# Patient Record
Sex: MALE | Race: WHITE | NOT HISPANIC OR LATINO | Employment: OTHER | URBAN - METROPOLITAN AREA
[De-identification: names, ages, dates, MRNs, and addresses within clinical notes are randomized per-mention and may not be internally consistent; named-entity substitution may affect disease eponyms.]

---

## 2017-01-01 ENCOUNTER — HOSPITAL ENCOUNTER (OUTPATIENT)
Dept: RADIOLOGY | Facility: MEDICAL CENTER | Age: 77
End: 2017-07-11
Attending: INTERNAL MEDICINE
Payer: MEDICARE

## 2017-01-01 ENCOUNTER — APPOINTMENT (OUTPATIENT)
Dept: RADIOLOGY | Facility: MEDICAL CENTER | Age: 77
End: 2017-01-01
Attending: INTERNAL MEDICINE
Payer: MEDICARE

## 2017-01-01 ENCOUNTER — HOSPITAL ENCOUNTER (OUTPATIENT)
Dept: RADIOLOGY | Facility: MEDICAL CENTER | Age: 77
End: 2017-08-08
Attending: INTERNAL MEDICINE
Payer: MEDICARE

## 2017-01-01 ENCOUNTER — APPOINTMENT (OUTPATIENT)
Dept: RADIOLOGY | Facility: MEDICAL CENTER | Age: 77
End: 2017-01-01
Attending: EMERGENCY MEDICINE
Payer: MEDICARE

## 2017-01-01 ENCOUNTER — HOSPITAL ENCOUNTER (OUTPATIENT)
Dept: RADIOLOGY | Facility: MEDICAL CENTER | Age: 77
End: 2017-10-10
Attending: INTERNAL MEDICINE
Payer: MEDICARE

## 2017-01-01 ENCOUNTER — OFFICE VISIT (OUTPATIENT)
Dept: MEDICAL GROUP | Facility: CLINIC | Age: 77
End: 2017-01-01
Payer: MEDICARE

## 2017-01-01 ENCOUNTER — APPOINTMENT (OUTPATIENT)
Dept: RADIOLOGY | Facility: MEDICAL CENTER | Age: 77
DRG: 193 | End: 2017-01-01
Payer: MEDICARE

## 2017-01-01 ENCOUNTER — HOSPITAL ENCOUNTER (OUTPATIENT)
Dept: RADIOLOGY | Facility: MEDICAL CENTER | Age: 77
End: 2017-09-05
Attending: INTERNAL MEDICINE
Payer: MEDICARE

## 2017-01-01 ENCOUNTER — HOSPITAL ENCOUNTER (OUTPATIENT)
Facility: MEDICAL CENTER | Age: 77
End: 2017-11-27
Attending: SURGERY | Admitting: SURGERY
Payer: MEDICARE

## 2017-01-01 ENCOUNTER — TELEPHONE (OUTPATIENT)
Dept: MEDICAL GROUP | Facility: CLINIC | Age: 77
End: 2017-01-01

## 2017-01-01 ENCOUNTER — HOSPITAL ENCOUNTER (OUTPATIENT)
Dept: RADIOLOGY | Facility: MEDICAL CENTER | Age: 77
End: 2017-05-02
Attending: INTERNAL MEDICINE
Payer: MEDICARE

## 2017-01-01 ENCOUNTER — PATIENT OUTREACH (OUTPATIENT)
Dept: HEALTH INFORMATION MANAGEMENT | Facility: OTHER | Age: 77
End: 2017-01-01

## 2017-01-01 ENCOUNTER — HOSPITAL ENCOUNTER (OUTPATIENT)
Dept: RADIOLOGY | Facility: MEDICAL CENTER | Age: 77
End: 2017-08-22
Attending: INTERNAL MEDICINE
Payer: MEDICARE

## 2017-01-01 ENCOUNTER — HOSPITAL ENCOUNTER (OUTPATIENT)
Dept: RADIOLOGY | Facility: MEDICAL CENTER | Age: 77
End: 2017-10-31
Attending: INTERNAL MEDICINE
Payer: MEDICARE

## 2017-01-01 ENCOUNTER — HOSPITAL ENCOUNTER (EMERGENCY)
Facility: MEDICAL CENTER | Age: 77
DRG: 193 | End: 2017-02-15
Payer: MEDICARE

## 2017-01-01 ENCOUNTER — APPOINTMENT (OUTPATIENT)
Dept: RADIOLOGY | Facility: MEDICAL CENTER | Age: 77
DRG: 193 | End: 2017-01-01
Attending: EMERGENCY MEDICINE
Payer: MEDICARE

## 2017-01-01 ENCOUNTER — HOSPITAL ENCOUNTER (EMERGENCY)
Facility: MEDICAL CENTER | Age: 77
End: 2017-04-16
Attending: EMERGENCY MEDICINE
Payer: MEDICARE

## 2017-01-01 ENCOUNTER — HOSPITAL ENCOUNTER (OUTPATIENT)
Dept: RADIOLOGY | Facility: MEDICAL CENTER | Age: 77
End: 2017-09-26
Attending: INTERNAL MEDICINE
Payer: MEDICARE

## 2017-01-01 ENCOUNTER — HOSPITAL ENCOUNTER (OUTPATIENT)
Dept: RADIOLOGY | Facility: MEDICAL CENTER | Age: 77
End: 2017-10-20
Attending: INTERNAL MEDICINE
Payer: MEDICARE

## 2017-01-01 ENCOUNTER — NON-PROVIDER VISIT (OUTPATIENT)
Dept: CARDIOLOGY | Facility: MEDICAL CENTER | Age: 77
End: 2017-01-01
Payer: MEDICARE

## 2017-01-01 ENCOUNTER — HOSPITAL ENCOUNTER (OUTPATIENT)
Dept: RADIOLOGY | Facility: MEDICAL CENTER | Age: 77
End: 2017-11-21
Attending: INTERNAL MEDICINE
Payer: MEDICARE

## 2017-01-01 ENCOUNTER — HOSPITAL ENCOUNTER (INPATIENT)
Facility: MEDICAL CENTER | Age: 77
LOS: 3 days | DRG: 683 | End: 2017-01-19
Attending: EMERGENCY MEDICINE | Admitting: INTERNAL MEDICINE
Payer: MEDICARE

## 2017-01-01 ENCOUNTER — APPOINTMENT (OUTPATIENT)
Dept: RADIOLOGY | Facility: MEDICAL CENTER | Age: 77
DRG: 193 | End: 2017-01-01
Attending: HOSPITALIST
Payer: MEDICARE

## 2017-01-01 ENCOUNTER — HOSPITAL ENCOUNTER (EMERGENCY)
Facility: MEDICAL CENTER | Age: 77
End: 2017-09-14
Attending: EMERGENCY MEDICINE
Payer: MEDICARE

## 2017-01-01 ENCOUNTER — HOSPITAL ENCOUNTER (OUTPATIENT)
Dept: RADIOLOGY | Facility: MEDICAL CENTER | Age: 77
End: 2017-05-16
Attending: INTERNAL MEDICINE
Payer: MEDICARE

## 2017-01-01 ENCOUNTER — HOSPITAL ENCOUNTER (EMERGENCY)
Facility: MEDICAL CENTER | Age: 77
End: 2017-03-28
Attending: EMERGENCY MEDICINE
Payer: MEDICARE

## 2017-01-01 ENCOUNTER — APPOINTMENT (OUTPATIENT)
Dept: RADIOLOGY | Facility: MEDICAL CENTER | Age: 77
DRG: 871 | End: 2017-01-01
Attending: INTERNAL MEDICINE
Payer: MEDICARE

## 2017-01-01 ENCOUNTER — HOSPITAL ENCOUNTER (OUTPATIENT)
Dept: RADIOLOGY | Facility: MEDICAL CENTER | Age: 77
End: 2017-06-27
Attending: INTERNAL MEDICINE
Payer: MEDICARE

## 2017-01-01 ENCOUNTER — HOSPITAL ENCOUNTER (OUTPATIENT)
Dept: RADIOLOGY | Facility: MEDICAL CENTER | Age: 77
End: 2017-07-25
Attending: INTERNAL MEDICINE
Payer: MEDICARE

## 2017-01-01 ENCOUNTER — OFFICE VISIT (OUTPATIENT)
Dept: CARDIOLOGY | Facility: MEDICAL CENTER | Age: 77
End: 2017-01-01
Payer: MEDICARE

## 2017-01-01 ENCOUNTER — HOSPITAL ENCOUNTER (OUTPATIENT)
Dept: RADIOLOGY | Facility: MEDICAL CENTER | Age: 77
DRG: 871 | End: 2017-12-26
Attending: INTERNAL MEDICINE
Payer: MEDICARE

## 2017-01-01 ENCOUNTER — HOSPITAL ENCOUNTER (INPATIENT)
Facility: MEDICAL CENTER | Age: 77
LOS: 2 days | DRG: 193 | End: 2017-02-18
Attending: EMERGENCY MEDICINE | Admitting: HOSPITALIST
Payer: MEDICARE

## 2017-01-01 ENCOUNTER — HOSPITAL ENCOUNTER (INPATIENT)
Facility: MEDICAL CENTER | Age: 77
LOS: 11 days | DRG: 871 | End: 2018-01-09
Attending: EMERGENCY MEDICINE | Admitting: HOSPITALIST
Payer: MEDICARE

## 2017-01-01 ENCOUNTER — HOSPITAL ENCOUNTER (OUTPATIENT)
Dept: RADIOLOGY | Facility: MEDICAL CENTER | Age: 77
End: 2017-05-30
Attending: INTERNAL MEDICINE
Payer: MEDICARE

## 2017-01-01 ENCOUNTER — HOSPITAL ENCOUNTER (OUTPATIENT)
Dept: RADIOLOGY | Facility: MEDICAL CENTER | Age: 77
End: 2017-12-05
Attending: INTERNAL MEDICINE
Payer: MEDICARE

## 2017-01-01 ENCOUNTER — HOSPITAL ENCOUNTER (OUTPATIENT)
Dept: RADIOLOGY | Facility: MEDICAL CENTER | Age: 77
End: 2017-11-09
Attending: INTERNAL MEDICINE
Payer: MEDICARE

## 2017-01-01 ENCOUNTER — APPOINTMENT (OUTPATIENT)
Dept: RADIOLOGY | Facility: MEDICAL CENTER | Age: 77
DRG: 683 | End: 2017-01-01
Attending: INTERNAL MEDICINE
Payer: MEDICARE

## 2017-01-01 ENCOUNTER — APPOINTMENT (OUTPATIENT)
Dept: RADIOLOGY | Facility: MEDICAL CENTER | Age: 77
DRG: 683 | End: 2017-01-01
Attending: HOSPITALIST
Payer: MEDICARE

## 2017-01-01 ENCOUNTER — RESOLUTE PROFESSIONAL BILLING HOSPITAL PROF FEE (OUTPATIENT)
Dept: HOSPITALIST | Facility: MEDICAL CENTER | Age: 77
End: 2017-01-01
Payer: MEDICARE

## 2017-01-01 ENCOUNTER — APPOINTMENT (OUTPATIENT)
Dept: RADIOLOGY | Facility: MEDICAL CENTER | Age: 77
DRG: 871 | End: 2017-01-01
Payer: MEDICARE

## 2017-01-01 ENCOUNTER — HOSPITAL ENCOUNTER (OUTPATIENT)
Dept: RADIOLOGY | Facility: MEDICAL CENTER | Age: 77
End: 2017-06-13
Attending: INTERNAL MEDICINE
Payer: MEDICARE

## 2017-01-01 ENCOUNTER — HOSPITAL ENCOUNTER (OUTPATIENT)
Dept: RADIOLOGY | Facility: MEDICAL CENTER | Age: 77
End: 2017-12-14
Attending: INTERNAL MEDICINE
Payer: MEDICARE

## 2017-01-01 ENCOUNTER — APPOINTMENT (OUTPATIENT)
Dept: RADIOLOGY | Facility: MEDICAL CENTER | Age: 77
DRG: 871 | End: 2017-01-01
Attending: EMERGENCY MEDICINE
Payer: MEDICARE

## 2017-01-01 ENCOUNTER — HOSPITAL ENCOUNTER (OUTPATIENT)
Dept: RADIOLOGY | Facility: MEDICAL CENTER | Age: 77
End: 2017-05-05
Attending: INTERNAL MEDICINE
Payer: MEDICARE

## 2017-01-01 ENCOUNTER — APPOINTMENT (OUTPATIENT)
Dept: RADIOLOGY | Facility: MEDICAL CENTER | Age: 77
DRG: 683 | End: 2017-01-01
Attending: EMERGENCY MEDICINE
Payer: MEDICARE

## 2017-01-01 VITALS
SYSTOLIC BLOOD PRESSURE: 112 MMHG | WEIGHT: 177.1 LBS | TEMPERATURE: 98.3 F | HEART RATE: 86 BPM | OXYGEN SATURATION: 96 % | DIASTOLIC BLOOD PRESSURE: 52 MMHG | BODY MASS INDEX: 23.99 KG/M2 | RESPIRATION RATE: 16 BRPM | HEIGHT: 72 IN

## 2017-01-01 VITALS
OXYGEN SATURATION: 96 % | HEART RATE: 92 BPM | SYSTOLIC BLOOD PRESSURE: 124 MMHG | DIASTOLIC BLOOD PRESSURE: 56 MMHG | BODY MASS INDEX: 25.86 KG/M2 | HEIGHT: 72 IN | RESPIRATION RATE: 20 BRPM | TEMPERATURE: 98.4 F | WEIGHT: 190.9 LBS

## 2017-01-01 VITALS
RESPIRATION RATE: 17 BRPM | HEIGHT: 72 IN | DIASTOLIC BLOOD PRESSURE: 64 MMHG | OXYGEN SATURATION: 94 % | SYSTOLIC BLOOD PRESSURE: 159 MMHG | HEART RATE: 74 BPM | TEMPERATURE: 98.7 F | BODY MASS INDEX: 25.89 KG/M2 | WEIGHT: 191.14 LBS

## 2017-01-01 VITALS
HEIGHT: 72 IN | DIASTOLIC BLOOD PRESSURE: 70 MMHG | HEART RATE: 84 BPM | BODY MASS INDEX: 25.47 KG/M2 | SYSTOLIC BLOOD PRESSURE: 140 MMHG | WEIGHT: 188 LBS | OXYGEN SATURATION: 98 %

## 2017-01-01 VITALS
DIASTOLIC BLOOD PRESSURE: 63 MMHG | HEIGHT: 72 IN | HEART RATE: 68 BPM | TEMPERATURE: 97.5 F | SYSTOLIC BLOOD PRESSURE: 124 MMHG | RESPIRATION RATE: 16 BRPM | WEIGHT: 178.13 LBS | BODY MASS INDEX: 24.13 KG/M2 | OXYGEN SATURATION: 98 %

## 2017-01-01 VITALS
HEIGHT: 72 IN | TEMPERATURE: 97.7 F | OXYGEN SATURATION: 99 % | DIASTOLIC BLOOD PRESSURE: 70 MMHG | RESPIRATION RATE: 16 BRPM | WEIGHT: 177 LBS | SYSTOLIC BLOOD PRESSURE: 120 MMHG | HEART RATE: 86 BPM | BODY MASS INDEX: 23.98 KG/M2

## 2017-01-01 VITALS
BODY MASS INDEX: 23.86 KG/M2 | HEIGHT: 73 IN | WEIGHT: 180 LBS | HEART RATE: 94 BPM | SYSTOLIC BLOOD PRESSURE: 128 MMHG | OXYGEN SATURATION: 99 % | RESPIRATION RATE: 21 BRPM | DIASTOLIC BLOOD PRESSURE: 71 MMHG | TEMPERATURE: 98.2 F

## 2017-01-01 VITALS
DIASTOLIC BLOOD PRESSURE: 54 MMHG | SYSTOLIC BLOOD PRESSURE: 132 MMHG | TEMPERATURE: 98.4 F | HEIGHT: 72 IN | RESPIRATION RATE: 18 BRPM | HEART RATE: 84 BPM | WEIGHT: 174.6 LBS | BODY MASS INDEX: 23.65 KG/M2 | OXYGEN SATURATION: 97 %

## 2017-01-01 VITALS
RESPIRATION RATE: 18 BRPM | WEIGHT: 171 LBS | SYSTOLIC BLOOD PRESSURE: 122 MMHG | BODY MASS INDEX: 22.66 KG/M2 | HEART RATE: 78 BPM | OXYGEN SATURATION: 98 % | TEMPERATURE: 98 F | DIASTOLIC BLOOD PRESSURE: 60 MMHG | HEIGHT: 73 IN

## 2017-01-01 VITALS
HEART RATE: 78 BPM | SYSTOLIC BLOOD PRESSURE: 167 MMHG | TEMPERATURE: 97.5 F | HEIGHT: 72 IN | WEIGHT: 167.77 LBS | RESPIRATION RATE: 16 BRPM | DIASTOLIC BLOOD PRESSURE: 75 MMHG | OXYGEN SATURATION: 100 % | BODY MASS INDEX: 22.72 KG/M2

## 2017-01-01 VITALS
TEMPERATURE: 98.8 F | OXYGEN SATURATION: 93 % | HEART RATE: 78 BPM | HEIGHT: 72 IN | SYSTOLIC BLOOD PRESSURE: 133 MMHG | RESPIRATION RATE: 18 BRPM | DIASTOLIC BLOOD PRESSURE: 74 MMHG | BODY MASS INDEX: 23.23 KG/M2 | WEIGHT: 171.52 LBS

## 2017-01-01 VITALS
OXYGEN SATURATION: 95 % | BODY MASS INDEX: 23.12 KG/M2 | WEIGHT: 170.7 LBS | HEART RATE: 80 BPM | RESPIRATION RATE: 18 BRPM | TEMPERATURE: 98.5 F | SYSTOLIC BLOOD PRESSURE: 126 MMHG | HEIGHT: 72 IN | DIASTOLIC BLOOD PRESSURE: 58 MMHG

## 2017-01-01 VITALS — SYSTOLIC BLOOD PRESSURE: 127 MMHG | OXYGEN SATURATION: 100 % | DIASTOLIC BLOOD PRESSURE: 61 MMHG | HEART RATE: 74 BPM

## 2017-01-01 VITALS
HEART RATE: 103 BPM | BODY MASS INDEX: 23.26 KG/M2 | WEIGHT: 171.74 LBS | DIASTOLIC BLOOD PRESSURE: 82 MMHG | TEMPERATURE: 98.6 F | OXYGEN SATURATION: 98 % | SYSTOLIC BLOOD PRESSURE: 125 MMHG | HEIGHT: 72 IN | RESPIRATION RATE: 16 BRPM

## 2017-01-01 VITALS
HEIGHT: 72 IN | WEIGHT: 172 LBS | OXYGEN SATURATION: 97 % | DIASTOLIC BLOOD PRESSURE: 82 MMHG | BODY MASS INDEX: 23.3 KG/M2 | HEART RATE: 82 BPM | SYSTOLIC BLOOD PRESSURE: 176 MMHG

## 2017-01-01 VITALS
DIASTOLIC BLOOD PRESSURE: 65 MMHG | WEIGHT: 191.8 LBS | SYSTOLIC BLOOD PRESSURE: 124 MMHG | HEIGHT: 72 IN | BODY MASS INDEX: 25.98 KG/M2 | TEMPERATURE: 98.2 F | OXYGEN SATURATION: 94 % | HEART RATE: 94 BPM | RESPIRATION RATE: 19 BRPM

## 2017-01-01 VITALS
DIASTOLIC BLOOD PRESSURE: 50 MMHG | TEMPERATURE: 98 F | OXYGEN SATURATION: 100 % | WEIGHT: 177 LBS | HEART RATE: 82 BPM | HEIGHT: 73 IN | RESPIRATION RATE: 16 BRPM | BODY MASS INDEX: 23.46 KG/M2 | SYSTOLIC BLOOD PRESSURE: 122 MMHG

## 2017-01-01 VITALS — SYSTOLIC BLOOD PRESSURE: 127 MMHG | DIASTOLIC BLOOD PRESSURE: 62 MMHG | OXYGEN SATURATION: 99 % | HEART RATE: 76 BPM

## 2017-01-01 VITALS — DIASTOLIC BLOOD PRESSURE: 60 MMHG | HEART RATE: 79 BPM | SYSTOLIC BLOOD PRESSURE: 131 MMHG | OXYGEN SATURATION: 98 %

## 2017-01-01 DIAGNOSIS — E88.01 ALPHA-1-ANTITRYPSIN DEFICIENCY (HCC): ICD-10-CM

## 2017-01-01 DIAGNOSIS — N17.9 ACUTE ON CHRONIC RENAL FAILURE (HCC): ICD-10-CM

## 2017-01-01 DIAGNOSIS — E11.22 TYPE 2 DIABETES MELLITUS WITH STAGE 2 CHRONIC KIDNEY DISEASE, WITH LONG-TERM CURRENT USE OF INSULIN (HCC): Chronic | ICD-10-CM

## 2017-01-01 DIAGNOSIS — R18.8 OTHER ASCITES: ICD-10-CM

## 2017-01-01 DIAGNOSIS — E46 PROTEIN CALORIE MALNUTRITION (HCC): ICD-10-CM

## 2017-01-01 DIAGNOSIS — R18.8 ASCITES OF LIVER: ICD-10-CM

## 2017-01-01 DIAGNOSIS — J40 BRONCHITIS: ICD-10-CM

## 2017-01-01 DIAGNOSIS — E78.5 DYSLIPIDEMIA: Chronic | ICD-10-CM

## 2017-01-01 DIAGNOSIS — Z95.0 S/P PLACEMENT OF CARDIAC PACEMAKER: ICD-10-CM

## 2017-01-01 DIAGNOSIS — R06.02 SOB (SHORTNESS OF BREATH): ICD-10-CM

## 2017-01-01 DIAGNOSIS — N28.9 RENAL INSUFFICIENCY: ICD-10-CM

## 2017-01-01 DIAGNOSIS — E11.9 TYPE 2 DIABETES MELLITUS WITHOUT COMPLICATION, WITHOUT LONG-TERM CURRENT USE OF INSULIN (HCC): ICD-10-CM

## 2017-01-01 DIAGNOSIS — N18.9 ACUTE ON CHRONIC RENAL FAILURE (HCC): ICD-10-CM

## 2017-01-01 DIAGNOSIS — N40.1 BENIGN NON-NODULAR PROSTATIC HYPERPLASIA WITH LOWER URINARY TRACT SYMPTOMS: ICD-10-CM

## 2017-01-01 DIAGNOSIS — E11.9 TYPE II DIABETES MELLITUS, WELL CONTROLLED (HCC): ICD-10-CM

## 2017-01-01 DIAGNOSIS — I10 ESSENTIAL HYPERTENSION: Chronic | ICD-10-CM

## 2017-01-01 DIAGNOSIS — Z95.0 CARDIAC PACEMAKER IN SITU: ICD-10-CM

## 2017-01-01 DIAGNOSIS — Z23 NEED FOR INFLUENZA VACCINATION: ICD-10-CM

## 2017-01-01 DIAGNOSIS — Z95.0 S/P PLACEMENT OF CARDIAC PACEMAKER: Chronic | ICD-10-CM

## 2017-01-01 DIAGNOSIS — R18.8 ASCITES CONTROLLED WITH MEDICATION: ICD-10-CM

## 2017-01-01 DIAGNOSIS — D64.9 NORMOCYTIC ANEMIA: ICD-10-CM

## 2017-01-01 DIAGNOSIS — E87.8 HYPERCHLOREMIA: ICD-10-CM

## 2017-01-01 DIAGNOSIS — K70.31 ASCITES DUE TO ALCOHOLIC CIRRHOSIS (HCC): ICD-10-CM

## 2017-01-01 DIAGNOSIS — N19 RENAL FAILURE: ICD-10-CM

## 2017-01-01 DIAGNOSIS — R06.02 SHORTNESS OF BREATH: ICD-10-CM

## 2017-01-01 DIAGNOSIS — Z79.4 TYPE 2 DIABETES MELLITUS WITH STAGE 2 CHRONIC KIDNEY DISEASE, WITH LONG-TERM CURRENT USE OF INSULIN (HCC): ICD-10-CM

## 2017-01-01 DIAGNOSIS — Z79.4 TYPE 2 DIABETES MELLITUS WITH STAGE 2 CHRONIC KIDNEY DISEASE, WITH LONG-TERM CURRENT USE OF INSULIN (HCC): Chronic | ICD-10-CM

## 2017-01-01 DIAGNOSIS — I10 ESSENTIAL HYPERTENSION: ICD-10-CM

## 2017-01-01 DIAGNOSIS — N18.5 ACUTE RENAL FAILURE SUPERIMPOSED ON STAGE 5 CHRONIC KIDNEY DISEASE, NOT ON CHRONIC DIALYSIS, UNSPECIFIED ACUTE RENAL FAILURE TYPE (HCC): ICD-10-CM

## 2017-01-01 DIAGNOSIS — K72.90 HEPATIC INSUFFICIENCY (HCC): ICD-10-CM

## 2017-01-01 DIAGNOSIS — I44.2 AV BLOCK, 3RD DEGREE (HCC): ICD-10-CM

## 2017-01-01 DIAGNOSIS — J90 PLEURAL EFFUSION: ICD-10-CM

## 2017-01-01 DIAGNOSIS — N18.2 TYPE 2 DIABETES MELLITUS WITH STAGE 2 CHRONIC KIDNEY DISEASE, WITH LONG-TERM CURRENT USE OF INSULIN (HCC): Chronic | ICD-10-CM

## 2017-01-01 DIAGNOSIS — R80.9 PROTEINURIA: ICD-10-CM

## 2017-01-01 DIAGNOSIS — N17.9 ACUTE RENAL FAILURE SUPERIMPOSED ON STAGE 5 CHRONIC KIDNEY DISEASE, NOT ON CHRONIC DIALYSIS, UNSPECIFIED ACUTE RENAL FAILURE TYPE (HCC): ICD-10-CM

## 2017-01-01 DIAGNOSIS — J11.1 INFLUENZA: ICD-10-CM

## 2017-01-01 DIAGNOSIS — N18.2 TYPE 2 DIABETES MELLITUS WITH STAGE 2 CHRONIC KIDNEY DISEASE, WITH LONG-TERM CURRENT USE OF INSULIN (HCC): ICD-10-CM

## 2017-01-01 DIAGNOSIS — E11.22 TYPE 2 DIABETES MELLITUS WITH STAGE 2 CHRONIC KIDNEY DISEASE, WITH LONG-TERM CURRENT USE OF INSULIN (HCC): ICD-10-CM

## 2017-01-01 DIAGNOSIS — R05.9 COUGH: ICD-10-CM

## 2017-01-01 DIAGNOSIS — D64.9 ANEMIA, UNSPECIFIED TYPE: ICD-10-CM

## 2017-01-01 DIAGNOSIS — N18.9 CRF (CHRONIC RENAL FAILURE), UNSPECIFIED STAGE: ICD-10-CM

## 2017-01-01 DIAGNOSIS — D64.9 CHRONIC ANEMIA: ICD-10-CM

## 2017-01-01 DIAGNOSIS — J18.9 PNEUMONIA DUE TO INFECTIOUS ORGANISM, UNSPECIFIED LATERALITY, UNSPECIFIED PART OF LUNG: ICD-10-CM

## 2017-01-01 DIAGNOSIS — R89.9 ABNORMAL LABORATORY TEST: ICD-10-CM

## 2017-01-01 DIAGNOSIS — J18.9 PNEUMONIA OF RIGHT UPPER LOBE DUE TO INFECTIOUS ORGANISM: ICD-10-CM

## 2017-01-01 DIAGNOSIS — K74.69 CIRRHOSIS, CRYPTOGENIC (HCC): ICD-10-CM

## 2017-01-01 DIAGNOSIS — E11.22 TYPE 2 DIABETES MELLITUS WITH CHRONIC KIDNEY DISEASE, WITHOUT LONG-TERM CURRENT USE OF INSULIN, UNSPECIFIED CKD STAGE (HCC): ICD-10-CM

## 2017-01-01 DIAGNOSIS — R53.1 WEAKNESS: ICD-10-CM

## 2017-01-01 DIAGNOSIS — E78.5 DYSLIPIDEMIA: ICD-10-CM

## 2017-01-01 DIAGNOSIS — R52 BODY ACHES: ICD-10-CM

## 2017-01-01 LAB
A1AT SERPL-MCNC: 80 MG/DL (ref 90–200)
A1AT SERPL-MCNC: 83 MG/DL (ref 90–200)
ACTION RANGE TRIGGERED IACRT: YES
AFP-TM SERPL-MCNC: 2 NG/ML (ref 0–9)
ALBUMIN FLD-MCNC: <1 G/DL
ALBUMIN SERPL BCP-MCNC: 2 G/DL (ref 3.2–4.9)
ALBUMIN SERPL BCP-MCNC: 2.1 G/DL (ref 3.2–4.9)
ALBUMIN SERPL BCP-MCNC: 2.2 G/DL (ref 3.2–4.9)
ALBUMIN SERPL BCP-MCNC: 2.4 G/DL (ref 3.2–4.9)
ALBUMIN SERPL BCP-MCNC: 2.4 G/DL (ref 3.2–4.9)
ALBUMIN SERPL BCP-MCNC: 2.6 G/DL (ref 3.2–4.9)
ALBUMIN SERPL BCP-MCNC: 2.7 G/DL (ref 3.2–4.9)
ALBUMIN SERPL BCP-MCNC: 2.7 G/DL (ref 3.2–4.9)
ALBUMIN SERPL BCP-MCNC: 3 G/DL (ref 3.2–4.9)
ALBUMIN SERPL BCP-MCNC: 3.1 G/DL (ref 3.2–4.9)
ALBUMIN/GLOB SERPL: 0.8 G/DL
ALBUMIN/GLOB SERPL: 0.9 G/DL
ALP SERPL-CCNC: 103 U/L (ref 30–99)
ALP SERPL-CCNC: 118 U/L (ref 30–99)
ALP SERPL-CCNC: 140 U/L (ref 30–99)
ALP SERPL-CCNC: 145 U/L (ref 30–99)
ALP SERPL-CCNC: 155 U/L (ref 30–99)
ALP SERPL-CCNC: 165 U/L (ref 30–99)
ALP SERPL-CCNC: 198 U/L (ref 30–99)
ALP SERPL-CCNC: 75 U/L (ref 30–99)
ALP SERPL-CCNC: 79 U/L (ref 30–99)
ALP SERPL-CCNC: 98 U/L (ref 30–99)
ALT SERPL-CCNC: 10 U/L (ref 2–50)
ALT SERPL-CCNC: 11 U/L (ref 2–50)
ALT SERPL-CCNC: 12 U/L (ref 2–50)
ALT SERPL-CCNC: 14 U/L (ref 2–50)
ALT SERPL-CCNC: 20 U/L (ref 2–50)
ALT SERPL-CCNC: 9 U/L (ref 2–50)
ALT SERPL-CCNC: 9 U/L (ref 2–50)
AMORPH CRY #/AREA URNS HPF: PRESENT /HPF
ANION GAP SERPL CALC-SCNC: 10 MMOL/L (ref 0–11.9)
ANION GAP SERPL CALC-SCNC: 11 MMOL/L (ref 0–11.9)
ANION GAP SERPL CALC-SCNC: 12 MMOL/L (ref 0–11.9)
ANION GAP SERPL CALC-SCNC: 13 MMOL/L (ref 0–11.9)
ANION GAP SERPL CALC-SCNC: 14 MMOL/L (ref 0–11.9)
ANION GAP SERPL CALC-SCNC: 15 MMOL/L (ref 0–11.9)
ANION GAP SERPL CALC-SCNC: 15 MMOL/L (ref 0–11.9)
ANION GAP SERPL CALC-SCNC: 16 MMOL/L (ref 0–11.9)
ANION GAP SERPL CALC-SCNC: 17 MMOL/L (ref 0–11.9)
ANION GAP SERPL CALC-SCNC: 19 MMOL/L (ref 0–11.9)
ANION GAP SERPL CALC-SCNC: 6 MMOL/L (ref 0–11.9)
ANION GAP SERPL CALC-SCNC: 7 MMOL/L (ref 0–11.9)
ANION GAP SERPL CALC-SCNC: 8 MMOL/L (ref 0–11.9)
ANION GAP SERPL CALC-SCNC: 9 MMOL/L (ref 0–11.9)
ANION GAP SERPL CALC-SCNC: 9 MMOL/L (ref 0–11.9)
ANISOCYTOSIS BLD QL SMEAR: ABNORMAL
APPEARANCE FLD: NORMAL
APPEARANCE UR: ABNORMAL
APPEARANCE UR: CLEAR
APTT PPP: 25.8 SEC (ref 24.7–36)
APTT PPP: 29.4 SEC (ref 24.7–36)
APTT PPP: 30.9 SEC (ref 24.7–36)
APTT PPP: 31.1 SEC (ref 24.7–36)
APTT PPP: 32.9 SEC (ref 24.7–36)
AST SERPL-CCNC: 23 U/L (ref 12–45)
AST SERPL-CCNC: 25 U/L (ref 12–45)
AST SERPL-CCNC: 25 U/L (ref 12–45)
AST SERPL-CCNC: 26 U/L (ref 12–45)
AST SERPL-CCNC: 28 U/L (ref 12–45)
AST SERPL-CCNC: 28 U/L (ref 12–45)
AST SERPL-CCNC: 29 U/L (ref 12–45)
AST SERPL-CCNC: 31 U/L (ref 12–45)
AST SERPL-CCNC: 35 U/L (ref 12–45)
AST SERPL-CCNC: 39 U/L (ref 12–45)
B-OH-BUTYR SERPL-MCNC: 1.49 MMOL/L (ref 0.02–0.27)
BACTERIA #/AREA URNS HPF: ABNORMAL /HPF
BACTERIA #/AREA URNS HPF: NEGATIVE /HPF
BACTERIA BLD CULT: NORMAL
BACTERIA FLD AEROBE CULT: NORMAL
BASE EXCESS BLDA CALC-SCNC: -17 MMOL/L (ref -4–3)
BASOPHILS # BLD AUTO: 0 % (ref 0–1.8)
BASOPHILS # BLD AUTO: 0.2 % (ref 0–1.8)
BASOPHILS # BLD AUTO: 0.2 % (ref 0–1.8)
BASOPHILS # BLD AUTO: 0.4 % (ref 0–1.8)
BASOPHILS # BLD AUTO: 0.4 % (ref 0–1.8)
BASOPHILS # BLD AUTO: 0.6 % (ref 0–1.8)
BASOPHILS # BLD: 0 K/UL (ref 0–0.12)
BASOPHILS # BLD: 0.01 K/UL (ref 0–0.12)
BASOPHILS # BLD: 0.01 K/UL (ref 0–0.12)
BASOPHILS # BLD: 0.02 K/UL (ref 0–0.12)
BASOPHILS # BLD: 0.04 K/UL (ref 0–0.12)
BASOPHILS # BLD: 0.06 K/UL (ref 0–0.12)
BASOPHILS NFR FLD: 1 %
BILIRUB FLD-MCNC: 0.3 MG/DL
BILIRUB SERPL-MCNC: 0.3 MG/DL (ref 0.1–1.5)
BILIRUB SERPL-MCNC: 0.3 MG/DL (ref 0.1–1.5)
BILIRUB SERPL-MCNC: 0.4 MG/DL (ref 0.1–1.5)
BILIRUB SERPL-MCNC: 0.5 MG/DL (ref 0.1–1.5)
BILIRUB SERPL-MCNC: 0.6 MG/DL (ref 0.1–1.5)
BILIRUB SERPL-MCNC: 0.6 MG/DL (ref 0.1–1.5)
BILIRUB SERPL-MCNC: 0.8 MG/DL (ref 0.1–1.5)
BILIRUB SERPL-MCNC: 0.8 MG/DL (ref 0.1–1.5)
BILIRUB UR QL STRIP.AUTO: NEGATIVE
BILIRUB UR QL STRIP.AUTO: NEGATIVE
BNP SERPL-MCNC: 103 PG/ML (ref 0–100)
BNP SERPL-MCNC: 108 PG/ML (ref 0–100)
BNP SERPL-MCNC: 300 PG/ML (ref 0–100)
BNP SERPL-MCNC: 361 PG/ML (ref 0–100)
BODY FLD TYPE: NORMAL
BODY FLD TYPE: NORMAL
BODY TEMPERATURE: ABNORMAL DEGREES
BUN SERPL-MCNC: 29 MG/DL (ref 8–22)
BUN SERPL-MCNC: 30 MG/DL (ref 8–22)
BUN SERPL-MCNC: 31 MG/DL (ref 8–22)
BUN SERPL-MCNC: 32 MG/DL (ref 8–22)
BUN SERPL-MCNC: 33 MG/DL (ref 8–22)
BUN SERPL-MCNC: 33 MG/DL (ref 8–22)
BUN SERPL-MCNC: 39 MG/DL (ref 8–22)
BUN SERPL-MCNC: 42 MG/DL (ref 8–22)
BUN SERPL-MCNC: 43 MG/DL (ref 8–27)
BUN SERPL-MCNC: 45 MG/DL (ref 8–22)
BUN SERPL-MCNC: 89 MG/DL (ref 8–22)
BUN SERPL-MCNC: 90 MG/DL (ref 8–22)
BUN SERPL-MCNC: 90 MG/DL (ref 8–22)
BUN SERPL-MCNC: 91 MG/DL (ref 8–22)
BUN SERPL-MCNC: 92 MG/DL (ref 8–22)
BUN SERPL-MCNC: 92 MG/DL (ref 8–22)
BUN SERPL-MCNC: 95 MG/DL (ref 8–22)
BUN/CREAT SERPL: 9 (ref 10–24)
BURR CELLS BLD QL SMEAR: NORMAL
C DIFF DNA SPEC QL NAA+PROBE: NEGATIVE
C DIFF TOX GENS STL QL NAA+PROBE: NEGATIVE
CALCIUM SERPL-MCNC: 6.8 MG/DL (ref 8.5–10.5)
CALCIUM SERPL-MCNC: 6.8 MG/DL (ref 8.5–10.5)
CALCIUM SERPL-MCNC: 7 MG/DL (ref 8.5–10.5)
CALCIUM SERPL-MCNC: 7 MG/DL (ref 8.5–10.5)
CALCIUM SERPL-MCNC: 7.3 MG/DL (ref 8.5–10.5)
CALCIUM SERPL-MCNC: 7.4 MG/DL (ref 8.5–10.5)
CALCIUM SERPL-MCNC: 7.4 MG/DL (ref 8.5–10.5)
CALCIUM SERPL-MCNC: 7.5 MG/DL (ref 8.5–10.5)
CALCIUM SERPL-MCNC: 7.6 MG/DL (ref 8.5–10.5)
CALCIUM SERPL-MCNC: 7.6 MG/DL (ref 8.5–10.5)
CALCIUM SERPL-MCNC: 7.7 MG/DL (ref 8.5–10.5)
CALCIUM SERPL-MCNC: 7.8 MG/DL (ref 8.5–10.5)
CALCIUM SERPL-MCNC: 7.8 MG/DL (ref 8.5–10.5)
CALCIUM SERPL-MCNC: 8.1 MG/DL (ref 8.5–10.5)
CALCIUM SERPL-MCNC: 8.1 MG/DL (ref 8.5–10.5)
CALCIUM SERPL-MCNC: 8.2 MG/DL (ref 8.5–10.5)
CALCIUM SERPL-MCNC: 8.2 MG/DL (ref 8.6–10.2)
CALCIUM SERPL-MCNC: 8.6 MG/DL (ref 8.5–10.5)
CALCIUM SERPL-MCNC: 8.6 MG/DL (ref 8.5–10.5)
CERULOPLASMIN SERPL-MCNC: 21 MG/DL (ref 17–54)
CHLORIDE SERPL-SCNC: 101 MMOL/L (ref 96–112)
CHLORIDE SERPL-SCNC: 102 MMOL/L (ref 96–112)
CHLORIDE SERPL-SCNC: 103 MMOL/L (ref 96–106)
CHLORIDE SERPL-SCNC: 106 MMOL/L (ref 96–112)
CHLORIDE SERPL-SCNC: 109 MMOL/L (ref 96–112)
CHLORIDE SERPL-SCNC: 110 MMOL/L (ref 96–112)
CHLORIDE SERPL-SCNC: 110 MMOL/L (ref 96–112)
CHLORIDE SERPL-SCNC: 111 MMOL/L (ref 96–112)
CHLORIDE SERPL-SCNC: 112 MMOL/L (ref 96–112)
CHLORIDE SERPL-SCNC: 112 MMOL/L (ref 96–112)
CHLORIDE SERPL-SCNC: 113 MMOL/L (ref 96–112)
CHLORIDE SERPL-SCNC: 114 MMOL/L (ref 96–112)
CHLORIDE SERPL-SCNC: 114 MMOL/L (ref 96–112)
CHLORIDE SERPL-SCNC: 115 MMOL/L (ref 96–112)
CHLORIDE SERPL-SCNC: 115 MMOL/L (ref 96–112)
CHLORIDE SERPL-SCNC: 116 MMOL/L (ref 96–112)
CHLORIDE SERPL-SCNC: 117 MMOL/L (ref 96–112)
CHLORIDE SERPL-SCNC: 117 MMOL/L (ref 96–112)
CHLORIDE UR-SCNC: 98 MMOL/L
CHOLEST SERPL-MCNC: 114 MG/DL (ref 100–199)
CO2 BLDA-SCNC: 8 MMOL/L (ref 20–33)
CO2 SERPL-SCNC: 11 MMOL/L (ref 20–33)
CO2 SERPL-SCNC: 12 MMOL/L (ref 20–33)
CO2 SERPL-SCNC: 13 MMOL/L (ref 20–33)
CO2 SERPL-SCNC: 13 MMOL/L (ref 20–33)
CO2 SERPL-SCNC: 14 MMOL/L (ref 20–33)
CO2 SERPL-SCNC: 15 MMOL/L (ref 20–33)
CO2 SERPL-SCNC: 15 MMOL/L (ref 20–33)
CO2 SERPL-SCNC: 16 MMOL/L (ref 20–33)
CO2 SERPL-SCNC: 17 MMOL/L (ref 20–33)
CO2 SERPL-SCNC: 17 MMOL/L (ref 20–33)
CO2 SERPL-SCNC: 18 MMOL/L (ref 20–33)
CO2 SERPL-SCNC: 18 MMOL/L (ref 20–33)
CO2 SERPL-SCNC: 20 MMOL/L (ref 18–29)
CO2 SERPL-SCNC: 7 MMOL/L (ref 20–33)
CO2 SERPL-SCNC: 8 MMOL/L (ref 20–33)
CO2 SERPL-SCNC: 9 MMOL/L (ref 20–33)
COLOR FLD: YELLOW
COLOR UR: ABNORMAL
COLOR UR: YELLOW
COMMENT 011824: ABNORMAL
CREAT SERPL-MCNC: 2.69 MG/DL (ref 0.5–1.4)
CREAT SERPL-MCNC: 2.82 MG/DL (ref 0.5–1.4)
CREAT SERPL-MCNC: 2.86 MG/DL (ref 0.5–1.4)
CREAT SERPL-MCNC: 2.93 MG/DL (ref 0.5–1.4)
CREAT SERPL-MCNC: 2.94 MG/DL (ref 0.5–1.4)
CREAT SERPL-MCNC: 3.01 MG/DL (ref 0.5–1.4)
CREAT SERPL-MCNC: 3.02 MG/DL (ref 0.5–1.4)
CREAT SERPL-MCNC: 3.04 MG/DL (ref 0.5–1.4)
CREAT SERPL-MCNC: 3.64 MG/DL (ref 0.5–1.4)
CREAT SERPL-MCNC: 4.14 MG/DL (ref 0.5–1.4)
CREAT SERPL-MCNC: 4.75 MG/DL (ref 0.5–1.4)
CREAT SERPL-MCNC: 4.76 MG/DL (ref 0.76–1.27)
CREAT SERPL-MCNC: 4.8 MG/DL (ref 0.5–1.4)
CREAT SERPL-MCNC: 4.98 MG/DL (ref 0.5–1.4)
CREAT SERPL-MCNC: 5.02 MG/DL (ref 0.5–1.4)
CREAT SERPL-MCNC: 5.02 MG/DL (ref 0.5–1.4)
CREAT SERPL-MCNC: 5.16 MG/DL (ref 0.5–1.4)
CREAT SERPL-MCNC: 5.18 MG/DL (ref 0.5–1.4)
CREAT SERPL-MCNC: 5.37 MG/DL (ref 0.5–1.4)
CREAT SERPL-MCNC: 5.42 MG/DL (ref 0.5–1.4)
CREAT SERPL-MCNC: 5.78 MG/DL (ref 0.5–1.4)
CREAT UR-MCNC: 85.2 MG/DL
CSF COMMENTS 1658: NORMAL
CULTURE IF INDICATED INDCX: NO UA CULTURE
CYTOLOGY REG CYTOL: NORMAL
EKG IMPRESSION: NORMAL
EOSINOPHIL # BLD AUTO: 0 K/UL (ref 0–0.51)
EOSINOPHIL # BLD AUTO: 0.03 K/UL (ref 0–0.51)
EOSINOPHIL # BLD AUTO: 0.03 K/UL (ref 0–0.51)
EOSINOPHIL # BLD AUTO: 0.05 K/UL (ref 0–0.51)
EOSINOPHIL # BLD AUTO: 0.08 K/UL (ref 0–0.51)
EOSINOPHIL # BLD AUTO: 0.12 K/UL (ref 0–0.51)
EOSINOPHIL NFR BLD: 0 % (ref 0–6.9)
EOSINOPHIL NFR BLD: 0.5 % (ref 0–6.9)
EOSINOPHIL NFR BLD: 0.7 % (ref 0–6.9)
EOSINOPHIL NFR BLD: 0.8 % (ref 0–6.9)
EOSINOPHIL NFR BLD: 0.9 % (ref 0–6.9)
EOSINOPHIL NFR BLD: 1.4 % (ref 0–6.9)
EPI CELLS #/AREA URNS HPF: ABNORMAL /HPF
EPI CELLS #/AREA URNS HPF: ABNORMAL /HPF
ERYTHROCYTE [DISTWIDTH] IN BLOOD BY AUTOMATED COUNT: 48.6 FL (ref 35.9–50)
ERYTHROCYTE [DISTWIDTH] IN BLOOD BY AUTOMATED COUNT: 48.7 FL (ref 35.9–50)
ERYTHROCYTE [DISTWIDTH] IN BLOOD BY AUTOMATED COUNT: 48.9 FL (ref 35.9–50)
ERYTHROCYTE [DISTWIDTH] IN BLOOD BY AUTOMATED COUNT: 49.2 FL (ref 35.9–50)
ERYTHROCYTE [DISTWIDTH] IN BLOOD BY AUTOMATED COUNT: 50.7 FL (ref 35.9–50)
ERYTHROCYTE [DISTWIDTH] IN BLOOD BY AUTOMATED COUNT: 51 FL (ref 35.9–50)
ERYTHROCYTE [DISTWIDTH] IN BLOOD BY AUTOMATED COUNT: 51.1 FL (ref 35.9–50)
ERYTHROCYTE [DISTWIDTH] IN BLOOD BY AUTOMATED COUNT: 51.9 FL (ref 35.9–50)
ERYTHROCYTE [DISTWIDTH] IN BLOOD BY AUTOMATED COUNT: 53.1 FL (ref 35.9–50)
ERYTHROCYTE [DISTWIDTH] IN BLOOD BY AUTOMATED COUNT: 54 FL (ref 35.9–50)
ERYTHROCYTE [DISTWIDTH] IN BLOOD BY AUTOMATED COUNT: 56.2 FL (ref 35.9–50)
ERYTHROCYTE [DISTWIDTH] IN BLOOD BY AUTOMATED COUNT: 56.9 FL (ref 35.9–50)
ERYTHROCYTE [DISTWIDTH] IN BLOOD BY AUTOMATED COUNT: 58.2 FL (ref 35.9–50)
ERYTHROCYTE [DISTWIDTH] IN BLOOD BY AUTOMATED COUNT: 58.4 FL (ref 35.9–50)
EST. AVERAGE GLUCOSE BLD GHB EST-MCNC: 111 MG/DL
FERRITIN SERPL-MCNC: 226.4 NG/ML (ref 22–322)
FERRITIN SERPL-MCNC: 91.7 NG/ML (ref 22–322)
FLUAV RNA SPEC QL NAA+PROBE: POSITIVE
FLUBV RNA SPEC QL NAA+PROBE: NEGATIVE
GFR SERPL CREATININE-BSD FRML MDRD: 10 ML/MIN/1.73 M 2
GFR SERPL CREATININE-BSD FRML MDRD: 11 ML/MIN/1.73 M 2
GFR SERPL CREATININE-BSD FRML MDRD: 12 ML/MIN/1.73 M 2
GFR SERPL CREATININE-BSD FRML MDRD: 12 ML/MIN/1.73 M 2
GFR SERPL CREATININE-BSD FRML MDRD: 14 ML/MIN/1.73 M 2
GFR SERPL CREATININE-BSD FRML MDRD: 16 ML/MIN/1.73 M 2
GFR SERPL CREATININE-BSD FRML MDRD: 20 ML/MIN/1.73 M 2
GFR SERPL CREATININE-BSD FRML MDRD: 21 ML/MIN/1.73 M 2
GFR SERPL CREATININE-BSD FRML MDRD: 22 ML/MIN/1.73 M 2
GFR SERPL CREATININE-BSD FRML MDRD: 23 ML/MIN/1.73 M 2
GFR SERPLBLD CREATININE-BSD FMLA CKD-EPI: 11 ML/MIN/1.73
GFR SERPLBLD CREATININE-BSD FMLA CKD-EPI: 12 ML/MIN/1.73
GLOBULIN SER CALC-MCNC: 2.3 G/DL (ref 1.9–3.5)
GLOBULIN SER CALC-MCNC: 2.6 G/DL (ref 1.9–3.5)
GLOBULIN SER CALC-MCNC: 2.8 G/DL (ref 1.9–3.5)
GLOBULIN SER CALC-MCNC: 2.9 G/DL (ref 1.9–3.5)
GLOBULIN SER CALC-MCNC: 2.9 G/DL (ref 1.9–3.5)
GLOBULIN SER CALC-MCNC: 3.1 G/DL (ref 1.9–3.5)
GLOBULIN SER CALC-MCNC: 3.1 G/DL (ref 1.9–3.5)
GLOBULIN SER CALC-MCNC: 3.3 G/DL (ref 1.9–3.5)
GLOBULIN SER CALC-MCNC: 3.3 G/DL (ref 1.9–3.5)
GLOBULIN SER CALC-MCNC: 3.8 G/DL (ref 1.9–3.5)
GLUCOSE BLD-MCNC: 101 MG/DL (ref 65–99)
GLUCOSE BLD-MCNC: 104 MG/DL (ref 65–99)
GLUCOSE BLD-MCNC: 109 MG/DL (ref 65–99)
GLUCOSE BLD-MCNC: 109 MG/DL (ref 65–99)
GLUCOSE BLD-MCNC: 110 MG/DL (ref 65–99)
GLUCOSE BLD-MCNC: 111 MG/DL (ref 65–99)
GLUCOSE BLD-MCNC: 120 MG/DL (ref 65–99)
GLUCOSE BLD-MCNC: 124 MG/DL (ref 65–99)
GLUCOSE BLD-MCNC: 131 MG/DL (ref 65–99)
GLUCOSE BLD-MCNC: 132 MG/DL (ref 65–99)
GLUCOSE BLD-MCNC: 137 MG/DL (ref 65–99)
GLUCOSE BLD-MCNC: 140 MG/DL (ref 65–99)
GLUCOSE BLD-MCNC: 141 MG/DL (ref 65–99)
GLUCOSE BLD-MCNC: 146 MG/DL (ref 65–99)
GLUCOSE BLD-MCNC: 171 MG/DL (ref 65–99)
GLUCOSE BLD-MCNC: 174 MG/DL (ref 65–99)
GLUCOSE BLD-MCNC: 175 MG/DL (ref 65–99)
GLUCOSE BLD-MCNC: 176 MG/DL (ref 65–99)
GLUCOSE BLD-MCNC: 84 MG/DL (ref 65–99)
GLUCOSE BLD-MCNC: 88 MG/DL (ref 65–99)
GLUCOSE BLD-MCNC: 89 MG/DL (ref 65–99)
GLUCOSE BLD-MCNC: 90 MG/DL (ref 65–99)
GLUCOSE BLD-MCNC: 95 MG/DL (ref 65–99)
GLUCOSE BLD-MCNC: 95 MG/DL (ref 65–99)
GLUCOSE BLD-MCNC: 97 MG/DL (ref 65–99)
GLUCOSE BLD-MCNC: 98 MG/DL (ref 65–99)
GLUCOSE FLD-MCNC: 136 MG/DL
GLUCOSE SERPL-MCNC: 101 MG/DL (ref 65–99)
GLUCOSE SERPL-MCNC: 111 MG/DL (ref 65–99)
GLUCOSE SERPL-MCNC: 112 MG/DL (ref 65–99)
GLUCOSE SERPL-MCNC: 118 MG/DL (ref 65–99)
GLUCOSE SERPL-MCNC: 124 MG/DL (ref 65–99)
GLUCOSE SERPL-MCNC: 125 MG/DL (ref 65–99)
GLUCOSE SERPL-MCNC: 138 MG/DL (ref 65–99)
GLUCOSE SERPL-MCNC: 140 MG/DL (ref 65–99)
GLUCOSE SERPL-MCNC: 141 MG/DL (ref 65–99)
GLUCOSE SERPL-MCNC: 142 MG/DL (ref 65–99)
GLUCOSE SERPL-MCNC: 144 MG/DL (ref 65–99)
GLUCOSE SERPL-MCNC: 147 MG/DL (ref 65–99)
GLUCOSE SERPL-MCNC: 176 MG/DL (ref 65–99)
GLUCOSE SERPL-MCNC: 180 MG/DL (ref 65–99)
GLUCOSE SERPL-MCNC: 231 MG/DL (ref 65–99)
GLUCOSE SERPL-MCNC: 90 MG/DL (ref 65–99)
GLUCOSE SERPL-MCNC: 94 MG/DL (ref 65–99)
GLUCOSE SERPL-MCNC: 94 MG/DL (ref 65–99)
GLUCOSE SERPL-MCNC: 95 MG/DL (ref 65–99)
GLUCOSE SERPL-MCNC: 98 MG/DL (ref 65–99)
GLUCOSE SERPL-MCNC: 98 MG/DL (ref 65–99)
GLUCOSE UR STRIP.AUTO-MCNC: NEGATIVE MG/DL
GLUCOSE UR STRIP.AUTO-MCNC: NEGATIVE MG/DL
GRAM STN SPEC: NORMAL
GRAM STN SPEC: NORMAL
HAV IGM SERPL QL IA: NEGATIVE
HBA1C MFR BLD: 5.5 % (ref 0–5.6)
HBA1C MFR BLD: NORMAL % (ref ?–5.8)
HBV CORE IGM SER QL: NEGATIVE
HBV SURFACE AG SER QL: NEGATIVE
HCO3 BLDA-SCNC: 7.5 MMOL/L (ref 17–25)
HCT VFR BLD AUTO: 23.6 % (ref 42–52)
HCT VFR BLD AUTO: 23.9 % (ref 42–52)
HCT VFR BLD AUTO: 24.2 % (ref 42–52)
HCT VFR BLD AUTO: 24.3 % (ref 42–52)
HCT VFR BLD AUTO: 24.3 % (ref 42–52)
HCT VFR BLD AUTO: 26.1 % (ref 42–52)
HCT VFR BLD AUTO: 26.6 % (ref 42–52)
HCT VFR BLD AUTO: 27.8 % (ref 42–52)
HCT VFR BLD AUTO: 28.4 % (ref 42–52)
HCT VFR BLD AUTO: 29.1 % (ref 42–52)
HCT VFR BLD AUTO: 30.2 % (ref 42–52)
HCT VFR BLD AUTO: 31.1 % (ref 42–52)
HCT VFR BLD AUTO: 31.2 % (ref 42–52)
HCT VFR BLD AUTO: 35.7 % (ref 42–52)
HCV AB SER QL: NEGATIVE
HDLC SERPL-MCNC: 27 MG/DL
HEMOCCULT STL QL: NEGATIVE
HGB BLD-MCNC: 10.1 G/DL (ref 14–18)
HGB BLD-MCNC: 10.3 G/DL (ref 14–18)
HGB BLD-MCNC: 11.5 G/DL (ref 14–18)
HGB BLD-MCNC: 7.6 G/DL (ref 14–18)
HGB BLD-MCNC: 7.7 G/DL (ref 14–18)
HGB BLD-MCNC: 7.8 G/DL (ref 14–18)
HGB BLD-MCNC: 7.8 G/DL (ref 14–18)
HGB BLD-MCNC: 7.9 G/DL (ref 14–18)
HGB BLD-MCNC: 8.6 G/DL (ref 14–18)
HGB BLD-MCNC: 8.9 G/DL (ref 14–18)
HGB BLD-MCNC: 8.9 G/DL (ref 14–18)
HGB BLD-MCNC: 9.4 G/DL (ref 14–18)
HGB BLD-MCNC: 9.5 G/DL (ref 14–18)
HGB BLD-MCNC: 9.7 G/DL (ref 14–18)
HGB RETIC QN AUTO: 34.8 PG/CELL (ref 29–35)
HIV 1+2 AB+HIV1 P24 AG SERPL QL IA: NON REACTIVE
HYALINE CASTS #/AREA URNS LPF: ABNORMAL /LPF
IGG SERPL-MCNC: 961 MG/DL (ref 768–1632)
IMM GRANULOCYTES # BLD AUTO: 0.01 K/UL (ref 0–0.11)
IMM GRANULOCYTES # BLD AUTO: 0.02 K/UL (ref 0–0.11)
IMM GRANULOCYTES # BLD AUTO: 0.03 K/UL (ref 0–0.11)
IMM GRANULOCYTES # BLD AUTO: 0.05 K/UL (ref 0–0.11)
IMM GRANULOCYTES # BLD AUTO: 0.06 K/UL (ref 0–0.11)
IMM GRANULOCYTES NFR BLD AUTO: 0.3 % (ref 0–0.9)
IMM GRANULOCYTES NFR BLD AUTO: 0.4 % (ref 0–0.9)
IMM GRANULOCYTES NFR BLD AUTO: 0.4 % (ref 0–0.9)
IMM GRANULOCYTES NFR BLD AUTO: 0.5 % (ref 0–0.9)
IMM GRANULOCYTES NFR BLD AUTO: 0.7 % (ref 0–0.9)
IMM RETICS NFR: 17.7 % (ref 9.3–17.4)
INR PPP: 1.18 (ref 0.87–1.13)
INR PPP: 1.21 (ref 0.87–1.13)
INR PPP: 1.26 (ref 0.87–1.13)
INR PPP: 1.27 (ref 0.87–1.13)
INR PPP: 1.28 (ref 0.87–1.13)
INST. QUALIFIED PATIENT IIQPT: YES
INT CON NEG: NEGATIVE
INT CON POS: POSITIVE
IRON SATN MFR SERPL: 11 % (ref 15–55)
IRON SATN MFR SERPL: ABNORMAL % (ref 15–55)
IRON SERPL-MCNC: 23 UG/DL (ref 50–180)
IRON SERPL-MCNC: <10 UG/DL (ref 50–180)
KETONES UR STRIP.AUTO-MCNC: ABNORMAL MG/DL
KETONES UR STRIP.AUTO-MCNC: NEGATIVE MG/DL
LACTATE BLD-SCNC: 2.4 MMOL/L (ref 0.5–2)
LACTATE BLD-SCNC: 2.8 MMOL/L (ref 0.5–2)
LACTATE BLD-SCNC: 2.9 MMOL/L (ref 0.5–2)
LACTATE BLD-SCNC: 2.9 MMOL/L (ref 0.5–2)
LACTATE BLD-SCNC: 3 MMOL/L (ref 0.5–2)
LACTATE BLD-SCNC: 3.6 MMOL/L (ref 0.5–2)
LACTATE BLD-SCNC: 4.5 MMOL/L (ref 0.5–2)
LACTATE BLD-SCNC: 6 MMOL/L (ref 0.5–2)
LDH FLD-CCNC: 49 U/L
LDLC SERPL CALC-MCNC: 57 MG/DL (ref 0–99)
LEUKOCYTE ESTERASE UR QL STRIP.AUTO: NEGATIVE
LEUKOCYTE ESTERASE UR QL STRIP.AUTO: NEGATIVE
LIPASE SERPL-CCNC: 17 U/L (ref 11–82)
LIPASE SERPL-CCNC: 19 U/L (ref 11–82)
LIPASE SERPL-CCNC: 20 U/L (ref 11–82)
LV EJECT FRACT  99904: 75
LYMPHOCYTES # BLD AUTO: 0.72 K/UL (ref 1–4.8)
LYMPHOCYTES # BLD AUTO: 0.73 K/UL (ref 1–4.8)
LYMPHOCYTES # BLD AUTO: 0.75 K/UL (ref 1–4.8)
LYMPHOCYTES # BLD AUTO: 0.92 K/UL (ref 1–4.8)
LYMPHOCYTES # BLD AUTO: 1.1 K/UL (ref 1–4.8)
LYMPHOCYTES # BLD AUTO: 1.25 K/UL (ref 1–4.8)
LYMPHOCYTES NFR BLD: 17.6 % (ref 22–41)
LYMPHOCYTES NFR BLD: 19.7 % (ref 22–41)
LYMPHOCYTES NFR BLD: 22.7 % (ref 22–41)
LYMPHOCYTES NFR BLD: 4.5 % (ref 22–41)
LYMPHOCYTES NFR BLD: 8.6 % (ref 22–41)
LYMPHOCYTES NFR BLD: 8.8 % (ref 22–41)
LYMPHOCYTES NFR FLD: 42 %
MACROCYTES BLD QL SMEAR: ABNORMAL
MAGNESIUM SERPL-MCNC: 1.8 MG/DL (ref 1.5–2.5)
MAGNESIUM SERPL-MCNC: 2.1 MG/DL (ref 1.5–2.5)
MANUAL DIFF BLD: ABNORMAL
MCH RBC QN AUTO: 29.1 PG (ref 27–33)
MCH RBC QN AUTO: 29.1 PG (ref 27–33)
MCH RBC QN AUTO: 29.3 PG (ref 27–33)
MCH RBC QN AUTO: 29.6 PG (ref 27–33)
MCH RBC QN AUTO: 29.7 PG (ref 27–33)
MCH RBC QN AUTO: 29.9 PG (ref 27–33)
MCH RBC QN AUTO: 29.9 PG (ref 27–33)
MCH RBC QN AUTO: 30.1 PG (ref 27–33)
MCH RBC QN AUTO: 30.1 PG (ref 27–33)
MCH RBC QN AUTO: 30.2 PG (ref 27–33)
MCH RBC QN AUTO: 30.5 PG (ref 27–33)
MCH RBC QN AUTO: 31 PG (ref 27–33)
MCH RBC QN AUTO: 31.5 PG (ref 27–33)
MCH RBC QN AUTO: 31.5 PG (ref 27–33)
MCHC RBC AUTO-ENTMCNC: 31.4 G/DL (ref 33.7–35.3)
MCHC RBC AUTO-ENTMCNC: 31.7 G/DL (ref 33.7–35.3)
MCHC RBC AUTO-ENTMCNC: 32 G/DL (ref 33.7–35.3)
MCHC RBC AUTO-ENTMCNC: 32.1 G/DL (ref 33.7–35.3)
MCHC RBC AUTO-ENTMCNC: 32.1 G/DL (ref 33.7–35.3)
MCHC RBC AUTO-ENTMCNC: 32.2 G/DL (ref 33.7–35.3)
MCHC RBC AUTO-ENTMCNC: 32.4 G/DL (ref 33.7–35.3)
MCHC RBC AUTO-ENTMCNC: 32.6 G/DL (ref 33.7–35.3)
MCHC RBC AUTO-ENTMCNC: 32.6 G/DL (ref 33.7–35.3)
MCHC RBC AUTO-ENTMCNC: 33 G/DL (ref 33.7–35.3)
MCHC RBC AUTO-ENTMCNC: 33.1 G/DL (ref 33.7–35.3)
MCHC RBC AUTO-ENTMCNC: 33.1 G/DL (ref 33.7–35.3)
MCHC RBC AUTO-ENTMCNC: 33.5 G/DL (ref 33.7–35.3)
MCHC RBC AUTO-ENTMCNC: 33.5 G/DL (ref 33.7–35.3)
MCV RBC AUTO: 87.9 FL (ref 81.4–97.8)
MCV RBC AUTO: 88.4 FL (ref 81.4–97.8)
MCV RBC AUTO: 90.4 FL (ref 81.4–97.8)
MCV RBC AUTO: 91.3 FL (ref 81.4–97.8)
MCV RBC AUTO: 92.3 FL (ref 81.4–97.8)
MCV RBC AUTO: 92.4 FL (ref 81.4–97.8)
MCV RBC AUTO: 93.2 FL (ref 81.4–97.8)
MCV RBC AUTO: 93.3 FL (ref 81.4–97.8)
MCV RBC AUTO: 94 FL (ref 81.4–97.8)
MCV RBC AUTO: 94.2 FL (ref 81.4–97.8)
MCV RBC AUTO: 94.3 FL (ref 81.4–97.8)
MCV RBC AUTO: 94.5 FL (ref 81.4–97.8)
MCV RBC AUTO: 95.1 FL (ref 81.4–97.8)
MCV RBC AUTO: 95.3 FL (ref 81.4–97.8)
MESOTHL CELL NFR FLD: 2 %
MICRO URNS: ABNORMAL
MICRO URNS: ABNORMAL
MITOCHONDRIA M2 IGG SER-ACNC: 6.4 UNITS (ref 0–20)
MONOCYTES # BLD AUTO: 0.27 K/UL (ref 0–0.85)
MONOCYTES # BLD AUTO: 0.29 K/UL (ref 0–0.85)
MONOCYTES # BLD AUTO: 0.36 K/UL (ref 0–0.85)
MONOCYTES # BLD AUTO: 0.66 K/UL (ref 0–0.85)
MONOCYTES # BLD AUTO: 0.73 K/UL (ref 0–0.85)
MONOCYTES # BLD AUTO: 1.18 K/UL (ref 0–0.85)
MONOCYTES NFR BLD AUTO: 5.1 % (ref 0–13.4)
MONOCYTES NFR BLD AUTO: 6.2 % (ref 0–13.4)
MONOCYTES NFR BLD AUTO: 6.4 % (ref 0–13.4)
MONOCYTES NFR BLD AUTO: 7.1 % (ref 0–13.4)
MONOCYTES NFR BLD AUTO: 7.1 % (ref 0–13.4)
MONOCYTES NFR BLD AUTO: 8.4 % (ref 0–13.4)
MONONUC CELLS NFR FLD: 12 %
MORPHOLOGY BLD-IMP: NORMAL
MUCOUS THREADS #/AREA URNS HPF: ABNORMAL /HPF
NEUTROPHILS # BLD AUTO: 12.05 K/UL (ref 1.82–7.42)
NEUTROPHILS # BLD AUTO: 14.67 K/UL (ref 1.82–7.42)
NEUTROPHILS # BLD AUTO: 2.15 K/UL (ref 1.82–7.42)
NEUTROPHILS # BLD AUTO: 3.01 K/UL (ref 1.82–7.42)
NEUTROPHILS # BLD AUTO: 4.02 K/UL (ref 1.82–7.42)
NEUTROPHILS # BLD AUTO: 8.95 K/UL (ref 1.82–7.42)
NEUTROPHILS NFR BLD: 63.4 % (ref 44–72)
NEUTROPHILS NFR BLD: 67.1 % (ref 44–72)
NEUTROPHILS NFR BLD: 71.9 % (ref 44–72)
NEUTROPHILS NFR BLD: 73.7 % (ref 44–72)
NEUTROPHILS NFR BLD: 83.8 % (ref 44–72)
NEUTROPHILS NFR BLD: 84.5 % (ref 44–72)
NEUTROPHILS NFR FLD: 38 %
NEUTS BAND NFR BLD MANUAL: 25 % (ref 0–10)
NITRITE UR QL STRIP.AUTO: NEGATIVE
NITRITE UR QL STRIP.AUTO: NEGATIVE
NRBC # BLD AUTO: 0 K/UL
NRBC # BLD AUTO: 0.03 K/UL
NRBC BLD AUTO-RTO: 0 /100 WBC
NRBC BLD-RTO: 0.2 /100 WBC
NUCLEAR IGG SER QL IA: NORMAL
O2/TOTAL GAS SETTING VFR VENT: 25 %
PCO2 BLDA: 15.8 MMHG (ref 26–37)
PCO2 TEMP ADJ BLDA: 15.2 MMHG (ref 26–37)
PH BLDA: 7.29 [PH] (ref 7.4–7.5)
PH TEMP ADJ BLDA: 7.3 [PH] (ref 7.4–7.5)
PH UR STRIP.AUTO: 5 [PH]
PH UR STRIP.AUTO: 5.5 [PH]
PHOSPHATE SERPL-MCNC: 3.4 MG/DL (ref 2.5–4.5)
PHOSPHATE SERPL-MCNC: 5.6 MG/DL (ref 2.5–4.5)
PHOSPHATE SERPL-MCNC: 6.2 MG/DL (ref 2.5–4.5)
PLATELET # BLD AUTO: 101 K/UL (ref 164–446)
PLATELET # BLD AUTO: 105 K/UL (ref 164–446)
PLATELET # BLD AUTO: 106 K/UL (ref 164–446)
PLATELET # BLD AUTO: 106 K/UL (ref 164–446)
PLATELET # BLD AUTO: 107 K/UL (ref 164–446)
PLATELET # BLD AUTO: 108 K/UL (ref 164–446)
PLATELET # BLD AUTO: 108 K/UL (ref 164–446)
PLATELET # BLD AUTO: 111 K/UL (ref 164–446)
PLATELET # BLD AUTO: 122 K/UL (ref 164–446)
PLATELET # BLD AUTO: 154 K/UL (ref 164–446)
PLATELET # BLD AUTO: 163 K/UL (ref 164–446)
PLATELET # BLD AUTO: 167 K/UL (ref 164–446)
PLATELET # BLD AUTO: 95 K/UL (ref 164–446)
PLATELET # BLD AUTO: 99 K/UL (ref 164–446)
PMV BLD AUTO: 11.2 FL (ref 9–12.9)
PMV BLD AUTO: 11.3 FL (ref 9–12.9)
PMV BLD AUTO: 11.4 FL (ref 9–12.9)
PMV BLD AUTO: 11.5 FL (ref 9–12.9)
PMV BLD AUTO: 11.6 FL (ref 9–12.9)
PMV BLD AUTO: 11.6 FL (ref 9–12.9)
PMV BLD AUTO: 11.7 FL (ref 9–12.9)
PMV BLD AUTO: 11.8 FL (ref 9–12.9)
PMV BLD AUTO: 11.8 FL (ref 9–12.9)
PMV BLD AUTO: 12.1 FL (ref 9–12.9)
PMV BLD AUTO: 12.3 FL (ref 9–12.9)
PMV BLD AUTO: 12.4 FL (ref 9–12.9)
PO2 BLDA: 57 MMHG (ref 64–87)
PO2 TEMP ADJ BLDA: 54 MMHG (ref 64–87)
POIKILOCYTOSIS BLD QL SMEAR: NORMAL
POTASSIUM BLD-SCNC: 4.3 MMOL/L (ref 3.6–5.5)
POTASSIUM SERPL-SCNC: 3.8 MMOL/L (ref 3.6–5.5)
POTASSIUM SERPL-SCNC: 3.9 MMOL/L (ref 3.6–5.5)
POTASSIUM SERPL-SCNC: 4 MMOL/L (ref 3.6–5.5)
POTASSIUM SERPL-SCNC: 4 MMOL/L (ref 3.6–5.5)
POTASSIUM SERPL-SCNC: 4.1 MMOL/L (ref 3.6–5.5)
POTASSIUM SERPL-SCNC: 4.1 MMOL/L (ref 3.6–5.5)
POTASSIUM SERPL-SCNC: 4.2 MMOL/L (ref 3.5–5.2)
POTASSIUM SERPL-SCNC: 4.2 MMOL/L (ref 3.6–5.5)
POTASSIUM SERPL-SCNC: 4.3 MMOL/L (ref 3.6–5.5)
POTASSIUM SERPL-SCNC: 4.3 MMOL/L (ref 3.6–5.5)
POTASSIUM SERPL-SCNC: 4.4 MMOL/L (ref 3.6–5.5)
POTASSIUM SERPL-SCNC: 4.6 MMOL/L (ref 3.6–5.5)
POTASSIUM SERPL-SCNC: 4.7 MMOL/L (ref 3.6–5.5)
POTASSIUM SERPL-SCNC: 4.8 MMOL/L (ref 3.6–5.5)
POTASSIUM SERPL-SCNC: 4.9 MMOL/L (ref 3.6–5.5)
POTASSIUM SERPL-SCNC: 5.1 MMOL/L (ref 3.6–5.5)
POTASSIUM SERPL-SCNC: 5.2 MMOL/L (ref 3.6–5.5)
POTASSIUM UR-SCNC: 37.3 MMOL/L
PROCALCITONIN SERPL-MCNC: 24.69 NG/ML
PROCALCITONIN SERPL-MCNC: 34.13 NG/ML
PROT SERPL-MCNC: 4.3 G/DL (ref 6–8.2)
PROT SERPL-MCNC: 4.8 G/DL (ref 6–8.2)
PROT SERPL-MCNC: 4.9 G/DL (ref 6–8.2)
PROT SERPL-MCNC: 5.3 G/DL (ref 6–8.2)
PROT SERPL-MCNC: 5.5 G/DL (ref 6–8.2)
PROT SERPL-MCNC: 5.6 G/DL (ref 6–8.2)
PROT SERPL-MCNC: 5.8 G/DL (ref 6–8.2)
PROT SERPL-MCNC: 5.9 G/DL (ref 6–8.2)
PROT SERPL-MCNC: 6.4 G/DL (ref 6–8.2)
PROT SERPL-MCNC: 6.8 G/DL (ref 6–8.2)
PROT UR QL STRIP: 30 MG/DL
PROT UR QL STRIP: 70 MG/DL
PROT UR-MCNC: 198 MG/DL (ref 0–15)
PROT UR-MCNC: 215.5 MG/DL (ref 0–15)
PROTHROMBIN TIME: 15.4 SEC (ref 12–14.6)
PROTHROMBIN TIME: 15.7 SEC (ref 12–14.6)
PROTHROMBIN TIME: 16.2 SEC (ref 12–14.6)
PROTHROMBIN TIME: 16.3 SEC (ref 12–14.6)
PROTHROMBIN TIME: 16.4 SEC (ref 12–14.6)
RBC # BLD AUTO: 2.51 M/UL (ref 4.7–6.1)
RBC # BLD AUTO: 2.56 M/UL (ref 4.7–6.1)
RBC # BLD AUTO: 2.58 M/UL (ref 4.7–6.1)
RBC # BLD AUTO: 2.59 M/UL (ref 4.7–6.1)
RBC # BLD AUTO: 2.63 M/UL (ref 4.7–6.1)
RBC # BLD AUTO: 2.86 M/UL (ref 4.7–6.1)
RBC # BLD AUTO: 2.98 M/UL (ref 4.7–6.1)
RBC # BLD AUTO: 2.98 M/UL (ref 4.7–6.1)
RBC # BLD AUTO: 3.01 M/UL (ref 4.7–6.1)
RBC # BLD AUTO: 3.06 M/UL (ref 4.7–6.1)
RBC # BLD AUTO: 3.24 M/UL (ref 4.7–6.1)
RBC # BLD AUTO: 3.31 M/UL (ref 4.7–6.1)
RBC # BLD AUTO: 3.54 M/UL (ref 4.7–6.1)
RBC # BLD AUTO: 3.95 M/UL (ref 4.7–6.1)
RBC # FLD: <2000 CELLS/UL
RBC # URNS HPF: ABNORMAL /HPF
RBC # URNS HPF: ABNORMAL /HPF
RBC BLD AUTO: PRESENT
RBC UR QL AUTO: NEGATIVE
RBC UR QL AUTO: NEGATIVE
RETICS # AUTO: 0.04 M/UL (ref 0.04–0.06)
RETICS/RBC NFR: 1.5 % (ref 0.8–2.1)
SAO2 % BLDA: 87 % (ref 93–99)
SCHISTOCYTES BLD QL SMEAR: NORMAL
SIGNIFICANT IND 70042: NORMAL
SITE SITE: NORMAL
SODIUM SERPL-SCNC: 135 MMOL/L (ref 135–145)
SODIUM SERPL-SCNC: 137 MMOL/L (ref 135–145)
SODIUM SERPL-SCNC: 138 MMOL/L (ref 135–145)
SODIUM SERPL-SCNC: 139 MMOL/L (ref 135–145)
SODIUM SERPL-SCNC: 140 MMOL/L (ref 135–145)
SODIUM SERPL-SCNC: 140 MMOL/L (ref 135–145)
SODIUM SERPL-SCNC: 141 MMOL/L (ref 134–144)
SODIUM SERPL-SCNC: 141 MMOL/L (ref 135–145)
SODIUM SERPL-SCNC: 142 MMOL/L (ref 135–145)
SODIUM UR-SCNC: 95 MMOL/L
SOURCE SOURCE: NORMAL
SP GR UR STRIP.AUTO: 1.01
SP GR UR STRIP.AUTO: 1.02
SPECIMEN DRAWN FROM PATIENT: ABNORMAL
TIBC SERPL-MCNC: 157 UG/DL (ref 250–450)
TIBC SERPL-MCNC: 204 UG/DL (ref 250–450)
TRIGL SERPL-MCNC: 148 MG/DL (ref 0–149)
TROPONIN I SERPL-MCNC: 0.05 NG/ML (ref 0–0.04)
TROPONIN I SERPL-MCNC: 0.06 NG/ML (ref 0–0.04)
TROPONIN I SERPL-MCNC: 0.08 NG/ML (ref 0–0.04)
TROPONIN I SERPL-MCNC: <0.01 NG/ML (ref 0–0.04)
TROPONIN I SERPL-MCNC: <0.01 NG/ML (ref 0–0.04)
TSH SERPL DL<=0.005 MIU/L-ACNC: 9.72 UIU/ML (ref 0.3–3.7)
UROBILINOGEN UR STRIP.AUTO-MCNC: 0.2 MG/DL
UUN UR-MCNC: 427 MG/DL
VANCOMYCIN SERPL-MCNC: 22.5 UG/ML
VLDLC SERPL CALC-MCNC: 30 MG/DL (ref 5–40)
WBC # BLD AUTO: 10.7 K/UL (ref 4.8–10.8)
WBC # BLD AUTO: 12.5 K/UL (ref 4.8–10.8)
WBC # BLD AUTO: 14.3 K/UL (ref 4.8–10.8)
WBC # BLD AUTO: 14.4 K/UL (ref 4.8–10.8)
WBC # BLD AUTO: 16.6 K/UL (ref 4.8–10.8)
WBC # BLD AUTO: 3.2 K/UL (ref 4.8–10.8)
WBC # BLD AUTO: 3.5 K/UL (ref 4.8–10.8)
WBC # BLD AUTO: 3.6 K/UL (ref 4.8–10.8)
WBC # BLD AUTO: 3.9 K/UL (ref 4.8–10.8)
WBC # BLD AUTO: 4.1 K/UL (ref 4.8–10.8)
WBC # BLD AUTO: 5 K/UL (ref 4.8–10.8)
WBC # BLD AUTO: 5.2 K/UL (ref 4.8–10.8)
WBC # BLD AUTO: 5.6 K/UL (ref 4.8–10.8)
WBC # BLD AUTO: 5.9 K/UL (ref 4.8–10.8)
WBC # FLD: 160 CELLS/UL
WBC #/AREA URNS HPF: ABNORMAL /HPF
WBC #/AREA URNS HPF: ABNORMAL /HPF
WBC OTHER NFR FLD: 5 %

## 2017-01-01 PROCEDURE — 85025 COMPLETE CBC W/AUTO DIFF WBC: CPT

## 2017-01-01 PROCEDURE — 700105 HCHG RX REV CODE 258: Performed by: INTERNAL MEDICINE

## 2017-01-01 PROCEDURE — 36415 COLL VENOUS BLD VENIPUNCTURE: CPT

## 2017-01-01 PROCEDURE — 88305 TISSUE EXAM BY PATHOLOGIST: CPT

## 2017-01-01 PROCEDURE — 700111 HCHG RX REV CODE 636 W/ 250 OVERRIDE (IP): Performed by: INTERNAL MEDICINE

## 2017-01-01 PROCEDURE — 700102 HCHG RX REV CODE 250 W/ 637 OVERRIDE(OP): Performed by: HOSPITALIST

## 2017-01-01 PROCEDURE — 49083 ABD PARACENTESIS W/IMAGING: CPT

## 2017-01-01 PROCEDURE — 700101 HCHG RX REV CODE 250: Performed by: INTERNAL MEDICINE

## 2017-01-01 PROCEDURE — 93005 ELECTROCARDIOGRAM TRACING: CPT | Performed by: INTERNAL MEDICINE

## 2017-01-01 PROCEDURE — 770006 HCHG ROOM/CARE - MED/SURG/GYN SEMI*

## 2017-01-01 PROCEDURE — 99233 SBSQ HOSP IP/OBS HIGH 50: CPT | Performed by: HOSPITALIST

## 2017-01-01 PROCEDURE — 160041 HCHG SURGERY MINUTES - EA ADDL 1 MIN LEVEL 4: Performed by: SURGERY

## 2017-01-01 PROCEDURE — 700111 HCHG RX REV CODE 636 W/ 250 OVERRIDE (IP): Performed by: RADIOLOGY

## 2017-01-01 PROCEDURE — 83690 ASSAY OF LIPASE: CPT

## 2017-01-01 PROCEDURE — 94668 MNPJ CHEST WALL SBSQ: CPT

## 2017-01-01 PROCEDURE — G8432 DEP SCR NOT DOC, RNG: HCPCS | Performed by: INTERNAL MEDICINE

## 2017-01-01 PROCEDURE — 93005 ELECTROCARDIOGRAM TRACING: CPT | Performed by: EMERGENCY MEDICINE

## 2017-01-01 PROCEDURE — 71010 DX-CHEST-PORTABLE (1 VIEW): CPT

## 2017-01-01 PROCEDURE — 700111 HCHG RX REV CODE 636 W/ 250 OVERRIDE (IP): Performed by: HOSPITALIST

## 2017-01-01 PROCEDURE — 88112 CYTOPATH CELL ENHANCE TECH: CPT

## 2017-01-01 PROCEDURE — 80048 BASIC METABOLIC PNL TOTAL CA: CPT

## 2017-01-01 PROCEDURE — 700105 HCHG RX REV CODE 258: Performed by: FAMILY MEDICINE

## 2017-01-01 PROCEDURE — 80053 COMPREHEN METABOLIC PANEL: CPT

## 2017-01-01 PROCEDURE — 700101 HCHG RX REV CODE 250: Performed by: EMERGENCY MEDICINE

## 2017-01-01 PROCEDURE — 99291 CRITICAL CARE FIRST HOUR: CPT

## 2017-01-01 PROCEDURE — 99214 OFFICE O/P EST MOD 30 MIN: CPT | Performed by: INTERNAL MEDICINE

## 2017-01-01 PROCEDURE — 85730 THROMBOPLASTIN TIME PARTIAL: CPT

## 2017-01-01 PROCEDURE — 93010 ELECTROCARDIOGRAM REPORT: CPT | Performed by: INTERNAL MEDICINE

## 2017-01-01 PROCEDURE — 83540 ASSAY OF IRON: CPT

## 2017-01-01 PROCEDURE — 85027 COMPLETE CBC AUTOMATED: CPT

## 2017-01-01 PROCEDURE — 85007 BL SMEAR W/DIFF WBC COUNT: CPT

## 2017-01-01 PROCEDURE — 110454 HCHG SHELL REV 250: Performed by: SURGERY

## 2017-01-01 PROCEDURE — 99291 CRITICAL CARE FIRST HOUR: CPT | Mod: 25 | Performed by: INTERNAL MEDICINE

## 2017-01-01 PROCEDURE — P9047 ALBUMIN (HUMAN), 25%, 50ML: HCPCS | Performed by: RADIOLOGY

## 2017-01-01 PROCEDURE — A9270 NON-COVERED ITEM OR SERVICE: HCPCS | Performed by: INTERNAL MEDICINE

## 2017-01-01 PROCEDURE — 82103 ALPHA-1-ANTITRYPSIN TOTAL: CPT

## 2017-01-01 PROCEDURE — 160048 HCHG OR STATISTICAL LEVEL 1-5: Performed by: SURGERY

## 2017-01-01 PROCEDURE — 83605 ASSAY OF LACTIC ACID: CPT

## 2017-01-01 PROCEDURE — A9270 NON-COVERED ITEM OR SERVICE: HCPCS | Performed by: HOSPITALIST

## 2017-01-01 PROCEDURE — 82962 GLUCOSE BLOOD TEST: CPT | Mod: 91

## 2017-01-01 PROCEDURE — 82728 ASSAY OF FERRITIN: CPT

## 2017-01-01 PROCEDURE — 700111 HCHG RX REV CODE 636 W/ 250 OVERRIDE (IP): Performed by: EMERGENCY MEDICINE

## 2017-01-01 PROCEDURE — 87186 SC STD MICRODIL/AGAR DIL: CPT

## 2017-01-01 PROCEDURE — 86255 FLUORESCENT ANTIBODY SCREEN: CPT

## 2017-01-01 PROCEDURE — 74176 CT ABD & PELVIS W/O CONTRAST: CPT

## 2017-01-01 PROCEDURE — 99223 1ST HOSP IP/OBS HIGH 75: CPT | Mod: AI | Performed by: INTERNAL MEDICINE

## 2017-01-01 PROCEDURE — 85610 PROTHROMBIN TIME: CPT

## 2017-01-01 PROCEDURE — 160036 HCHG PACU - EA ADDL 30 MINS PHASE I: Performed by: SURGERY

## 2017-01-01 PROCEDURE — 93280 PM DEVICE PROGR EVAL DUAL: CPT | Performed by: INTERNAL MEDICINE

## 2017-01-01 PROCEDURE — G8482 FLU IMMUNIZE ORDER/ADMIN: HCPCS | Performed by: INTERNAL MEDICINE

## 2017-01-01 PROCEDURE — G8420 CALC BMI NORM PARAMETERS: HCPCS | Performed by: INTERNAL MEDICINE

## 2017-01-01 PROCEDURE — G0008 ADMIN INFLUENZA VIRUS VAC: HCPCS | Performed by: INTERNAL MEDICINE

## 2017-01-01 PROCEDURE — 94640 AIRWAY INHALATION TREATMENT: CPT

## 2017-01-01 PROCEDURE — 93005 ELECTROCARDIOGRAM TRACING: CPT

## 2017-01-01 PROCEDURE — 1036F TOBACCO NON-USER: CPT | Performed by: INTERNAL MEDICINE

## 2017-01-01 PROCEDURE — 700102 HCHG RX REV CODE 250 W/ 637 OVERRIDE(OP): Performed by: INTERNAL MEDICINE

## 2017-01-01 PROCEDURE — P9047 ALBUMIN (HUMAN), 25%, 50ML: HCPCS | Performed by: HOSPITALIST

## 2017-01-01 PROCEDURE — 82962 GLUCOSE BLOOD TEST: CPT

## 2017-01-01 PROCEDURE — 700105 HCHG RX REV CODE 258: Performed by: EMERGENCY MEDICINE

## 2017-01-01 PROCEDURE — 4040F PNEUMOC VAC/ADMIN/RCVD: CPT | Performed by: INTERNAL MEDICINE

## 2017-01-01 PROCEDURE — 99239 HOSP IP/OBS DSCHRG MGMT >30: CPT | Performed by: HOSPITALIST

## 2017-01-01 PROCEDURE — 160002 HCHG RECOVERY MINUTES (STAT): Performed by: SURGERY

## 2017-01-01 PROCEDURE — 81001 URINALYSIS AUTO W/SCOPE: CPT

## 2017-01-01 PROCEDURE — 700105 HCHG RX REV CODE 258: Performed by: HOSPITALIST

## 2017-01-01 PROCEDURE — 87077 CULTURE AEROBIC IDENTIFY: CPT

## 2017-01-01 PROCEDURE — 94669 MECHANICAL CHEST WALL OSCILL: CPT

## 2017-01-01 PROCEDURE — 89051 BODY FLUID CELL COUNT: CPT

## 2017-01-01 PROCEDURE — 700111 HCHG RX REV CODE 636 W/ 250 OVERRIDE (IP)

## 2017-01-01 PROCEDURE — A9270 NON-COVERED ITEM OR SERVICE: HCPCS | Performed by: EMERGENCY MEDICINE

## 2017-01-01 PROCEDURE — B24BYZZ ULTRASONOGRAPHY OF HEART WITH AORTA USING OTHER CONTRAST: ICD-10-PCS | Performed by: HOSPITALIST

## 2017-01-01 PROCEDURE — 82247 BILIRUBIN TOTAL: CPT

## 2017-01-01 PROCEDURE — 84133 ASSAY OF URINE POTASSIUM: CPT

## 2017-01-01 PROCEDURE — 700101 HCHG RX REV CODE 250: Performed by: HOSPITALIST

## 2017-01-01 PROCEDURE — 99291 CRITICAL CARE FIRST HOUR: CPT | Performed by: INTERNAL MEDICINE

## 2017-01-01 PROCEDURE — 83880 ASSAY OF NATRIURETIC PEPTIDE: CPT

## 2017-01-01 PROCEDURE — 87493 C DIFF AMPLIFIED PROBE: CPT

## 2017-01-01 PROCEDURE — C1768 GRAFT, VASCULAR: HCPCS | Performed by: SURGERY

## 2017-01-01 PROCEDURE — 87389 HIV-1 AG W/HIV-1&-2 AB AG IA: CPT

## 2017-01-01 PROCEDURE — 1111F DSCHRG MED/CURRENT MED MERGE: CPT | Performed by: INTERNAL MEDICINE

## 2017-01-01 PROCEDURE — 1101F PT FALLS ASSESS-DOCD LE1/YR: CPT | Performed by: INTERNAL MEDICINE

## 2017-01-01 PROCEDURE — 84132 ASSAY OF SERUM POTASSIUM: CPT

## 2017-01-01 PROCEDURE — 160035 HCHG PACU - 1ST 60 MINS PHASE I: Performed by: SURGERY

## 2017-01-01 PROCEDURE — 87086 URINE CULTURE/COLONY COUNT: CPT

## 2017-01-01 PROCEDURE — 80202 ASSAY OF VANCOMYCIN: CPT

## 2017-01-01 PROCEDURE — 770022 HCHG ROOM/CARE - ICU (200)

## 2017-01-01 PROCEDURE — 96361 HYDRATE IV INFUSION ADD-ON: CPT

## 2017-01-01 PROCEDURE — 82945 GLUCOSE OTHER FLUID: CPT

## 2017-01-01 PROCEDURE — 700111 HCHG RX REV CODE 636 W/ 250 OVERRIDE (IP): Performed by: FAMILY MEDICINE

## 2017-01-01 PROCEDURE — 501837 HCHG SUTURE CV: Performed by: SURGERY

## 2017-01-01 PROCEDURE — 84100 ASSAY OF PHOSPHORUS: CPT

## 2017-01-01 PROCEDURE — C1729 CATH, DRAINAGE: HCPCS

## 2017-01-01 PROCEDURE — 83036 HEMOGLOBIN GLYCOSYLATED A1C: CPT

## 2017-01-01 PROCEDURE — 87040 BLOOD CULTURE FOR BACTERIA: CPT | Mod: 91

## 2017-01-01 PROCEDURE — 36600 WITHDRAWAL OF ARTERIAL BLOOD: CPT

## 2017-01-01 PROCEDURE — 87150 DNA/RNA AMPLIFIED PROBE: CPT

## 2017-01-01 PROCEDURE — 99223 1ST HOSP IP/OBS HIGH 75: CPT | Mod: AI | Performed by: HOSPITALIST

## 2017-01-01 PROCEDURE — 90662 IIV NO PRSV INCREASED AG IM: CPT | Performed by: INTERNAL MEDICINE

## 2017-01-01 PROCEDURE — 71020 DX-CHEST-2 VIEWS: CPT

## 2017-01-01 PROCEDURE — 500700 HCHG HEMOCLIP, SMALL (RED): Performed by: SURGERY

## 2017-01-01 PROCEDURE — 96365 THER/PROPH/DIAG IV INF INIT: CPT

## 2017-01-01 PROCEDURE — 83615 LACTATE (LD) (LDH) ENZYME: CPT

## 2017-01-01 PROCEDURE — 82803 BLOOD GASES ANY COMBINATION: CPT

## 2017-01-01 PROCEDURE — 82570 ASSAY OF URINE CREATININE: CPT

## 2017-01-01 PROCEDURE — 84484 ASSAY OF TROPONIN QUANT: CPT

## 2017-01-01 PROCEDURE — 86038 ANTINUCLEAR ANTIBODIES: CPT

## 2017-01-01 PROCEDURE — 84156 ASSAY OF PROTEIN URINE: CPT

## 2017-01-01 PROCEDURE — 87502 INFLUENZA DNA AMP PROBE: CPT

## 2017-01-01 PROCEDURE — P9047 ALBUMIN (HUMAN), 25%, 50ML: HCPCS | Performed by: INTERNAL MEDICINE

## 2017-01-01 PROCEDURE — 87040 BLOOD CULTURE FOR BACTERIA: CPT

## 2017-01-01 PROCEDURE — 87015 SPECIMEN INFECT AGNT CONCNTJ: CPT

## 2017-01-01 PROCEDURE — 92250 FUNDUS PHOTOGRAPHY W/I&R: CPT | Mod: TC | Performed by: INTERNAL MEDICINE

## 2017-01-01 PROCEDURE — 160047 HCHG PACU  - EA ADDL 30 MINS PHASE II: Performed by: SURGERY

## 2017-01-01 PROCEDURE — 700102 HCHG RX REV CODE 250 W/ 637 OVERRIDE(OP): Performed by: EMERGENCY MEDICINE

## 2017-01-01 PROCEDURE — 82272 OCCULT BLD FECES 1-3 TESTS: CPT

## 2017-01-01 PROCEDURE — 0W9G3ZX DRAINAGE OF PERITONEAL CAVITY, PERCUTANEOUS APPROACH, DIAGNOSTIC: ICD-10-PCS | Performed by: RADIOLOGY

## 2017-01-01 PROCEDURE — 83735 ASSAY OF MAGNESIUM: CPT

## 2017-01-01 PROCEDURE — 82390 ASSAY OF CERULOPLASMIN: CPT

## 2017-01-01 PROCEDURE — 160046 HCHG PACU - 1ST 60 MINS PHASE II: Performed by: SURGERY

## 2017-01-01 PROCEDURE — 82010 KETONE BODYS QUAN: CPT

## 2017-01-01 PROCEDURE — 93306 TTE W/DOPPLER COMPLETE: CPT | Mod: 26 | Performed by: INTERNAL MEDICINE

## 2017-01-01 PROCEDURE — 87205 SMEAR GRAM STAIN: CPT

## 2017-01-01 PROCEDURE — 84443 ASSAY THYROID STIM HORMONE: CPT

## 2017-01-01 PROCEDURE — 304561 HCHG STAT O2

## 2017-01-01 PROCEDURE — P9047 ALBUMIN (HUMAN), 25%, 50ML: HCPCS | Performed by: EMERGENCY MEDICINE

## 2017-01-01 PROCEDURE — 49082 ABD PARACENTESIS: CPT

## 2017-01-01 PROCEDURE — 84300 ASSAY OF URINE SODIUM: CPT

## 2017-01-01 PROCEDURE — 99213 OFFICE O/P EST LOW 20 MIN: CPT | Performed by: INTERNAL MEDICINE

## 2017-01-01 PROCEDURE — 99284 EMERGENCY DEPT VISIT MOD MDM: CPT

## 2017-01-01 PROCEDURE — 160025 RECOVERY II MINUTES (STATS): Performed by: SURGERY

## 2017-01-01 PROCEDURE — 80048 BASIC METABOLIC PNL TOTAL CA: CPT | Mod: 91

## 2017-01-01 PROCEDURE — 99285 EMERGENCY DEPT VISIT HI MDM: CPT

## 2017-01-01 PROCEDURE — 83550 IRON BINDING TEST: CPT

## 2017-01-01 PROCEDURE — 99232 SBSQ HOSP IP/OBS MODERATE 35: CPT | Performed by: HOSPITALIST

## 2017-01-01 PROCEDURE — 160009 HCHG ANES TIME/MIN: Performed by: SURGERY

## 2017-01-01 PROCEDURE — G8419 CALC BMI OUT NRM PARAM NOF/U: HCPCS | Performed by: INTERNAL MEDICINE

## 2017-01-01 PROCEDURE — 93306 TTE W/DOPPLER COMPLETE: CPT

## 2017-01-01 PROCEDURE — 87070 CULTURE OTHR SPECIMN AEROBIC: CPT

## 2017-01-01 PROCEDURE — 82436 ASSAY OF URINE CHLORIDE: CPT

## 2017-01-01 PROCEDURE — 500698 HCHG HEMOCLIP, MEDIUM: Performed by: SURGERY

## 2017-01-01 PROCEDURE — 501838 HCHG SUTURE GENERAL: Performed by: SURGERY

## 2017-01-01 PROCEDURE — 80074 ACUTE HEPATITIS PANEL: CPT

## 2017-01-01 PROCEDURE — 160029 HCHG SURGERY MINUTES - 1ST 30 MINS LEVEL 4: Performed by: SURGERY

## 2017-01-01 PROCEDURE — 85046 RETICYTE/HGB CONCENTRATE: CPT

## 2017-01-01 PROCEDURE — 96375 TX/PRO/DX INJ NEW DRUG ADDON: CPT

## 2017-01-01 PROCEDURE — 82105 ALPHA-FETOPROTEIN SERUM: CPT

## 2017-01-01 PROCEDURE — 700112 HCHG RX REV CODE 229: Performed by: HOSPITALIST

## 2017-01-01 PROCEDURE — 93005 ELECTROCARDIOGRAM TRACING: CPT | Performed by: SURGERY

## 2017-01-01 PROCEDURE — 83036 HEMOGLOBIN GLYCOSYLATED A1C: CPT | Performed by: INTERNAL MEDICINE

## 2017-01-01 PROCEDURE — 700101 HCHG RX REV CODE 250

## 2017-01-01 PROCEDURE — 82042 OTHER SOURCE ALBUMIN QUAN EA: CPT

## 2017-01-01 PROCEDURE — 51798 US URINE CAPACITY MEASURE: CPT

## 2017-01-01 PROCEDURE — 83605 ASSAY OF LACTIC ACID: CPT | Mod: 91

## 2017-01-01 PROCEDURE — 94667 MNPJ CHEST WALL 1ST: CPT

## 2017-01-01 PROCEDURE — A6402 STERILE GAUZE <= 16 SQ IN: HCPCS | Performed by: SURGERY

## 2017-01-01 PROCEDURE — 96367 TX/PROPH/DG ADDL SEQ IV INF: CPT

## 2017-01-01 PROCEDURE — 82784 ASSAY IGA/IGD/IGG/IGM EACH: CPT

## 2017-01-01 PROCEDURE — 84145 PROCALCITONIN (PCT): CPT | Mod: 91

## 2017-01-01 PROCEDURE — 99214 OFFICE O/P EST MOD 30 MIN: CPT | Mod: 25 | Performed by: INTERNAL MEDICINE

## 2017-01-01 PROCEDURE — 96366 THER/PROPH/DIAG IV INF ADDON: CPT

## 2017-01-01 PROCEDURE — 94760 N-INVAS EAR/PLS OXIMETRY 1: CPT

## 2017-01-01 PROCEDURE — 71010 DX-CHEST-LIMITED (1 VIEW): CPT

## 2017-01-01 PROCEDURE — 76700 US EXAM ABDOM COMPLETE: CPT

## 2017-01-01 PROCEDURE — 84540 ASSAY OF URINE/UREA-N: CPT

## 2017-01-01 DEVICE — GRAFT GOR STRCH 4-7X45CM: Type: IMPLANTABLE DEVICE | Status: FUNCTIONAL

## 2017-01-01 RX ORDER — ALBUMIN (HUMAN) 12.5 G/50ML
50 SOLUTION INTRAVENOUS ONCE
Status: COMPLETED | OUTPATIENT
Start: 2017-01-01 | End: 2017-01-01

## 2017-01-01 RX ORDER — SODIUM CHLORIDE 9 MG/ML
1000 INJECTION, SOLUTION INTRAVENOUS
Status: DISCONTINUED | OUTPATIENT
Start: 2017-01-01 | End: 2018-01-01

## 2017-01-01 RX ORDER — SPIRONOLACTONE 25 MG/1
25 TABLET ORAL DAILY
Status: ON HOLD | COMMUNITY
End: 2017-01-01

## 2017-01-01 RX ORDER — SPIRONOLACTONE 25 MG/1
25 TABLET ORAL
Status: DISCONTINUED | OUTPATIENT
Start: 2017-01-01 | End: 2017-01-01 | Stop reason: HOSPADM

## 2017-01-01 RX ORDER — LOSARTAN POTASSIUM 100 MG/1
100 TABLET ORAL DAILY
Qty: 90 TAB | Refills: 3 | Status: SHIPPED | OUTPATIENT
Start: 2017-01-01

## 2017-01-01 RX ORDER — AMLODIPINE BESYLATE 5 MG/1
10 TABLET ORAL
Status: DISCONTINUED | OUTPATIENT
Start: 2017-01-01 | End: 2017-01-01

## 2017-01-01 RX ORDER — AZITHROMYCIN 500 MG/1
500 INJECTION, POWDER, LYOPHILIZED, FOR SOLUTION INTRAVENOUS ONCE
Status: DISCONTINUED | OUTPATIENT
Start: 2017-01-01 | End: 2017-01-01

## 2017-01-01 RX ORDER — LIDOCAINE AND PRILOCAINE 25; 25 MG/G; MG/G
1 CREAM TOPICAL
Status: DISCONTINUED | OUTPATIENT
Start: 2017-01-01 | End: 2017-01-01 | Stop reason: HOSPADM

## 2017-01-01 RX ORDER — SODIUM CHLORIDE 9 MG/ML
INJECTION, SOLUTION INTRAVENOUS CONTINUOUS
Status: ACTIVE | OUTPATIENT
Start: 2017-01-01 | End: 2017-01-01

## 2017-01-01 RX ORDER — SPIRONOLACTONE 25 MG/1
25 TABLET ORAL DAILY
Qty: 30 TAB | Refills: 0 | Status: SHIPPED | OUTPATIENT
Start: 2017-01-01 | End: 2017-01-01 | Stop reason: SDUPTHER

## 2017-01-01 RX ORDER — BISACODYL 10 MG
10 SUPPOSITORY, RECTAL RECTAL
Status: DISCONTINUED | OUTPATIENT
Start: 2017-01-01 | End: 2017-01-01 | Stop reason: HOSPADM

## 2017-01-01 RX ORDER — AZITHROMYCIN 250 MG/1
250 TABLET, FILM COATED ORAL DAILY
Status: DISCONTINUED | OUTPATIENT
Start: 2017-01-01 | End: 2017-01-01

## 2017-01-01 RX ORDER — ACETAMINOPHEN 325 MG/1
650 TABLET ORAL
COMMUNITY
End: 2017-01-01

## 2017-01-01 RX ORDER — AMLODIPINE BESYLATE 10 MG/1
10 TABLET ORAL DAILY
COMMUNITY

## 2017-01-01 RX ORDER — FUROSEMIDE 20 MG/1
20 TABLET ORAL DAILY
Qty: 60 TAB | Refills: 11 | Status: SHIPPED | OUTPATIENT
Start: 2017-01-01

## 2017-01-01 RX ORDER — SPIRONOLACTONE 25 MG/1
25 TABLET ORAL DAILY
Qty: 30 TAB | Refills: 11 | Status: SHIPPED | OUTPATIENT
Start: 2017-01-01 | End: 2017-01-01

## 2017-01-01 RX ORDER — CALCIUM ACETATE 667 MG/1
1334 TABLET ORAL
Status: DISCONTINUED | OUTPATIENT
Start: 2017-01-01 | End: 2018-01-01

## 2017-01-01 RX ORDER — LOSARTAN POTASSIUM 50 MG/1
100 TABLET ORAL
Status: DISCONTINUED | OUTPATIENT
Start: 2017-01-01 | End: 2017-01-01 | Stop reason: HOSPADM

## 2017-01-01 RX ORDER — DOXAZOSIN 2 MG/1
2 TABLET ORAL DAILY
Status: DISCONTINUED | OUTPATIENT
Start: 2017-01-01 | End: 2017-01-01 | Stop reason: HOSPADM

## 2017-01-01 RX ORDER — HEPARIN SODIUM 5000 [USP'U]/ML
5000 INJECTION, SOLUTION INTRAVENOUS; SUBCUTANEOUS EVERY 8 HOURS
Status: DISCONTINUED | OUTPATIENT
Start: 2017-01-01 | End: 2017-01-01 | Stop reason: HOSPADM

## 2017-01-01 RX ORDER — LABETALOL HYDROCHLORIDE 5 MG/ML
10 INJECTION, SOLUTION INTRAVENOUS EVERY 4 HOURS PRN
Status: DISCONTINUED | OUTPATIENT
Start: 2017-01-01 | End: 2017-01-01 | Stop reason: HOSPADM

## 2017-01-01 RX ORDER — FLUTICASONE PROPIONATE 50 MCG
2 SPRAY, SUSPENSION (ML) NASAL
Status: DISCONTINUED | OUTPATIENT
Start: 2017-01-01 | End: 2017-01-01 | Stop reason: HOSPADM

## 2017-01-01 RX ORDER — ONDANSETRON 2 MG/ML
4 INJECTION INTRAMUSCULAR; INTRAVENOUS EVERY 4 HOURS PRN
Status: DISCONTINUED | OUTPATIENT
Start: 2017-01-01 | End: 2017-01-01 | Stop reason: HOSPADM

## 2017-01-01 RX ORDER — MAGNESIUM SULFATE HEPTAHYDRATE 40 MG/ML
4 INJECTION, SOLUTION INTRAVENOUS EVERY 4 HOURS PRN
Status: DISCONTINUED | OUTPATIENT
Start: 2017-01-01 | End: 2017-01-01

## 2017-01-01 RX ORDER — ACETAMINOPHEN 325 MG/1
650 TABLET ORAL EVERY 6 HOURS PRN
Status: DISCONTINUED | OUTPATIENT
Start: 2017-01-01 | End: 2017-01-01 | Stop reason: HOSPADM

## 2017-01-01 RX ORDER — FUROSEMIDE 20 MG/1
20 TABLET ORAL
Status: DISCONTINUED | OUTPATIENT
Start: 2017-01-01 | End: 2017-01-01 | Stop reason: HOSPADM

## 2017-01-01 RX ORDER — PIOGLITAZONEHYDROCHLORIDE 15 MG/1
15 TABLET ORAL DAILY
Status: DISCONTINUED | OUTPATIENT
Start: 2017-01-01 | End: 2017-01-01 | Stop reason: HOSPADM

## 2017-01-01 RX ORDER — AZITHROMYCIN 250 MG/1
500 TABLET, FILM COATED ORAL ONCE
Status: COMPLETED | OUTPATIENT
Start: 2017-01-01 | End: 2017-01-01

## 2017-01-01 RX ORDER — HYDROCODONE BITARTRATE AND ACETAMINOPHEN 5; 325 MG/1; MG/1
1-2 TABLET ORAL EVERY 4 HOURS PRN
Qty: 30 TAB | Refills: 0 | Status: SHIPPED | OUTPATIENT
Start: 2017-01-01 | End: 2017-01-01

## 2017-01-01 RX ORDER — AZITHROMYCIN 250 MG/1
250 TABLET, FILM COATED ORAL DAILY
Qty: 2 TAB | Refills: 0 | Status: SHIPPED | OUTPATIENT
Start: 2017-01-01 | End: 2017-01-01

## 2017-01-01 RX ORDER — AMLODIPINE BESYLATE 10 MG/1
TABLET ORAL
Qty: 90 TAB | Refills: 3 | Status: ON HOLD | OUTPATIENT
Start: 2017-01-01 | End: 2017-01-01

## 2017-01-01 RX ORDER — SODIUM CHLORIDE 9 MG/ML
INJECTION, SOLUTION INTRAVENOUS CONTINUOUS
Status: DISCONTINUED | OUTPATIENT
Start: 2017-01-01 | End: 2017-01-01

## 2017-01-01 RX ORDER — FUROSEMIDE 10 MG/ML
40 INJECTION INTRAMUSCULAR; INTRAVENOUS ONCE
Status: COMPLETED | OUTPATIENT
Start: 2017-01-01 | End: 2017-01-01

## 2017-01-01 RX ORDER — LIDOCAINE HYDROCHLORIDE 10 MG/ML
0.5 INJECTION, SOLUTION INFILTRATION; PERINEURAL
Status: DISCONTINUED | OUTPATIENT
Start: 2017-01-01 | End: 2017-01-01 | Stop reason: HOSPADM

## 2017-01-01 RX ORDER — AMOXICILLIN AND CLAVULANATE POTASSIUM 875; 125 MG/1; MG/1
1 TABLET, FILM COATED ORAL 2 TIMES DAILY
Qty: 4 TAB | Refills: 0 | Status: SHIPPED | OUTPATIENT
Start: 2017-01-01 | End: 2017-01-01

## 2017-01-01 RX ORDER — LOSARTAN POTASSIUM 50 MG/1
50 TABLET ORAL ONCE
Status: DISCONTINUED | OUTPATIENT
Start: 2017-01-01 | End: 2017-01-01

## 2017-01-01 RX ORDER — DEXTROSE MONOHYDRATE 25 G/50ML
25 INJECTION, SOLUTION INTRAVENOUS
Status: DISCONTINUED | OUTPATIENT
Start: 2017-01-01 | End: 2018-01-01

## 2017-01-01 RX ORDER — DEXTROSE AND SODIUM CHLORIDE 10; .45 G/100ML; G/100ML
INJECTION, SOLUTION INTRAVENOUS CONTINUOUS
Status: DISCONTINUED | OUTPATIENT
Start: 2017-01-01 | End: 2017-01-01

## 2017-01-01 RX ORDER — AZITHROMYCIN 250 MG/1
250 TABLET, FILM COATED ORAL
Status: DISCONTINUED | OUTPATIENT
Start: 2017-01-01 | End: 2017-01-01 | Stop reason: HOSPADM

## 2017-01-01 RX ORDER — CEFTRIAXONE 2 G/1
2 INJECTION, POWDER, FOR SOLUTION INTRAMUSCULAR; INTRAVENOUS ONCE
Status: COMPLETED | OUTPATIENT
Start: 2017-01-01 | End: 2017-01-01

## 2017-01-01 RX ORDER — DOXAZOSIN 2 MG/1
2 TABLET ORAL
Status: DISCONTINUED | OUTPATIENT
Start: 2017-01-01 | End: 2017-01-01 | Stop reason: HOSPADM

## 2017-01-01 RX ORDER — INSULIN GLARGINE 100 [IU]/ML
5 INJECTION, SOLUTION SUBCUTANEOUS NIGHTLY
Status: DISCONTINUED | OUTPATIENT
Start: 2017-01-01 | End: 2017-01-01

## 2017-01-01 RX ORDER — AMOXICILLIN 250 MG
1 CAPSULE ORAL NIGHTLY
Status: DISCONTINUED | OUTPATIENT
Start: 2017-01-01 | End: 2017-01-01 | Stop reason: HOSPADM

## 2017-01-01 RX ORDER — DEXTROSE MONOHYDRATE 25 G/50ML
25 INJECTION, SOLUTION INTRAVENOUS
Status: DISCONTINUED | OUTPATIENT
Start: 2017-01-01 | End: 2017-01-01 | Stop reason: HOSPADM

## 2017-01-01 RX ORDER — INSULIN GLARGINE 100 [IU]/ML
INJECTION, SOLUTION SUBCUTANEOUS
Qty: 10 ML | Refills: 11 | Status: SHIPPED | OUTPATIENT
Start: 2017-01-01 | End: 2017-01-01

## 2017-01-01 RX ORDER — SODIUM CHLORIDE 9 MG/ML
30 INJECTION, SOLUTION INTRAVENOUS
Status: DISCONTINUED | OUTPATIENT
Start: 2017-01-01 | End: 2018-01-01

## 2017-01-01 RX ORDER — FUROSEMIDE 20 MG/1
20 TABLET ORAL DAILY
Qty: 60 TAB | Refills: 0 | Status: SHIPPED | OUTPATIENT
Start: 2017-01-01 | End: 2017-01-01 | Stop reason: SDUPTHER

## 2017-01-01 RX ORDER — MAGNESIUM SULFATE HEPTAHYDRATE 40 MG/ML
2 INJECTION, SOLUTION INTRAVENOUS
Status: DISCONTINUED | OUTPATIENT
Start: 2017-01-01 | End: 2017-01-01

## 2017-01-01 RX ORDER — SODIUM CHLORIDE 9 MG/ML
1000 INJECTION, SOLUTION INTRAVENOUS
Status: COMPLETED | OUTPATIENT
Start: 2017-01-01 | End: 2017-01-01

## 2017-01-01 RX ORDER — PIOGLITAZONEHYDROCHLORIDE 15 MG/1
15 TABLET ORAL
Status: DISCONTINUED | OUTPATIENT
Start: 2017-01-01 | End: 2017-01-01

## 2017-01-01 RX ORDER — LORAZEPAM 0.5 MG/1
0.5 TABLET ORAL EVERY 6 HOURS PRN
Status: DISCONTINUED | OUTPATIENT
Start: 2017-01-01 | End: 2017-01-01 | Stop reason: HOSPADM

## 2017-01-01 RX ORDER — DOCUSATE SODIUM 100 MG/1
100 CAPSULE, LIQUID FILLED ORAL 2 TIMES DAILY
Status: DISCONTINUED | OUTPATIENT
Start: 2017-01-01 | End: 2017-01-01 | Stop reason: HOSPADM

## 2017-01-01 RX ORDER — SODIUM BICARBONATE 650 MG/1
650 TABLET ORAL 2 TIMES DAILY
COMMUNITY

## 2017-01-01 RX ORDER — SODIUM CHLORIDE 9 MG/ML
INJECTION, SOLUTION INTRAVENOUS CONTINUOUS
Status: DISCONTINUED | OUTPATIENT
Start: 2017-01-01 | End: 2017-01-01 | Stop reason: HOSPADM

## 2017-01-01 RX ORDER — ALBUMIN (HUMAN) 12.5 G/50ML
12.5 SOLUTION INTRAVENOUS ONCE
Status: COMPLETED | OUTPATIENT
Start: 2017-01-01 | End: 2017-01-01

## 2017-01-01 RX ORDER — SODIUM BICARBONATE 650 MG/1
650 TABLET ORAL 2 TIMES DAILY
Status: DISCONTINUED | OUTPATIENT
Start: 2017-01-01 | End: 2018-01-01

## 2017-01-01 RX ORDER — SPIRONOLACTONE 25 MG/1
25 TABLET ORAL DAILY
COMMUNITY

## 2017-01-01 RX ORDER — DOXAZOSIN 2 MG/1
2 TABLET ORAL DAILY
Status: ON HOLD | COMMUNITY
End: 2017-01-01

## 2017-01-01 RX ORDER — ROSUVASTATIN CALCIUM 20 MG/1
20 TABLET, COATED ORAL EVERY EVENING
Status: DISCONTINUED | OUTPATIENT
Start: 2017-01-01 | End: 2017-01-01 | Stop reason: HOSPADM

## 2017-01-01 RX ORDER — SYRINGE-NEEDLE,INSULIN,0.5 ML 31 GX5/16"
SYRINGE, EMPTY DISPOSABLE MISCELLANEOUS
Qty: 100 EACH | Refills: 3 | Status: ON HOLD | OUTPATIENT
Start: 2017-01-01 | End: 2017-01-01

## 2017-01-01 RX ORDER — LOSARTAN POTASSIUM 100 MG/1
100 TABLET ORAL DAILY
COMMUNITY
End: 2017-01-01 | Stop reason: SDUPTHER

## 2017-01-01 RX ORDER — DEXTROSE AND SODIUM CHLORIDE 5; .45 G/100ML; G/100ML
INJECTION, SOLUTION INTRAVENOUS CONTINUOUS
Status: DISCONTINUED | OUTPATIENT
Start: 2017-01-01 | End: 2017-01-01

## 2017-01-01 RX ORDER — CARVEDILOL 12.5 MG/1
12.5 TABLET ORAL 2 TIMES DAILY WITH MEALS
Status: DISCONTINUED | OUTPATIENT
Start: 2017-01-01 | End: 2017-01-01

## 2017-01-01 RX ORDER — DOXAZOSIN 2 MG/1
2 TABLET ORAL DAILY
COMMUNITY

## 2017-01-01 RX ORDER — INSULIN GLARGINE 100 [IU]/ML
5 INJECTION, SOLUTION SUBCUTANEOUS NIGHTLY
Status: DISCONTINUED | OUTPATIENT
Start: 2017-01-01 | End: 2017-01-01 | Stop reason: HOSPADM

## 2017-01-01 RX ORDER — INSULIN GLARGINE 100 [IU]/ML
5-7 INJECTION, SOLUTION SUBCUTANEOUS NIGHTLY
COMMUNITY

## 2017-01-01 RX ORDER — PIOGLITAZONEHYDROCHLORIDE 15 MG/1
TABLET ORAL
Qty: 90 TAB | Refills: 3 | Status: SHIPPED | OUTPATIENT
Start: 2017-01-01

## 2017-01-01 RX ORDER — HEPARIN SODIUM 5000 [USP'U]/ML
5000 INJECTION, SOLUTION INTRAVENOUS; SUBCUTANEOUS EVERY 8 HOURS
Status: DISCONTINUED | OUTPATIENT
Start: 2017-01-01 | End: 2018-01-01

## 2017-01-01 RX ORDER — LIDOCAINE HYDROCHLORIDE 20 MG/ML
20 INJECTION, SOLUTION INFILTRATION; PERINEURAL ONCE
Status: COMPLETED | OUTPATIENT
Start: 2017-01-01 | End: 2017-01-01

## 2017-01-01 RX ORDER — SODIUM BICARBONATE 650 MG/1
650 TABLET ORAL 2 TIMES DAILY
Status: DISCONTINUED | OUTPATIENT
Start: 2017-01-01 | End: 2017-01-01

## 2017-01-01 RX ORDER — ROSUVASTATIN CALCIUM 20 MG/1
20 TABLET, COATED ORAL DAILY
Status: DISCONTINUED | OUTPATIENT
Start: 2017-01-01 | End: 2017-01-01 | Stop reason: HOSPADM

## 2017-01-01 RX ORDER — SODIUM CHLORIDE 9 MG/ML
2000 INJECTION, SOLUTION INTRAVENOUS ONCE
Status: COMPLETED | OUTPATIENT
Start: 2017-01-01 | End: 2017-01-01

## 2017-01-01 RX ORDER — OSELTAMIVIR PHOSPHATE 30 MG/1
30 CAPSULE ORAL DAILY
Status: COMPLETED | OUTPATIENT
Start: 2017-01-01 | End: 2018-01-01

## 2017-01-01 RX ORDER — ROSUVASTATIN CALCIUM 10 MG/1
20 TABLET, COATED ORAL EVERY EVENING
Status: DISCONTINUED | OUTPATIENT
Start: 2017-01-01 | End: 2018-01-01

## 2017-01-01 RX ORDER — LOSARTAN POTASSIUM 100 MG/1
100 TABLET ORAL DAILY
Qty: 30 TAB | Refills: 6 | Status: SHIPPED | OUTPATIENT
Start: 2017-01-01 | End: 2017-01-01 | Stop reason: SDUPTHER

## 2017-01-01 RX ORDER — ZOLPIDEM TARTRATE 5 MG/1
5 TABLET ORAL
Status: DISCONTINUED | OUTPATIENT
Start: 2017-01-01 | End: 2017-01-01 | Stop reason: HOSPADM

## 2017-01-01 RX ORDER — LORAZEPAM 2 MG/ML
0.5 INJECTION INTRAMUSCULAR EVERY 6 HOURS PRN
Status: DISCONTINUED | OUTPATIENT
Start: 2017-01-01 | End: 2017-01-01 | Stop reason: HOSPADM

## 2017-01-01 RX ORDER — LOSARTAN POTASSIUM 50 MG/1
100 TABLET ORAL DAILY
Status: DISCONTINUED | OUTPATIENT
Start: 2017-01-01 | End: 2017-01-01

## 2017-01-01 RX ORDER — ALBUMIN (HUMAN) 12.5 G/50ML
62.5 SOLUTION INTRAVENOUS ONCE
Status: COMPLETED | OUTPATIENT
Start: 2017-01-01 | End: 2017-01-01

## 2017-01-01 RX ORDER — ONDANSETRON 4 MG/1
4 TABLET, ORALLY DISINTEGRATING ORAL EVERY 4 HOURS PRN
Status: DISCONTINUED | OUTPATIENT
Start: 2017-01-01 | End: 2017-01-01 | Stop reason: HOSPADM

## 2017-01-01 RX ORDER — SODIUM CHLORIDE 450 MG/100ML
INJECTION, SOLUTION INTRAVENOUS CONTINUOUS
Status: DISCONTINUED | OUTPATIENT
Start: 2017-01-01 | End: 2017-01-01

## 2017-01-01 RX ORDER — LIDOCAINE HYDROCHLORIDE 10 MG/ML
INJECTION, SOLUTION INFILTRATION; PERINEURAL
Status: COMPLETED
Start: 2017-01-01 | End: 2017-01-01

## 2017-01-01 RX ORDER — DOXAZOSIN 2 MG/1
TABLET ORAL
Qty: 90 TAB | Refills: 3 | Status: ON HOLD | OUTPATIENT
Start: 2017-01-01 | End: 2017-01-01

## 2017-01-01 RX ORDER — AMOXICILLIN 250 MG
1 CAPSULE ORAL
Status: DISCONTINUED | OUTPATIENT
Start: 2017-01-01 | End: 2017-01-01 | Stop reason: HOSPADM

## 2017-01-01 RX ORDER — LACTULOSE 20 G/30ML
30 SOLUTION ORAL
Status: DISCONTINUED | OUTPATIENT
Start: 2017-01-01 | End: 2017-01-01 | Stop reason: HOSPADM

## 2017-01-01 RX ORDER — ENEMA 19; 7 G/133ML; G/133ML
1 ENEMA RECTAL
Status: DISCONTINUED | OUTPATIENT
Start: 2017-01-01 | End: 2017-01-01

## 2017-01-01 RX ADMIN — AZITHROMYCIN 500 MG: 250 TABLET, FILM COATED ORAL at 10:52

## 2017-01-01 RX ADMIN — DOXAZOSIN 2 MG: 2 TABLET ORAL at 21:55

## 2017-01-01 RX ADMIN — CEFTAROLINE FOSAMIL 300 MG: 600 POWDER, FOR SOLUTION INTRAVENOUS at 20:03

## 2017-01-01 RX ADMIN — INSULIN LISPRO 2 UNITS: 100 INJECTION, SOLUTION INTRAVENOUS; SUBCUTANEOUS at 11:02

## 2017-01-01 RX ADMIN — HEPARIN SODIUM 5000 UNITS: 5000 INJECTION, SOLUTION INTRAVENOUS; SUBCUTANEOUS at 06:09

## 2017-01-01 RX ADMIN — CARVEDILOL 12.5 MG: 12.5 TABLET, FILM COATED ORAL at 08:09

## 2017-01-01 RX ADMIN — Medication 1334 MG: at 08:29

## 2017-01-01 RX ADMIN — AZITHROMYCIN 250 MG: 250 TABLET, FILM COATED ORAL at 08:54

## 2017-01-01 RX ADMIN — OSELTAMIVIR PHOSPHATE 30 MG: 30 CAPSULE ORAL at 18:01

## 2017-01-01 RX ADMIN — ALBUMIN (HUMAN) 12.5 G: 12.5 SOLUTION INTRAVENOUS at 19:18

## 2017-01-01 RX ADMIN — HEPARIN SODIUM 5000 UNITS: 5000 INJECTION, SOLUTION INTRAVENOUS; SUBCUTANEOUS at 06:22

## 2017-01-01 RX ADMIN — LIDOCAINE HYDROCHLORIDE 0.5 ML: 10 INJECTION, SOLUTION INFILTRATION; PERINEURAL at 10:30

## 2017-01-01 RX ADMIN — AMPICILLIN SODIUM AND SULBACTAM SODIUM 3 G: 2; 1 INJECTION, POWDER, FOR SOLUTION INTRAMUSCULAR; INTRAVENOUS at 22:59

## 2017-01-01 RX ADMIN — AMPICILLIN SODIUM AND SULBACTAM SODIUM 3 G: 2; 1 INJECTION, POWDER, FOR SOLUTION INTRAMUSCULAR; INTRAVENOUS at 20:08

## 2017-01-01 RX ADMIN — OSELTAMIVIR PHOSPHATE 30 MG: 30 CAPSULE ORAL at 09:21

## 2017-01-01 RX ADMIN — FUROSEMIDE 20 MG: 20 TABLET ORAL at 13:33

## 2017-01-01 RX ADMIN — FUROSEMIDE 20 MG: 20 TABLET ORAL at 08:40

## 2017-01-01 RX ADMIN — ALBUMIN (HUMAN) 50 G: 12.5 SOLUTION INTRAVENOUS at 10:50

## 2017-01-01 RX ADMIN — DOCUSATE SODIUM 100 MG: 100 CAPSULE ORAL at 15:45

## 2017-01-01 RX ADMIN — ALBUMIN (HUMAN) 50 G: 12.5 SOLUTION INTRAVENOUS at 10:46

## 2017-01-01 RX ADMIN — CEFTRIAXONE 2 G: 2 INJECTION, POWDER, FOR SOLUTION INTRAMUSCULAR; INTRAVENOUS at 12:31

## 2017-01-01 RX ADMIN — HEPARIN SODIUM 5000 UNITS: 5000 INJECTION, SOLUTION INTRAVENOUS; SUBCUTANEOUS at 22:06

## 2017-01-01 RX ADMIN — VANCOMYCIN HYDROCHLORIDE 1200 MG: 100 INJECTION, POWDER, LYOPHILIZED, FOR SOLUTION INTRAVENOUS at 15:02

## 2017-01-01 RX ADMIN — PIOGLITAZONE 15 MG: 15 TABLET ORAL at 08:34

## 2017-01-01 RX ADMIN — SPIRONOLACTONE 25 MG: 25 TABLET, FILM COATED ORAL at 09:45

## 2017-01-01 RX ADMIN — LOSARTAN POTASSIUM 100 MG: 50 TABLET, FILM COATED ORAL at 08:40

## 2017-01-01 RX ADMIN — HEPARIN SODIUM 5000 UNITS: 5000 INJECTION, SOLUTION INTRAVENOUS; SUBCUTANEOUS at 15:46

## 2017-01-01 RX ADMIN — HEPARIN SODIUM 5000 UNITS: 5000 INJECTION, SOLUTION INTRAVENOUS; SUBCUTANEOUS at 13:46

## 2017-01-01 RX ADMIN — CARVEDILOL 12.5 MG: 12.5 TABLET, FILM COATED ORAL at 17:46

## 2017-01-01 RX ADMIN — HEPARIN SODIUM 5000 UNITS: 5000 INJECTION, SOLUTION INTRAVENOUS; SUBCUTANEOUS at 06:27

## 2017-01-01 RX ADMIN — SPIRONOLACTONE 25 MG: 25 TABLET, FILM COATED ORAL at 13:34

## 2017-01-01 RX ADMIN — SODIUM BICARBONATE: 84 INJECTION, SOLUTION INTRAVENOUS at 13:45

## 2017-01-01 RX ADMIN — LABETALOL HYDROCHLORIDE 10 MG: 5 INJECTION, SOLUTION INTRAVENOUS at 18:47

## 2017-01-01 RX ADMIN — AMPICILLIN SODIUM AND SULBACTAM SODIUM 3 G: 2; 1 INJECTION, POWDER, FOR SOLUTION INTRAMUSCULAR; INTRAVENOUS at 17:32

## 2017-01-01 RX ADMIN — ROSUVASTATIN CALCIUM 20 MG: 20 TABLET ORAL at 22:03

## 2017-01-01 RX ADMIN — INSULIN GLARGINE 5 UNITS: 100 INJECTION, SOLUTION SUBCUTANEOUS at 22:11

## 2017-01-01 RX ADMIN — HEPARIN SODIUM 5000 UNITS: 5000 INJECTION, SOLUTION INTRAVENOUS; SUBCUTANEOUS at 20:09

## 2017-01-01 RX ADMIN — AMPICILLIN SODIUM AND SULBACTAM SODIUM 3 G: 2; 1 INJECTION, POWDER, FOR SOLUTION INTRAMUSCULAR; INTRAVENOUS at 11:10

## 2017-01-01 RX ADMIN — VANCOMYCIN HYDROCHLORIDE 1900 MG: 100 INJECTION, POWDER, LYOPHILIZED, FOR SOLUTION INTRAVENOUS at 07:37

## 2017-01-01 RX ADMIN — SODIUM BICARBONATE 650 MG: 650 TABLET ORAL at 20:27

## 2017-01-01 RX ADMIN — ROSUVASTATIN CALCIUM 20 MG: 10 TABLET, FILM COATED ORAL at 20:03

## 2017-01-01 RX ADMIN — FLUTICASONE PROPIONATE 100 MCG: 50 SPRAY, METERED NASAL at 20:09

## 2017-01-01 RX ADMIN — LOSARTAN POTASSIUM 100 MG: 50 TABLET, FILM COATED ORAL at 08:35

## 2017-01-01 RX ADMIN — SODIUM BICARBONATE 650 MG: 650 TABLET ORAL at 09:21

## 2017-01-01 RX ADMIN — SODIUM BICARBONATE 650 MG: 650 TABLET ORAL at 21:56

## 2017-01-01 RX ADMIN — CEFTRIAXONE 2 G: 2 INJECTION, POWDER, FOR SOLUTION INTRAMUSCULAR; INTRAVENOUS at 12:14

## 2017-01-01 RX ADMIN — SODIUM BICARBONATE: 84 INJECTION, SOLUTION INTRAVENOUS at 19:23

## 2017-01-01 RX ADMIN — Medication 1334 MG: at 11:54

## 2017-01-01 RX ADMIN — SODIUM CHLORIDE 1000 ML: 9 INJECTION, SOLUTION INTRAVENOUS at 15:34

## 2017-01-01 RX ADMIN — CEFTRIAXONE 2 G: 2 INJECTION, POWDER, FOR SOLUTION INTRAMUSCULAR; INTRAVENOUS at 09:22

## 2017-01-01 RX ADMIN — FUROSEMIDE 40 MG: 10 INJECTION, SOLUTION INTRAVENOUS at 19:00

## 2017-01-01 RX ADMIN — SODIUM CHLORIDE: 9 INJECTION, SOLUTION INTRAVENOUS at 10:30

## 2017-01-01 RX ADMIN — HEPARIN SODIUM 5000 UNITS: 5000 INJECTION, SOLUTION INTRAVENOUS; SUBCUTANEOUS at 21:56

## 2017-01-01 RX ADMIN — ROSUVASTATIN CALCIUM 20 MG: 20 TABLET ORAL at 22:17

## 2017-01-01 RX ADMIN — INSULIN HUMAN 2 UNITS: 100 INJECTION, SOLUTION PARENTERAL at 20:27

## 2017-01-01 RX ADMIN — HEPARIN SODIUM 5000 UNITS: 5000 INJECTION, SOLUTION INTRAVENOUS; SUBCUTANEOUS at 20:03

## 2017-01-01 RX ADMIN — AZITHROMYCIN 500 MG: 250 TABLET, FILM COATED ORAL at 15:45

## 2017-01-01 RX ADMIN — ALBUMIN (HUMAN) 50 G: 12.5 SOLUTION INTRAVENOUS at 10:30

## 2017-01-01 RX ADMIN — LOSARTAN POTASSIUM 100 MG: 50 TABLET, FILM COATED ORAL at 08:54

## 2017-01-01 RX ADMIN — SODIUM CHLORIDE 1000 ML: 9 INJECTION, SOLUTION INTRAVENOUS at 10:24

## 2017-01-01 RX ADMIN — DOXAZOSIN 2 MG: 2 TABLET ORAL at 08:55

## 2017-01-01 RX ADMIN — ALBUMIN (HUMAN) 50 G: 12.5 SOLUTION INTRAVENOUS at 10:10

## 2017-01-01 RX ADMIN — ALBUMIN (HUMAN) 50 G: 0.25 INJECTION, SOLUTION INTRAVENOUS at 10:45

## 2017-01-01 RX ADMIN — HEPARIN SODIUM 5000 UNITS: 5000 INJECTION, SOLUTION INTRAVENOUS; SUBCUTANEOUS at 21:30

## 2017-01-01 RX ADMIN — SODIUM CHLORIDE: 9 INJECTION, SOLUTION INTRAVENOUS at 08:40

## 2017-01-01 RX ADMIN — LABETALOL HYDROCHLORIDE 10 MG: 5 INJECTION, SOLUTION INTRAVENOUS at 17:36

## 2017-01-01 RX ADMIN — OSELTAMIVIR PHOSPHATE 30 MG: 30 CAPSULE ORAL at 08:29

## 2017-01-01 RX ADMIN — AZITHROMYCIN 250 MG: 250 TABLET, FILM COATED ORAL at 09:21

## 2017-01-01 RX ADMIN — SODIUM CHLORIDE: 9 INJECTION, SOLUTION INTRAVENOUS at 15:51

## 2017-01-01 RX ADMIN — ALBUMIN (HUMAN) 50 G: 0.25 INJECTION, SOLUTION INTRAVENOUS at 16:20

## 2017-01-01 RX ADMIN — LOSARTAN POTASSIUM 50 MG: 50 TABLET, FILM COATED ORAL at 13:34

## 2017-01-01 RX ADMIN — ALBUMIN (HUMAN) 50 G: 12.5 SOLUTION INTRAVENOUS at 11:24

## 2017-01-01 RX ADMIN — ALBUMIN (HUMAN) 50 G: 12.5 SOLUTION INTRAVENOUS at 09:30

## 2017-01-01 RX ADMIN — Medication 1334 MG: at 17:51

## 2017-01-01 RX ADMIN — ROSUVASTATIN CALCIUM 20 MG: 20 TABLET ORAL at 21:30

## 2017-01-01 RX ADMIN — FLUTICASONE PROPIONATE 100 MCG: 50 SPRAY, METERED NASAL at 20:13

## 2017-01-01 RX ADMIN — ROSUVASTATIN CALCIUM 20 MG: 10 TABLET, FILM COATED ORAL at 21:55

## 2017-01-01 RX ADMIN — AZITHROMYCIN 250 MG: 250 TABLET, FILM COATED ORAL at 08:35

## 2017-01-01 RX ADMIN — SPIRONOLACTONE 25 MG: 25 TABLET, FILM COATED ORAL at 08:55

## 2017-01-01 RX ADMIN — AZITHROMYCIN 250 MG: 250 TABLET, FILM COATED ORAL at 08:29

## 2017-01-01 RX ADMIN — DOXAZOSIN 2 MG: 2 TABLET ORAL at 21:29

## 2017-01-01 RX ADMIN — AMPICILLIN SODIUM AND SULBACTAM SODIUM 3 G: 2; 1 INJECTION, POWDER, FOR SOLUTION INTRAMUSCULAR; INTRAVENOUS at 05:06

## 2017-01-01 RX ADMIN — SODIUM BICARBONATE 650 MG: 650 TABLET ORAL at 08:29

## 2017-01-01 RX ADMIN — AMPICILLIN SODIUM AND SULBACTAM SODIUM 3 G: 2; 1 INJECTION, POWDER, FOR SOLUTION INTRAMUSCULAR; INTRAVENOUS at 08:58

## 2017-01-01 RX ADMIN — HEPARIN SODIUM 5000 UNITS: 5000 INJECTION, SOLUTION INTRAVENOUS; SUBCUTANEOUS at 15:02

## 2017-01-01 RX ADMIN — DOXYCYCLINE 100 MG: 100 INJECTION, POWDER, LYOPHILIZED, FOR SOLUTION INTRAVENOUS at 13:38

## 2017-01-01 RX ADMIN — DOXAZOSIN 2 MG: 2 TABLET ORAL at 08:35

## 2017-01-01 RX ADMIN — SODIUM CHLORIDE 2000 ML: 9 INJECTION, SOLUTION INTRAVENOUS at 13:00

## 2017-01-01 RX ADMIN — SODIUM BICARBONATE: 84 INJECTION, SOLUTION INTRAVENOUS at 08:59

## 2017-01-01 RX ADMIN — PIOGLITAZONE 15 MG: 15 TABLET ORAL at 08:55

## 2017-01-01 RX ADMIN — ROSUVASTATIN CALCIUM 20 MG: 20 TABLET ORAL at 20:09

## 2017-01-01 RX ADMIN — ALBUMIN (HUMAN) 62.5 G: 12.5 SOLUTION INTRAVENOUS at 11:00

## 2017-01-01 RX ADMIN — ALBUMIN (HUMAN) 50 G: 12.5 SOLUTION INTRAVENOUS at 16:45

## 2017-01-01 RX ADMIN — ALBUMIN (HUMAN) 50 G: 12.5 SOLUTION INTRAVENOUS at 12:41

## 2017-01-01 RX ADMIN — SODIUM BICARBONATE: 84 INJECTION, SOLUTION INTRAVENOUS at 21:55

## 2017-01-01 RX ADMIN — ROSUVASTATIN CALCIUM 20 MG: 10 TABLET, FILM COATED ORAL at 22:06

## 2017-01-01 RX ADMIN — SPIRONOLACTONE 25 MG: 25 TABLET, FILM COATED ORAL at 08:40

## 2017-01-01 RX ADMIN — LIDOCAINE HYDROCHLORIDE 20 ML: 20 INJECTION, SOLUTION INFILTRATION; PERINEURAL at 11:45

## 2017-01-01 RX ADMIN — CEFTRIAXONE 2 G: 2 INJECTION, POWDER, FOR SOLUTION INTRAMUSCULAR; INTRAVENOUS at 09:00

## 2017-01-01 RX ADMIN — LABETALOL HYDROCHLORIDE 10 MG: 5 INJECTION, SOLUTION INTRAVENOUS at 05:06

## 2017-01-01 RX ADMIN — SODIUM BICARBONATE: 84 INJECTION, SOLUTION INTRAVENOUS at 04:13

## 2017-01-01 RX ADMIN — LOSARTAN POTASSIUM 50 MG: 50 TABLET, FILM COATED ORAL at 12:37

## 2017-01-01 RX ADMIN — CARVEDILOL 12.5 MG: 12.5 TABLET, FILM COATED ORAL at 08:14

## 2017-01-01 RX ADMIN — HEPARIN SODIUM 5000 UNITS: 5000 INJECTION, SOLUTION INTRAVENOUS; SUBCUTANEOUS at 20:13

## 2017-01-01 RX ADMIN — HEPARIN SODIUM 5000 UNITS: 5000 INJECTION, SOLUTION INTRAVENOUS; SUBCUTANEOUS at 14:30

## 2017-01-01 ASSESSMENT — ENCOUNTER SYMPTOMS
FEVER: 0
MEMORY LOSS: 0
ORTHOPNEA: 0
CHILLS: 0
COUGH: 1
PND: 0
ORTHOPNEA: 0
SORE THROAT: 0
HEMOPTYSIS: 0
HEMOPTYSIS: 0
BRUISES/BLEEDS EASILY: 0
DEPRESSION: 0
GASTROINTESTINAL NEGATIVE: 1
DIZZINESS: 0
CARDIOVASCULAR NEGATIVE: 1
SORE THROAT: 0
COUGH: 0
RESPIRATORY NEGATIVE: 1
WHEEZING: 0
CHILLS: 0
GASTROINTESTINAL NEGATIVE: 1
SORE THROAT: 0
SPUTUM PRODUCTION: 1
COUGH: 0
CLAUDICATION: 0
MUSCULOSKELETAL NEGATIVE: 1
ABDOMINAL PAIN: 0
BLURRED VISION: 0
WHEEZING: 0
COUGH: 0
TINGLING: 0
SPEECH CHANGE: 0
DIARRHEA: 0
NAUSEA: 0
CONSTIPATION: 0
SHORTNESS OF BREATH: 0
CARDIOVASCULAR NEGATIVE: 1
CARDIOVASCULAR NEGATIVE: 1
RESPIRATORY NEGATIVE: 1
ORTHOPNEA: 0
VOMITING: 0
CHILLS: 0
TINGLING: 0
WEAKNESS: 1
PALPITATIONS: 0
BLOOD IN STOOL: 0
SPUTUM PRODUCTION: 0
PALPITATIONS: 0
COUGH: 1
GASTROINTESTINAL NEGATIVE: 1
EYES NEGATIVE: 1
FEVER: 0
STRIDOR: 0
SORE THROAT: 0
HEARTBURN: 0
COUGH: 1
CLAUDICATION: 0
GASTROINTESTINAL NEGATIVE: 1
FEVER: 0
CHILLS: 0
HEADACHES: 0
FOCAL WEAKNESS: 0
CARDIOVASCULAR NEGATIVE: 1
SPUTUM PRODUCTION: 1
SPUTUM PRODUCTION: 0
SPUTUM PRODUCTION: 0
PALPITATIONS: 0
DIZZINESS: 0
EYES NEGATIVE: 1
DEPRESSION: 0
DOUBLE VISION: 0
VOMITING: 0
DIARRHEA: 0
ORTHOPNEA: 0
SORE THROAT: 0
BRUISES/BLEEDS EASILY: 0
BLOOD IN STOOL: 0
BLOOD IN STOOL: 0
COUGH: 0
SPUTUM PRODUCTION: 0
CONSTITUTIONAL NEGATIVE: 1
PALPITATIONS: 0
COUGH: 0
PHOTOPHOBIA: 0
SHORTNESS OF BREATH: 1
HEMOPTYSIS: 0
PND: 0
PHOTOPHOBIA: 0
ABDOMINAL PAIN: 0
NECK PAIN: 0
SHORTNESS OF BREATH: 0
CHILLS: 0
DEPRESSION: 0
DIARRHEA: 0
FEVER: 0
COUGH: 1
FEVER: 0
NAUSEA: 0
WHEEZING: 0
HEADACHES: 0
SHORTNESS OF BREATH: 0
FOCAL WEAKNESS: 0
BRUISES/BLEEDS EASILY: 0
NAUSEA: 0
TREMORS: 0
TINGLING: 0
DIARRHEA: 0
WEAKNESS: 0
FEVER: 0
LOSS OF CONSCIOUSNESS: 0
WEAKNESS: 0
NAUSEA: 0
PALPITATIONS: 0
DIZZINESS: 0
WHEEZING: 0
CHILLS: 0
MUSCULOSKELETAL NEGATIVE: 1
PND: 0
FOCAL WEAKNESS: 0
FEVER: 0
VOMITING: 0
EYES NEGATIVE: 1
MUSCULOSKELETAL NEGATIVE: 1
DIZZINESS: 0
SORE THROAT: 0
ABDOMINAL PAIN: 0
PALPITATIONS: 0
SENSORY CHANGE: 0
DIZZINESS: 0
MUSCULOSKELETAL NEGATIVE: 1
EYES NEGATIVE: 1
DIZZINESS: 0
HEADACHES: 0
EYES NEGATIVE: 1
CHILLS: 0
ORTHOPNEA: 0
CLAUDICATION: 0
NEUROLOGICAL NEGATIVE: 1
MYALGIAS: 0
ABDOMINAL PAIN: 0
GASTROINTESTINAL NEGATIVE: 1
STRIDOR: 0
WHEEZING: 0
NEUROLOGICAL NEGATIVE: 1
DIZZINESS: 0
LOSS OF CONSCIOUSNESS: 0
COUGH: 1
HEMOPTYSIS: 1
SHORTNESS OF BREATH: 0
SORE THROAT: 0
SHORTNESS OF BREATH: 1
ORTHOPNEA: 0
VOMITING: 0
CONSTITUTIONAL NEGATIVE: 1
DIARRHEA: 0
FEVER: 0
MYALGIAS: 0
EYE PAIN: 0
NAUSEA: 0
PHOTOPHOBIA: 0
HEADACHES: 0
NERVOUS/ANXIOUS: 1
MUSCULOSKELETAL NEGATIVE: 1
SHORTNESS OF BREATH: 1
SHORTNESS OF BREATH: 0
STRIDOR: 0
SHORTNESS OF BREATH: 0
BACK PAIN: 0
CARDIOVASCULAR NEGATIVE: 1
NEUROLOGICAL NEGATIVE: 1
CONSTIPATION: 0
MYALGIAS: 0
WEAKNESS: 0
TINGLING: 0
FOCAL WEAKNESS: 0
MYALGIAS: 0

## 2017-01-01 ASSESSMENT — LIFESTYLE VARIABLES
ALCOHOL_USE: NO
DO YOU DRINK ALCOHOL: NO
EVER_SMOKED: NEVER
DO YOU DRINK ALCOHOL: NO
DO YOU DRINK ALCOHOL: NO
EVER_SMOKED: NEVER
EVER_SMOKED: YES
DO YOU DRINK ALCOHOL: NO
DO YOU DRINK ALCOHOL: NO
ALCOHOL_USE: NO
EVER_SMOKED: YES
DO YOU DRINK ALCOHOL: NO

## 2017-01-01 ASSESSMENT — PAIN SCALES - GENERAL
PAINLEVEL_OUTOF10: 0
PAINLEVEL_OUTOF10: 1
PAINLEVEL_OUTOF10: 0
PAINLEVEL_OUTOF10: 4
PAINLEVEL_OUTOF10: 0
PAINLEVEL_OUTOF10: 0

## 2017-01-01 ASSESSMENT — PATIENT HEALTH QUESTIONNAIRE - PHQ9
2. FEELING DOWN, DEPRESSED, IRRITABLE, OR HOPELESS: NOT AT ALL
CLINICAL INTERPRETATION OF PHQ2 SCORE: 0
SUM OF ALL RESPONSES TO PHQ9 QUESTIONS 1 AND 2: 0
1. LITTLE INTEREST OR PLEASURE IN DOING THINGS: NOT AT ALL
SUM OF ALL RESPONSES TO PHQ QUESTIONS 1-9: 0

## 2017-01-01 ASSESSMENT — COPD QUESTIONNAIRES
HAVE YOU SMOKED AT LEAST 100 CIGARETTES IN YOUR ENTIRE LIFE: YES
DO YOU EVER COUGH UP ANY MUCUS OR PHLEGM?: NO/ONLY WITH OCCASIONAL COLDS OR INFECTIONS
HAVE YOU SMOKED AT LEAST 100 CIGARETTES IN YOUR ENTIRE LIFE: YES
COPD SCREENING SCORE: 4
DURING THE PAST 4 WEEKS HOW MUCH DID YOU FEEL SHORT OF BREATH: NONE/LITTLE OF THE TIME
COPD SCREENING SCORE: 4
HAVE YOU SMOKED AT LEAST 100 CIGARETTES IN YOUR ENTIRE LIFE: YES
DURING THE PAST 4 WEEKS HOW MUCH DID YOU FEEL SHORT OF BREATH: NONE/LITTLE OF THE TIME
DURING THE PAST 4 WEEKS HOW MUCH DID YOU FEEL SHORT OF BREATH: SOME OF THE TIME
DO YOU EVER COUGH UP ANY MUCUS OR PHLEGM?: NO/ONLY WITH OCCASIONAL COLDS OR INFECTIONS

## 2017-01-04 ENCOUNTER — TELEPHONE (OUTPATIENT)
Dept: MEDICAL GROUP | Facility: CLINIC | Age: 77
End: 2017-01-04

## 2017-01-04 NOTE — TELEPHONE ENCOUNTER
Please have patient Fu with Dr. MADRIGAL regarding this thyroid lab.  Looks like he would benefit from medication

## 2017-01-16 PROBLEM — D64.9 NORMOCYTIC ANEMIA: Status: ACTIVE | Noted: 2017-01-01

## 2017-01-16 PROBLEM — R80.9 PROTEINURIA: Status: ACTIVE | Noted: 2017-01-01

## 2017-01-16 PROBLEM — E87.8 HYPERCHLOREMIA: Status: ACTIVE | Noted: 2017-01-01

## 2017-01-16 NOTE — IP AVS SNAPSHOT
" After Visit Summary                                                                                                                  Name:Tico Solorzano  Medical Record Number:[id number]  CSN: [id number]    YOB: 1940   Age: 76 y.o.  Sex: male  HT:1.829 m (6' 0.01\") WT: 86.7 kg (191 lb 2.2 oz)          Admit Date: 1/16/2017     Discharge Date:   Today's Date: 1/19/2017  Attending Doctor:  Idalia Randhawa M.D.                  Allergies:  Valsartan            Discharge Instructions       Discharge Instructions    Discharged to home by taxi with self. Discharged via wheelchair, hospital escort: Yes.  Special equipment needed: Not Applicable    Be sure to schedule a follow-up appointment with your primary care doctor or any specialists as instructed.     Discharge Plan:   Diet Plan: Discussed  Activity Level: Discussed  Confirmed Follow up Appointment: Patient to Call and Schedule Appointment  Confirmed Symptoms Management: Discussed  Medication Reconciliation Updated: Yes  Influenza Vaccine Indication: Not indicated: Previously immunized this influenza season and > 8 years of age    I understand that a diet low in cholesterol, fat, and sodium is recommended for good health. Unless I have been given specific instructions below for another diet, I accept this instruction as my diet prescription.   Other diet: Diabetic Diet    Special Instructions: None    · Is patient discharged on Warfarin / Coumadin?   No     · Is patient Post Blood Transfusion?  No    Depression / Suicide Risk    As you are discharged [facility], it is important to learn how to keep safe from harming yourself.    Recognize the warning signs:  · Abrupt changes in personality, positive or negative- including increase in energy   · Giving away possessions  · Change in eating patterns- significant weight changes-  positive or negative  · Change in sleeping patterns- unable to sleep or sleeping all the " time   · Unwillingness or inability to communicate  · Depression  · Unusual sadness, discouragement and loneliness  · Talk of wanting to die  · Neglect of personal appearance   · Rebelliousness- reckless behavior  · Withdrawal from people/activities they love  · Confusion- inability to concentrate     If you or a loved one observes any of these behaviors or has concerns about self-harm, here's what you can do:  · Talk about it- your feelings and reasons for harming yourself  · Remove any means that you might use to hurt yourself (examples: pills, rope, extension cords, firearm)  · Get professional help from the community (Mental Health, Substance Abuse, psychological counseling)  · Do not be alone:Call your Safe Contact- someone whom you trust who will be there for you.  · Call your local CRISIS HOTLINE 021-8555 or 787-838-5124  · Call your local Children's Mobile Crisis Response Team [place] (891) 722-4187 or www.Starbates  · Call the toll free National Suicide Prevention Hotlines   · National Suicide Prevention Lifeline 708-961-GYWU (9375)  · [employer] Line Network 800-SUICIDE (640-7607)  OK to DC home once seen by nephro.  Patient to call [facility] at [phone] to arrange appointment to followup on new liver disease and to review labs.        Your appointments     Jan 24, 2017  1:30 PM   PACER CHECK ONLY with PACER PHONE CHECK   [facility] (--)    [address], [address]  [place] [address] [phone]            Jan 24, 2017  1:45 PM   FOLLOW UP with Navneet Brunner M.D.   [facility] (--)    [address], [address]  [place] [address] [phone]            Mar 08, 2017 11:00 AM   Established Patient with Casey Wick D.O.   [facility]    [address] [facility] [address] [facility] [address]  [place] [address] [phone]           You will be receiving a confirmation call a few days before your  appointment from our automated call confirmation system.                 Discharge Medication Instructions:    Below are the medications your physician expects you to take upon discharge:    Review all your home medications and newly ordered medications with your doctor and/or pharmacist. Follow medication instructions as directed by your doctor and/or pharmacist.    Please keep your medication list with you and share with your physician.               Medication List      START taking these medications        Instructions    furosemide 20 MG Tabs   Last time this was given:  20 mg on 1/19/2017  8:40 AM   Commonly known as:  LASIX    Take 1 Tab by mouth every day.   Dose:  20 mg       spironolactone 25 MG Tabs   Last time this was given:  25 mg on 1/19/2017  8:40 AM   Commonly known as:  ALDACTONE    Take 1 Tab by mouth every day.   Dose:  25 mg         CONTINUE taking these medications        Instructions    doxazosin 2 MG Tabs   Last time this was given:  2 mg on 1/18/2017  9:29 PM   Commonly known as:  CARDURA    Take 2 mg by mouth every day.   Dose:  2 mg       fluticasone 50 MCG/ACT nasal spray   Commonly known as:  FLONASE    INSTILL 2 SPRAYS IN EACH NOSTRIL ONCE DAILY       insulin glargine 100 UNIT/ML Soln   Commonly known as:  LANTUS    Inject 5-7 Units as instructed every evening.   Dose:  5-7 Units       losartan 100 MG Tabs   Last time this was given:  100 mg on 1/19/2017  8:40 AM   Commonly known as:  COZAAR    TAKE 1 TABLET BY MOUTH ONCE DAILY       OCUVITE Tabs    Take 1 Tab by mouth every day.   Dose:  1 Tab       pioglitazone 15 MG Tabs   Commonly known as:  ACTOS    TAKE 1 TABLET BY MOUTH EVERY DAY       rosuvastatin 20 MG Tabs   Last time this was given:  20 mg on 1/18/2017  9:30 PM   Commonly known as:  CRESTOR    Take 1 Tab by mouth every evening.   Dose:  20 mg         STOP taking these medications     amlodipine 10 MG Tabs   Commonly known as:  NORVASC               Instructions           Diet  / Nutrition:    Follow any diet instructions given to you by your doctor or the dietician, including how much salt (sodium) you are allowed each day.    If you are overweight, talk to your doctor about a weight reduction plan.    Activity:    Remain physically active following your doctor's instructions about exercise and activity.    Rest often.     Any time you become even a little tired or short of breath, SIT DOWN and rest.    Worsening Symptoms:    Report any of the following signs and symptoms to the doctor's office immediately:    *Pain of jaw, arm, or neck  *Chest pain not relieved by medication                               *Dizziness or loss of consciousness  *Difficulty breathing even when at rest   *More tired than usual                                       *Bleeding drainage or swelling of surgical site  *Swelling of feet, ankles, legs or stomach                 *Fever (>100ºF)  *Pink or blood tinged sputum  *Weight gain (3lbs/day or 5lbs /week)           *Shock from internal defibrillator (if applicable)  *Palpitations or irregular heartbeats                *Cool and/or numb extremities    Stroke Awareness    Common Risk Factors for Stroke include:    Age  Atrial Fibrillation  Carotid Artery Stenosis  Diabetes Mellitus  Excessive alcohol consumption  High blood pressure  Overweight   Physical inactivity  Smoking    Warning signs and symptoms of a stroke include:    *Sudden numbness or weakness of the face, arm or leg (especially on one side of the body).  *Sudden confusion, trouble speaking or understanding.  *Sudden trouble seeing in one or both eyes.  *Sudden trouble walking, dizziness, loss of balance or coordination.Sudden severe headache with no known cause.    It is very important to get treatment quickly when a stroke occurs. If you experience any of the above warning signs, call 911 immediately.                   Disclaimer         Quit Smoking / Tobacco Use:    I understand the use of any  tobacco products increases my chance of suffering from future heart disease or stroke and could cause other illnesses which may shorten my life. Quitting the use of tobacco products is the single most important thing I can do to improve my health. For further information on smoking / tobacco cessation call a Toll Free Quit Line at 1-954.523.5563 (*National [employer]) or 1-653.330.4201 (American Lung Association) or you can access the web based program at www.lungusa.org.    [place] Tobacco Users Help Line:  (811) 981-3390       Toll Free: 1-425.772.1176  Quit Tobacco Program [facility] (927)158-9761    Crisis Hotline:    [place] Crisis Hotline:  7-689-ZUABQDH or 1-571.221.7370    [place] Crisis Hotline:    1-267.393.5725 or 662-685-1679    Discharge Survey:   Thank you for choosing [facility]. We hope we did everything we could to make your hospital stay a pleasant one. You may be receiving a phone survey and we would appreciate your time and participation in answering the questions. Your input is very valuable to us in our efforts to improve our service to our patients and their families.        My signature on this form indicates that:    1. I have reviewed and understand the above information.  2. My questions regarding this information have been answered to my satisfaction.  3. I have formulated a plan with my discharge nurse to obtain my prescribed medications for home.                  Disclaimer         __________________________________                     __________       ________                       Patient Signature                                                 Date                    Time

## 2017-01-16 NOTE — IP AVS SNAPSHOT
POPAPP Access Code: [email or web address]  Expires: 2/18/2017 10:49 AM    Your email address is not on file at [facility].  Email Addresses are required for you to sign up for POPAPP, please contact [phone] to verify your personal information and to provide your email address prior to attempting to register for POPAPP.    Tico Whalen Solorzano  [address] [place] [address]    POPAPP  A secure, online tool to manage your health information     [facility]’s POPAPP® is a secure, online tool that connects you to your personalized health information from the privacy of your home -- day or night - making it very easy for you to manage your healthcare. Once the activation process is completed, you can even access your medical information using the POPAPP chapincito, which is available for free in the Apple Chapincito store or Google Play store.     To learn more about POPAPP, visit [email or web address]    There are two levels of access available (as shown below):   My Chart Features  [facility] Primary Care Doctor [facility]  Specialists [facility]  Urgent  Care Non-[facility] Primary Care Doctor   Email your healthcare team securely and privately 24/7 X X X    Manage appointments: schedule your next appointment; view details of past/upcoming appointments X      Request prescription refills. X      View recent personal medical records, including lab and immunizations X X X X   View health record, including health history, allergies, medications X X X X   Read reports about your outpatient visits, procedures, consult and ER notes X X X X   See your discharge summary, which is a recap of your hospital and/or ER visit that includes your diagnosis, lab results, and care plan X X  X     How to register for POPAPP:  Once your e-mail address has been verified, follow the following steps to sign up for POPAPP.     1. Go to  [email or web address]  2. Click on the Sign Up Now box, which takes you to the New Member Sign Up page. You will need  to provide the following information:  a. Enter your [employer] Access Code exactly as it appears at the top of this page. (You will not need to use this code after you’ve completed the sign-up process. If you do not sign up before the expiration date, you must request a new code.)   b. Enter your date of birth.   c. Enter your home email address.   d. Click Submit, and follow the next screen’s instructions.  3. Create a [employer] ID. This will be your [employer] login ID and cannot be changed, so think of one that is secure and easy to remember.  4. Create a [employer] password. You can change your password at any time.  5. Enter your Password Reset Question and Answer. This can be used at a later time if you forget your password.   6. Enter your e-mail address. This allows you to receive e-mail notifications when new information is available in [employer].  7. Click Sign Up. You can now view your health information.    For assistance activating your [employer] account, call [phone]

## 2017-01-16 NOTE — IP AVS SNAPSHOT
1/19/2017          Tico Solorzano  [address] [address]    Dear Tico:    [facility] wants to ensure your discharge home is safe and you or your loved ones have had all your questions answered regarding your care after you leave the hospital.    You may receive a telephone call within two days of your discharge.  This call is to make certain you understand your discharge instructions as well as ensure we provided you with the best care possible during your stay with us.     The call will only last approximately 3-5 minutes and will be done by a nurse.    Once again, we want to ensure your discharge home is safe and that you have a clear understanding of any next steps in your care.  If you have any questions or concerns, please do not hesitate to contact us, we are here for you.  Thank you for choosing [facility] for your healthcare needs.    Sincerely,    Baltazar Jacome    [facility] [place]

## 2017-01-16 NOTE — IP AVS SNAPSHOT
" <p align=\"LEFT\"><IMG SRC=\"//[email or web address]/blob$/[email or web address]\" alt=\"Image\" WIDTH=\"50%\" HEIGHT=\"200\" BORDER=\"\"></p>                   Name:Tico Solorzano  Medical Record Number:[id number]  CSN: [id number]    YOB: 1940   Age: 76 y.o.  Sex: male  HT:1.829 m (6' 0.01\") WT: 86.7 kg (191 lb 2.2 oz)          Admit Date: 1/16/2017     Discharge Date:   Today's Date: 1/19/2017  Attending Doctor:  Idalia Randhawa M.D.                  Allergies:  Valsartan          Your appointments     Jan 24, 2017  1:30 PM   PACER CHECK ONLY with PACER PHONE CHECK   [facility] (--)    [address], [address]  [place] [address]   [phone]            Jan 24, 2017  1:45 PM   FOLLOW UP with Navneet Brunner M.D.   [facility] (--)    [address], [address]  [place] [address] [phone]            Mar 08, 2017 11:00 AM   Established Patient with Casey Wick D.O.   [facility] ([address]  [place] [address]   [phone]           You will be receiving a confirmation call a few days before your appointment from our automated call confirmation system.                 Medication List      Take these Medications        Instructions    doxazosin 2 MG Tabs   Commonly known as:  CARDURA    Take 2 mg by mouth every day.   Dose:  2 mg       fluticasone 50 MCG/ACT nasal spray   Commonly known as:  FLONASE    INSTILL 2 SPRAYS IN EACH NOSTRIL ONCE DAILY       furosemide 20 MG Tabs   Commonly known as:  LASIX    Take 1 Tab by mouth every day.   Dose:  20 mg       insulin glargine 100 UNIT/ML Soln   Commonly known as:  LANTUS    Inject 5-7 Units as instructed every evening.   Dose:  5-7 Units       losartan 100 MG Tabs   Commonly known as:  COZAAR    TAKE 1 TABLET BY MOUTH ONCE DAILY       OCUVITE Tabs    Take 1 Tab by mouth every day.   Dose:  1 Tab       pioglitazone 15 MG Tabs   Commonly known as:  ACTOS   " TAKE 1 TABLET BY MOUTH EVERY DAY       rosuvastatin 20 MG Tabs   Commonly known as:  CRESTOR    Take 1 Tab by mouth every evening.   Dose:  20 mg       spironolactone 25 MG Tabs   Commonly known as:  ALDACTONE    Take 1 Tab by mouth every day.   Dose:  25 mg

## 2017-01-17 NOTE — ED PROVIDER NOTES
ED Provider Note    CHIEF COMPLAINT  Chief Complaint   Patient presents with   • Shortness of Breath   • Abdominal Swelling       [facility]  Lousicarmen Solorzano is a 76 y.o. male who presents for evaluation of one week's worth of progressive shortness of breath orthopnea leg swelling and abdominal swelling. The patient has extensive past medical history as listed below including chronic kidney disease. He has no known history of congestive heart failure or coronary artery disease. He is followed by cardiology. He had an echocardiogram about one year ago which was unremarkable. He denies any high fevers or productive cough. No numbness weakness tingling. He has reported abdominal swelling but has had diarrhea no bloody stools or vomiting blood. No high fevers or chills. He does have a pacemaker and it has been apparently working according to his cardiology visit last August.    REVIEW OF SYSTEMS  See HPI for further details. No numbness tingling weakness high fevers or chills All other systems are negative.     PAST MEDICAL HISTORY  Past Medical History   Diagnosis Date   • Diabetes    • Hypertension    • Renal disorder      50% function   • Arthritis      fingers   • Pneumonia 1961   • Cancer 2003     prostate cancer   • CATARACT      repaired in right eye   • Vitamin d deficiency 1/16/2012   • Protein in urine 7/2/2012       FAMILY HISTORY  No history of bleeding disorder    SOCIAL HISTORY  Social History     Social History   • Marital Status:      Spouse Name: N/A   • Number of Children: N/A   • Years of Education: N/A     Social History Main Topics   • Smoking status: Former Smoker -- 2.00 packs/day for 40 years     Quit date: 07/13/1991   • Smokeless tobacco: Never Used      Comment: quit 20 yrs ago   • Alcohol Use: No   • Drug Use: No   • Sexual Activity:     Partners: Female     Other Topics Concern   • Not on file     Social History Narrative     Former smoker no alcohol abuse  SURGICAL HISTORY  Past Surgical  "History   Procedure Laterality Date   • Tonsillectomy     • Cholecystectomy     • Other  2003     radical prostatectomy   • Open reduction       rods in left leg, spontaneous bone deficit   • Incision and drainage orthopedic  12/17/2011     Performed by MADISON MENDEZ at SUR[facility]       CURRENT MEDICATIONS  No current facility-administered medications for this encounter.    Current outpatient prescriptions:   •  doxazosin (CARDURA) 2 MG Tab, Take 2 mg by mouth every day., Disp: , Rfl:   •  insulin glargine (LANTUS) 100 UNIT/ML Solution, Inject 5-7 Units as instructed every evening., Disp: , Rfl:   •  losartan (COZAAR) 100 MG Tab, TAKE 1 TABLET BY MOUTH ONCE DAILY, Disp: 90 Tab, Rfl: 3  •  amlodipine (NORVASC) 10 MG Tab, TAKE 1 TABLET BY MOUTH EVERY DAY, Disp: 90 Tab, Rfl: 3  •  fluticasone (FLONASE) 50 MCG/ACT nasal spray, INSTILL 2 SPRAYS IN EACH NOSTRIL ONCE DAILY, Disp: 16 g, Rfl: 6  •  pioglitazone (ACTOS) 15 MG Tab, TAKE 1 TABLET BY MOUTH EVERY DAY, Disp: 90 Tab, Rfl: 3  •  Multiple Vitamins-Minerals (OCUVITE) Tab, Take 1 Tab by mouth every day., Disp: , Rfl:   •  rosuvastatin (CRESTOR) 20 MG Tab, Take 1 Tab by mouth every evening., Disp: 30 Tab, Rfl: 11      ALLERGIES  Allergies   Allergen Reactions   • Valsartan      12/16/15 pt states he doesn't remember if he's allergic to this medication.       PHYSICAL EXAM  VITAL SIGNS: /69 mmHg  Pulse 80  Temp(Src) 37 °C (98.6 °F)  Resp 18  Ht 1.829 m (6' 0.01\")  Wt 82.555 kg (182 lb)  BMI 24.68 kg/m2 Room air O2: 95    Constitutional: Patient appears chronically ill  HENT: Normocephalic, Atraumatic, Bilateral external ears normal, Oropharynx moist, No oral exudates, Nose normal.   Eyes: PERRLA, EOMI, Conjunctiva normal, No discharge.   Neck: Normal range of motion, No tenderness, Supple, No stridor.   Cardiovascular: Normal heart rate, Normal rhythm, No murmurs, No rubs, No gallops.   Thorax & Lungs: Bibasilar rales orthopnea is noted JVD is " noted  Abdomen: Bowel sounds hyperactive, tense abdomen without any tenderness positive fluid wave  Skin: Warm, Dry, No erythema, No rash.   Back: No tenderness, No CVA tenderness.   Extremities: Intact distal pulses, apparently new for plus pitting edema to the anterior lower extremities, No cyanosis, No clubbing.   Neurologic: Alert & oriented x 3, Normal motor function, Normal sensory function, No focal deficits noted.   Psychiatric: Affect normal, Judgment normal, Mood normal.     EKG  Interpretation by me rate 102 sinus tachycardia borderline intraventricular conduction delay. Question of anterior infarct and old inferior infarct QT interval was prolonged. Patient does have a pacemaker does not appear to be paced    RADIOLOGY/PROCEDURES  DX-CHEST-PORTABLE (1 VIEW)   Final Result      Mild interstitial prominence could be related to hypoventilatory change or mild edema.            COURSE & MEDICAL DECISION MAKING  Pertinent Labs & Imaging studies reviewed. (See chart for details)  Results for orders placed or performed during the hospital encounter of 01/16/17   LACTIC ACID   Result Value Ref Range    Lactic Acid 2.8 (H) 0.5 - 2.0 mmol/L   CBC WITH DIFFERENTIAL   Result Value Ref Range    WBC 10.7 4.8 - 10.8 K/uL    RBC 2.98 (L) 4.70 - 6.10 M/uL    Hemoglobin 9.4 (L) 14.0 - 18.0 g/dL    Hematocrit 28.4 (L) 42.0 - 52.0 %    MCV 95.3 81.4 - 97.8 fL    MCH 31.5 27.0 - 33.0 pg    MCHC 33.1 (L) 33.7 - 35.3 g/dL    RDW 53.1 (H) 35.9 - 50.0 fL    Platelet Count 154 (L) 164 - 446 K/uL    MPV 11.5 9.0 - 12.9 fL    Neutrophils-Polys 83.80 (H) 44.00 - 72.00 %    Lymphocytes 8.60 (L) 22.00 - 41.00 %    Monocytes 6.20 0.00 - 13.40 %    Eosinophils 0.50 0.00 - 6.90 %    Basophils 0.40 0.00 - 1.80 %    Immature Granulocytes 0.50 0.00 - 0.90 %    Nucleated RBC 0.00 /100 WBC    Neutrophils (Absolute) 8.95 (H) 1.82 - 7.42 K/uL    Lymphs (Absolute) 0.92 (L) 1.00 - 4.80 K/uL    Monos (Absolute) 0.66 0.00 - 0.85 K/uL    Eos  (Absolute) 0.05 0.00 - 0.51 K/uL    Baso (Absolute) 0.04 0.00 - 0.12 K/uL    Immature Granulocytes (abs) 0.05 0.00 - 0.11 K/uL    NRBC (Absolute) 0.00 K/uL   COMP METABOLIC PANEL   Result Value Ref Range    Sodium 138 135 - 145 mmol/L    Potassium 4.8 3.6 - 5.5 mmol/L    Chloride 113 (H) 96 - 112 mmol/L    Co2 13 (L) 20 - 33 mmol/L    Anion Gap 12.0 (H) 0.0 - 11.9    Glucose 147 (H) 65 - 99 mg/dL    Bun 29 (H) 8 - 22 mg/dL    Creatinine 2.82 (H) 0.50 - 1.40 mg/dL    Calcium 8.1 (L) 8.5 - 10.5 mg/dL    AST(SGOT) 28 12 - 45 U/L    ALT(SGPT) 11 2 - 50 U/L    Alkaline Phosphatase 145 (H) 30 - 99 U/L    Total Bilirubin 0.8 0.1 - 1.5 mg/dL    Albumin 2.7 (L) 3.2 - 4.9 g/dL    Total Protein 5.8 (L) 6.0 - 8.2 g/dL    Globulin 3.1 1.9 - 3.5 g/dL    A-G Ratio 0.9 g/dL   TROPONIN   Result Value Ref Range    Troponin I <0.01 0.00 - 0.04 ng/mL   PROTHROMBIN TIME   Result Value Ref Range    PT 15.4 (H) 12.0 - 14.6 sec    INR 1.18 (H) 0.87 - 1.13   APTT   Result Value Ref Range    APTT 25.8 24.7 - 36.0 sec   BTYPE NATRIURETIC PEPTIDE   Result Value Ref Range    B Natriuretic Peptide 103 (H) 0 - 100 pg/mL   ESTIMATED GFR   Result Value Ref Range    GFR If  26 (A) >60 mL/min/1.73 m 2    GFR If Non African American 22 (A) >60 mL/min/1.73 m 2   EKG (ER)   Result Value Ref Range    Report       [facility] Emergency Dept.    Test Date:  2017  Pt Name:    JOSE MANUEL AARON                 Department: ER  MRN:        [id number]                      Room:        07  Gender:     M                            Technician: [id number]  :        [id number]                   Requested By:ER TRIAGE PROTOCOL  Order #:    [id number]                    Reading MD:    Measurements  Intervals                                Axis  Rate:       102                          P:          0  CT:         133                          QRS:        -69  QRSD:       108                          T:          93  QT:         372  QTc:         485    Interpretive Statements  SINUS TACHYCARDIA  BORDERLINE IVCD WITH LAD  INFERIOR INFARCT, AGE INDETERMINATE  EXTENSIVE ANTERIOR INFARCT, POSSIBLY ACUTE  BORDERLINE PROLONGED QT INTERVAL  Compared to ECG 02/03/2016 09:46:30  Myocardial infarct finding now present  Ventricular-paced complex(es) or rhythm no longer present  Atrial-sensed ventricular-pace d complex(es) or rhythm no longer present        Patient presents here with shortness of breath leg swelling and abdominal swelling and orthopnea. Here his workup strongly suggest anasarca. I performed a quick look bedside ultrasound and appears to moderate to significant ascites has no known history of liver disease I suspect that this is all related to his acute nephrosis. His albumin is quite low and he appears to be 3rd spacing. The patient has worsening kidney function in the starting to acutely accumulate fluid in his legs and his lungs. He is no history of heart failure and has had a good echo 2 years ago. I will consult nephrology as he already sees Dr. Blanco. I spoke to Dr. Finch. Who recommended in addition to IV albumin some IV Lasix. They will consult on the patient. He likely has acute nephrotic syndrome    FINAL IMPRESSION  1. Anasarca  2. Worsening chronic kidney disease  3. Question of nephrotic syndrome    Admission         Electronically signed by: Yair Vaca, 1/16/2017 4:45 PM

## 2017-01-17 NOTE — PROGRESS NOTES
"Admitted from the ER, alert x4 and able to let his needs known. Admit paperwork completed. Refused to take some of his medications and refused insulin , \"it'll be better tomorrow\"- NPO at midnight for abd US; aware of orders. Holcomb to room, call light within reach and visual checks through the night  Built in bed alarm placed    Skin check completed with Joseph RN. No open areas, skin intact  "

## 2017-01-17 NOTE — CARE PLAN
Problem: Safety  Goal: Will remain free from injury  Bed alarm placed for safety and fall history    Problem: Knowledge Deficit  Goal: Knowledge of disease process/condition, treatment plan, diagnostic tests, and medications will improve  Educate about the plan of care    Problem: Discharge Barriers/Planning  Goal: Patient’s continuum of care needs will be met  Lives at home independantly with his wife

## 2017-01-17 NOTE — DISCHARGE PLANNING
Care Transition Team Assessment  Pt stated that he resides with his wife and has a son who lives in [place].  Pt denied using any DME and stated that he does not have difficulty with his ADLs unless he has shortness of breath.  Pt does not have any outside services providing care to him and his wife.  Pt receives SSI and his wife does as well, he has Medicare and [employer] as insurance coverage.  SW will assist in dc needs.         Information Source  Who is responsible for making decisions for patient? : Patient  Source of Information: Patient  Primary Caregiver for Others?: No  Why do you believe you were admitted?: SOB    Elopement Risk  Legal Hold: No    Interdisciplinary Discharge Planning  Does Admitting Nurse Feel This Could be a Complex Discharge?: No  Primary Care Physician: Dr Wick  Lives with - Patient's Self Care Capacity: Spouse  Support Systems: Children, Family Member(s)  Housing / Facility: [address]  Do You Take your Prescribed Medications Regularly: Yes  Able to Return to Previous ADL's: Yes  Mobility Issues: No  Prior Services: None  Patient Expects to be Discharged to:: home  Assistance Needed: Unknown at this Time  Durable Medical Equipment: Not Applicable    Discharge Preparedness  [facility] resources needed?: None  Do you have concerns about your hospital stay or care after discharge?: No  Difficulity with ADLs: None  Difficulity with IADLs: None  Pharmacy: [facility]  Prescription Coverage: Yes    Functional Assesment  Prior Functional Level: Ambulatory    Finances  Source of Income: Social Security  Medical Insurance Coverage: Medicare    Vision / Hearing Impairment  Vision Impairment : Yes  Right Eye Vision: Wears Glasses  Left Eye Vision: Wears Glasses  Hearing Impairment : Yes  Hearing Impairment: Patient Declines to Wear Hearing Device(s)    Values / Beliefs / Concerns  Values / Beliefs Concerns : No    Advance Directive  Advance Directive?: None    Domestic Abuse  Have you ever  been the victim of abuse or violence?: No    Psychological Assessment  Suspect Abuse: No  History of Psychiatric Problems: No  Non-compliant with Treatment: No  Newly Diagnosed Catastrophic Illness: No  Needs Assistance Dealing with Catastrophic Illness: No  Cancer Diagnosis per Medical Record: No  Facing Drastic Life Change: No  No Needs at this Time: No Needs at This Time    Discharge Risks or Barriers  Discharge risks or barriers?: No    Anticipated Discharge Information  Anticipated discharge disposition: Home

## 2017-01-17 NOTE — RESPIRATORY CARE
COPD EDUCATION by COPD CLINICAL EDUCATOR  1/17/2017 at 8:26 AM by Lucia Posada     Patient reviewed by COPD education team. Patient does not qualify for COPD program.

## 2017-01-17 NOTE — PROGRESS NOTES
"Assumed care of patient at 0700. Received report from RN. Patient is AOX4 . Assessment complete. Labs reviewed.Patient and RN discussed plan of care. Patient questions answered. Patient needs are met at this time. Bed in lowest and locked position. Upper bed rails up.  Call light is within reach. Hourly rounding in place.  /55 mmHg  Pulse 75  Temp(Src) 36.6 °C (97.9 °F)  Resp 16  Ht 1.829 m (6' 0.01\")  Wt 82.555 kg (182 lb)  BMI 24.68 kg/m2  SpO2 94%    "

## 2017-01-17 NOTE — ED NOTES
77 y/o male bib ambulance from home with c/o exertional sob x 1 week. Pt also reporting abd swelling over the past week, no history.

## 2017-01-17 NOTE — ED NOTES
The Medication Reconciliation has been completed per patient, his list and a call to [facility]  Allergies have been reviewed  Antibiotic use in 30 days - NONE  Pharmacy:  [facility] Sw[facility] Sotelo

## 2017-01-17 NOTE — H&P
ATTENDING:  [facility] hospitalist.    CONSULTS:  Nephrology, Dr. Finch.    PRIMARY CARE PHYSICIAN:  Casey Wick DO    CODE STATUS:  Full code.    CHIEF COMPLAINT:  Dyspnea and edema.    HISTORY OF PRESENT ILLNESS:  This is a 76-year-old male who presented to the   emergency department due to dyspnea and leg and abdominal swelling.  Patient   states it started approximately 1 week ago; however, it has continued to   worsen.  Patient does have a history of renal failure, not requiring   hemodialysis, does follow with nephrology.  Upon arrival, patient did obtain a   urinalysis which shows significant proteinuria.  Patient does have   significant lower extremity edema as well as abdominal distention.  Patient   does state his urine output has been normal and actually he felt that it was   getting better because he was not needing to get up at night, whereas before   he had to get up 1-2 times per night.  Patient does complain of some chronic   diarrhea.    PAST MEDICAL HISTORY:  1.  Prostate cancer.   2.  Chronic kidney disease.   3.  Hypertension.   4.  Diabetes.   5.  Gout for which he has been off allopurinol.     PAST SURGICAL HISTORY:  1.  Pacemaker.   2.  Cholecystectomy.   3.  Prostatectomy.   4.  Left leg surgery.     MEDICATIONS:  1.  Amlodipine 10 mg daily.   2.  Cardura 2 mg daily.   3.  Fluticasone 2 sprays in each nostril daily.   4.  Lantus 5-7 units q. evening.   5.  Cozaar 100 mg daily.   6.  Multivitamin daily.   7.  Actos 50 mg daily.   8.  Crestor 20 mg daily.     ALLERGIES:  VALSARTAN.      SOCIAL HISTORY:  Quit smoking 37 years ago.  Denies any tobacco or illicit   drug use.    FAMILY HISTORY:  Significant for cancer.    REVIEW OF SYSTEMS:  Complete review of systems obtained with positives noted   in the HPI above, all others negative.    PHYSICAL EXAMINATION:  VITAL SIGNS:  Temperature 37, pulse 80, respirations 18, blood pressure 133/69   and satting 93% on room air.  GENERAL:  Chronically  ill appearing.  HEENT:  Normocephalic, atraumatic, moist mucous membranes.  NECK:  No JVD, bruit, thyromegaly, cervical or supraclavicular adenopathy   noted.  CARDIOVASCULAR:  Regular rate and rhythm.  No murmurs, rubs or gallops.  LUNGS:  Bilateral crackles, no rhonchi or wheezes.  ABDOMEN:  Bowel sounds x4, soft, nontender, distended.  No hepatosplenomegaly.  EXTREMITIES:  Bilateral lower extremity edema, no clubbing or cyanosis.  SKIN:  No rash or erythema.  NEUROLOGIC:  Cranial nerves II-XII intact.  Extraocular muscles intact.    LABORATORY DATA:  White count 7.7, hemoglobin is 9.4, hematocrit 28.4 and   platelets 154.  Sodium 138, potassium 4.8, chloride 113, CO2 is 13, BUN 29,   creatinine 2.82 and glucose 147.    IMAGING:  Chest x-ray showed mild interstitial prominence could be related to   hypoventilatory change or mild edema.    ASSESSMENT AND PLAN:  1.  Proteinuria -- nephrology has been consulted, requesting albumin as well   as Lasix.  Await additional recommendations.  Patient will require at least 2   days in the hospital.  2.  Acute on chronic renal failure -- for now, we will have albumin as well as   Lasix, we will repeat a BMP in the morning.   3.  Normocytic anemia -- no sign of gross bleeding, repeat CBC in the morning.     4.  Hyperchloremia -- this as well as renal failure are contributing to his   acidosis, we will repeat a BMP in the morning.   5.  Hypertension -- patient is on amlodipine at home, this could worsen his   swelling, we will hold this for now and just start Coreg, adjust Coreg as   able.  Patient at home is on Cozaar; however, needing to hold this due to his   worsening kidney function.  We will place p.r.n. and adjust as necessary.   6.  Diabetes -- we will give insulin sliding scale and adjust as necessary.       ____________________________________     DO SUSY CUMMINGSP / DIONICIO    DD:  01/16/2017 23:58:40  DT:  01/17/2017 02:15:43    D#:  [id number]  Job#:  [id number]

## 2017-01-17 NOTE — PROGRESS NOTES
Hospital Medicine Progress Note, Adult, Complex               Author: Idalia Randhawa Date & Time created: 1/17/2017  8:04 AM     CC: 76M with history of CKD4 who presented with SOB and abdominal swelling and admitted for acute on chronic renal failure    Interval History:  Patient feeling unchanged, minimal improvement in swelling     Review of Systems:  Review of Systems   Constitutional: Negative for fever and chills.   HENT: Negative for congestion and sore throat.    Eyes: Negative for photophobia.   Respiratory: Negative for cough, shortness of breath and wheezing.    Cardiovascular: Negative for chest pain and palpitations.   Gastrointestinal: Negative for nausea, vomiting, abdominal pain and diarrhea.   Genitourinary: Negative for dysuria.   Musculoskeletal: Negative for myalgias.   Skin: Negative.    Neurological: Negative for dizziness, tingling, focal weakness and headaches.   Psychiatric/Behavioral: Negative for depression and suicidal ideas.       Physical Exam:  Physical Exam   Constitutional: He is oriented to person, place, and time. No distress.   HENT:   Head: Normocephalic and atraumatic.   Right Ear: External ear normal.   Left Ear: External ear normal.   Eyes: EOM are normal. Right eye exhibits no discharge. Left eye exhibits no discharge.   Neck: Neck supple. No JVD present.   Cardiovascular: Normal rate, regular rhythm and normal heart sounds.    Pulmonary/Chest: Effort normal and breath sounds normal. No respiratory distress. He exhibits no tenderness.   Abdominal: Soft. Bowel sounds are normal. He exhibits distension. There is no tenderness.   Musculoskeletal: He exhibits no edema.   Neurological: He is alert and oriented to person, place, and time. No cranial nerve deficit.   Skin: Skin is dry. He is not diaphoretic. No erythema.   Psychiatric: He has a normal mood and affect. His behavior is normal.   Nursing note and vitals reviewed.      Labs:        Invalid input(s):  [id number]  Recent Labs      17   1644   TROPONINI  <0.01   BNPBTYPENAT  103*     Recent Labs      17   1644  17   0124   SODIUM  138  141   POTASSIUM  4.8  4.3   CHLORIDE  113*  116*   CO2  13*  16*   BUN  29*  30*   CREATININE  2.82*  3.04*   CALCIUM  8.1*  7.7*     Recent Labs      17   1644  17   0124   ALTSGPT  11   --    ASTSGOT  28   --    ALKPHOSPHAT  145*   --    TBILIRUBIN  0.8   --    GLUCOSE  147*  118*     Recent Labs      17   1644  17   0124   RBC  2.98*  2.56*   HEMOGLOBIN  9.4*  7.6*   HEMATOCRIT  28.4*  24.2*   PLATELETCT  154*  107*   PROTHROMBTM  15.4*   --    APTT  25.8   --    INR  1.18*   --      Recent Labs      17   0124   WBC  10.7  5.9   NEUTSPOLYS  83.80*   --    LYMPHOCYTES  8.60*   --    MONOCYTES  6.20   --    EOSINOPHILS  0.50   --    BASOPHILS  0.40   --    ASTSGOT  28   --    ALTSGPT  11   --    ALKPHOSPHAT  145*   --    TBILIRUBIN  0.8   --            Hemodynamics:  Temp (24hrs), Av.9 °C (98.5 °F), Min:36.6 °C (97.9 °F), Max:37.1 °C (98.8 °F)  Temperature: 36.6 °C (97.9 °F)  Pulse  Av.3  Min: 75  Max: 94Heart Rate (Monitored): 93  Blood Pressure : 140/55 mmHg, NIBP: 136/58 mmHg     Respiratory:    Respiration: 16, Pulse Oximetry: 94 %           Fluids:    Intake/Output Summary (Last 24 hours) at 17 0804  Last data filed at 17 0400   Gross per 24 hour   Intake    120 ml   Output    720 ml   Net   -600 ml     Weight: 82.555 kg (182 lb)  GI/Nutrition:  Orders Placed This Encounter   Procedures   • Diet Order     Standing Status: Standing      Number of Occurrences: 1      Standing Expiration Date:      Order Specific Question:  Diet:     Answer:  Diabetic [3]     Medical Decision Making, by Problem:  Active Hospital Problems    Diagnosis   • Acute on chronic renal failure (CMS-HCC) [N17.9, N18.9] with Proteinuria [R80.9]  - No improvement overnight with lasix and albumin  - Nephrology  following  - Will continue to trend function  - US with no hydronephrosis  - Possible progression of chronic disease  - Still making urine  - Avoid nephrotoxins     • Ascites with cirrhosis on US  - Etiology unclear at this time  - No significant EtOH history  - Hepatitis panel pending  - Paracentesis ordered for cytology     • Type 2 diabetes mellitus with diabetic chronic kidney disease (CMS-HCC) [E11.22]  - ISS and accuchecks for now     • HTN (hypertension) [I10]  - Norvasc held in light of edema, transitioned to Coreg for now  - Holding ARB in light of worsening renal function     • Normocytic anemia [D64.9]  - Stable, no active bleed, monitor     Labs reviewed and Medications reviewed

## 2017-01-17 NOTE — CONSULTS
[facility] NEPHROLOGY CONSULTATION      DATE OF SERVICE:  01/17/2017     REASON FOR CONSULTATION:  Anasarca in setting of CKD     HISTORY OF PRESENT ILLNESS:    Mr. Solorzano is a 75 y/o male with PMHx of T2DM, HTN, and CKD who presented with 2 week h/o progressive dyspnea, especially on exertion, associated with some abdominal distension.  He reports chronic pedal edema for over a year, which has remained unchanged recently, as well as chronic diarrhea with bowel incontinence for the past 2 years, but other than this denies any complaints.  He has not had fevers, chills, URI symptoms, decreased appetite, weakness, dizziness, orthopnea, PND, abdominal pain, nausea, or vomiting.  On probing, he endorses noting slight decrease in urinary output, in that he is not having to wake up to go to the bathroom at night as often as he usually does.  He states his urinary output has increased significantly since being given lasix overnight, and already feels some improvement in his dyspnea.     PAST MEDICAL HISTORY:   T2DM, HTN, CKD-4, h/o prostate cancer s/p prostatectomy at age 56, gout, Mobitz type II heart block s/p dual chamber PPM placement in 12/2015     ALLERGIES: Valsartan      PRE-ADMIT MEDICATIONS:    1. Lantus 5-7 U QPM  2. Pioglitazone 15 mg QD  3. Rosuvastatin 20 mg QD  4. Losartan 100 mg QD  5. Amlodipine 10 mg QD  6. Doxazosin 2 mg QD  7. Fluticasone 2 sprays in each nostril QD  8. MV 1 tab QD     REVIEW OF SYSTEMS:    Constitutional: no fevers, chills, weight change  Eyes: no blurred vision, discharge, eye pain  ENT: no rhinorrhea, sore throat, neck masses  Cardiovascular: no angina, palpitations, PND, orthopnea, + chronic pedal edema  Respiratory: no cough or sputum, + dyspnea  GI: no nausea, vomiting, abdominal pain, constipation, melena, or hematochezia, + chronic diarrhea and bowel incontinence, + abdominal distension  : no dysuria, hematuria, frequency   Musculo-skeletal: no joint or muscle  pain  Skin: no rashes or open wounds  Neurological: no headaches, dizziness, motor/sensory loss  Psychological: no anxiety or depression  All others negative     SOCIAL HISTORY:  Former smoker with 50+ pack year smoking history, quit >40 years ago.  Denies alcohol or drug use.    FAMILY HISTORY: No family h/o CVA, CAD, CHF, cancer, liver disease, CKD.  Patient has a brother who was told he had an abdominal cancer and  in his 20s.     CURRENT PHYSICAL FINDINGS:  GENERAL: Patient is resting comfortably in bed in no acute distress.  VITAL SIGNS: Temp is 97.9 F, HR is 75 bpm, BP is 140/55 mmHg, SpO2 is 94% on RA.  HEAD, EYES, EARS, NOSE, AND THROAT: Examination is atraumatic and normocephalic. Pupils are equal, round, and reactive to light. Mucous membranes are moist.  NECK: Supple. There is no lymphadenopathy.  CHEST: Clear to auscultation and percussion. No rales/ronchi.  HEART: normal S1 and S2, without murmurs, gallops, or rubs.  ABDOMEN: Soft, nontender, distended, + shifting dullness. Bowel sounds are normal.  No abdominal wall edema.  No signs of hepatic encephalopathy.  NEURO: Patient is A&Ox4. No focal neurological deficits.  EXTREMITIES: Distal pulses palpable bilaterally.  2+ b/l pitting pedal edema.    CURRENT LABORATORY REVIEW:    BUN/Creat 30/3.04 (baseline 20-25/2.1-2.4)  K 4.3, CO2 16, Ca 7.7, Alb 2.7  INR 1.18, platelet count 154 (at baseline)  Hb 9.4>7.6 (10-11 at baseline)    UA - protein 70 mg/dL, no blood/ketone/LE/nitrite/glucose  CXR - mild vascular congestion  US Abd - cirrhosis with hepatosplenomegaly, + moderate ascites, b/l renal cortical thinning, 7mm nonobstructing calculus, 2.7 cm simple left renal cyst     ASSESSMENT & PLAN:    # acute on chronic kidney disease, stage IV   - likely from diabetic and hypertensive nephropathy    - patient does not need dialysis at this time   - lab review does not fit the picture of nephrotic syndrome, as urinary protein is not in nephrotic range,  despite serum albumin <3   - low Albumin + acites and scarred liver + large spleen - concern he has significant undiagnosed liver dz contributing to hypoalbuminemia   - will order spot urinary protein to further evaluate   - advise resuming home Losartan 100 mg QD    # electrolytes    - K wnl; Ca borderline low; will check Mg/P    - will continue to monitor    # hypervolemia   - seems to be more likely of hepatic etiology (US shows e/o cirrhosis and HSmegaly, labs show thrombocytopenia, hypoalbuminemia, and elevated INR; risk factors - T2DM leading to fatty liver)   - unlikely cardiac etiology, as BNP is only 103 and echo showed normal EF in 12/2015   - advise diagnostic paracentesis, hepatitis panel, and HIV for further workup    # acute on chronic anemia   - chronic anemia likely secondary to renal disease, but acute drop is not explained by patient's symptoms   - advise further workup with FOBT and iron studies    # hypertension    - normotensive currently   - advise continuing home Losartan     # T2DM    - well controlled, last A1c 6.7 in 7/2016

## 2017-01-17 NOTE — DIETARY
"Nutrition Services:  Poor Oral Intake  75 yo male with admit dx: proteinuria  PMH: diabetes, HTN, renal disorder, arthritis, pneumonia, cancer, cataract, vitamin D deficiency  The patient is currently on a diabetic diet. Per nutrition admit screen, patient with unplanned weight loss prior to admission. However, the patient denies any weight loss when questioned. He does confirm that he has not been eating well for the last few days and did poorly with breakfast this morning; ADLs show patient consumed 25-50% of breakfast. Offered to add appropriate diabetic snacks between meals as patient seems to be having early satiety when eating; smaller more frequent meals would likely benefit patient. The patient stated \"it better not be any of that diabetic crap.\" Discussed that the patient was on a diabetic diet and all items would have to fit into diabetic diet restrictions. The patient did not like any of the items offered to him. Encouraged patient to consume protein items. Per chart review, patient with significant ascites, swelling, chronic renal failure, proteinuria.     Pertinent Labs: Glucose 118, BUN 30, Creatinine 3.04, GFR 20, Ca 7.7  Pertinent Meds: SSI. Prn: zofran.  Fluids: none listed  GI: no BM documented  WT: 82.555 kg stated weight (need to obtain scale weight)  HT: 6'  BMI: 24.68 kg/m^2  SKIN: no skin breakdown noted    PLAN - Nutrition Rep to see patient daily for meal choices.   RECOMMEND - Continue Diabetic diet; encourage good PO. Please obtain scale weight and continued weights to monitor fluid and nutrition status. Please document all intake as percentage of meal consumed. RD monitoring.    "

## 2017-01-18 NOTE — PROGRESS NOTES
[facility] Nephrology Progress Note               Author: Yue Preston/Nacho Ram MD Date & Time created: 1/18/2017  9:46 AM     Interval History:  Mr. Solorzano is a 75 y/o male with PMHx of T2DM, HTN, and CKD who presented with 2 week h/o progressive dyspnea, especially on exertion, associated with some abdominal distension.  Nephrology was consulted for suspicion of nephrotic syndrome in the setting of patient's CKD, however etiology appears to be more consistent with liver disease after further workup.    01/17/2017 - initial consultation  01/18/2017 - kidney function improving.  Still no nephrotic range proteinuria.  +120 mL fluid balance.  Vitals stable.  OK to resume losartan.  Paracentesis pending.  Consider starting lasix/aldactone for fluid overload.      Review of Systems   Constitutional: Negative for fever and chills.   Respiratory: Positive for shortness of breath. Negative for cough.    Cardiovascular: Positive for leg swelling. Negative for chest pain and orthopnea.   Gastrointestinal: Negative for nausea, abdominal pain, diarrhea and blood in stool.        + abdominal distension   Genitourinary: Negative for dysuria and hematuria.   Neurological: Negative for dizziness and focal weakness.       Physical Exam   Constitutional: He is oriented to person, place, and time. He appears well-developed and well-nourished. No distress.   HENT:   Head: Normocephalic and atraumatic.   Eyes: EOM are normal. Pupils are equal, round, and reactive to light.   Neck: Neck supple. No JVD present.   Cardiovascular: Normal rate, regular rhythm, normal heart sounds and intact distal pulses.  Exam reveals no gallop and no friction rub.    No murmur heard.  Pulmonary/Chest: Effort normal and breath sounds normal. No respiratory distress. He has no wheezes. He has no rales.   Abdominal: Soft. Bowel sounds are normal. He exhibits distension. There is no tenderness.   Musculoskeletal: He exhibits edema.    Neurological: He is alert and oriented to person, place, and time.   Skin: Skin is warm and dry.   Psychiatric: He has a normal mood and affect.   Nursing note and vitals reviewed.       Labs:        Invalid input(s): [id number]  Recent Labs      17   TROPONINI  <0.01   BNPBTYPENAT  103*     Recent Labs      17   001   SODIUM  138  141  140   POTASSIUM  4.8  4.3  4.3   CHLORIDE  113*  116*  115*   CO2  13*  16*  16*   BUN  29*  30*  31*   CREATININE  2.82*  3.04*  2.86*   MAGNESIUM   --    --   2.1   PHOSPHORUS   --    --   3.4   CALCIUM  8.1*  7.7*  7.5*     Recent Labs      17   ALTSGPT  11   --    --    ASTSGOT  28   --    --    ALKPHOSPHAT  145*   --    --    TBILIRUBIN  0.8   --    --    GLUCOSE  147*  118*  111*     Recent Labs      17   0014  17   0015  17   0711   RBC  2.98*  2.56*  2.58*   --    --    HEMOGLOBIN  9.4*  7.6*  7.8*   --    --    HEMATOCRIT  28.4*  24.2*  24.3*   --    --    PLATELETCT  154*  107*  108*   --    --    PROTHROMBTM  15.4*   --    --    --   16.3*   APTT  25.8   --    --    --   31.1   INR  1.18*   --    --    --   1.27*   IRON   --    --   23*   --    --    FERRITIN   --    --    --   91.7   --    TOTIRONBC   --    --   204*   --    --      Recent Labs      17   WBC  10.7  5.9  5.2   NEUTSPOLYS  83.80*   --    --    LYMPHOCYTES  8.60*   --    --    MONOCYTES  6.20   --    --    EOSINOPHILS  0.50   --    --    BASOPHILS  0.40   --    --    ASTSGOT  28   --    --    ALTSGPT  11   --    --    ALKPHOSPHAT  145*   --    --    TBILIRUBIN  0.8   --    --            Hemodynamics:  Temp (24hrs), Av.7 °C (98.1 °F), Min:36.2 °C (97.2 °F), Max:37.1 °C (98.8 °F)  Temperature: 36.2 °C (97.2 °F)  Pulse  Av.9  Min: 65  Max: 94   Blood Pressure : 148/66 mmHg     Respiratory:     Respiration: 18, Pulse Oximetry: 91 %           Fluids:    Intake/Output Summary (Last 24 hours) at 01/18/17 0946  Last data filed at 01/18/17 0731   Gross per 24 hour   Intake    440 ml   Output    675 ml   Net   -235 ml     Weight: 86.7 kg (191 lb 2.2 oz)  GI/Nutrition:  Orders Placed This Encounter   Procedures   • Diet Order     Standing Status: Standing      Number of Occurrences: 1      Standing Expiration Date:      Order Specific Question:  Diet:     Answer:  Diabetic [3]     Medical Decision Making, by Problem:  Active Hospital Problems    Diagnosis   • Proteinuria [R80.9]   • Hyperchloremia [E87.8]   • Acute on chronic renal failure (CMS-HCC) [N17.9, N18.9]   • Gout [M10.9]   • Normocytic anemia [D64.9]   • Type 2 diabetes mellitus with diabetic chronic kidney disease (CMS-HCC) [E11.22]   • HTN (hypertension) [I10]     ASSESSMENT & PLAN:    # acute on chronic kidney disease, stage IV              - likely from diabetic and hypertensive nephropathy                - patient does not need dialysis at this time; kidney function improving              - lab review does fit the picture of nephrotic syndrome, urinary protein is in nephrotic range              - low albumin + acites and scarred liver + large spleen - concern he has significant undiagnosed liver disease contributing to hypoalbuminemia              - advise resuming home Losartan 100 mg QD   - will check FeUrea (cannot check FeNa as patient has been started on Lasix)    # electrolytes               - K/Mg/P/Ca wnl   - CO2 16               - will continue to monitor    # hypervolemia              - concern there is significant hepatic etiology (US shows e/o cirrhosis and HSmegaly, labs show thrombocytopenia, hypoalbuminemia, and elevated INR; risk factors - T2DM leading to fatty liver)              - unlikely cardiac etiology, as BNP is only 103 and echo showed normal EF in 12/2015   - hepatitis panel and HIV negative              -  diagnostic/therapeutic paracentesis pending   - consider starting lasix/aldactone for treatment of cirrhotic ascites as patient currently continues to have + fluid balance despite fluid restriction    # acute on chronic anemia              - chronic anemia likely secondary to renal disease, but acute drop is not explained by patient's symptoms              - FOBT negative, iron studies show picture of anemia of chronic disease   - H/H stable currently   - continue to monitor    # hypertension               - normotensive currently              - advise continuing home Losartan     # T2DM               - well controlled, last A1c 6.7 in 7/2016    Core Measures

## 2017-01-18 NOTE — CARE PLAN
Problem: Safety  Goal: Will remain free from injury  Outcome: PROGRESSING AS EXPECTED  Bed in lowest position, call light within reach. Bed alarm on. Patient educated regarding safety precautions. Room free of clutter.

## 2017-01-18 NOTE — OR SURGEON
Immediate Post- Operative Note        PostOp Diagnosis: ascites      Procedure(s): US goided paracentesis      Estimated Blood Loss: Less than 5 ml        Complications: None            1/18/2017     11:41 AM     Aj Cruz

## 2017-01-18 NOTE — PROGRESS NOTES
Hospital Medicine Progress Note, Adult, Complex               Author: Idalia Randhawa Date & Time created: 1/18/2017  8:15 AM     CC: 76M with history of CKD4 who presented with SOB and abdominal swelling and admitted for acute on chronic renal failure    Interval History:  1/17 - Patient feeling unchanged, minimal improvement in swelling   1/18 - Abdominal swelling unchanged but still waiting for paracentesis, discussed abnormalities noted on US of the abdomen and possible underlying liver disease.  Patient reiterates no history of EtOH, IVDU and hepatitis panel is negative    Review of Systems:  Review of Systems   Constitutional: Negative for fever and chills.   HENT: Negative for congestion and sore throat.    Eyes: Negative for photophobia.   Respiratory: Negative for cough, shortness of breath and wheezing.    Cardiovascular: Negative for chest pain and palpitations.   Gastrointestinal: Negative for nausea, vomiting, abdominal pain and diarrhea.   Genitourinary: Negative for dysuria.   Musculoskeletal: Negative for myalgias.   Skin: Negative.    Neurological: Negative for dizziness, tingling, focal weakness and headaches.   Psychiatric/Behavioral: Negative for depression and suicidal ideas.       Physical Exam:  Physical Exam   Constitutional: He is oriented to person, place, and time. No distress.   HENT:   Head: Normocephalic and atraumatic.   Right Ear: External ear normal.   Left Ear: External ear normal.   Eyes: EOM are normal. Right eye exhibits no discharge. Left eye exhibits no discharge.   Neck: Neck supple. No JVD present.   Cardiovascular: Normal rate, regular rhythm and normal heart sounds.    Pulmonary/Chest: Effort normal and breath sounds normal. No respiratory distress. He exhibits no tenderness.   Abdominal: Soft. Bowel sounds are normal. He exhibits distension. There is no tenderness.   Musculoskeletal: He exhibits no edema.   Neurological: He is alert and oriented to person, place, and time.  No cranial nerve deficit.   Skin: Skin is dry. He is not diaphoretic. No erythema.   Psychiatric: He has a normal mood and affect. His behavior is normal.   Nursing note and vitals reviewed.      Labs:        Invalid input(s): [id number]  Recent Labs      17   TROPONINI  <0.01   BNPBTYPENAT  103*     Recent Labs      17   0014   SODIUM  138  141  140   POTASSIUM  4.8  4.3  4.3   CHLORIDE  113*  116*  115*   CO2  13*  16*  16*   BUN  29*  30*  31*   CREATININE  2.82*  3.04*  2.86*   MAGNESIUM   --    --   2.1   PHOSPHORUS   --    --   3.4   CALCIUM  8.1*  7.7*  7.5*     Recent Labs      17   ALTSGPT  11   --    --    ASTSGOT  28   --    --    ALKPHOSPHAT  145*   --    --    TBILIRUBIN  0.8   --    --    GLUCOSE  147*  118*  111*     Recent Labs      17   0014  17   0015  17   0711   RBC  2.98*  2.56*  2.58*   --    --    HEMOGLOBIN  9.4*  7.6*  7.8*   --    --    HEMATOCRIT  28.4*  24.2*  24.3*   --    --    PLATELETCT  154*  107*  108*   --    --    PROTHROMBTM  15.4*   --    --    --   16.3*   APTT  25.8   --    --    --   31.1   INR  1.18*   --    --    --   1.27*   IRON   --    --   23*   --    --    FERRITIN   --    --    --   91.7   --    TOTIRONBC   --    --   204*   --    --      Recent Labs      17   001   WBC  10.7  5.9  5.2   NEUTSPOLYS  83.80*   --    --    LYMPHOCYTES  8.60*   --    --    MONOCYTES  6.20   --    --    EOSINOPHILS  0.50   --    --    BASOPHILS  0.40   --    --    ASTSGOT  28   --    --    ALTSGPT  11   --    --    ALKPHOSPHAT  145*   --    --    TBILIRUBIN  0.8   --    --            Hemodynamics:  Temp (24hrs), Av.7 °C (98.1 °F), Min:36.2 °C (97.2 °F), Max:37.1 °C (98.8 °F)  Temperature: 36.2 °C (97.2 °F)  Pulse  Av.9  Min: 65  Max: 94   Blood Pressure : 148/66 mmHg     Respiratory:     Respiration: 18, Pulse Oximetry: 91 %           Fluids:    Intake/Output Summary (Last 24 hours) at 01/18/17 0815  Last data filed at 01/18/17 0731   Gross per 24 hour   Intake    440 ml   Output    675 ml   Net   -235 ml     Weight: 86.7 kg (191 lb 2.2 oz)  GI/Nutrition:  Orders Placed This Encounter   Procedures   • Diet Order     Standing Status: Standing      Number of Occurrences: 1      Standing Expiration Date:      Order Specific Question:  Diet:     Answer:  Diabetic [3]     Medical Decision Making, by Problem:  Active Hospital Problems    Diagnosis   • Acute on chronic renal failure (CMS-HCC) [N17.9, N18.9] with Proteinuria [R80.9]  - No improvement overnight with lasix and albumin  - Nephrology following  - Will continue to trend function  - US with no hydronephrosis  - Possible progression of chronic disease  - Still making urine  - Avoid nephrotoxins     • Ascites with cirrhosis on US  - Etiology unclear at this time  - No significant EtOH or IVDU history  - Hepatitis panel negative  - Paracentesis today, monitor cytology and other fluid studies  - Start spironolactone and lasix  - Outpt FU with GI is appropriate at this time     • Type 2 diabetes mellitus with diabetic chronic kidney disease (CMS-HCC) [E11.22]  - ISS and accuchecks for now     • HTN (hypertension) [I10]  - Norvasc held in light of edema, transitioned to Coreg for now, may DC if BP is too low with new lasix and aldactone  - Holding ARB in light of worsening renal function     • Normocytic anemia [D64.9]  - Stable, no active bleed, monitor     Labs reviewed and Medications reviewed

## 2017-01-18 NOTE — PROGRESS NOTES
Assumed care of pt, received report from day shift RN, pt assessed.  Pt has no complaints of pain.  Pt is A&O x4.  Pt fall risk, wearing treaded socks, bed locked and in lowest position.  Pt refusing bed alarm, pt educated on need for bed alarm but still refusing.  Pt instructed to call for assistance prior to getting OOB, pt verbalized understanding.  Call light, phone, and personal belongings within reach.

## 2017-01-18 NOTE — CARE PLAN
Problem: Communication  Goal: The ability to communicate needs accurately and effectively will improve  Outcome: PROGRESSING AS EXPECTED  Pt capable of verbalizing all needs and utilizes call light system approprietly.    Problem: Infection  Goal: Will remain free from infection  Outcome: PROGRESSING AS EXPECTED  Pt has no signs of new or worsening infection

## 2017-01-18 NOTE — PROGRESS NOTES
PROCEDURE: US guided PARACENTESIS  SONOGRAPHER: Lisseth STANTON  RADIOLOGIST: Dr. Cruz    PT laying in bed for RLQ paracentesis. PT's vitals were monitored and charted in epic. PT tolerated procedure well. 5300 mL was removed from RLQ, and 1000 mL was sent to lab. Floor RN was notified of amount removed and albumin protocol was started. PT was discharged in stable condition.

## 2017-01-19 NOTE — CARE PLAN
Problem: Communication  Goal: The ability to communicate needs accurately and effectively will improve  Outcome: PROGRESSING AS EXPECTED  Pt does not utilize call light and has difficulty expressing needs, frequent rounding in place to address all of pt needs.    Problem: Safety  Goal: Will remain free from injury  Outcome: PROGRESSING AS EXPECTED  Pt fall risk, pt wearing treaded socks, bed locked and in lowest position, bed alarm is active, in BL SWR regarding pt pulling at lines and tubes.  Pt call light and phone within reach.

## 2017-01-19 NOTE — PROGRESS NOTES
"Assumed care of patient at  0700. Received report from RN. Patient is AOX4 . Assessment complete. Labs reviewed.Patient and RN discussed plan of care. Patient questions answered. Patient needs are met at this time. Bed in lowest and locked position. Upper bed rails up.  Call light is within reach. Hourly rounding in place.  /64 mmHg  Pulse 74  Temp(Src) 37.1 °C (98.7 °F)  Resp 17  Ht 1.829 m (6' 0.01\")  Wt 86.7 kg (191 lb 2.2 oz)  BMI 25.92 kg/m2  SpO2 94%    "

## 2017-01-19 NOTE — CARE PLAN
Problem: Respiratory:  Goal: Respiratory status will improve  Outcome: PROGRESSING AS EXPECTED  Pt SPO2 92% on RA and has no complaints of shortness of breath or difficulty breathing.    Problem: Venous Thromboembolism (VTW)/Deep Vein Thrombosis (DVT) Prevention:  Goal: Patient will participate in Venous Thrombosis (VTE)/Deep Vein Thrombosis (DVT)Prevention Measures  Outcome: PROGRESSING AS EXPECTED  Pt on heparin for pharmacologic prophylaxis.

## 2017-01-19 NOTE — CARE PLAN
Problem: Safety  Goal: Will remain free from injury  Outcome: PROGRESSING AS EXPECTED  Bed in lowest position, call light within reach. Bed alarm on. Patient educated regarding safety precautions. Room free of clutter.     Problem: Knowledge Deficit  Goal: Knowledge of disease process/condition, treatment plan, diagnostic tests, and medications will improve  Outcome: PROGRESSING AS EXPECTED  Explained paracentesis procedure to patient. Patient received handout information.

## 2017-01-19 NOTE — PROGRESS NOTES
[facility] Nephrology Progress Note               Author: Yue Preston/Nacho Ram MD Date & Time created: 1/19/2017  10:27 AM     Interval History:  Mr. Solorzano is a 75 y/o male with PMHx of T2DM, HTN, and CKD who presented with 2 week h/o progressive dyspnea, especially on exertion, associated with some abdominal distension.  Nephrology was consulted for suspicion of nephrotic syndrome in the setting of patient's CKD, however etiology appears to be more consistent with liver disease after further workup.    01/17/2017 - initial consultation  01/18/2017 - kidney function improving.  Still no nephrotic range proteinuria. Vitals stable.  OK to resume losartan.  Paracentesis pending.  Consider starting lasix/aldactone for fluid overload.  01/19/2017 - creatinine improving, good urine output with diuresis.  5.3L drained by paracentesis yesterday; SAAG >1.1.  FeUrea pending.  Vitals stable.  Patient asymptomatic and ok for discharge from renal standpoint.        Review of Systems   Constitutional: Negative for fever and chills.   Respiratory: Negative for cough and shortness of breath.    Cardiovascular: Positive for leg swelling. Negative for chest pain and orthopnea.   Gastrointestinal: Negative for nausea, abdominal pain, diarrhea and blood in stool.   Genitourinary: Negative for dysuria and hematuria.   Neurological: Negative for dizziness and focal weakness.       Physical Exam   Constitutional: He is oriented to person, place, and time. He appears well-developed and well-nourished. No distress.   HENT:   Head: Normocephalic and atraumatic.   Eyes: EOM are normal. Pupils are equal, round, and reactive to light.   Neck: Neck supple. No JVD present.   Cardiovascular: Normal rate, regular rhythm, normal heart sounds and intact distal pulses.  Exam reveals no gallop and no friction rub.    No murmur heard.  Pulmonary/Chest: Effort normal and breath sounds normal. No respiratory distress. He has no wheezes. He  has no rales.   Abdominal: Soft. Bowel sounds are normal. He exhibits no distension. There is no tenderness.   Musculoskeletal: He exhibits edema.   Neurological: He is alert and oriented to person, place, and time.   Skin: Skin is warm and dry.   Psychiatric: He has a normal mood and affect.   Nursing note and vitals reviewed.       Labs:        Invalid input(s): [id number]  Recent Labs      01/16/17 1644   TROPONINI  <0.01   BNPBTYPENAT  103*     Recent Labs      01/17/17 0124  01/18/17   0014  01/19/17 0124   SODIUM  141  140  139   POTASSIUM  4.3  4.3  4.0   CHLORIDE  116*  115*  117*   CO2  16*  16*  16*   BUN  30*  31*  33*   CREATININE  3.04*  2.86*  2.69*   MAGNESIUM   --   2.1   --    PHOSPHORUS   --   3.4   --    CALCIUM  7.7*  7.5*  7.5*     Recent Labs      01/16/17 1644 01/17/17 0124  01/18/17   0014  01/19/17 0124   ALTSGPT  11   --    --    --    ASTSGOT  28   --    --    --    ALKPHOSPHAT  145*   --    --    --    TBILIRUBIN  0.8   --    --    --    GLUCOSE  147*  118*  111*  112*     Recent Labs      01/16/17 1644 01/17/17 0124  01/18/17   0014  01/18/17   0015  01/18/17   0711  01/19/17 0124   RBC  2.98*  2.56*  2.58*   --    --   2.51*   HEMOGLOBIN  9.4*  7.6*  7.8*   --    --   7.9*   HEMATOCRIT  28.4*  24.2*  24.3*   --    --   23.6*   PLATELETCT  154*  107*  108*   --    --   111*   PROTHROMBTM  15.4*   --    --    --   16.3*   --    APTT  25.8   --    --    --   31.1   --    INR  1.18*   --    --    --   1.27*   --    IRON   --    --   23*   --    --    --    FERRITIN   --    --    --   91.7   --    --    TOTIRONBC   --    --   204*   --    --    --      Recent Labs      01/16/17   1644  01/17/17   0124  01/18/17   0014  01/19/17   0124   WBC  10.7  5.9  5.2  3.6*   NEUTSPOLYS  83.80*   --    --    --    LYMPHOCYTES  8.60*   --    --    --    MONOCYTES  6.20   --    --    --    EOSINOPHILS  0.50   --    --    --    BASOPHILS  0.40   --    --    --    SVETLANA Hobson   --     --    --    ALTSGPT  11   --    --    --    ALKPHOSPHAT  145*   --    --    --    TBILIRUBIN  0.8   --    --    --            Hemodynamics:  Temp (24hrs), Av.9 °C (98.5 °F), Min:36.6 °C (97.8 °F), Max:37.3 °C (99.1 °F)  Temperature: 37.1 °C (98.7 °F)  Pulse  Av.8  Min: 60  Max: 94   Blood Pressure : 159/64 mmHg     Respiratory:    Respiration: 17, Pulse Oximetry: 94 %           Fluids:    Intake/Output Summary (Last 24 hours) at 17 1027  Last data filed at 17 0800   Gross per 24 hour   Intake    650 ml   Output      0 ml   Net    650 ml        GI/Nutrition:  Orders Placed This Encounter   Procedures   • Diet Order     Standing Status: Standing      Number of Occurrences: 1      Standing Expiration Date:      Order Specific Question:  Diet:     Answer:  Diabetic [3]     Medical Decision Making, by Problem:  Active Hospital Problems    Diagnosis   • Proteinuria [R80.9]   • Hyperchloremia [E87.8]   • Acute on chronic renal failure (CMS-HCC) [N17.9, N18.9]   • Gout [M10.9]   • Normocytic anemia [D64.9]   • Type 2 diabetes mellitus with diabetic chronic kidney disease (CMS-[facility]) [E11.22]   • HTN (hypertension) [I10]     ASSESSMENT & PLAN:    # acute on chronic kidney disease, stage IV              - likely from diabetic and hypertensive nephropathy                - patient does not need dialysis at this time; kidney function improving              - patient may have a small underlying component of nephrotic syndrome (FeNa 2.2%, Feurea pending)              - low albumin + acites and scarred liver + large spleen - concern he has significant undiagnosed liver disease contributing to hypoalbuminemia; SAAG >1.1              - continue home Losartan 100 mg QD   - patient has o/p f/u with nephrology on Tuesday    # electrolytes               - K/Mg/P/Ca wnl   - CO2 stable at 16                # hypervolemia              - seems to be more likely of hepatic etiology (US shows e/o cirrhosis and HSmegaly, labs  show thrombocytopenia, hypoalbuminemia, and elevated INR; risk factors - T2DM leading to fatty liver)              - hepatitis panel and HIV negative              - paracentesis 1/18 drained 5.3L and showed SAAG >1.1   - continue lasix/aldactone; patient has GI f/u scheduled on o/p basis    # acute on chronic anemia              - chronic anemia likely secondary to renal disease, but acute drop is not explained by patient's symptoms              - FOBT negative, iron studies show picture of anemia of chronic disease   - H/H stable currently     # hypertension               - normotensive currently              - continue home Losartan     # T2DM               - well controlled, last A1c 6.7 in 7/2016    Core Measures

## 2017-01-19 NOTE — PROGRESS NOTES
Patient verbalized understanding of discharge instructions. Patient d/c to home via taxi. Patient had d/c paperwork and prescriptions in hand.

## 2017-01-19 NOTE — DISCHARGE INSTRUCTIONS
Discharge Instructions    Discharged to home by taxi with self. Discharged via wheelchair, hospital escort: Yes.  Special equipment needed: Not Applicable    Be sure to schedule a follow-up appointment with your primary care doctor or any specialists as instructed.     Discharge Plan:   Diet Plan: Discussed  Activity Level: Discussed  Confirmed Follow up Appointment: Patient to Call and Schedule Appointment  Confirmed Symptoms Management: Discussed  Medication Reconciliation Updated: Yes  Influenza Vaccine Indication: Not indicated: Previously immunized this influenza season and > 8 years of age    I understand that a diet low in cholesterol, fat, and sodium is recommended for good health. Unless I have been given specific instructions below for another diet, I accept this instruction as my diet prescription.   Other diet: Diabetic Diet    Special Instructions: None    · Is patient discharged on Warfarin / Coumadin?   No     · Is patient Post Blood Transfusion?  No    Depression / Suicide Risk    As you are discharged from this [facility] facility, it is important to learn how to keep safe from harming yourself.    Recognize the warning signs:  · Abrupt changes in personality, positive or negative- including increase in energy   · Giving away possessions  · Change in eating patterns- significant weight changes-  positive or negative  · Change in sleeping patterns- unable to sleep or sleeping all the time   · Unwillingness or inability to communicate  · Depression  · Unusual sadness, discouragement and loneliness  · Talk of wanting to die  · Neglect of personal appearance   · Rebelliousness- reckless behavior  · Withdrawal from people/activities they love  · Confusion- inability to concentrate     If you or a loved one observes any of these behaviors or has concerns about self-harm, here's what you can do:  · Talk about it- your feelings and reasons for harming yourself  · Remove any means that you might use to  hurt yourself (examples: pills, rope, extension cords, firearm)  · Get professional help from the community (Mental Health, Substance Abuse, psychological counseling)  · Do not be alone:Call your Safe Contact- someone whom you trust who will be there for you.  · Call your local CRISIS HOTLINE 221-2596 or 805-983-3074  · Call your local Children's Mobile Crisis Response Team [place] (462) 473-0161 or www.VISEO  · Call the toll free National Suicide Prevention Hotlines   · National Suicide Prevention Lifeline 418-153-AYYX (2476)  · [employer] Network 800-SUICIDE (527-8288)  OK to DC home once seen by nephro.  Patient to call [facility] at ([phone]) [phone] to arrange appointment to followup on new liver disease and to review labs.

## 2017-01-20 NOTE — DISCHARGE SUMMARY
CHIEF COMPLAINT:  Shortness of breath and swollen belly.    DISCHARGE DIAGNOSES:  1.  Acute on chronic renal failure, improved, back to baseline.  2.  Proteinuria.  3.  Ascites with cirrhosis noted on ultrasound, this is new.  4.  History of type 2 diabetes mellitus.  5.  History of hypertension.  6.  History of anemia of chronic disease.    CONSULTANTS:  Dr. Nacho Ram of nephrology.    STUDIES:  Abdominal ultrasound on 01/17/2017:  Findings of cirrhosis,   hepatosplenomegaly, moderate ascites, no biliary ductal dilatation, 7 mm   echogenic foci in the left kidney, likely represents a nonobstructing   calculus, 2.7 cm left renal cyst    PROCEDURES:  Paracentesis on 01/18/2017.    HOSPITAL COURSE:  For complete history and physical, please see notes dictated   by Dr. Cabrera Luna.  In short, this is a 76-year-old gentleman with a known   history of chronic kidney disease approximately stage III to IV, who was   followed on a regular basis by Dr. Nacho Ram of nephrology.  He came in with   complaints of abdominal pain and swelling with shortness of breath.  Upon   assessment in the ER, the patient was noted to have worsening of his renal   function from his usual baseline and he was admitted to the hospital for   further evaluation and specialist consultation with nephrology.  He was also   noted to have proteinuria.  The patient was diuresed and by the date of his   discharge, his renal function had returned to his baseline.  However, an   ultrasound of the abdomen did show a new diagnosis of cirrhosis of the liver   with ascites.  He underwent paracentesis for both a therapeutic and diagnostic   tap.  Following this, he felt much improved and was ready for discharge home.    The results of the paracentesis were discussed with Dr. Thai Tran of   gastroenterology.  As the patient was back in his clinical baseline, he is   appropriate for outpatient followup with Dr. Tran to discuss his new   diagnosis of liver  disease and for further treatment as needed.  Additionally,   several tests were ordered for him including ascitic fluid cytology, AFP,   IgG, alpha-1 antitrypsin, SOFIYA, and antimitochondrial antibody, which are   send outs and he will review these results with Dr. Tran or one of Dr. Tran's colleagues on an outpatient basis.  Of note, the patient's hepatitis   panel was negative.  He is currently stable for discharge home.    DISPOSITION:  Home.    DIET:  Diabetic.    ACTIVITIES:  As tolerated.    MEDICATIONS:  1.  Lasix 20 mg p.o. daily.  2.  Spironolactone 25 mg p.o. daily.  3.  Cardura 2 mg p.o. daily.  4.  Fluticasone 2 sprays in nose daily.  5.  Lantus 5-7 units subcutaneously at bedtime.  6.  Losartan 100 mg p.o. daily.  7.  Ocuvite tabs.  8.  Actos 15 mg p.o. daily.  9.  Crestor 20 mg p.o. daily.    FOLLOWUP:  The patient has been provided with a phone number for GI consultant   and he is instructed to call within the next week for a followup appointment.    Otherwise, he has followup appointments with his primary care provider and   cardiology.  He has been instructed to keep these appointments.    These discharge instructions were discussed with the patient at bedside, he   has expressed understanding and agreed to comply with all discharge   instructions.    Thirty-six minutes were spent in the discharge planning and management of this   patient, including more than 50% of the time spent face-to-face in   counseling.       ____________________________________     MD PATRIA HINOJOSA / NTS    DD:  01/19/2017 18:32:52  DT:  01/19/2017 20:59:28    D#:  [id number]  Job#:  [id number]

## 2017-01-24 NOTE — MR AVS SNAPSHOT
"        Tico Solorzano   2017 1:45 PM   Office Visit   MRN: [id number]    Department:  [facility]   Dept Phone:  [phone]    Description:  Male : 1940   Provider:  Navneet Brunner M.D.           Reason for Visit     Follow-Up           Allergies as of 2017     Allergen Noted Reactions    Valsartan 2015       12/16/15 pt states he doesn't remember if he's allergic to this medication.      You were diagnosed with     Essential hypertension   [4369513]       Normocytic anemia   [608783]       Proteinuria   [791.0.ICD-9-CM]       S/P placement of cardiac pacemaker   [175770]       Acute on chronic renal failure (CMS-HCC)   [209658]       Type 2 diabetes mellitus with stage 2 chronic kidney disease, with long-term current use of insulin (CMS-HCC)   [3796114]       Renal insufficiency   [529391]       Dyslipidemia   [130688]         Vital Signs     Blood Pressure Pulse Height Weight Body Mass Index Oxygen Saturation    140/70 mmHg 84 1.829 m (6' 0.01\") 85.276 kg (188 lb) 25.49 kg/m2 98%    Smoking Status                   Former Smoker           Basic Information     Date Of Birth Sex Race Ethnicity Preferred Language    1940 Male White Non- English      Your appointments     Mar 08, 2017 11:00 AM   Established Patient with Casey Wick D.O.   [address] [place] [id number] [phone]           You will be receiving a confirmation call a few days before your appointment from our automated call confirmation system.              Problem List              ICD-10-CM Priority Class Noted - Resolved    Renal insufficiency N28.9   2009 - Present    Dyslipidemia E78.5   2009 - Present    Gout M10.9 Low  6/3/2009 - Present    HTN (hypertension) I10   2010 - Present    Vitamin d deficiency    2012 - Present    Protein in urine R80.9   2012 - Present    Type 2 diabetes mellitus with diabetic " chronic kidney disease (CMS-HCC) E11.22   8/10/2015 - Present    History of prostate cancer Z85.46   11/4/2015 - Present    Acute on chronic renal failure (CMS-HCC) N17.9, N18.9 High  12/16/2015 - Present    BPH (benign prostatic hyperplasia) N40.0   12/17/2015 - Present    S/P placement of cardiac pacemaker Z95.0   12/17/2015 - Present    Cough R05   1/21/2016 - Present    Lactic acidosis E87.2   1/21/2016 - Present    Type 2 diabetes mellitus (CMS-HCC) E11.9   2/3/2016 - Present    Proteinuria R80.9 High  1/16/2017 - Present    Normocytic anemia D64.9   1/16/2017 - Present    Hyperchloremia E87.8 High  1/16/2017 - Present      Health Maintenance        Date Due Completion Dates    IMM ZOSTER VACCINE 10/11/1998 ---    RETINAL SCREENING 1/30/2016 1/30/2015 (Done), 6/10/2013, 4/13/2011 (N/S)    Override on 1/30/2015: Done (Dr. Wilkinson. )    Override on 4/13/2011: (N/S) (Dr Wilkinson macular degeneration)    IMM PNEUMOCOCCAL 65+ (ADULT) LOW/MEDIUM RISK SERIES (2 of 2 - PPSV23) 12/16/2016 12/16/2015    A1C SCREENING 1/19/2017 7/19/2016, 3/22/2016, 11/18/2015, 7/15/2015, 2/17/2015, 11/13/2014, 8/12/2014, 5/12/2014, 2/4/2014, 10/10/2013, 7/9/2013, 2/26/2013, 12/27/2012, 9/5/2012, 6/21/2012, 1/26/2012, 11/10/2011, 4/12/2011, 7/13/2010, 4/6/2010, 1/12/2010, 11/10/2009, 8/12/2009    URINE ACR / MICROALBUMIN 3/22/2017 3/22/2016, 5/12/2014, 10/10/2013, 9/5/2012, 6/21/2012, 1/26/2012, 4/12/2011, 4/6/2010    FASTING LIPID PROFILE 7/19/2017 7/19/2016, 7/15/2015, 2/4/2014, 1/26/2012, 9/14/2011 (N/S)    Override on 9/14/2011: (N/S) (See labs from [facility] Consult)    DIABETES MONOFILAMENT / LE EXAM 9/8/2017 9/8/2016 (Done), 3/10/2015 (Done), 2/21/2014 (Done), 1/4/2013 (Done), 7/2/2012 (N/S), 6/13/2011 (N/S)    Override on 9/8/2016: Done    Override on 3/10/2015: Done (Dr. Wick)    Override on 2/21/2014: Done    Override on 1/4/2013: Done    Override on 7/2/2012: (N/S)    Override on 6/13/2011: (N/S) (  Shamika)    SERUM CREATININE 1/19/2018 1/19/2017, 1/18/2017, 1/17/2017, 1/16/2017, 7/19/2016, 1/22/2016, 1/21/2016, 12/18/2015, 12/17/2015, 12/16/2015, 11/18/2015, 7/15/2015, 2/17/2015, 11/13/2014, 5/12/2014, 7/9/2013, 1/26/2012, 12/18/2011, 12/16/2011, 11/10/2009    IMM DTaP/Tdap/Td Vaccine (2 - Td) 12/12/2021 12/12/2011    COLONOSCOPY 7/17/2023 7/17/2013 (Declined)    Override on 7/17/2013: Patient Declined            Current Immunizations     13-VALENT PCV PREVNAR 12/16/2015  6:44 PM    INFLUENZA VACCINE H1N1 1/14/2010    Influenza TIV (IM) 10/16/2016, 10/13/2014, 10/21/2013, 11/1/2011    Influenza Vaccine Adult HD 10/28/2015    Influenza Vaccine Pediatric 12/14/2009    Pneumococcal Vaccine (UF)Historical Data 10/1/2003    Tdap Vaccine 12/12/2011      Below and/or attached are the medications your provider expects you to take. Review all of your home medications and newly ordered medications with your provider and/or pharmacist. Follow medication instructions as directed by your provider and/or pharmacist. Please keep your medication list with you and share with your provider. Update the information when medications are discontinued, doses are changed, or new medications (including over-the-counter products) are added; and carry medication information at all times in the event of emergency situations     Allergies:  VALSARTAN - (reactions not documented)               Medications  Valid as of: January 24, 2017 -  2:12 PM    Generic Name Brand Name Tablet Size Instructions for use    Doxazosin Mesylate (Tab) CARDURA 2 MG Take 2 mg by mouth every day.        Fluticasone Propionate (Suspension) FLONASE 50 MCG/ACT INSTILL 2 SPRAYS IN EACH NOSTRIL ONCE DAILY        Furosemide (Tab) LASIX 20 MG Take 1 Tab by mouth every day.        Insulin Glargine (Solution) LANTUS 100 UNIT/ML Inject 5-7 Units as instructed every evening.        Losartan Potassium (Tab) COZAAR 100 MG TAKE 1 TABLET BY MOUTH ONCE DAILY        Multiple  Vitamins-Minerals (Tab) OCUVITE  Take 1 Tab by mouth every day.        Pioglitazone HCl (Tab) ACTOS 15 MG TAKE 1 TABLET BY MOUTH EVERY DAY        Rosuvastatin Calcium (Tab) CRESTOR 20 MG Take 1 Tab by mouth every evening.        Spironolactone (Tab) ALDACTONE 25 MG Take 1 Tab by mouth every day.        .                 Medicines prescribed today were sent to:     [id number] - [place], [place] - [address] [place] [address]    Phone: [phone] Fax: [phone]    Open 24 Hours?: No      Medication refill instructions:       If your prescription bottle indicates you have medication refills left, it is not necessary to call your provider’s office. Please contact your pharmacy and they will refill your medication.    If your prescription bottle indicates you do not have any refills left, you may request refills at any time through one of the following ways: The online [employer] system (except Urgent Care), by calling your provider’s office, or by asking your pharmacy to contact your provider’s office with a refill request. Medication refills are processed only during regular business hours and may not be available until the next business day. Your provider may request additional information or to have a follow-up visit with you prior to refilling your medication.   *Please Note: Medication refills are assigned a new Rx number when refilled electronically. Your pharmacy may indicate that no refills were authorized even though a new prescription for the same medication is available at the pharmacy. Please request the medicine by name with the pharmacy before contacting your provider for a refill.           [employer] Access Code: [email or web address]  Expires: 2/18/2017 10:49 AM    [employer]  A secure, online tool to manage your health information     [employer]’s [employer]® is a secure, online tool that connects you to your personalized health information from the  privacy of your home -- day or night - making it very easy for you to manage your healthcare. Once the activation process is completed, you can even access your medical information using the Cycle chapincito, which is available for free in the Apple Chapincito store or Google Play store.     Cycle provides the following levels of access (as shown below):   My Chart Features   [facility] Primary [facility] Doctor [facility]  Specialists [facility]  Urgent  Care Non-[facility]  Primary Care  Doctor   Email your healthcare team securely and privately 24/7 X X X    Manage appointments: schedule your next appointment; view details of past/upcoming appointments X      Request prescription refills. X      View recent personal medical records, including lab and immunizations X X X X   View health record, including health history, allergies, medications X X X X   Read reports about your outpatient visits, procedures, consult and ER notes X X X X   See your discharge summary, which is a recap of your hospital and/or ER visit that includes your diagnosis, lab results, and care plan. X X       How to register for Cycle:  1. Go to  [email or web address].  2. Click on the Sign Up Now box, which takes you to the New Member Sign Up page. You will need to provide the following information:  a. Enter your Cycle Access Code exactly as it appears at the top of this page. (You will not need to use this code after you’ve completed the sign-up process. If you do not sign up before the expiration date, you must request a new code.)   b. Enter your date of birth.   c. Enter your home email address.   d. Click Submit, and follow the next screen’s instructions.  3. Create a Cycle ID. This will be your Cycle login ID and cannot be changed, so think of one that is secure and easy to remember.  4. Create a Cycle password. You can change your password at any time.  5. Enter your Password Reset Question and Answer. This can be used at a later time if you forget  your password.   6. Enter your e-mail address. This allows you to receive e-mail notifications when new information is available in [employer].  7. Click Sign Up. You can now view your health information.    For assistance activating your [employer] account, call [phone]

## 2017-01-24 NOTE — Clinical Note
[facility]   [address], [facility] [address]  [place] [address]  Phone: [phone]  Fax: [phone]              Tico Solorzano  1940    Encounter Date: 1/24/2017    Navneet Brunner M.D.          PROGRESS NOTE:  Subjective:   Tico Solorzano is a 76 y.o. male who presents today as a follow-up for his pacemaker and chronic kidney disease. He was recently admitted to the hospital diagnosed with cirrhosis of the liver underwent a diagnostic and therapeutic paracentesis. He was also started on Lasix and spironolactone. Since then his breathing is better and is feeling significantly improved. His most recent pacemaker check showed his battery life is estimated 8 years and is functioning properly.    Past Medical History   Diagnosis Date   • Diabetes    • Hypertension    • Renal disorder      50% function   • Arthritis      fingers   • Pneumonia 1961   • Cancer (CMS-HCC) 2003     prostate cancer   • CATARACT      repaired in right eye   • Vitamin d deficiency 1/16/2012   • Protein in urine 7/2/2012     Past Surgical History   Procedure Laterality Date   • Tonsillectomy     • Cholecystectomy     • Other  2003     radical prostatectomy   • Open reduction       rods in left leg, spontaneous bone deficit   • Incision and drainage orthopedic  12/17/2011     Performed by MADISON MENDEZ at SURGERY [facility]     Family History   Problem Relation Age of Onset   • Other Mother    • Other Father    • Cancer Brother      History   Smoking status   • Former Smoker -- 2.00 packs/day for 40 years   • Quit date: 07/13/1991   Smokeless tobacco   • Never Used     Comment: quit 20 yrs ago     Allergies   Allergen Reactions   • Valsartan      12/16/15 pt states he doesn't remember if he's allergic to this medication.     Outpatient Encounter Prescriptions as of 1/24/2017   Medication Sig Dispense Refill   • furosemide (LASIX) 20 MG Tab Take 1 Tab by mouth every day. 60 Tab 0    "  • spironolactone (ALDACTONE) 25 MG Tab Take 1 Tab by mouth every day. 30 Tab 0   • doxazosin (CARDURA) 2 MG Tab Take 2 mg by mouth every day.     • insulin glargine (LANTUS) 100 UNIT/ML Solution Inject 5-7 Units as instructed every evening.     • losartan (COZAAR) 100 MG Tab TAKE 1 TABLET BY MOUTH ONCE DAILY 90 Tab 3   • fluticasone (FLONASE) 50 MCG/ACT nasal spray INSTILL 2 SPRAYS IN EACH NOSTRIL ONCE DAILY 16 g 6   • pioglitazone (ACTOS) 15 MG Tab TAKE 1 TABLET BY MOUTH EVERY DAY 90 Tab 3   • Multiple Vitamins-Minerals (OCUVITE) Tab Take 1 Tab by mouth every day.     • rosuvastatin (CRESTOR) 20 MG Tab Take 1 Tab by mouth every evening. 30 Tab 11     No facility-administered encounter medications on file as of 1/24/2017.     Review of Systems   Constitutional: Negative.  Negative for fever, chills and malaise/fatigue.   HENT: Negative.  Negative for sore throat.    Eyes: Negative.    Respiratory: Negative.  Negative for cough, hemoptysis, sputum production, shortness of breath, wheezing and stridor.    Cardiovascular: Negative.  Negative for chest pain, palpitations, orthopnea, claudication, leg swelling and PND.   Gastrointestinal: Negative.    Genitourinary: Negative.    Musculoskeletal: Negative.    Skin: Negative.    Neurological: Negative.  Negative for dizziness, loss of consciousness and weakness.   Endo/Heme/Allergies: Negative.  Does not bruise/bleed easily.   All other systems reviewed and are negative.       Objective:   /70 mmHg  Pulse 84  Ht 1.829 m (6' 0.01\")  Wt 85.276 kg (188 lb)  BMI 25.49 kg/m2  SpO2 98%    Physical Exam   Constitutional: He is oriented to person, place, and time. He appears well-developed and well-nourished. No distress.   HENT:   Head: Normocephalic.   Mouth/Throat: Oropharynx is clear and moist.   Eyes: EOM are normal. Pupils are equal, round, and reactive to light. Right eye exhibits no discharge. Left eye exhibits no discharge. No scleral icterus.   Neck: Normal " range of motion. Neck supple. No JVD present. No tracheal deviation present.   Cardiovascular: Normal rate, regular rhythm, S1 normal, S2 normal, normal heart sounds, intact distal pulses and normal pulses.  Exam reveals no gallop, no S3, no S4 and no friction rub.    No murmur heard.   No systolic murmur is present    No diastolic murmur is present   Pulses:       Carotid pulses are 2+ on the right side, and 2+ on the left side.       Radial pulses are 2+ on the right side, and 2+ on the left side.        Dorsalis pedis pulses are 2+ on the right side, and 2+ on the left side.        Posterior tibial pulses are 2+ on the right side, and 2+ on the left side.   Pulmonary/Chest: Effort normal and breath sounds normal. No respiratory distress. He has no wheezes. He has no rales.   Abdominal: Soft. Bowel sounds are normal. He exhibits no distension and no mass. There is no tenderness. There is no rebound and no guarding.   Musculoskeletal: He exhibits no edema.   Neurological: He is alert and oriented to person, place, and time. No cranial nerve deficit.   Skin: Skin is warm and dry. He is not diaphoretic. No pallor.   Psychiatric: He has a normal mood and affect. His behavior is normal. Judgment and thought content normal.   Nursing note and vitals reviewed.      Assessment:     1. Essential hypertension     2. Normocytic anemia     3. Proteinuria     4. S/P placement of cardiac pacemaker     5. Acute on chronic renal failure (CMS-HCC)     6. Type 2 diabetes mellitus with stage 2 chronic kidney disease, with long-term current use of insulin (CMS-[facility])     7. Renal insufficiency     8. Dyslipidemia         Medical Decision Making:  Today's Assessment / Status / Plan:     75-year-old gentleman with complete heart block status post pacemaker, hypertension hyperlipidemia. It is unfortunately was recently diagnosed with cirrhosis. He's got a follow-up appointment scheduled with GI on Monday. Otherwise from a cardiovascular  standpoint I think that he is doing well.    1. 3rd degree s/p PPM    - no issues    2. HTN    - consider stopping doxazonsin    - cont losartan 100    3. HLD    - cont rosuva 20    4. Cirrhosis    - cont kit 25    - cont lasix 20    Thank for you allowing me to take part in your patient's care, please call should you have any questions or would like to discuss this patient.      DEQUAN Guzman.O.  [address] [facility]  [address]  [facility] [address]  VIA In Basket

## 2017-01-24 NOTE — PROGRESS NOTES
Subjective:   Tico Solorzano is a 76 y.o. male who presents today as a follow-up for his pacemaker and chronic kidney disease. He was recently admitted to the hospital diagnosed with cirrhosis of the liver underwent a diagnostic and therapeutic paracentesis. He was also started on Lasix and spironolactone. Since then his breathing is better and is feeling significantly improved. His most recent pacemaker check showed his battery life is estimated 8 years and is functioning properly.    Past Medical History   Diagnosis Date   • Diabetes    • Hypertension    • Renal disorder      50% function   • Arthritis      fingers   • Pneumonia 1961   • Cancer (CMS-HCC) 2003     prostate cancer   • CATARACT      repaired in right eye   • Vitamin d deficiency 1/16/2012   • Protein in urine 7/2/2012     Past Surgical History   Procedure Laterality Date   • Tonsillectomy     • Cholecystectomy     • Other  2003     radical prostatectomy   • Open reduction       rods in left leg, spontaneous bone deficit   • Incision and drainage orthopedic  12/17/2011     Performed by MADISON MENDEZ at SURGERY [facility] ORS     Family History   Problem Relation Age of Onset   • Other Mother    • Other Father    • Cancer Brother      History   Smoking status   • Former Smoker -- 2.00 packs/day for 40 years   • Quit date: 07/13/1991   Smokeless tobacco   • Never Used     Comment: quit 20 yrs ago     Allergies   Allergen Reactions   • Valsartan      12/16/15 pt states he doesn't remember if he's allergic to this medication.     Outpatient Encounter Prescriptions as of 1/24/2017   Medication Sig Dispense Refill   • furosemide (LASIX) 20 MG Tab Take 1 Tab by mouth every day. 60 Tab 0   • spironolactone (ALDACTONE) 25 MG Tab Take 1 Tab by mouth every day. 30 Tab 0   • doxazosin (CARDURA) 2 MG Tab Take 2 mg by mouth every day.     • insulin glargine (LANTUS) 100 UNIT/ML Solution Inject 5-7 Units as instructed every evening.     • losartan (COZAAR) 100  "MG Tab TAKE 1 TABLET BY MOUTH ONCE DAILY 90 Tab 3   • fluticasone (FLONASE) 50 MCG/ACT nasal spray INSTILL 2 SPRAYS IN EACH NOSTRIL ONCE DAILY 16 g 6   • pioglitazone (ACTOS) 15 MG Tab TAKE 1 TABLET BY MOUTH EVERY DAY 90 Tab 3   • Multiple Vitamins-Minerals (OCUVITE) Tab Take 1 Tab by mouth every day.     • rosuvastatin (CRESTOR) 20 MG Tab Take 1 Tab by mouth every evening. 30 Tab 11     No facility-administered encounter medications on file as of 1/24/2017.     Review of Systems   Constitutional: Negative.  Negative for fever, chills and malaise/fatigue.   HENT: Negative.  Negative for sore throat.    Eyes: Negative.    Respiratory: Negative.  Negative for cough, hemoptysis, sputum production, shortness of breath, wheezing and stridor.    Cardiovascular: Negative.  Negative for chest pain, palpitations, orthopnea, claudication, leg swelling and PND.   Gastrointestinal: Negative.    Genitourinary: Negative.    Musculoskeletal: Negative.    Skin: Negative.    Neurological: Negative.  Negative for dizziness, loss of consciousness and weakness.   Endo/Heme/Allergies: Negative.  Does not bruise/bleed easily.   All other systems reviewed and are negative.       Objective:   /70 mmHg  Pulse 84  Ht 1.829 m (6' 0.01\")  Wt 85.276 kg (188 lb)  BMI 25.49 kg/m2  SpO2 98%    Physical Exam   Constitutional: He is oriented to person, place, and time. He appears well-developed and well-nourished. No distress.   HENT:   Head: Normocephalic.   Mouth/Throat: Oropharynx is clear and moist.   Eyes: EOM are normal. Pupils are equal, round, and reactive to light. Right eye exhibits no discharge. Left eye exhibits no discharge. No scleral icterus.   Neck: Normal range of motion. Neck supple. No JVD present. No tracheal deviation present.   Cardiovascular: Normal rate, regular rhythm, S1 normal, S2 normal, normal heart sounds, intact distal pulses and normal pulses.  Exam reveals no gallop, no S3, no S4 and no friction rub.    No " murmur heard.   No systolic murmur is present    No diastolic murmur is present   Pulses:       Carotid pulses are 2+ on the right side, and 2+ on the left side.       Radial pulses are 2+ on the right side, and 2+ on the left side.        Dorsalis pedis pulses are 2+ on the right side, and 2+ on the left side.        Posterior tibial pulses are 2+ on the right side, and 2+ on the left side.   Pulmonary/Chest: Effort normal and breath sounds normal. No respiratory distress. He has no wheezes. He has no rales.   Abdominal: Soft. Bowel sounds are normal. He exhibits no distension and no mass. There is no tenderness. There is no rebound and no guarding.   Musculoskeletal: He exhibits no edema.   Neurological: He is alert and oriented to person, place, and time. No cranial nerve deficit.   Skin: Skin is warm and dry. He is not diaphoretic. No pallor.   Psychiatric: He has a normal mood and affect. His behavior is normal. Judgment and thought content normal.   Nursing note and vitals reviewed.      Assessment:     1. Essential hypertension     2. Normocytic anemia     3. Proteinuria     4. S/P placement of cardiac pacemaker     5. Acute on chronic renal failure (CMS-HCC)     6. Type 2 diabetes mellitus with stage 2 chronic kidney disease, with long-term current use of insulin (CMS-[facility])     7. Renal insufficiency     8. Dyslipidemia         Medical Decision Making:  Today's Assessment / Status / Plan:     75-year-old gentleman with complete heart block status post pacemaker, hypertension hyperlipidemia. It is unfortunately was recently diagnosed with cirrhosis. He's got a follow-up appointment scheduled with GI on Monday. Otherwise from a cardiovascular standpoint I think that he is doing well.    1. 3rd degree s/p PPM    - no issues    2. HTN    - consider stopping doxazonsin    - cont losartan 100    3. HLD    - cont rosuva 20    4. Cirrhosis    - cont kit 25    - cont lasix 20    Thank for you allowing me to take  part in your patient's care, please call should you have any questions or would like to discuss this patient.

## 2017-02-02 NOTE — MR AVS SNAPSHOT
"        Tico Whalen Rashad   2017 10:00 AM   Office Visit   MRN: [id number]    Department:  [facility]   Dept Phone:  [phone]    Description:  Male : 1940   Provider:  Casey Wick D.O.           Reason for Visit     Follow-Up HF/medications/ 17       Allergies as of 2017     Allergen Noted Reactions    Valsartan 2015       12/16/15 pt states he doesn't remember if he's allergic to this medication.      You were diagnosed with     Type II diabetes mellitus, well controlled (CMS-[facility])   [995373]       Acute on chronic renal failure (CMS-[facility])   [301784]       Proteinuria   [791.0.ICD-9-CM]         Vital Signs     Blood Pressure Pulse Temperature Respirations Height Weight    120/70 mmHg 86 36.5 °C (97.7 °F) 16 1.829 m (6' 0.01\") 80.287 kg (177 lb)    Body Mass Index Oxygen Saturation Smoking Status             24.00 kg/m2 99% Former Smoker         Basic Information     Date Of Birth Sex Race Ethnicity Preferred Language    1940 Male White Non- English      Your appointments     Mar 08, 2017 11:00 AM   Established Patient with Casey Wick D.O.   [address]  [place] [address] [phone]           You will be receiving a confirmation call a few days before your appointment from our automated call confirmation system.            2017  2:30 PM   PACER CHECK ONLY with PACER PHONE CHECK   [facility] (--)    [address], [address]  [place] [id number] [phone]            2017  1:30 PM   PACER CHECK ONLY with PACER PHONE CHECK   [facility] (--)    [address], [address]  [place] [address] [phone]            2017  2:00 PM   FOLLOW UP with Navneet Brunner M.D.   [facility] (--)    [address]  [place] [id number] [phone]           "      Problem List              ICD-10-CM Priority Class Noted - Resolved    Renal insufficiency N28.9   5/22/2009 - Present    Dyslipidemia E78.5   5/22/2009 - Present    Gout M10.9 Low  6/3/2009 - Present    HTN (hypertension) I10   9/16/2010 - Present    Vitamin d deficiency    1/16/2012 - Present    Protein in urine R80.9   7/2/2012 - Present    Type 2 diabetes mellitus with diabetic chronic kidney disease (CMS-HCC) E11.22   8/10/2015 - Present    History of prostate cancer Z85.46   11/4/2015 - Present    Acute on chronic renal failure (CMS-HCC) N17.9, N18.9 High  12/16/2015 - Present    BPH (benign prostatic hyperplasia) N40.0   12/17/2015 - Present    S/P placement of cardiac pacemaker Z95.0   12/17/2015 - Present    Cough R05   1/21/2016 - Present    Lactic acidosis E87.2   1/21/2016 - Present    Type 2 diabetes mellitus (CMS-HCC) E11.9   2/3/2016 - Present    Proteinuria R80.9 High  1/16/2017 - Present    Normocytic anemia D64.9   1/16/2017 - Present    Hyperchloremia E87.8 High  1/16/2017 - Present      Health Maintenance        Date Due Completion Dates    IMM ZOSTER VACCINE 10/11/1998 ---    RETINAL SCREENING 1/30/2016 1/30/2015 (Done), 6/10/2013, 4/13/2011 (N/S)    Override on 1/30/2015: Done (Dr. Wilkinson. )    Override on 4/13/2011: (N/S) (Dr Wilkinson macular degeneration)    IMM PNEUMOCOCCAL 65+ (ADULT) LOW/MEDIUM RISK SERIES (2 of 2 - PPSV23) 12/16/2016 12/16/2015    A1C SCREENING 1/19/2017 7/19/2016, 3/22/2016, 11/18/2015, 7/15/2015, 2/17/2015, 11/13/2014, 8/12/2014, 5/12/2014, 2/4/2014, 10/10/2013, 7/9/2013, 2/26/2013, 12/27/2012, 9/5/2012, 6/21/2012, 1/26/2012, 11/10/2011, 4/12/2011, 7/13/2010, 4/6/2010, 1/12/2010, 11/10/2009, 8/12/2009    URINE ACR / MICROALBUMIN 3/22/2017 3/22/2016, 5/12/2014, 10/10/2013, 9/5/2012, 6/21/2012, 1/26/2012, 4/12/2011, 4/6/2010    FASTING LIPID PROFILE 7/19/2017 7/19/2016, 7/15/2015, 2/4/2014, 1/26/2012, 9/14/2011 (N/S)    Override on 9/14/2011: (N/S) (See labs from  [facility] Consult)    DIABETES MONOFILAMENT / LE EXAM 9/8/2017 9/8/2016 (Done), 3/10/2015 (Done), 2/21/2014 (Done), 1/4/2013 (Done), 7/2/2012 (N/S), 6/13/2011 (N/S)    Override on 9/8/2016: Done    Override on 3/10/2015: Done (Dr. Wick)    Override on 2/21/2014: Done    Override on 1/4/2013: Done    Override on 7/2/2012: (N/S)    Override on 6/13/2011: (N/S) (Dr. Wick)    SERUM CREATININE 1/19/2018 1/19/2017, 1/18/2017, 1/17/2017, 1/16/2017, 7/19/2016, 1/22/2016, 1/21/2016, 12/18/2015, 12/17/2015, 12/16/2015, 11/18/2015, 7/15/2015, 2/17/2015, 11/13/2014, 5/12/2014, 7/9/2013, 1/26/2012, 12/18/2011, 12/16/2011, 11/10/2009    IMM DTaP/Tdap/Td Vaccine (2 - Td) 12/12/2021 12/12/2011    COLONOSCOPY 7/17/2023 7/17/2013 (Declined)    Override on 7/17/2013: Patient Declined            Current Immunizations     13-VALENT PCV PREVNAR 12/16/2015  6:44 PM    INFLUENZA VACCINE H1N1 1/14/2010    Influenza TIV (IM) 10/16/2016, 10/13/2014, 10/21/2013, 11/1/2011    Influenza Vaccine Adult HD 10/28/2015    Influenza Vaccine Pediatric 12/14/2009    Pneumococcal Vaccine (UF)Historical Data 10/1/2003    Tdap Vaccine 12/12/2011      Below and/or attached are the medications your provider expects you to take. Review all of your home medications and newly ordered medications with your provider and/or pharmacist. Follow medication instructions as directed by your provider and/or pharmacist. Please keep your medication list with you and share with your provider. Update the information when medications are discontinued, doses are changed, or new medications (including over-the-counter products) are added; and carry medication information at all times in the event of emergency situations     Allergies:  VALSARTAN - (reactions not documented)               Medications  Valid as of: February 02, 2017 - 12:38 PM    Generic Name Brand Name Tablet Size Instructions for use    Doxazosin Mesylate (Tab) CARDURA 2 MG Take 2 mg  by mouth every day.        Fluticasone Propionate (Suspension) FLONASE 50 MCG/ACT INSTILL 2 SPRAYS IN EACH NOSTRIL ONCE DAILY        Furosemide (Tab) LASIX 20 MG Take 1 Tab by mouth every day.        Insulin Glargine (Solution) LANTUS 100 UNIT/ML Inject 5-7 Units as instructed every evening.        Losartan Potassium (Tab) COZAAR 100 MG TAKE 1 TABLET BY MOUTH ONCE DAILY        Multiple Vitamins-Minerals (Tab) OCUVITE  Take 1 Tab by mouth every day.        Pioglitazone HCl (Tab) ACTOS 15 MG TAKE 1 TABLET BY MOUTH EVERY DAY        Rosuvastatin Calcium (Tab) CRESTOR 20 MG Take 1 Tab by mouth every evening.        Spironolactone (Tab) ALDACTONE 25 MG Take 1 Tab by mouth every day.        .                 Medicines prescribed today were sent to:     [facility] #[id number] - [place], [place] - [address] [place] [address] AT [address] [address]    Phone: [phone] Fax: [phone]    Open 24 Hours?: No      Medication refill instructions:       If your prescription bottle indicates you have medication refills left, it is not necessary to call your provider’s office. Please contact your pharmacy and they will refill your medication.    If your prescription bottle indicates you do not have any refills left, you may request refills at any time through one of the following ways: The online [employer] system (except Urgent Care), by calling your provider’s office, or by asking your pharmacy to contact your provider’s office with a refill request. Medication refills are processed only during regular business hours and may not be available until the next business day. Your provider may request additional information or to have a follow-up visit with you prior to refilling your medication.   *Please Note: Medication refills are assigned a new Rx number when refilled electronically. Your pharmacy may indicate that no refills were authorized even though a new prescription for the same medication is  available at the pharmacy. Please request the medicine by name with the pharmacy before contacting your provider for a refill.        Your To Do List     Future Labs/Procedures Complete By Expires    CBC WITH DIFFERENTIAL  As directed 2/3/2018    COMP METABOLIC PANEL  As directed 2/3/2018    URINALYSIS,CULTURE IF INDICATED  As directed 2/3/2018         Gradwell Access Code: [email or web address]  Expires: 2/18/2017 10:49 AM    Gradwell  A secure, online tool to manage your health information     [email or web address]’s Gradwell® is a secure, online tool that connects you to your personalized health information from the privacy of your home -- day or night - making it very easy for you to manage your healthcare. Once the activation process is completed, you can even access your medical information using the Gradwell chapincito, which is available for free in the Apple Chapincito store or Google Play store.     Gradwell provides the following levels of access (as shown below):   My Chart Features   [facility] Doctor [facility]  Specialists [facility]  Urgent  Care Non-[facility]  Primary Care  Doctor   Email your healthcare team securely and privately 24/7 X X X    Manage appointments: schedule your next appointment; view details of past/upcoming appointments X      Request prescription refills. X      View recent personal medical records, including lab and immunizations X X X X   View health record, including health history, allergies, medications X X X X   Read reports about your outpatient visits, procedures, consult and ER notes X X X X   See your discharge summary, which is a recap of your hospital and/or ER visit that includes your diagnosis, lab results, and care plan. X X       How to register for Gradwell:  1. Go to  [email or web address].  2. Click on the Sign Up Now box, which takes you to the New Member Sign Up page. You will need to provide the following information:  a. Enter your Gradwell Access Code exactly as it appears at the top  of this page. (You will not need to use this code after you’ve completed the sign-up process. If you do not sign up before the expiration date, you must request a new code.)   b. Enter your date of birth.   c. Enter your home email address.   d. Click Submit, and follow the next screen’s instructions.  3. Create a Wistia ID. This will be your Wistia login ID and cannot be changed, so think of one that is secure and easy to remember.  4. Create a Wistia password. You can change your password at any time.  5. Enter your Password Reset Question and Answer. This can be used at a later time if you forget your password.   6. Enter your e-mail address. This allows you to receive e-mail notifications when new information is available in Wistia.  7. Click Sign Up. You can now view your health information.    For assistance activating your Wistia account, call [phone]

## 2017-02-03 NOTE — PROGRESS NOTES
CC: Tico Solorzano is a 76 y.o. male is suffering from   Chief Complaint   Patient presents with   • Follow-Up     HF/medications/ 1/16/17          SUBJECTIVE:  1. Other ascites  Patient's here for follow-up, was hospitalized at [facility], reviewed his medical records. Patient was treated with paracentesis which significantly improved his discomfort. Patient was started on Lasix also Aldactone to go ahead and help treat his ascites.     2. Type II diabetes mellitus, well controlled (CMS-HCC)  Patient has a history of type 2 diabetes with reasonable control.     3. Acute on chronic renal failure (CMS-[facility])  Patient was noted to have problems with chronic renal failure which was exacerbated with acute renal failure likely leading to hepatorenal syndrome.     4. Proteinuria  Patient with history of proteinuria consistent with renal failure        Past social, family, history:   Social History   Substance Use Topics   • Smoking status: Former Smoker -- 2.00 packs/day for 40 years     Quit date: 07/13/1991   • Smokeless tobacco: Never Used      Comment: quit 20 yrs ago   • Alcohol Use: No         MEDICATIONS:    Current outpatient prescriptions:   •  furosemide (LASIX) 20 MG Tab, Take 1 Tab by mouth every day., Disp: 60 Tab, Rfl: 11  •  spironolactone (ALDACTONE) 25 MG Tab, Take 1 Tab by mouth every day., Disp: 30 Tab, Rfl: 11  •  doxazosin (CARDURA) 2 MG Tab, Take 2 mg by mouth every day., Disp: , Rfl:   •  insulin glargine (LANTUS) 100 UNIT/ML Solution, Inject 5-7 Units as instructed every evening., Disp: , Rfl:   •  losartan (COZAAR) 100 MG Tab, TAKE 1 TABLET BY MOUTH ONCE DAILY, Disp: 90 Tab, Rfl: 3  •  fluticasone (FLONASE) 50 MCG/ACT nasal spray, INSTILL 2 SPRAYS IN EACH NOSTRIL ONCE DAILY, Disp: 16 g, Rfl: 6  •  pioglitazone (ACTOS) 15 MG Tab, TAKE 1 TABLET BY MOUTH EVERY DAY, Disp: 90 Tab, Rfl: 3  •  Multiple Vitamins-Minerals (OCUVITE) Tab, Take 1 Tab by mouth every day., Disp: , Rfl:   •   "rosuvastatin (CRESTOR) 20 MG Tab, Take 1 Tab by mouth every evening., Disp: 30 Tab, Rfl: 11    PROBLEMS:  Patient Active Problem List    Diagnosis Date Noted   • Proteinuria 01/16/2017     Priority: High   • Hyperchloremia 01/16/2017     Priority: High   • Acute on chronic renal failure (CMS-HCC) 12/16/2015     Priority: High   • Gout 06/03/2009     Priority: Low   • Normocytic anemia 01/16/2017   • Type 2 diabetes mellitus (CMS-HCC) 02/03/2016   • Cough 01/21/2016   • Lactic acidosis 01/21/2016   • BPH (benign prostatic hyperplasia) 12/17/2015   • S/P placement of cardiac pacemaker 12/17/2015   • History of prostate cancer 11/04/2015   • 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  Heart: No chest pain.  Lungs:  No shortness of Breath.  Psychological: Alvin unusual Anxiety depression     PHYSICAL EXAM   Constitutional: Alert, cooperative, not in acute distress.  Cardiovascular:  Rate Rhythm is regular without murmurs rubs clicks.     Thorax & Lungs: Clear to auscultation, no wheezing, rhonchi, or rales  HENT: Normocephalic, Atraumatic.  Eyes: PERRLA, EOMI, Conjunctiva normal.   Neck: Trachia is midline no swelling of the thyroid.   Lymphatic: No lymphadenopathy noted.   Abdomin: Soft non-tender, no rebound, no guarding.   Neurologic: Alert & oriented x 3, cranial nerves II through XII are intact, Normal motor function, Normal sensory function, No focal deficits noted.   Psychiatric: Affect normal, Judgment normal, Mood normal.     VITAL SIGNS:/70 mmHg  Pulse 86  Temp(Src) 36.5 °C (97.7 °F)  Resp 16  Ht 1.829 m (6' 0.01\")  Wt 80.287 kg (177 lb)  BMI 24.00 kg/m2  SpO2 99%    Labs: Reviewed    Assessment:                                  "                    Plan:    1. Other ascites  Patient with no recurrent ascites, clinically stable to continue on Lasix and spironolactone.    2. Type II diabetes mellitus, well controlled (CMS-HCC)  Stable type 2 diabetes retinal eye exam completed by mouth recheck A1c  - POCT Retinal Eye Exam  - POCT A1C (For A1C Every 6 Months Topic)    3. Acute on chronic renal failure (CMS-HCC)  Recheck labs in one month  - COMP METABOLIC PANEL; Future  - CBC WITH DIFFERENTIAL; Future    4. Proteinuria  No change in medical therapy  - URINALYSIS,CULTURE IF INDICATED; Future

## 2017-02-16 PROBLEM — J18.9 RIGHT UPPER LOBE PNEUMONIA: Status: ACTIVE | Noted: 2017-01-01

## 2017-02-16 NOTE — PROGRESS NOTES
Pt transported to [facility] via [facility], [id number].

## 2017-02-16 NOTE — ED NOTES
Med Rec completed per patient and patient med list  Allergies reviewed  No ORAL antibiotics in last 30 days

## 2017-02-16 NOTE — IP AVS SNAPSHOT
2/18/2017          Tico Solorzano  [address] [address]    Dear Tico:    [facility] wants to ensure your discharge home is safe and you or your loved ones have had all your questions answered regarding your care after you leave the hospital.    You may receive a telephone call within two days of your discharge.  This call is to make certain you understand your discharge instructions as well as ensure we provided you with the best care possible during your stay with us.     The call will only last approximately 3-5 minutes and will be done by a nurse.    Once again, we want to ensure your discharge home is safe and that you have a clear understanding of any next steps in your care.  If you have any questions or concerns, please do not hesitate to contact us, we are here for you.  Thank you for choosing [facility] for your healthcare needs.    Sincerely,    Baltazar Jacome    [facility]

## 2017-02-16 NOTE — ED NOTES
PT ambulated to triage c/o SOB and a cough.    Chief Complaint   Patient presents with   • Shortness of Breath   • Cough     Blood pressure 128/60, pulse 109, temperature 36.7 °C (98 °F), temperature source Temporal, resp. rate 16, height 1.829 m (6'), weight 77.8 kg (171 lb 8.3 oz), SpO2 98 %.

## 2017-02-16 NOTE — ED PROVIDER NOTES
ED Provider Note    Scribed for Yair Owusu M.D. by Cosmo Patel. 2/16/2017, 10:03 AM.    Primary care provider: Casey Wick D.O.  Means of arrival: Walk in  History obtained from: Patient  History limited by: None    CHIEF COMPLAINT  Chief Complaint   Patient presents with   • Shortness of Breath   • Cough       HPI  Tico Solorzano is a 76 y.o. male who presents to the Emergency Department complaining of shortness of breath and cough. The patient states he was here for the same complaint 1 month ago and says he had a paracentesis. He says since then his cough has persisted and has progressively worsened with intermittent shortness of breath. He decided to come back to the ED. He says he was also here for a cough last night. His shortness of breath is exacerbated with laying flat. He denies any sputum with his cough. He denies any fever, sore throat, chest pain, abdominal pain, vomiting, or diarrhea. Patient was a former smoker for 40 years. He denies a history of CHF. He has a pacemaker. Patient has a past medical history of hypertension and diabetes.     REVIEW OF SYSTEMS  Review of Systems   Constitutional: Negative for fever.   HENT: Negative for sore throat.    Respiratory: Positive for cough and shortness of breath.    Cardiovascular: Negative for chest pain.   Gastrointestinal: Negative for vomiting, abdominal pain and diarrhea.   All other systems reviewed and are negative.  C    PAST MEDICAL HISTORY   has a past medical history of Diabetes; Hypertension; Renal disorder; Arthritis; Pneumonia (1961); Cancer (CMS-HCC) (2003); CATARACT; Vitamin d deficiency (1/16/2012); and Protein in urine (7/2/2012).    SURGICAL HISTORY   has past surgical history that includes tonsillectomy; cholecystectomy; other (2003); open reduction; and incision and drainage orthopedic (12/17/2011).    SOCIAL HISTORY  Social History   Substance Use Topics   • Smoking status: Former Smoker -- 2.00 packs/day for 40 years      Quit date: 07/13/1991   • Smokeless tobacco: Never Used      Comment: quit 20 yrs ago   • Alcohol Use: No      History   Drug Use No       FAMILY HISTORY  Family History   Problem Relation Age of Onset   • Other Mother    • Other Father    • Cancer Brother        CURRENT MEDICATIONS  No current facility-administered medications for this encounter.    Current outpatient prescriptions:   •  furosemide (LASIX) 20 MG Tab, Take 1 Tab by mouth every day., Disp: 60 Tab, Rfl: 11  •  spironolactone (ALDACTONE) 25 MG Tab, Take 1 Tab by mouth every day., Disp: 30 Tab, Rfl: 11  •  doxazosin (CARDURA) 2 MG Tab, Take 2 mg by mouth every day., Disp: , Rfl:   •  insulin glargine (LANTUS) 100 UNIT/ML Solution, Inject 5-7 Units as instructed every evening., Disp: , Rfl:   •  losartan (COZAAR) 100 MG Tab, TAKE 1 TABLET BY MOUTH ONCE DAILY, Disp: 90 Tab, Rfl: 3  •  fluticasone (FLONASE) 50 MCG/ACT nasal spray, INSTILL 2 SPRAYS IN EACH NOSTRIL ONCE DAILY, Disp: 16 g, Rfl: 6  •  pioglitazone (ACTOS) 15 MG Tab, TAKE 1 TABLET BY MOUTH EVERY DAY, Disp: 90 Tab, Rfl: 3  •  Multiple Vitamins-Minerals (OCUVITE) Tab, Take 1 Tab by mouth every day., Disp: , Rfl:   •  rosuvastatin (CRESTOR) 20 MG Tab, Take 1 Tab by mouth every evening., Disp: 30 Tab, Rfl: 11    ALLERGIES  Allergies   Allergen Reactions   • Valsartan      12/16/15 pt states he doesn't remember if he's allergic to this medication.       PHYSICAL EXAM  VITAL SIGNS: /60 mmHg  Pulse 109  Temp(Src) 36.7 °C (98 °F) (Temporal)  Resp 16  Ht 1.829 m (6')  Wt 77.8 kg (171 lb 8.3 oz)  BMI 23.26 kg/m2  SpO2 98%  Vitals reviewed.  Constitutional: Awake alert chronically ill-appearing, no acute distress  HENT: Normocephalic, Atraumatic, Bilateral external ears normal, Oropharynx moist, No oral exudates, Nose normal.   Eyes: PERRL, EOMI, Conjunctiva normal, No discharge.   Neck: Normal range of motion, No tenderness, Supple, No stridor.   Cardiovascular: Normal heart rate, Normal  rhythm, No murmurs, No rubs, No gallops.   Thorax & Lungs: Normal breath sounds, No respiratory distress, No wheezing  Abdomen: Bowel sounds normal, distended abdomen, fluid wave. No peritonitis  Skin: Warm, Dry, No erythema, No rash.   Back: No tenderness, No CVA tenderness.   Musculoskeletal: Good range of motion in all major joints.   Neurologic: Alert, No focal deficits noted.   Psychiatric: Affect normal    LABS  Results for orders placed or performed during the hospital encounter of 02/16/17   CBC WITH DIFFERENTIAL   Result Value Ref Range    WBC 4.1 (L) 4.8 - 10.8 K/uL    RBC 2.98 (L) 4.70 - 6.10 M/uL    Hemoglobin 8.9 (L) 14.0 - 18.0 g/dL    Hematocrit 27.8 (L) 42.0 - 52.0 %    MCV 93.3 81.4 - 97.8 fL    MCH 29.9 27.0 - 33.0 pg    MCHC 32.0 (L) 33.7 - 35.3 g/dL    RDW 48.9 35.9 - 50.0 fL    Platelet Count 99 (L) 164 - 446 K/uL    MPV 11.8 9.0 - 12.9 fL    Neutrophils-Polys 73.70 (H) 44.00 - 72.00 %    Lymphocytes 17.60 (L) 22.00 - 41.00 %    Monocytes 7.10 0.00 - 13.40 %    Eosinophils 0.70 0.00 - 6.90 %    Basophils 0.20 0.00 - 1.80 %    Immature Granulocytes 0.70 0.00 - 0.90 %    Nucleated RBC 0.00 /100 WBC    Neutrophils (Absolute) 3.01 1.82 - 7.42 K/uL    Lymphs (Absolute) 0.72 (L) 1.00 - 4.80 K/uL    Monos (Absolute) 0.29 0.00 - 0.85 K/uL    Eos (Absolute) 0.03 0.00 - 0.51 K/uL    Baso (Absolute) 0.01 0.00 - 0.12 K/uL    Immature Granulocytes (abs) 0.03 0.00 - 0.11 K/uL    NRBC (Absolute) 0.00 K/uL   BTYPE NATRIURETIC PEPTIDE   Result Value Ref Range    B Natriuretic Peptide 300 (H) 0 - 100 pg/mL   APTT   Result Value Ref Range    APTT 32.9 24.7 - 36.0 sec   PROTHROMBIN TIME (INR)   Result Value Ref Range    PT 16.2 (H) 12.0 - 14.6 sec    INR 1.26 (H) 0.87 - 1.13   LACTIC ACID   Result Value Ref Range    Lactic Acid 3.0 (H) 0.5 - 2.0 mmol/L   COMP METABOLIC PANEL   Result Value Ref Range    Sodium 138 135 - 145 mmol/L    Potassium 4.4 3.6 - 5.5 mmol/L    Chloride 114 (H) 96 - 112 mmol/L    Co2 14 (L) 20  - 33 mmol/L    Anion Gap 10.0 0.0 - 11.9    Glucose 176 (H) 65 - 99 mg/dL    Bun 32 (H) 8 - 22 mg/dL    Creatinine 3.02 (H) 0.50 - 1.40 mg/dL    Calcium 7.8 (L) 8.5 - 10.5 mg/dL    AST(SGOT) 25 12 - 45 U/L    ALT(SGPT) 12 2 - 50 U/L    Alkaline Phosphatase 155 (H) 30 - 99 U/L    Total Bilirubin 0.5 0.1 - 1.5 mg/dL    Albumin 2.4 (L) 3.2 - 4.9 g/dL    Total Protein 5.3 (L) 6.0 - 8.2 g/dL    Globulin 2.9 1.9 - 3.5 g/dL    A-G Ratio 0.8 g/dL   LIPASE   Result Value Ref Range    Lipase 19 11 - 82 U/L   TROPONIN   Result Value Ref Range    Troponin I <0.01 0.00 - 0.04 ng/mL   ESTIMATED GFR   Result Value Ref Range    GFR If  24 (A) >60 mL/min/1.73 m 2    GFR If Non African American 20 (A) >60 mL/min/1.73 m 2   EKG (NOW)   Result Value Ref Range    Report       [facility] Emergency Dept.    Test Date:  2017  Pt Name:    JOSE MANUEL AARON                 Department: ER  MRN:        [id number]                      Room:       [facility]  Gender:     M                            Technician: [id number]  :        [id number]                   Requested By:DOC RETANA  Order #:    [id number]                    Reading MD:    Measurements  Intervals                                Axis  Rate:       74                           P:          9  OH:         160                          QRS:        -37  QRSD:       90                           T:          43  QT:         436  QTc:        484    Interpretive Statements  SINUS RHYTHM  LEFT AXIS DEVIATION  BORDERLINE PROLONGED QT INTERVAL  Compared to ECG 2017 16:41:53  Left-axis deviation now present  Sinus tachycardia no longer present  Myocardial infarct finding no longer present         All labs reviewed by me.    EKG Interpretation  Interpreted by me    Rhythm:  Normal sinus rhythm   Rate:74  Axis: normal  Ectopy: none  Conduction: normal  ST Segments: no acute change  T Waves: no acute change  Q Waves: none  Clinical Impression: Normal EKG without  acute changes      RADIOLOGY  DX-CHEST-2 VIEWS   Final Result      Right suprahilar opacity may represent atelectasis or pneumonia. Follow-up is recommended.      Atherosclerotic plaque.        The radiologist's interpretation of all radiological studies have been reviewed by me.    COURSE & MEDICAL DECISION MAKING  Pertinent Labs & Imaging studies reviewed. (See chart for details)    Obtained and reviewed past medical records from previous admission    10:07 AM Patient seen and examined at bedside by medical student. The patient presents with shortness of breath and cough, and the differential diagnosis includes but is not limited to pneumonia, CHF, atelectasis is result ascites, medication-induced cough.. Ordered for DX-chest, CBC with differential, CMP, Lipase, Troponin, BNP, APTT, Prothrombin time, blood culture, lactic acid to evaluate. Patient will be treated with antibiotics for hospital associated pneumonia for his symptoms.      11:54 AM - Reviewed patient's labs and chest x-ray. Patient will be treated with ROCEPHIN 2g IV, VIBRAMYCIN 100mg IV.     1:21 PM - Spoke with Dr. Ingram, hospitalist, concerning patient case. Agreed to admit patient for further treatment and evaluation.   Chest x-ray shows pneumonia.    Old chart was reviewed her previous workup and treatment.    Patient has what appears to be a hospital associated infection. TSH is elevated. Mild elevation of BNP elevation in creatinine. The liver And    Disposition  Patient will be admitted to Dr. Ingram in guarded condition.     FINAL IMPRESSION  1. Pneumonia of right upper lobe due to infectious organism    2. Cough    3. Essential hypertension     4. Hospital associated pneumonia  5. Renal failure     Cosmo ORTIZ (Hu), am scribing for, and in the presence of, Yair Owusu M.D..    Electronically signed by: Cosmo Patel (Hu), 2/16/2017    Yair ORTIZ M.D. personally performed the services described in this  documentation, as scribed by Cosmo Patel in my presence, and it is both accurate and complete.    The note accurately reflects work and decisions made by me.  Yair Owusu  2/16/2017  4:51 PM

## 2017-02-16 NOTE — IP AVS SNAPSHOT
" Home Care Instructions                                                                                                                  Name:Tico Solorzano  Medical Record Number:[id number]  CSN: [id number]    YOB: 1940   Age: 76 y.o.  Sex: male  HT:1.829 m (6' 0.01\") WT: 77.8 kg (171 lb 8.3 oz)          Admit Date: 2/16/2017     Discharge Date:   Today's Date: 2/18/2017  Attending Doctor:  Chaz Poe M.D.                  Allergies:  Valsartan            Discharge Instructions       Discharge Instructions    Discharged to home by taxi with self. Discharged via wheelchair, hospital escort: Yes.  Special equipment needed: Not Applicable    Be sure to schedule a follow-up appointment with your primary care doctor or any specialists as instructed.     Discharge Plan:   Diet Plan: Discussed (diabetic)  Activity Level: Discussed (as tolerated)  Confirmed Follow up Appointment: Patient to Call and Schedule Appointment  Confirmed Symptoms Management: Discussed  Medication Reconciliation Updated: Yes  Influenza Vaccine Indication: Not indicated: Previously immunized this influenza season and > 8 years of age    I understand that a diet low in cholesterol, fat, and sodium is recommended for good health. Unless I have been given specific instructions below for another diet, I accept this instruction as my diet prescription.   Other diet: diabetic    Special Instructions: None    · Is patient discharged on Warfarin / Coumadin?   No     · Is patient Post Blood Transfusion?  No    Depression / Suicide Risk    As you are discharged from this [facility] facility, it is important to learn how to keep safe from harming yourself.    Recognize the warning signs:  · Abrupt changes in personality, positive or negative- including increase in energy   · Giving away possessions  · Change in eating patterns- significant weight changes-  positive or negative  · Change in sleeping patterns- unable to sleep or sleeping all " the time   · Unwillingness or inability to communicate  · Depression  · Unusual sadness, discouragement and loneliness  · Talk of wanting to die  · Neglect of personal appearance   · Rebelliousness- reckless behavior  · Withdrawal from people/activities they love  · Confusion- inability to concentrate     If you or a loved one observes any of these behaviors or has concerns about self-harm, here's what you can do:  · Talk about it- your feelings and reasons for harming yourself  · Remove any means that you might use to hurt yourself (examples: pills, rope, extension cords, firearm)  · Get professional help from the community (Mental Health, Substance Abuse, psychological counseling)  · Do not be alone:Call your Safe Contact- someone whom you trust who will be there for you.  · Call your local CRISIS HOTLINE 997-1300 or 409-491-3192  · Call your local Children's Mobile Crisis Response Team [place] (315) 798-3122 or www.[employer]  · Call the toll free National Suicide Prevention Hotlines   · National Suicide Prevention Lifeline 914-522-PLRS (1913)  · [employer] Line Network 800-SUICIDE (670-8910)    Cough, Adult   A cough is a reflex that helps clear your throat and airways. It can help heal the body or may be a reaction to an irritated airway. A cough may only last 2 or 3 weeks (acute) or may last more than 8 weeks (chronic).   CAUSES  Acute cough:  · Viral or bacterial infections.  Chronic cough:  · Infections.  · Allergies.  · Asthma.  · Post-nasal drip.  · Smoking.  · Heartburn or acid reflux.  · Some medicines.  · Chronic lung problems (COPD).  · Cancer.  SYMPTOMS   · Cough.  · Fever.  · Chest pain.  · Increased breathing rate.  · High-pitched whistling sound when breathing (wheezing).  · Colored mucus that you cough up (sputum).  TREATMENT   · A bacterial cough may be treated with antibiotic medicine.  · A viral cough must run its course and will not respond to antibiotics.  · Your caregiver may  recommend other treatments if you have a chronic cough.  HOME CARE INSTRUCTIONS   · Only take over-the-counter or prescription medicines for pain, discomfort, or fever as directed by your caregiver. Use cough suppressants only as directed by your caregiver.  · Use a cold steam vaporizer or humidifier in your bedroom or home to help loosen secretions.  · Sleep in a semi-upright position if your cough is worse at night.  · Rest as needed.  · Stop smoking if you smoke.  SEEK IMMEDIATE MEDICAL CARE IF:   · You have pus in your sputum.  · Your cough starts to worsen.  · You cannot control your cough with suppressants and are losing sleep.  · You begin coughing up blood.  · You have difficulty breathing.  · You develop pain which is getting worse or is uncontrolled with medicine.  · You have a fever.  MAKE SURE YOU:   · Understand these instructions.  · Will watch your condition.  · Will get help right away if you are not doing well or get worse.     This information is not intended to replace advice given to you by your health care provider. Make sure you discuss any questions you have with your health care provider.     Document Released: 06/15/2012 Document Revised: 03/11/2013 Document Reviewed: 02/24/2016  BTR Interactive Patient Education ©2016 [employer]      Shortness of Breath  Shortness of breath means you have trouble breathing. It could also mean that you have a medical problem. You should get immediate medical care for shortness of breath.  CAUSES   · Not enough oxygen in the air such as with high altitudes or a smoke-filled room.  · Certain lung diseases, infections, or problems.  · Heart disease or conditions, such as angina or heart failure.  · Low red blood cells (anemia).  · Poor physical fitness, which can cause shortness of breath when you exercise.  · Chest or back injuries or stiffness.  · Being overweight.  · Smoking.  · Anxiety, which can make you feel like you are not getting enough  air.  DIAGNOSIS   Serious medical problems can often be found during your physical exam. Tests may also be done to determine why you are having shortness of breath. Tests may include:  · Chest X-rays.  · Lung function tests.  · Blood tests.  · An electrocardiogram (ECG).  · An ambulatory electrocardiogram. An ambulatory ECG records your heartbeat patterns over a 24-hour period.  · Exercise testing.  · A transthoracic echocardiogram (TTE). During echocardiography, sound waves are used to evaluate how blood flows through your heart.  · A transesophageal echocardiogram (DORETHA).  · Imaging scans.  Your health care provider may not be able to find a cause for your shortness of breath after your exam. In this case, it is important to have a follow-up exam with your health care provider as directed.   TREATMENT   Treatment for shortness of breath depends on the cause of your symptoms and can vary greatly.  HOME CARE INSTRUCTIONS   · Do not smoke. Smoking is a common cause of shortness of breath. If you smoke, ask for help to quit.  · Avoid being around chemicals or things that may bother your breathing, such as paint fumes and dust.  · Rest as needed. Slowly resume your usual activities.  · If medicines were prescribed, take them as directed for the full length of time directed. This includes oxygen and any inhaled medicines.  · Keep all follow-up appointments as directed by your health care provider.  SEEK MEDICAL CARE IF:   · Your condition does not improve in the time expected.  · You have a hard time doing your normal activities even with rest.  · You have any new symptoms.  SEEK IMMEDIATE MEDICAL CARE IF:   · Your shortness of breath gets worse.  · You feel light-headed, faint, or develop a cough not controlled with medicines.  · You start coughing up blood.  · You have pain with breathing.  · You have chest pain or pain in your arms, shoulders, or abdomen.  · You have a fever.  · You are unable to walk up stairs or  exercise the way you normally do.  MAKE SURE YOU:  · Understand these instructions.  · Will watch your condition.  · Will get help right away if you are not doing well or get worse.     This information is not intended to replace advice given to you by your health care provider. Make sure you discuss any questions you have with your health care provider.     Document Released: 09/12/2002 Document Revised: 12/23/2014 Document Reviewed: 03/04/2013  [employer] Interactive Patient Education ©2016 [employer] Inc.        Your appointments     Mar 08, 2017 11:00 AM   Established Patient with Casey Wick D.O.   [facility] ([facility])    [address]  [place] [address] [phone]           You will be receiving a confirmation call a few days before your appointment from our automated call confirmation system.            Apr 24, 2017  2:30 PM   PACER CHECK ONLY with PACER PHONE CHECK   [facility] (--)    [address]  [place] [address]   [phone]            Jul 24, 2017  1:30 PM   PACER CHECK ONLY with PACER PHONE CHECK   [facility] (--)    [address], [address]  [place] [address]   [phone]            Jul 24, 2017  2:00 PM   FOLLOW UP with Navneet Brunner M.D.   [facility] (--)    [address]  [place] [address]   [phone]              Follow-up Information     1. Schedule an appointment as soon as possible for a visit with Casey Wick D.O..    Specialty:  Internal Medicine    Contact information    [address] [place] [address] [address]  [address]  [place] [address] [phone]          2. Schedule an appointment as soon as possible for a visit with Nacho Ram M.D..    Specialty:  Nephrology    Contact information    [address]  [place] [address]  [phone]          3. Follow up with Casey Wick D.O.. Schedule an  appointment as soon as possible for a visit in 1 week.    Specialty:  Internal Medicine    Why:  follow up    Contact information    [address]  [address]  [place] [address] [phone]           Discharge Medication Instructions:    Below are the medications your physician expects you to take upon discharge:    Review all your home medications and newly ordered medications with your doctor and/or pharmacist. Follow medication instructions as directed by your doctor and/or pharmacist.    Please keep your medication list with you and share with your physician.               Medication List      START taking these medications        Instructions    amoxicillin-clavulanate 875-125 MG Tabs   Commonly known as:  AUGMENTIN    Take 1 Tab by mouth 2 times a day for 2 days.   Dose:  1 Tab       azithromycin 250 MG Tabs   Last time this was given:  250 mg on 2/18/2017  8:54 AM   Commonly known as:  ZITHROMAX    Take 1 Tab by mouth every day for 2 days.   Dose:  250 mg         CONTINUE taking these medications        Instructions    doxazosin 2 MG Tabs   Last time this was given:  2 mg on 2/18/2017  8:55 AM   Commonly known as:  CARDURA    Take 2 mg by mouth every day.   Dose:  2 mg       fluticasone 50 MCG/ACT nasal spray   Last time this was given:  100 mcg on 2/17/2017  8:09 PM   Commonly known as:  FLONASE    INSTILL 2 SPRAYS IN EACH NOSTRIL ONCE DAILY       furosemide 20 MG Tabs   Commonly known as:  LASIX    Take 1 Tab by mouth every day.   Dose:  20 mg       insulin glargine 100 UNIT/ML Soln   Commonly known as:  LANTUS    Inject 5-7 Units as instructed every evening.   Dose:  5-7 Units       losartan 100 MG Tabs   Last time this was given:  100 mg on 2/18/2017  8:54 AM   Commonly known as:  COZAAR    TAKE 1 TABLET BY MOUTH ONCE DAILY       OCUVITE Tabs    Take 1 Tab by mouth every day.   Dose:  1 Tab       pioglitazone 15 MG Tabs   Last time this was given:  15 mg on 2/18/2017  8:55 AM   Commonly known as:  ACTOS     TAKE 1 TABLET BY MOUTH EVERY DAY       rosuvastatin 20 MG Tabs   Last time this was given:  20 mg on 2/17/2017  8:09 PM   Commonly known as:  CRESTOR    Take 1 Tab by mouth every evening.   Dose:  20 mg       spironolactone 25 MG Tabs   Last time this was given:  25 mg on 2/18/2017  8:55 AM   Commonly known as:  ALDACTONE    Take 1 Tab by mouth every day.   Dose:  25 mg               Instructions           Diet / Nutrition:    Follow any diet instructions given to you by your doctor or the dietician, including how much salt (sodium) you are allowed each day.    If you are overweight, talk to your doctor about a weight reduction plan.    Activity:    Remain physically active following your doctor's instructions about exercise and activity.    Rest often.     Any time you become even a little tired or short of breath, SIT DOWN and rest.    Worsening Symptoms:    Report any of the following signs and symptoms to the doctor's office immediately:    *Pain of jaw, arm, or neck  *Chest pain not relieved by medication                               *Dizziness or loss of consciousness  *Difficulty breathing even when at rest   *More tired than usual                                       *Bleeding drainage or swelling of surgical site  *Swelling of feet, ankles, legs or stomach                 *Fever (>100ºF)  *Pink or blood tinged sputum  *Weight gain (3lbs/day or 5lbs /week)           *Shock from internal defibrillator (if applicable)  *Palpitations or irregular heartbeats                *Cool and/or numb extremities    Stroke Awareness    Common Risk Factors for Stroke include:    Age  Atrial Fibrillation  Carotid Artery Stenosis  Diabetes Mellitus  Excessive alcohol consumption  High blood pressure  Overweight   Physical inactivity  Smoking    Warning signs and symptoms of a stroke include:    *Sudden numbness or weakness of the face, arm or leg (especially on one side of the body).  *Sudden confusion, trouble speaking  or understanding.  *Sudden trouble seeing in one or both eyes.  *Sudden trouble walking, dizziness, loss of balance or coordination.Sudden severe headache with no known cause.    It is very important to get treatment quickly when a stroke occurs. If you experience any of the above warning signs, call 911 immediately.                   Disclaimer         Quit Smoking / Tobacco Use:    I understand the use of any tobacco products increases my chance of suffering from future heart disease or stroke and could cause other illnesses which may shorten my life. Quitting the use of tobacco products is the single most important thing I can do to improve my health. For further information on smoking / tobacco cessation call a Toll Free Quit Line at 1-143.976.1236 (*National Cancer [place]) or 1-167.470.8742 (American Lung Association) or you can access the web based program at www.lungusa.org.    [place] Tobacco Users Help Line:  (910) 426-7377       Toll Free: 1-556.708.7319  Quit Tobacco Program [facility] (174)310-3915    Crisis Hotline:    [place] Crisis Hotline:  1-523-ABZZACR or 1-302.775.6533    [place] Crisis Hotline:    1-776.677.4608 or 886-676-6442    Discharge Survey:   Thank you for choosing [facility]. We hope we did everything we could to make your hospital stay a pleasant one. You may be receiving a phone survey and we would appreciate your time and participation in answering the questions. Your input is very valuable to us in our efforts to improve our service to our patients and their families.        My signature on this form indicates that:    1. I have reviewed and understand the above information.  2. My questions regarding this information have been answered to my satisfaction.  3. I have formulated a plan with my discharge nurse to obtain my prescribed medications for home.                  Disclaimer         __________________________________                     __________        ________                       Patient Signature                                                 Date                    Time

## 2017-02-16 NOTE — IP AVS SNAPSHOT
" <p align=\"LEFT\"><IMG SRC=\"//EMRWB/blob$/[email or web address]\" alt=\"Image\" WIDTH=\"50%\" HEIGHT=\"200\" BORDER=\"\"></p>                   Name:Tico Solorzano  Medical Record Number:[id number]  CSN: [id number]    YOB: 1940   Age: 76 y.o.  Sex: male  HT:1.829 m (6' 0.01\") WT: 77.8 kg (171 lb 8.3 oz)          Admit Date: 2/16/2017     Discharge Date:   Today's Date: 2/18/2017  Attending Doctor:  Chaz Poe M.D.                  Allergies:  Valsartan          Your appointments     Mar 08, 2017 11:00 AM   Established Patient with Casey Wick D.O.   [facility])    [address] [place] [address] [address]  [place] [address] [phone]           You will be receiving a confirmation call a few days before your appointment from our automated call confirmation system.            Apr 24, 2017  2:30 PM   PACER CHECK ONLY with PACER PHONE CHECK   [facility] (--)    [address]  [place] [address] [phone]            Jul 24, 2017  1:30 PM   PACER CHECK ONLY with PACER PHONE CHECK   [facility] (--)    [address]  [place] [address] [phone]            Jul 24, 2017  2:00 PM   FOLLOW UP with Navneet Brunner M.D.   [facility] (--)    [address]  [place] [address] [phone]              Follow-up Information     1. Schedule an appointment as soon as possible for a visit with Casey Wick D.O..    Specialty:  Internal Medicine    Contact information    [address] [place] [address] [address]  [address]  [place] [address] [phone]          2. Schedule an appointment as soon as possible for a visit with Nacho Ram M.D..    Specialty:  Nephrology    Contact information    [address] [place] [address]  [phone]          3. Follow up with Casey Wick D.O.. Schedule an appointment as soon as possible for a visit in 1 week.   " Specialty:  Internal Medicine    Why:  follow up    Contact information    [address]  [address]  [place] [address] [phone]           Medication List      Take these Medications        Instructions    amoxicillin-clavulanate 875-125 MG Tabs   Commonly known as:  AUGMENTIN    Take 1 Tab by mouth 2 times a day for 2 days.   Dose:  1 Tab       azithromycin 250 MG Tabs   Commonly known as:  ZITHROMAX    Take 1 Tab by mouth every day for 2 days.   Dose:  250 mg       doxazosin 2 MG Tabs   Commonly known as:  CARDURA    Take 2 mg by mouth every day.   Dose:  2 mg       fluticasone 50 MCG/ACT nasal spray   Commonly known as:  FLONASE    INSTILL 2 SPRAYS IN EACH NOSTRIL ONCE DAILY       furosemide 20 MG Tabs   Commonly known as:  LASIX    Take 1 Tab by mouth every day.   Dose:  20 mg       insulin glargine 100 UNIT/ML Soln   Commonly known as:  LANTUS    Inject 5-7 Units as instructed every evening.   Dose:  5-7 Units       losartan 100 MG Tabs   Commonly known as:  COZAAR    TAKE 1 TABLET BY MOUTH ONCE DAILY       OCUVITE Tabs    Take 1 Tab by mouth every day.   Dose:  1 Tab       pioglitazone 15 MG Tabs   Commonly known as:  ACTOS    TAKE 1 TABLET BY MOUTH EVERY DAY       rosuvastatin 20 MG Tabs   Commonly known as:  CRESTOR    Take 1 Tab by mouth every evening.   Dose:  20 mg       spironolactone 25 MG Tabs   Commonly known as:  ALDACTONE    Take 1 Tab by mouth every day.   Dose:  25 mg

## 2017-02-16 NOTE — IP AVS SNAPSHOT
[employer] Access Code: [email or web address]  Expires: 3/20/2017  9:59 AM    Your email address is not on file at [facility].  Email Addresses are required for you to sign up for [employer], please contact [phone] to verify your personal information and to provide your email address prior to attempting to register for [employer].    Tico Whalen Solorzano  [address] [place] [address]    [employer]  A secure, online tool to manage your health information     [facility]’s [employer]® is a secure, online tool that connects you to your personalized health information from the privacy of your home -- day or night - making it very easy for you to manage your healthcare. Once the activation process is completed, you can even access your medical information using the [employer] chapincito, which is available for free in the Apple Chapincito store or Google Play store.     To learn more about [employer], visit [email or web address]    There are two levels of access available (as shown below):   My Chart Features  [facility] Primary Care Doctor [facility]  Specialists [facility]  Urgent  Care Non-[facility] Primary Care Doctor   Email your healthcare team securely and privately 24/7 X X X    Manage appointments: schedule your next appointment; view details of past/upcoming appointments X      Request prescription refills. X      View recent personal medical records, including lab and immunizations X X X X   View health record, including health history, allergies, medications X X X X   Read reports about your outpatient visits, procedures, consult and ER notes X X X X   See your discharge summary, which is a recap of your hospital and/or ER visit that includes your diagnosis, lab results, and care plan X X  X     How to register for [employer]:  Once your e-mail address has been verified, follow the following steps to sign up for [employer].     1. Go to  [email or web address]  2. Click on the Sign Up Now box, which takes you to the New Member Sign Up page. You will need  to provide the following information:  a. Enter your [employer]Watch Access Code exactly as it appears at the top of this page. (You will not need to use this code after you’ve completed the sign-up process. If you do not sign up before the expiration date, you must request a new code.)   b. Enter your date of birth.   c. Enter your home email address.   d. Click Submit, and follow the next screen’s instructions.  3. Create a BabyWatch ID. This will be your BabyWatch login ID and cannot be changed, so think of one that is secure and easy to remember.  4. Create a [employer]Watch password. You can change your password at any time.  5. Enter your Password Reset Question and Answer. This can be used at a later time if you forget your password.   6. Enter your e-mail address. This allows you to receive e-mail notifications when new information is available in BabyWatch.  7. Click Sign Up. You can now view your health information.    For assistance activating your [employer] account, call [phone]

## 2017-02-16 NOTE — ED NOTES
Pt c/o cough x 1 mo getting worse, worse at night, wt loss, hx of liver problems, denies fevers, states is always cold. Had paracentesis last month, saw liver md last week who told pt though he still had fluid. Pt c/o abd feeling tight and full.

## 2017-02-17 PROBLEM — D64.9 NORMOCYTIC ANEMIA: Chronic | Status: ACTIVE | Noted: 2017-01-01

## 2017-02-17 PROBLEM — E46 PROTEIN CALORIE MALNUTRITION (HCC): Status: ACTIVE | Noted: 2017-01-01

## 2017-02-17 NOTE — DIETARY
Nutrition Services: Consult for Poor PO  76 year old male with admit dx of right upper lobe pneumonia  Past Pertinent Med Hx: HTN, DM II, chronic renal insufficiency, degenerative joint disease, prostate cancer, vitamin D deficiency, proteinuria    Pt states his appetite is all right. Pt reports he doesn't eat a lot at home and he typically only has 2 meals a day at home. Pt reports a 11 lb (5 kg) wt loss due to having fluids removed from his stomach. Pt states his UBW is 180 lbs (81.8 kg). Wt loss percent is 4.9%, which is significant. Pt reports eating most of breakfast and for lunch it was very appetizing so pt didn't eat much. Pt declined snacks Per chart pt PO % 1 meal recorded    Ht: 182.9 cm  Wt 2/16: 77.8 kg via stand up scale  BMI: 23.26  Diet: Diabetic, 2200 kcal  Pertinent Labs: BUN 32, Creatinine 2.93, POC glucose 174, 95, 171  Pertinent Meds: unasyn, zithromax, colace, cardura, lantus, humalog, cozaar, actos, crestor, pericolace, aldactone  Fluids: No IV fluids noted in MAR  Skin: No skin breakdown noted in chart  GI: Last BM 2/17    PLAN/RECOMMEND -  1) Nutrition rep to see patient daily for meal and snack preferences.  2) Encourage PO  3) Weekly weights to monitor fluid and nutrition status  4) Obtain supplement order per RD as needed.  5) Please document PO intake for each meal as percentage of meals consumed    RD available prn

## 2017-02-17 NOTE — CARE PLAN
Problem: Safety  Goal: Will remain free from falls  Outcome: PROGRESSING AS EXPECTED  Pt. A&OX4, with history of fall long time ago. Bed alarm ON, bed kept low and locked, call light within reach, assisted as necessary.    Problem: Infection  Goal: Will remain free from infection  Outcome: PROGRESSING AS EXPECTED  Pt. Afebrile, hemodynamically stable. IV ATB in use, blood cultures drawn.

## 2017-02-17 NOTE — PROGRESS NOTES
Pt is AOx4, no complains of pain, tolerated all oral medications, skin and sacral assessment done, left upper chest pacemaker observed, on RA, IS at bedside, able to perform correctly and effectively, 2000mL, call light in use, bed alarm on, treaded socks on, bed locked in low position, plan of care discussed, all needs met at this time. Pt pending sputum sample. Pt aware. Called RT for possible treatment to help loosen up secretions for pending sputum sample.

## 2017-02-17 NOTE — PROGRESS NOTES
AAOx4, irritable. C/o 0/10 pain, declining intervention at this time. -N/V. -N/T. Denies new onset of chest pain, +SOB. +BS in all 4 quadrants, last BM today, pt states he's been having diarrhea recently. Crackles heard upon auscultation of the RLL. Pacemaker present. POC discussed, denies further needs at this time. Bed alarm on, call light within reach & hourly rounding in place.

## 2017-02-17 NOTE — CARE PLAN
Problem: Infection  Goal: Will remain free from infection  Outcome: PROGRESSING AS EXPECTED  Pt admitted for PNA, IV/ PO abx on board,WBC at 3.5,pending BC result, pending sputum sample    Problem: Respiratory:  Goal: Respiratory status will improve  Outcome: PROGRESSING AS EXPECTED  On RA, IS at bedside, able to perform correctly and effectively, RT on board, asked RT Ray to have treatment with pt this AM to help mobilize secretions for sputum sample

## 2017-02-17 NOTE — CARE PLAN
Problem: Nutritional:  Goal: Achieve adequate nutritional intake  Patient will consume >50% of meals  Outcome: MET Date Met:  02/17/17  Per chart pt PO % 1 meal recorded

## 2017-02-17 NOTE — PROGRESS NOTES
Hospital Medicine Progress Note, Adult, Complex               Author: Shazia Valenzuela Date & Time created: 2/17/2017  9:58 AM     Interval History:  75 y/o M with h/o HTN, DM2, CRF that presented with SOB, cough, congestion and loss of appetite complaints. He is admitted for RUL CAP and had leukopenia, tachycardia, and elevated LA. He was given IV fluids, is on azithro PO and IV unasyn, placed on RT and O2 prn.    2/17- still has cough with no sputum today, feels generally improved, LA down to 2.4 from 3.0, Hgb 7.7- no active bleeding suspected, not requiring O2.    Review of Systems:  Review of Systems   Constitutional: Positive for malaise/fatigue. Negative for fever and chills.   HENT: Negative.  Negative for congestion and sore throat.    Eyes: Negative.    Respiratory: Positive for cough. Negative for hemoptysis, sputum production, shortness of breath and wheezing.    Cardiovascular: Negative.  Negative for chest pain, palpitations, orthopnea and leg swelling.   Gastrointestinal: Negative.  Negative for nausea, diarrhea and constipation.   Genitourinary: Negative.  Negative for dysuria, urgency and frequency.   Musculoskeletal: Negative.  Negative for myalgias and joint pain.   Skin: Negative.  Negative for itching and rash.   Neurological: Negative.  Negative for dizziness, tingling, focal weakness, weakness and headaches.   Endo/Heme/Allergies: Negative.  Does not bruise/bleed easily.       Physical Exam:  Physical Exam   Constitutional: He is oriented to person, place, and time. He appears well-developed and well-nourished.   HENT:   Head: Normocephalic and atraumatic.   Mouth/Throat: Oropharynx is clear and moist. No oropharyngeal exudate.   Eyes: Conjunctivae are normal. Right eye exhibits no discharge. Left eye exhibits no discharge. No scleral icterus.   Neck: Normal range of motion. Neck supple. No tracheal deviation present.   Cardiovascular: Normal rate, regular rhythm, normal heart sounds and intact  distal pulses.    Pulmonary/Chest: Effort normal. No respiratory distress. He has wheezes (exp to amna upper, o/w diminished). He has no rales. He exhibits no tenderness.   Pacer left chest   Abdominal: Soft. Bowel sounds are normal. He exhibits distension. He exhibits no mass. There is no tenderness. There is no rebound and no guarding.   Musculoskeletal: Normal range of motion. He exhibits no edema or tenderness.   Neurological: He is alert and oriented to person, place, and time.   Skin: Skin is warm and dry. No rash noted. He is not diaphoretic. No erythema.   Psychiatric: He has a normal mood and affect. His behavior is normal. Judgment and thought content normal.       Labs:        Invalid input(s): [id number]  Recent Labs      02/15/17   1614  02/16/17   1215   TROPONINI   --   <0.01   BNPBTYPENAT  361*  300*     Recent Labs      02/15/17   1614  02/16/17   1215  02/17/17   0225   SODIUM  138  138  137   POTASSIUM  4.9  4.4  4.1   CHLORIDE  112  114*  114*   CO2  15*  14*  16*   BUN  32*  32*  32*   CREATININE  2.94*  3.02*  2.93*   MAGNESIUM   --   1.8   --    CALCIUM  8.1*  7.8*  7.4*     Recent Labs      02/15/17   1614  02/16/17   1215  02/17/17   0225   ALTSGPT  14  12   --    ASTSGOT  29  25   --    ALKPHOSPHAT  165*  155*   --    TBILIRUBIN  0.5  0.5   --    LIPASE   --   19   --    GLUCOSE  231*  176*  98     Recent Labs      02/15/17   1614  02/16/17   1215  02/17/17   0225   RBC  3.31*  2.98*  2.63*   HEMOGLOBIN  10.1*  8.9*  7.7*   HEMATOCRIT  31.2*  27.8*  24.3*   PLATELETCT  101*  99*  95*   PROTHROMBTM   --   16.2*   --    APTT   --   32.9   --    INR   --   1.26*   --      Recent Labs      02/15/17   1614  02/16/17   1215  02/17/17   0225   WBC  5.6  4.1*  3.5*   NEUTSPOLYS  71.90  73.70*   --    LYMPHOCYTES  19.70*  17.60*   --    MONOCYTES  6.40  7.10   --    EOSINOPHILS  1.40  0.70   --    BASOPHILS  0.20  0.20   --    ASTSGOT  29  25   --    ALTSGPT  14  12   --    ALKPHOSPHAT  165*   155*   --    TBILIRUBIN  0.5  0.5   --            Hemodynamics:  Temp (24hrs), Av.8 °C (98.3 °F), Min:36.7 °C (98.1 °F), Max:36.9 °C (98.4 °F)  Temperature: 36.7 °C (98.1 °F)  Pulse  Av.2  Min: 67  Max: 109Heart Rate (Monitored): 71  Blood Pressure : 158/83 mmHg, NIBP: (!) 170/71 mmHg     Respiratory:    Respiration: 16, Pulse Oximetry: 93 %, O2 Daily Delivery Respiratory : Room Air with O2 Available        RUL Breath Sounds: Diminished, RML Breath Sounds: Diminished, RLL Breath Sounds: Diminished, EMERSON Breath Sounds: Diminished, LLL Breath Sounds: Diminished  Fluids:    Intake/Output Summary (Last 24 hours) at 17 0958  Last data filed at 17 1700   Gross per 24 hour   Intake    300 ml   Output      0 ml   Net    300 ml        GI/Nutrition:  Orders Placed This Encounter   Procedures   • Diet Order     Standing Status: Standing      Number of Occurrences: 1      Standing Expiration Date:      Order Specific Question:  Diet:     Answer:  Regular [1]     Order Specific Question:  Diet:     Answer:  Diabetic [3]     Order Specific Question:  Calorie modifications:     Answer:  2200 kcals [6]     Medical Decision Making, by Problem:  Active Hospital Problems    Diagnosis   • *Right upper lobe pneumonia [J18.9]  -cont IV Unasyn, PO azithro  -O2 prn- no need currently  -cont RT protocol, IS  - monitor for signs of sepsis- seems to be improving   • Lactic acidosis [E87.2]  -2.4, improved after IV fluids   • Acute on chronic renal failure (CMS-HCC) [N17.9, N18.9]  -Cr 2.94, slight improvement  - cont to monitor with BMP in AM   • Protein calorie malnutrition (CMS-HCC) [E46]  Decreased appetite over 1 week  -RD consult   • Normocytic anemia [D64.9]  -no obv bleeding, likely from chronic disease state  -cont to trend with CBC in AM  -transfuse if <7/21   • S/P placement of cardiac pacemaker [Z95.0]   • Type 2 diabetes mellitus with diabetic chronic kidney disease (CMS-HCC) [E11.22]  -accuchecks  -DM diet, RD  consult  -cont Lantus, Actos, ISS   • HTN (hypertension) [I10]  -cont losartan, aldactone  -prn labetalol   • Dyslipidemia [E78.5]  -cont crestor       Radiology images reviewed, Labs reviewed and Medications reviewed  Ca catheter: No Ca      DVT Prophylaxis: Heparin      Antibiotics: Treating active infection/contamination beyond 24 hours perioperative coverage        Dispo: cont to monitor closely, recheck labs in AM, cont RT prn, Cont IV/PO abx, possible discharge tomorrow if cont to improve and stable

## 2017-02-17 NOTE — RESPIRATORY CARE
COPD EDUCATION by COPD CLINICAL EDUCATOR  2/17/2017 at 8:22 AM by Jeni Hays     Patient reviewed by COPD education team. Patient does not qualify for COPD program.

## 2017-02-17 NOTE — H&P
CHIEF COMPLAINT:  Shortness of breath.    PRIMARY CARE PHYSICIAN:  Casey Wick DO    ADMITTED TO:  [facility] hospitalist, Dr. Ingram.    DIAGNOSIS:  Right upper lobe pneumonia.    HISTORY OF CURRENT ILLNESS:  The patient is a 76-year-old gentleman who has   been having shortness of breath with cough and clear sputum with a stuffy   nose, generalized weakness, and loss of appetite for the past 7 days or so.    Patient comes into the emergency room, he is tachycardic, he has leukopenia   and the patient at this point will be admitted to the medical floor for   treatment of his community-acquired pneumonia.    PAST MEDICAL HISTORY:  Includes hypertension, diabetes mellitus type 2,   chronic renal insufficiency, degenerative joint disease, prostate cancer,   cataracts, vitamin D deficiency, and proteinuria.    PAST SURGICAL HISTORY:  Includes tonsillectomy, cholecystectomy, open   reduction internal fixation of the left leg and then he has also had a   debridement and radical prostatectomy.    ALLERGIES: HE IS ALLERGIC TO VALSARTAN, UNKNOWN REACTION.    MEDICATIONS:  At the time of admission include Cardura 2 mg p.o. daily,   Flonase 50 mcg 2 sprays in the nose daily, Lasix 20 mg daily, Lantus insulin   5-7 units at bedtime, losartan 100 mg daily, multivitamin 1 tablet daily,   Actos 15 mg daily, Crestor 20 mg at bedtime, and Aldactone 25 mg daily.    SOCIAL HISTORY:  He is a former smoker, quit in 1991.  Smoked for 40 years, 2   packs a day.  No alcohol, no drugs.  He has an advanced directive.  He wishes   to be full code status.    FAMILY HISTORY:  Both mother and father passed away at age 85.  He does not   remember any medical problems that were pertinent.    REVIEW OF SYSTEMS:  He complains of shortness of breath, cough, stuffy nose,   weakness in the muscles, loss of appetite.  Denies at this point any fevers,   chills, headache, nausea, vomiting, any chest pain, or abdominal pain.  All   other review of  systems were reviewed and are negative.    PHYSICAL EXAMINATION:  VITAL SIGNS:  Temperature 36.7 degrees Celsius, pulse 72, respirations 16,   blood pressure 151/70, he is 77.8 kilograms in weight, and 182.9 cm tall.  GENERAL:  He is currently alert, awake, oriented x3, pleasant, cooperative   gentleman, appears his stated age.  HEENT:  Head is atraumatic.  Eyes follow in normal range of gaze.  Pupils are   equal, round, reactive bilaterally.  Nose midline without discharge.  Ears   bilaterally intact without discharge.  Oral cavity, moist mucous membranes.    No apparent focus of infection in the oral cavity.  NECK:  Soft and supple.  No JVD, carotid bruit, thyromegaly, or   lymphadenopathy appreciated.  CHEST:  Chest wall moves equal with inspiration and expiration.  No   paradoxical motion.  No reproducible chest wall tenderness.  HEART:  S1, S2 tachycardic.  No murmurs or gallops detected.  LUNGS:  Diminished breath sounds in the entire right side.  There are also   diminished breath sounds in the left lower lobe.  There are rhonchi in the   right lower lobe and right middle lobe as well as the right upper lobe.  ABDOMEN:  Soft.  Bowel sounds present in all 4 quadrants.  No hepatomegaly, no   splenomegaly, no rebound, no tenderness elicited.  NEUROLOGIC:  Cranial nerves II-XII grossly within normal limits without any   focal deficits appreciated.  SKIN:  Normal turgor.  No rashes or abrasions identified.  EXTREMITIES:  Upper and lower extremities positive pulses, no edema, good   muscle strength, good muscle tone, positive deep tendon reflexes.  GENITAL:  Deferred.  RECTAL:  Deferred.    LABORATORY EVALUATION:  WBC count is 4.1 with a hemoglobin 8.9, hematocrit   27.8, platelets are 99.  Sodium 138, potassium 4.4, chloride of 114, CO2 is   14, BUN 32, creatinine 3, calcium is 7.8 with a glucose of 176.  Liver   function tests show alkaline phosphatase is 155, albumin 2.4 with total   protein 5.3.  INR is 1.26.   Troponin less than 0.01.  Magnesium is 1.8.    IMAGING:  Chest x-ray from today, which I have reviewed myself shows at this   point right upper lobe infiltrate representing a community-acquired pneumonia.    IMPRESSION AND PLAN:  At this point:  1.  Community-acquired pneumonia.  Patient will be placed on Rocephin and   azithromycin.  Patient will have sputum cultures, blood cultures.  We will   follow those cultures carefully.  He will be placed on RT protocol, oxygen   protocol.  The patient at this point will have his white blood cell count   followed.  2.  Normochromic normocytic anemia.  Continue to monitor, currently not in   transfusion range.  The patient will be transfused if he drops below 7 and 21  3.  Metabolic acidosis.  Fluid resuscitation will be given and we will follow   his acidosis.  4.  Acute renal failure on chronic renal insufficiency, follow at this point   renal functions and give fluid resuscitation to correct and hold nephrotoxic   medications.  5.  Severe protein-calorie malnutrition with hypoalbuminemia.  Continue at   this point nutritional support.  He has lost his appetite and he has lost   considerable amount of weight over this past several weeks.  6.  Hyperglycemia.  Continue at this point with blood sugar management   including Lantus insulin as well as sliding scale insulin.  7.  Dyslipidemia.  Continue with Crestor.  8.  Hypertension.  Continue with Cozaar.  9.  Nasal allergies, continue with Flonase.  Patient at this point will be   full admission to the medical floor.       ____________________________________     MD SHIRA DENTON / DIONICIO    DD:  02/16/2017 17:28:22  DT:  02/16/2017 20:05:29    D#:  [id number]  Job#:  [id number]    cc: JASPAL ALMANZAR DO

## 2017-02-17 NOTE — PROGRESS NOTES
"Received report from day shift RN, assumed care. Pt. Is awake, on bed. A&Ox4, stand by assist. Pt. denies pain. Due medications given, no s/s of aspiration noted.  On continuous IV infusion at 83 cc/hr , tolerating well. Plan of care was safety, comfort and rest. Discussed plan of care. Bed alarm in use, call light and personal belongings within reach, bed kept low, treaded socks on. Assisted as necessary. Kept rested and comfortable at all times.    Pt. Needs sputum specimen, unable to produce at this moment. Will continue monitoring.    Pt. Refuses scheduled lantus and SSI dose. Pt. Verbalized \" I usually take insulin when my blood sugar is more than 180, I don't think need i need it this time\". RN educated pt. About insulins peak time and duration. Pt. Verbalized \" I know how my insulin works. I just dont think I need my insulin\".     "

## 2017-02-18 NOTE — PROGRESS NOTES
Received report from day shift RN, assumed care. Pt. Is awake, on bed. A&Ox4,  Stand by assist. denies pain, n/v. Due medications given, tolerated well, no s/s of aspiration noted. Pt. On RA, tolerating well, with O2 sat of 93-94%, diminished lung sounds. Plan of care was safety, comfort and rest. Discussed plan of care. Bed alarm in use, call light and personal belongings within reach, bed kept low, treaded socks on. Assisted as necessary. Kept rested and comfortable at all times.    Pt. Refuses scheduled lantus, again RN medicated pt. About insulins peak and duration time, pt. Continues to refuse.

## 2017-02-18 NOTE — PROGRESS NOTES
Late Entry: Pt had episodes of elevated BP this AM and PM, diuretic given per MAR and scheduled BP medications. PRN BP meds also given this PM, will CTM BP readings. Pt remains to be asymptomatic despite elevation in BP.

## 2017-02-18 NOTE — CARE PLAN
Problem: Safety  Goal: Will remain free from falls  Outcome: PROGRESSING AS EXPECTED  Pt. With remote history of fall. Bed alarm ON, bed kept low and locked, call light within reach, assisted as necessary.    Problem: Knowledge Deficit  Goal: Knowledge of disease process/condition, treatment plan, diagnostic tests, and medications will improve  Outcome: PROGRESSING AS EXPECTED  Reiterated importance of medication/treatment adherence. Pt. Continues to refuses scheduled Lantus.

## 2017-02-18 NOTE — DISCHARGE INSTRUCTIONS
Discharge Instructions    Discharged to home by taxi with self. Discharged via wheelchair, hospital escort: Yes.  Special equipment needed: Not Applicable    Be sure to schedule a follow-up appointment with your primary care doctor or any specialists as instructed.     Discharge Plan:   Diet Plan: Discussed (diabetic)  Activity Level: Discussed (as tolerated)  Confirmed Follow up Appointment: Patient to Call and Schedule Appointment  Confirmed Symptoms Management: Discussed  Medication Reconciliation Updated: Yes  Influenza Vaccine Indication: Not indicated: Previously immunized this influenza season and > 8 years of age    I understand that a diet low in cholesterol, fat, and sodium is recommended for good health. Unless I have been given specific instructions below for another diet, I accept this instruction as my diet prescription.   Other diet: diabetic    Special Instructions: None    · Is patient discharged on Warfarin / Coumadin?   No     · Is patient Post Blood Transfusion?  No    Depression / Suicide Risk    As you are discharged from this [facility] facility, it is important to learn how to keep safe from harming yourself.    Recognize the warning signs:  · Abrupt changes in personality, positive or negative- including increase in energy   · Giving away possessions  · Change in eating patterns- significant weight changes-  positive or negative  · Change in sleeping patterns- unable to sleep or sleeping all the time   · Unwillingness or inability to communicate  · Depression  · Unusual sadness, discouragement and loneliness  · Talk of wanting to die  · Neglect of personal appearance   · Rebelliousness- reckless behavior  · Withdrawal from people/activities they love  · Confusion- inability to concentrate     If you or a loved one observes any of these behaviors or has concerns about self-harm, here's what you can do:  · Talk about it- your feelings and reasons for harming yourself  · Remove any means  that you might use to hurt yourself (examples: pills, rope, extension cords, firearm)  · Get professional help from the community (Mental Health, Substance Abuse, psychological counseling)  · Do not be alone:Call your Safe Contact- someone whom you trust who will be there for you.  · Call your local CRISIS HOTLINE 450-3273 or 244-346-3854  · Call your local Children's Mobile Crisis Response Team [place] (360) 118-7939 or wwwBonanza  · Call the toll free National Suicide Prevention Hotlines   · National Suicide Prevention Lifeline 408-661-MXCM (1265)  · National Hope Line Network 800-SUICIDE (617-5853)    Cough, Adult   A cough is a reflex that helps clear your throat and airways. It can help heal the body or may be a reaction to an irritated airway. A cough may only last 2 or 3 weeks (acute) or may last more than 8 weeks (chronic).   CAUSES  Acute cough:  · Viral or bacterial infections.  Chronic cough:  · Infections.  · Allergies.  · Asthma.  · Post-nasal drip.  · Smoking.  · Heartburn or acid reflux.  · Some medicines.  · Chronic lung problems (COPD).  · Cancer.  SYMPTOMS   · Cough.  · Fever.  · Chest pain.  · Increased breathing rate.  · High-pitched whistling sound when breathing (wheezing).  · Colored mucus that you cough up (sputum).  TREATMENT   · A bacterial cough may be treated with antibiotic medicine.  · A viral cough must run its course and will not respond to antibiotics.  · Your caregiver may recommend other treatments if you have a chronic cough.  HOME CARE INSTRUCTIONS   · Only take over-the-counter or prescription medicines for pain, discomfort, or fever as directed by your caregiver. Use cough suppressants only as directed by your caregiver.  · Use a cold steam vaporizer or humidifier in your bedroom or home to help loosen secretions.  · Sleep in a semi-upright position if your cough is worse at night.  · Rest as needed.  · Stop smoking if you smoke.  SEEK IMMEDIATE MEDICAL CARE IF:    · You have pus in your sputum.  · Your cough starts to worsen.  · You cannot control your cough with suppressants and are losing sleep.  · You begin coughing up blood.  · You have difficulty breathing.  · You develop pain which is getting worse or is uncontrolled with medicine.  · You have a fever.  MAKE SURE YOU:   · Understand these instructions.  · Will watch your condition.  · Will get help right away if you are not doing well or get worse.     This information is not intended to replace advice given to you by your health care provider. Make sure you discuss any questions you have with your health care provider.     Document Released: 06/15/2012 Document Revised: 03/11/2013 Document Reviewed: 02/24/2016  [employer] Interactive Patient Education ©2016 [employer]      Shortness of Breath  Shortness of breath means you have trouble breathing. It could also mean that you have a medical problem. You should get immediate medical care for shortness of breath.  CAUSES   · Not enough oxygen in the air such as with high altitudes or a smoke-filled room.  · Certain lung diseases, infections, or problems.  · Heart disease or conditions, such as angina or heart failure.  · Low red blood cells (anemia).  · Poor physical fitness, which can cause shortness of breath when you exercise.  · Chest or back injuries or stiffness.  · Being overweight.  · Smoking.  · Anxiety, which can make you feel like you are not getting enough air.  DIAGNOSIS   Serious medical problems can often be found during your physical exam. Tests may also be done to determine why you are having shortness of breath. Tests may include:  · Chest X-rays.  · Lung function tests.  · Blood tests.  · An electrocardiogram (ECG).  · An ambulatory electrocardiogram. An ambulatory ECG records your heartbeat patterns over a 24-hour period.  · Exercise testing.  · A transthoracic echocardiogram (TTE). During echocardiography, sound waves are used to evaluate how blood  flows through your heart.  · A transesophageal echocardiogram (DORETHA).  · Imaging scans.  Your health care provider may not be able to find a cause for your shortness of breath after your exam. In this case, it is important to have a follow-up exam with your health care provider as directed.   TREATMENT   Treatment for shortness of breath depends on the cause of your symptoms and can vary greatly.  HOME CARE INSTRUCTIONS   · Do not smoke. Smoking is a common cause of shortness of breath. If you smoke, ask for help to quit.  · Avoid being around chemicals or things that may bother your breathing, such as paint fumes and dust.  · Rest as needed. Slowly resume your usual activities.  · If medicines were prescribed, take them as directed for the full length of time directed. This includes oxygen and any inhaled medicines.  · Keep all follow-up appointments as directed by your health care provider.  SEEK MEDICAL CARE IF:   · Your condition does not improve in the time expected.  · You have a hard time doing your normal activities even with rest.  · You have any new symptoms.  SEEK IMMEDIATE MEDICAL CARE IF:   · Your shortness of breath gets worse.  · You feel light-headed, faint, or develop a cough not controlled with medicines.  · You start coughing up blood.  · You have pain with breathing.  · You have chest pain or pain in your arms, shoulders, or abdomen.  · You have a fever.  · You are unable to walk up stairs or exercise the way you normally do.  MAKE SURE YOU:  · Understand these instructions.  · Will watch your condition.  · Will get help right away if you are not doing well or get worse.     This information is not intended to replace advice given to you by your health care provider. Make sure you discuss any questions you have with your health care provider.     Document Released: 09/12/2002 Document Revised: 12/23/2014 Document Reviewed: 03/04/2013  [employer] Interactive Patient Education ©2016 [employer]

## 2017-02-18 NOTE — PROGRESS NOTES
Discharging patient home per physician order.  Discharged with self.  Demonstrated understanding of discharge instructions, follow up appointments, home medications, prescriptions.  Ambulating with minimal assistance, voiding without difficulty, pain well controlled, tolerating oral medications, oxygen saturation greater than 90% , tolerating diet.   Educational handouts about cough and SOB given and discussed.  Verbalized understanding of discharge instructions and educational handouts.  All questions answered.  Belongings with patient at time of discharge.

## 2017-02-19 NOTE — DISCHARGE SUMMARY
PRIMARY CARE PHYSICIAN:  Casey Wick DO.    PRIMARY NEPHROLOGIST:  Nacho Ram M.D.    CONSULTS:  None.    CHIEF COMPLAINT:  Shortness of breath and cough.    DISCHARGE DIAGNOSES:  Right upper lobe pneumonia, acute on chronic renal   failure, lactic acidosis, and protein calorie malnutrition.    CHRONIC MEDICAL PROBLEMS:  Dyslipidemia, hypertension, type 2 diabetes with   diabetic chronic kidney disease, BPH, and normocytic anemia status post   placement of cardiac pacemaker.    PROCEDURES PERFORMED:  None.    RESULTS REVIEW:    Interval history/exam for day of discharge:    Filed Vitals:    02/17/17 2125 02/18/17 0400 02/18/17 0743 02/18/17 0800   BP: 148/62 137/66  133/74   Pulse:  74 78 78   Temp:  37 °C (98.6 °F) 37.1 °C (98.8 °F)    TempSrc:       Resp:  16 18    Height:       Weight:       SpO2:  90% 93%      Weight/BMI: Body mass index is 23.26 kg/(m^2).         Most Recent Labs:    Lab Results   Component Value Date/Time    WBC 3.9* 02/18/2017 01:47 AM    RBC 2.59* 02/18/2017 01:47 AM    HEMOGLOBIN 7.8* 02/18/2017 01:47 AM    HEMATOCRIT 23.9* 02/18/2017 01:47 AM    MCV 92.3 02/18/2017 01:47 AM    MCH 30.1 02/18/2017 01:47 AM    MCHC 32.6* 02/18/2017 01:47 AM    MPV 11.6 02/18/2017 01:47 AM    NEUTROPHILS-POLYS 73.70* 02/16/2017 12:15 PM    LYMPHOCYTES 17.60* 02/16/2017 12:15 PM    MONOCYTES 7.10 02/16/2017 12:15 PM    EOSINOPHILS 0.70 02/16/2017 12:15 PM    BASOPHILS 0.20 02/16/2017 12:15 PM    ANISOCYTOSIS 1+ 01/21/2016 03:50 PM      Lab Results   Component Value Date/Time    SODIUM 139 02/18/2017 01:47 AM    POTASSIUM 4.4 02/18/2017 01:47 AM    CHLORIDE 115* 02/18/2017 01:47 AM    CO2 16* 02/18/2017 01:47 AM    GLUCOSE 124* 02/18/2017 01:47 AM    BUN 33* 02/18/2017 01:47 AM    CREATININE 3.01* 02/18/2017 01:47 AM    BUN-CREATININE RATIO 11 08/25/2016 07:22 AM    GLOM FILT RATE, EST 41* 11/10/2009 12:00 AM      Lab Results   Component Value Date/Time    ALT(SGPT) 11 02/18/2017 01:47 AM     AST(SGOT) 23 02/18/2017 01:47 AM    ALKALINE PHOSPHATASE 140* 02/18/2017 01:47 AM    TOTAL BILIRUBIN 0.4 02/18/2017 01:47 AM    LIPASE 19 02/16/2017 12:15 PM    ALBUMIN 2.1* 02/18/2017 01:47 AM    GLOBULIN 2.8 02/18/2017 01:47 AM    INR 1.26* 02/16/2017 12:15 PM    MACROCYTOSIS 1+ 01/21/2016 03:50 PM     Lab Results   Component Value Date/Time    PT 16.2* 02/16/2017 12:15 PM    INR 1.26* 02/16/2017 12:15 PM        Imaging/ Testing:      DX-CHEST-PORTABLE (1 VIEW)   Final Result      Hypoinflation without other evidence for acute cardiopulmonary disease.      DX-CHEST-2 VIEWS   Final Result      Right suprahilar opacity may represent atelectasis or pneumonia. Follow-up is recommended.      Atherosclerotic plaque.                DISCHARGE MEDICATIONS:       Medication List      START taking these medications       Instructions    amoxicillin-clavulanate 875-125 MG Tabs   Commonly known as:  AUGMENTIN    Take 1 Tab by mouth 2 times a day for 2 days.   Dose:  1 Tab       azithromycin 250 MG Tabs   Last time this was given:  250 mg on 2/18/2017  8:54 AM   Commonly known as:  ZITHROMAX    Take 1 Tab by mouth every day for 2 days.   Dose:  250 mg         CONTINUE taking these medications       Instructions    doxazosin 2 MG Tabs   Last time this was given:  2 mg on 2/18/2017  8:55 AM   Commonly known as:  CARDURA    Take 2 mg by mouth every day.   Dose:  2 mg       fluticasone 50 MCG/ACT nasal spray   Last time this was given:  100 mcg on 2/17/2017  8:09 PM   Commonly known as:  FLONASE    INSTILL 2 SPRAYS IN EACH NOSTRIL ONCE DAILY       furosemide 20 MG Tabs   Commonly known as:  LASIX    Take 1 Tab by mouth every day.   Dose:  20 mg       insulin glargine 100 UNIT/ML Soln   Commonly known as:  LANTUS    Inject 5-7 Units as instructed every evening.   Dose:  5-7 Units       losartan 100 MG Tabs   Last time this was given:  100 mg on 2/18/2017  8:54 AM   Commonly known as:  COZAAR    TAKE 1 TABLET BY MOUTH ONCE DAILY        OCUVITE Tabs    Take 1 Tab by mouth every day.   Dose:  1 Tab       pioglitazone 15 MG Tabs   Last time this was given:  15 mg on 2/18/2017  8:55 AM   Commonly known as:  ACTOS    TAKE 1 TABLET BY MOUTH EVERY DAY       rosuvastatin 20 MG Tabs   Last time this was given:  20 mg on 2/17/2017  8:09 PM   Commonly known as:  CRESTOR    Take 1 Tab by mouth every evening.   Dose:  20 mg       spironolactone 25 MG Tabs   Last time this was given:  25 mg on 2/18/2017  8:55 AM   Commonly known as:  ALDACTONE    Take 1 Tab by mouth every day.   Dose:  25 mg               HOSPITAL COURSE AND DECISION MAKING:  Please see the H and P dictated by Dr. Alaina Ingram on 02/16/2017.  In brief, this is a 76-year-old gentleman who   was having shortness of breath, cough, generalized weakness, and loss of   appetite for 7 days that was found to have community-acquired pneumonia and   lactic acidosis.  He was able to remain on room air.  His shortness of breath   and cough have improved and his lactic acid was trending down.  His hemoglobin   remained stable in the 7 and did not require transfusion.  He was treated   with IV Unasyn and p.o. azithromycin.  He will be sent home on Augmentin and   azithromycin.  He again had no oxygen needs.  At the time of discharge, his   acute on chronic kidney failure remained stable throughout his stay with the   creatinine of 3.01.  He does have an upcoming appointment with Dr. Ram.  I   advised to be sure to get in to see him as soon as possible for continued   chronic renal failure follow up.  We did some nursing and dietary instructions   to assist with his appetite and protein calorie malnutrition and I have   recommended that he take a blood pressure log to his primary care and   specialist appointments as his blood pressures have been elevated and he may   need to have adjustments made to his medications, but given his chronic renal   failure, I recommend to discuss with his specialist and  primary care for   making changes.  He is discharged to home in stable condition.  We will resume   a diabetic diet.  Activity is as.  He has verbalized understanding with   discharge instructions.  He had no further questions or concerns at this time.    Instructions:      The patient was instructed to return to the ER in the event of worsening symptoms. I have counseled the patient on the importance of compliance and the patient has agreed to proceed with all medical recommendations and follow up plan indicated above.   The patient understands that all medications come with benefits and risks. Risks may include permanent injury or death and these risks can be minimized with close reassessment and monitoring.        Opioid prescription history checked: no    Time spent in coordination of discharge was 35 minutes. This included face to face with the patient, medication reconciliation, care co ordination with RN involved in patient care and discussion and co ordination with case management.            ____________________________________     JOSE Erickson    DD:  02/18/2017 14:21:51  DT:  02/18/2017 22:28:22    D#:  [id number]  Job#:  [id number]

## 2017-03-28 NOTE — DISCHARGE PLANNING
CM met with pt at bedside to discuss getting Paracentesis as out-pt in the future. He has an appt with Dr Steel on Thursday and will discuss with him.

## 2017-03-28 NOTE — ED PROVIDER NOTES
ED Provider Note    CHIEF COMPLAINT  Chief Complaint   Patient presents with   • Abdominal Swelling       HPI  Tico Solorzano is a 76 y.o. male who presents for evaluation of abdominal swelling and shortness of breath. In January of this year. The patient was admitted to the hospital and required paracentesis where 5.3 L of ascites was drained. He states the swelling has now come back and he has shortness of breath with any ambulation or even at rest. States his abdomen is tense. He's had no fevers. He denies cough or sputum production. He has both primary care provider as well as gastroenterologist here in town that he sees.     REVIEW OF SYSTEMS  See HPI for further details. All other systems are negative.     PAST MEDICAL HISTORY  Past Medical History   Diagnosis Date   • Diabetes    • Hypertension    • Renal disorder      50% function   • Arthritis      fingers   • Pneumonia 1961   • Cancer (CMS-[facility]) 2003     prostate cancer   • CATARACT      repaired in right eye   • Vitamin d deficiency 1/16/2012   • Protein in urine 7/2/2012       FAMILY HISTORY  Family History   Problem Relation Age of Onset   • Other Mother    • Other Father    • Cancer Brother        SOCIAL HISTORY  Social History     Social History   • Marital Status:      Spouse Name: N/A   • Number of Children: N/A   • Years of Education: N/A     Social History Main Topics   • Smoking status: Former Smoker -- 2.00 packs/day for 40 years     Quit date: 07/13/1991   • Smokeless tobacco: Never Used      Comment: quit 20 yrs ago   • Alcohol Use: No   • Drug Use: No   • Sexual Activity:     Partners: Female     Other Topics Concern   • Not on file     Social History Narrative       SURGICAL HISTORY  Past Surgical History   Procedure Laterality Date   • Tonsillectomy     • Cholecystectomy     • Other  2003     radical prostatectomy   • Open reduction       rods in left leg, spontaneous bone deficit   • Incision and drainage orthopedic  12/17/2011      "Performed by MADISON MENDEZ at [facility] ORS       CURRENT MEDICATIONS  Home Medications     **Home medications have not yet been reviewed for this encounter**          ALLERGIES  Allergies   Allergen Reactions   • Valsartan      12/16/15 pt states he doesn't remember if he's allergic to this medication.       PHYSICAL EXAM  VITAL SIGNS: /80 mmHg  Pulse 93  Temp(Src) 36.8 °C (98.2 °F)  Resp 18  Ht 1.829 m (6' 0.01\")  Wt 87 kg (191 lb 12.8 oz)  BMI 26.01 kg/m2  SpO2 94%    Constitutional: Well developed, Well nourished,  Non-toxic appearance.   HENT: Normocephalic, Atraumatic.   Eyes:  EOMI, Conjunctiva normal, No discharge.   Cardiovascular: Normal heart rate, Normal rhythm, No murmurs, No rubs, No gallops.   Thorax & Lungs: Lungs clear to auscultation bilaterally without wheezes, rales or rhonchi. No respiratory distress.    Abdomen: Tensely distended abdomen. Nontender.  Skin: Warm, Dry.   Extremities: 2+ lower extremity edema bilaterally.  Musculoskeletal: Good range of motion in all major joints.  Neurologic: Awake alert.    RADIOLOGY/PROCEDURES  US-PARACENTESIS, ABD WITH IMAGING   Final Result      1. Ultrasound-guided therapeutic paracentesis of the left lower quadrant of the abdominal wall.      2. 7100 mL of fluid withdrawn.            COURSE & MEDICAL DECISION MAKING  Pertinent Labs & Imaging studies reviewed. (See chart for details)  This is a 76-year-old here for evaluation of abdominal distention and shortness of breath. The patient has a history of ascites which appears to have reaccumulated. An IV established and laboratories are obtained. These included chemistries with a glucose of 180 and a known diabetic. Renal function shows a BUN of 39 and a creatinine of 3.64 in a patient with known chronic renal failure. CBC shows a white count of 14 and a hemoglobin of 9.5 and a patient with chronic anemia thought secondary to his kidney disease from his diabetes. His platelet count is " normal. His INR is slightly elevated at 1.21. A therapeutic paracentesis is performed by the radiologist. 7100 mL of fluid is withdrawn. Upon repeat evaluation the patient states he feels great. He states his abdomen is flat and he has no shortness of breath. I discussed the case with my  and she has sent an email to Dr. Wick's office requesting that future paracentesis be scheduled as an outpatient as opposed to the patient coming through the emergency department. The patient has an appointment with him in 2 days. Patient is discharged home in improved condition. He is given a discharge instruction sheet on ascites.    FINAL IMPRESSION  1. Ascites with 7100 mL of peritoneal fluid drained  2. Chronic renal failure  3. Diabetes mellitus  4. Chronic anemia  5. Shortness of breath secondary to ascites         Electronically signed by: Lance Ray, 3/28/2017 12:10 PM

## 2017-03-28 NOTE — ED AVS SNAPSHOT
3/28/2017          Tico Solorzano  [address] [address]    Dear Tico:    Critical access hospital wants to ensure your discharge home is safe and you or your loved ones have had all your questions answered regarding your care after you leave the hospital.    You may receive a telephone call within two days of your discharge.  This call is to make certain you understand your discharge instructions as well as ensure we provided you with the best care possible during your stay with us.     The call will only last approximately 3-5 minutes and will be done by a nurse.    Once again, we want to ensure your discharge home is safe and that you have a clear understanding of any next steps in your care.  If you have any questions or concerns, please do not hesitate to contact us, we are here for you.  Thank you for choosing [facility] for your healthcare needs.    Sincerely,    Baltazar Jacome    [facility]

## 2017-03-28 NOTE — ED NOTES
"Med rec updated and complete  Allergies reviewed  Pt had medications list at bedside, went over list and returned list back to pt.  Pt states \"No antibiotics in the last 30 days\"    "

## 2017-03-28 NOTE — DISCHARGE INSTRUCTIONS
Ascites  Ascites is a collection of excess fluid in the abdomen. Ascites can range from mild to severe. It can get worse without treatment.  CAUSES  Possible causes include:  · Cirrhosis. This is the most common cause of ascites.  · Infection or inflammation in the abdomen.  · Cancer in the abdomen.  · Heart failure.  · Kidney disease.  · Inflammation of the pancreas.  · Clots in the veins of the liver.  SIGNS AND SYMPTOMS  Signs and symptoms may include:  · A feeling of fullness in your abdomen. This is common.  · An increase in the size of your abdomen or your waist.  · Swelling in your legs.  · Swelling of the scrotum in men.  · Difficulty breathing.  · Abdominal pain.  · Sudden weight gain.  If the condition is mild, you may not have symptoms.  DIAGNOSIS  To make a diagnosis, your health care provider will:  · Ask about your medical history.  · Perform a physical exam.  · Order imaging tests, such as an ultrasound or CT scan of your abdomen.  TREATMENT  Treatment depends on the cause of the ascites. It may include:  · Taking a pill to make you urinate. This is called a water pill (diuretic pill).  · Strictly reducing your salt (sodium) intake. Salt can cause extra fluid to be kept in the body, and this makes ascites worse.  · Having a procedure to remove fluid from your abdomen (paracentesis).  · Having a procedure to transfer fluid from your abdomen into a vein.  · Having a procedure that connects two of the major veins within your liver and relieves pressure on your liver (TIPS procedure).  Ascites may go away or improve with treatment of the condition that caused it.   HOME CARE INSTRUCTIONS  · Keep track of your weight. To do this, weigh yourself at the same time every day and record your weight.  · Keep track of how much you drink and any changes in the amount you urinate.  · Follow any instructions that your health care provider gives you about how much to drink.  · Try not to eat salty (high-sodium)  foods.  · Take medicines only as directed by your health care provider.  · Keep all follow-up visits as directed by your health care provider. This is important.  · Report any changes in your health to your health care provider, especially if you develop new symptoms or your symptoms get worse.  SEEK MEDICAL CARE IF:  · Your gain more than 3 pounds in 3 days.  · Your abdominal size or your waist size increases.  · You have new swelling in your legs.  · The swelling in your legs gets worse.  SEEK IMMEDIATE MEDICAL CARE IF:  · You develop a fever.  · You develop confusion.  · You develop new or worsening difficulty breathing.  · You develop new or worsening abdominal pain.  · You develop new or worsening swelling in the scrotum (in men).     This information is not intended to replace advice given to you by your health care provider. Make sure you discuss any questions you have with your health care provider.     Document Released: 12/18/2006 Document Revised: 01/08/2016 Document Reviewed: 07/17/2015  [employer] Interactive Patient Education ©2016 Elsevier Inc.

## 2017-03-28 NOTE — ED AVS SNAPSHOT
[employer] Access Code: [email or web address]  Expires: 4/18/2017 10:51 AM    Your email address is not on file at [facility].  Email Addresses are required for you to sign up for [employer], please contact [phone] to verify your personal information and to provide your email address prior to attempting to register for [employer].    Tico Whalen Solorzano  [address] [place] [address]    [employer]  A secure, online tool to manage your health information     [facility]’s [employer]® is a secure, online tool that connects you to your personalized health information from the privacy of your home -- day or night - making it very easy for you to manage your healthcare. Once the activation process is completed, you can even access your medical information using the [employer] chapincito, which is available for free in the Apple Chapincito store or Google Play store.     To learn more about [employer], visit [email or web address] Systems    There are two levels of access available (as shown below):   My Chart Features  [facility] Primary Care Doctor [facility]  Specialists [facility]  Urgent  Care Non-[facility] Primary Care Doctor   Email your healthcare team securely and privately 24/7 X X X    Manage appointments: schedule your next appointment; view details of past/upcoming appointments X      Request prescription refills. X      View recent personal medical records, including lab and immunizations X X X X   View health record, including health history, allergies, medications X X X X   Read reports about your outpatient visits, procedures, consult and ER notes X X X X   See your discharge summary, which is a recap of your hospital and/or ER visit that includes your diagnosis, lab results, and care plan X X  X     How to register for [employer]:  Once your e-mail address has been verified, follow the following steps to sign up for [employer].     1. Go to  [email or web address]  2. Click on the Sign Up Now box, which takes you to the New Member Sign Up page. You will need  to provide the following information:  a. Enter your [employer] Access Code exactly as it appears at the top of this page. (You will not need to use this code after you’ve completed the sign-up process. If you do not sign up before the expiration date, you must request a new code.)   b. Enter your date of birth.   c. Enter your home email address.   d. Click Submit, and follow the next screen’s instructions.  3. Create a [employer] ID. This will be your [employer] login ID and cannot be changed, so think of one that is secure and easy to remember.  4. Create a [employer] password. You can change your password at any time.  5. Enter your Password Reset Question and Answer. This can be used at a later time if you forget your password.   6. Enter your e-mail address. This allows you to receive e-mail notifications when new information is available in [employer].  7. Click Sign Up. You can now view your health information.    For assistance activating your [employer] account, call [phone]

## 2017-03-28 NOTE — OR SURGEON
Immediate Post- Operative Note        PostOp Diagnosis: ascites      Procedure(s): paracentesis      Estimated Blood Loss: Less than 5 ml        Complications: None            3/28/2017     2:44 PM     Santa Bills

## 2017-03-28 NOTE — PROGRESS NOTES
ABDOMINAL DISTENTION, ASCITES. THERAPEUTIC PARACENTESIS REQUESTED    RADIOLOGIST:   SONOGRAPHER: SANDRA    US FINDINGS:  LARGE VOLUME ASCITES SEEN. SAFE SITE MARKED USING ULTRASOUND GUIDANCE, LLQ. - 7100 CC YELLOW COLORED ASCITES, ALL FLUID DISCARDED. PT TOLERATED PROCEDURE WELL, WITH MINIMAL PAIN AND DISCOMFORT. VITALS MONITORED AND RECORDED WITHIN EPIC. SMALL BAND-AID PLACED OVER THE PROCEDURE SITE, LLQ. PT TAKEN BACK TO EMERGENCY DEPT. IN STABLE CONDITION.

## 2017-03-28 NOTE — ED AVS SNAPSHOT
Home Care Instructions                                                                                                                Tico Solorzano   MRN: [id number]    Department:  [facility], Emergency Dept   Date of Visit:  3/28/2017            [facility], Emergency Dept    [address] [address]    Phone:  [phone]      You were seen by     Lance Ray M.D.      Your Diagnosis Was     SOB (shortness of breath)     R06.02       Follow-up Information     1. Follow up with Casey Wick D.O..    Specialty:  Internal Medicine    Why:  on Thursday as scheduled    Contact information    [address]  [place] [place] [address] [phone]        Medication Information     Review all of your home medications and newly ordered medications with your primary doctor and/or pharmacist as soon as possible. Follow medication instructions as directed by your doctor and/or pharmacist.     Please keep your complete medication list with you and share with your physician. Update the information when medications are discontinued, doses are changed, or new medications (including over-the-counter products) are added; and carry medication information at all times in the event of emergency situations.               Medication List      ASK your doctor about these medications        Instructions    Morning Afternoon Evening Bedtime    doxazosin 2 MG Tabs   Commonly known as:  CARDURA        Take 2 mg by mouth every day.   Dose:  2 mg                        fluticasone 50 MCG/ACT nasal spray   Commonly known as:  FLONASE        INSTILL 2 SPRAYS IN EACH NOSTRIL ONCE DAILY                        furosemide 20 MG Tabs   Commonly known as:  LASIX        Take 1 Tab by mouth every day.   Dose:  20 mg                        insulin glargine 100 UNIT/ML Soln   Commonly known as:  LANTUS        Inject 5-7 Units as instructed every evening.   Dose:  5-7 Units                       losartan 100 MG Tabs   Commonly known as:  COZAAR        TAKE 1 TABLET BY MOUTH ONCE DAILY                        OCUVITE Tabs        Take 1 Tab by mouth every day.   Dose:  1 Tab                        pioglitazone 15 MG Tabs   Commonly known as:  ACTOS        TAKE 1 TABLET BY MOUTH EVERY DAY                        rosuvastatin 20 MG Tabs   Commonly known as:  CRESTOR        Take 1 Tab by mouth every evening.   Dose:  20 mg                        spironolactone 25 MG Tabs   Commonly known as:  ALDACTONE        Take 1 Tab by mouth every day.   Dose:  25 mg                                Procedures and tests performed during your visit     APTT    CBC WITH DIFFERENTIAL    COMP METABOLIC PANEL    CONSENT FOR RADIOLOGY PROCEDURE    EKG (ER)    ESTIMATED GFR    IV Saline Lock    LIPASE    PROTHROMBIN TIME (INR)    US-PARACENTESIS, ABD WITH IMAGING        Discharge Instructions       Ascites  Ascites is a collection of excess fluid in the abdomen. Ascites can range from mild to severe. It can get worse without treatment.  CAUSES  Possible causes include:  · Cirrhosis. This is the most common cause of ascites.  · Infection or inflammation in the abdomen.  · Cancer in the abdomen.  · Heart failure.  · Kidney disease.  · Inflammation of the pancreas.  · Clots in the veins of the liver.  SIGNS AND SYMPTOMS  Signs and symptoms may include:  · A feeling of fullness in your abdomen. This is common.  · An increase in the size of your abdomen or your waist.  · Swelling in your legs.  · Swelling of the scrotum in men.  · Difficulty breathing.  · Abdominal pain.  · Sudden weight gain.  If the condition is mild, you may not have symptoms.  DIAGNOSIS  To make a diagnosis, your health care provider will:  · Ask about your medical history.  · Perform a physical exam.  · Order imaging tests, such as an ultrasound or CT scan of your abdomen.  TREATMENT  Treatment depends on the cause of the ascites. It may include:  · Taking a  pill to make you urinate. This is called a water pill (diuretic pill).  · Strictly reducing your salt (sodium) intake. Salt can cause extra fluid to be kept in the body, and this makes ascites worse.  · Having a procedure to remove fluid from your abdomen (paracentesis).  · Having a procedure to transfer fluid from your abdomen into a vein.  · Having a procedure that connects two of the major veins within your liver and relieves pressure on your liver (TIPS procedure).  Ascites may go away or improve with treatment of the condition that caused it.   HOME CARE INSTRUCTIONS  · Keep track of your weight. To do this, weigh yourself at the same time every day and record your weight.  · Keep track of how much you drink and any changes in the amount you urinate.  · Follow any instructions that your health care provider gives you about how much to drink.  · Try not to eat salty (high-sodium) foods.  · Take medicines only as directed by your health care provider.  · Keep all follow-up visits as directed by your health care provider. This is important.  · Report any changes in your health to your health care provider, especially if you develop new symptoms or your symptoms get worse.  SEEK MEDICAL CARE IF:  · Your gain more than 3 pounds in 3 days.  · Your abdominal size or your waist size increases.  · You have new swelling in your legs.  · The swelling in your legs gets worse.  SEEK IMMEDIATE MEDICAL CARE IF:  · You develop a fever.  · You develop confusion.  · You develop new or worsening difficulty breathing.  · You develop new or worsening abdominal pain.  · You develop new or worsening swelling in the scrotum (in men).     This information is not intended to replace advice given to you by your health care provider. Make sure you discuss any questions you have with your health care provider.     Document Released: 12/18/2006 Document Revised: 01/08/2016 Document Reviewed: 07/17/2015  [employer] Patient  Education ©2016 Elsevier Inc.            Patient Information     Patient Information    Following emergency treatment: all patient requiring follow-up care must return either to a private physician or a clinic if your condition worsens before you are able to obtain further medical attention, please return to the emergency room.     Billing Information    At [facility], we work to make the billing process streamlined for our patients.  Our Representatives are here to answer any questions you may have regarding your hospital bill.  If you have insurance coverage and have supplied your insurance information to us, we will submit a claim to your insurer on your behalf.  Should you have any questions regarding your bill, we can be reached online or by phone as follows:  Online: You are able pay your bills online or live chat with our representatives about any billing questions you may have. We are here to help Monday - Friday from 8:00am to 7:30pm and 9:00am - 12:00pm on Saturdays.  Please visit [email or web address]  for more information.   Phone:  [phone] or [phone]    Please note that your emergency physician, surgeon, pathologist, radiologist, anesthesiologist, and other specialists are not employed by [facility] and will therefore bill separately for their services.  Please contact them directly for any questions concerning their bills at the numbers below:     Emergency Physician Services:  [phone]  [facility]:  [phone]  [facility]:  [phone]  [facility]:  [phone]    1. Your final bill may vary from the amount quoted upon discharge if all procedures are not complete at that time, or if your doctor has additional procedures of which we are not aware. You will receive an additional bill if you return to the Emergency Department at [facility] for suture removal regardless of the facility of which the  sutures were placed.     2. Please arrange for settlement of this account at the emergency registration.    3. All self-pay accounts are due in full at the time of treatment.  If you are unable to meet this obligation then payment is expected within 4-5 days.     4. If you have had radiology studies (CT, X-ray, Ultrasound, MRI), you have received a preliminary result during your emergency department visit. Please contact the radiology department [phone] to receive a copy of your final result. Please discuss the Final result with your primary physician or with the follow up physician provided.     Crisis Hotline:  [place] Crisis Hotline:  0-080-EYRJOOA or 1-788.823.9674  [place] Crisis Hotline:    1-927.371.5990 or 577-958-0001         ED Discharge Follow Up Questions    1. In order to provide you with very good care, we would like to follow up with a phone call in the next few days.  May we have your permission to contact you?     YES /  NO    2. What is the best phone number to call you? (       )_____-__________    3. What is the best time to call you?      Morning  /  Afternoon  /  Evening                   Patient Signature:  ____________________________________________________________    Date:  ____________________________________________________________      Your appointments     Mar 30, 2017  2:00 PM   Established Patient with Casey Wick D.O.   [facility]    [address] [place] [address]  [place] [address] [phone]           You will be receiving a confirmation call a few days before your appointment from our automated call confirmation system.            Apr 24, 2017  2:30 PM   PACER CHECK ONLY with PACER CHECK-[facility] B 2   [facility] (--)    [address]  [place] [address]   [phone]            Jul 24, 2017  1:30 PM   PACER CHECK ONLY with PACER CHECK-CAM B 2   [facility]  (--)    [address]  [place] [address]   [phone]            Jul 24, 2017  2:00 PM   FOLLOW UP with Navneet Brunner M.D.   [facility] (--)    [address]  [place] [address]   [phone]

## 2017-03-28 NOTE — ED NOTES
Pt BIB EMS from home  Chief Complaint   Patient presents with   • Abdominal Swelling   hx of paracentesis in January   per EMS  Pt asked to wait in lobby, pt updated on triage process and pt asked to inform RN of any changes.

## 2017-03-28 NOTE — ED NOTES
Pt given discharge instructions, IV removed, catheter intact. The pt walked from the ER and will take a cab home.

## 2017-03-30 PROBLEM — R18.8 ASCITES CONTROLLED WITH MEDICATION: Status: ACTIVE | Noted: 2017-01-01

## 2017-03-30 NOTE — Clinical Note
[facility]  Casey Wick D.O.  [address]  [place] [address]  Fax: [phone]   Authorization for Release/Disclosure of   Protected Health Information   Name: JOSE MANUEL AARON : 1940 SSN: [id number]   Address: [address] [place] [id number] Phone:    [phone] (home)    I authorize the entity listed below to release/disclose the PHI below to:   [facility]/Casey Wick D.O. and Casey Wick D.O.   Provider or Entity Name:  Dr. Nacho Ram.    Address   City, State, [facility]   Phone:      Fax:     Reason for request: continuity of care   Information to be released:    [  ] LAST COLONOSCOPY,  including any PATH REPORT and follow-up  [  ] LAST FIT/COLOGUARD RESULT [  ] LAST DEXA  [  ] LAST MAMMOGRAM  [  ] LAST PAP  [  ] LAST LABS [  ] RETINA EXAM REPORT  [  ] IMMUNIZATION RECORDS  [  ] Release all info  (X) Last office visit.       [  ] Check here and initial the line next to each item to release ALL health information INCLUDING  _____ Care and treatment for drug and / or alcohol abuse  _____ HIV testing, infection status, or AIDS  _____ Genetic Testing    DATES OF SERVICE OR TIME PERIOD TO BE DISCLOSED: _____________  I understand and acknowledge that:  * This Authorization may be revoked at any time by you in writing, except if your health information has already been used or disclosed.  * Your health information that will be used or disclosed as a result of you signing this authorization could be re-disclosed by the recipient. If this occurs, your re-disclosed health information may no longer be protected by State or Federal laws.  * You may refuse to sign this Authorization. Your refusal will not affect your ability to obtain treatment.  * This Authorization becomes effective upon signing and will  on (date) __________.      If no date is indicated, this Authorization will  one (1) year from the signature date.    Name: Jose Manuel Aaron    Signature:   Date:     3/30/2017       PLEASE FAX REQUESTED RECORDS BACK TO: [phone]

## 2017-03-30 NOTE — PROGRESS NOTES
CC: Tico Solorzano is a 76 y.o. male is suffering from   Chief Complaint   Patient presents with   • Follow-Up         SUBJECTIVE:  1. Abnormal laboratory test  Ed here for follow-up, has a history of multiple abnormal labs associated with this Hospital visit status post paracentesis. I have insisted the patient have an appointment with diagnostic radiology for June, I suspect this to be a recurrent issue. Case was discussed via email with emergency room department.     2. Renal insufficiency  Patient with a history of renal insufficiency most recent notes are not available.     3. Ascites controlled with medication  Patient with ascites, orders written for paracentesis in June        Past social, family, history: , Harika  Social History   Substance Use Topics   • Smoking status: Former Smoker -- 2.00 packs/day for 40 years     Quit date: 07/13/1991   • Smokeless tobacco: Never Used      Comment: quit 20 yrs ago   • Alcohol Use: No         MEDICATIONS:    Current outpatient prescriptions:   •  losartan (COZAAR) 100 MG Tab, Take 100 mg by mouth every day., Disp: , Rfl:   •  sodium bicarbonate (SODIUM BICARBONATE) 650 MG Tab, Take 650 mg by mouth 2 times a day., Disp: , Rfl:   •  furosemide (LASIX) 20 MG Tab, Take 1 Tab by mouth every day., Disp: 60 Tab, Rfl: 11  •  doxazosin (CARDURA) 2 MG Tab, Take 2 mg by mouth every day., Disp: , Rfl:   •  insulin glargine (LANTUS) 100 UNIT/ML Solution, Inject 5-7 Units as instructed every evening., Disp: , Rfl:   •  pioglitazone (ACTOS) 15 MG Tab, TAKE 1 TABLET BY MOUTH EVERY DAY, Disp: 90 Tab, Rfl: 3  •  Multiple Vitamins-Minerals (OCUVITE) Tab, Take 1 Tab by mouth every day., Disp: , Rfl:   •  rosuvastatin (CRESTOR) 20 MG Tab, Take 1 Tab by mouth every evening., Disp: 30 Tab, Rfl: 11    PROBLEMS:  Patient Active Problem List    Diagnosis Date Noted   • Right upper lobe pneumonia 02/16/2017     Priority: High   • Proteinuria 01/16/2017     Priority: High   •  "Hyperchloremia 01/16/2017     Priority: High   • Lactic acidosis 01/21/2016     Priority: High   • Acute on chronic renal failure (CMS-HCC) 12/16/2015     Priority: High   • Protein calorie malnutrition (CMS-HCC) 02/17/2017     Priority: Low   • Gout 06/03/2009     Priority: Low   • Ascites controlled with medication 03/30/2017   • Normocytic anemia 01/16/2017   • Type 2 diabetes mellitus (CMS-HCC) 02/03/2016   • Cough 01/21/2016   • BPH (benign prostatic hyperplasia) 12/17/2015   • S/P placement of cardiac pacemaker 12/17/2015   • History of prostate cancer 11/04/2015   • 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  Heart: No chest pain.  Lungs:  No shortness of Breath.  Psychological: Alvin unusual Anxiety depression     PHYSICAL EXAM   Constitutional: Alert, cooperative, not in acute distress.  Cardiovascular:  Rate Rhythm is regular without murmurs rubs clicks.     Thorax & Lungs: Clear to auscultation, no wheezing, rhonchi, or rales  HENT: Normocephalic, Atraumatic.  Eyes: PERRLA, EOMI, Conjunctiva normal.   Neck: Trachia is midline no swelling of the thyroid.   Lymphatic: No lymphadenopathy noted.   Abdomin: Soft non-tender, no rebound, no guarding.   Neurologic: Alert & oriented x 3, cranial nerves II through XII are intact, Normal motor function, Normal sensory function, No focal deficits noted.   Psychiatric: Affect normal, Judgment normal, Mood normal.     VITAL SIGNS:/50 mmHg  Pulse 82  Temp(Src) 36.7 °C (98 °F)  Resp 16  Ht 1.854 m (6' 0.99\")  Wt 80.287 kg (177 lb)  BMI 23.36 kg/m2  SpO2 100%    Labs: Reviewed    Assessment:                                                     Plan:    1. Abnormal laboratory test  Recheck " labs 2 weeks  - COMP METABOLIC PANEL; Future  - CBC WITH DIFFERENTIAL; Future    2. Renal insufficiency  Requested records from nephrology  - COMP METABOLIC PANEL; Future  - CBC WITH DIFFERENTIAL; Future  - REFERRAL TO INTERVENTIONAL RADIOLOGY    3. Ascites controlled with medication  Referral written to interventional radiology  - COMP METABOLIC PANEL; Future  - CBC WITH DIFFERENTIAL; Future  - REFERRAL TO INTERVENTIONAL RADIOLOGY

## 2017-03-30 NOTE — MR AVS SNAPSHOT
"        Tico Whalen Solorzano   3/30/2017 2:00 PM   Office Visit   MRN: [id number]    Department:  [facility]   Dept Phone:  [phone]    Description:  Male : 1940   Provider:  Casey Wick D.O.           Reason for Visit     Follow-Up           Allergies as of 3/30/2017     Allergen Noted Reactions    Valsartan 2015       12/16/15 pt states he doesn't remember if he's allergic to this medication.      You were diagnosed with     Abnormal laboratory test   [264282]       Renal insufficiency   [452143]       Ascites controlled with medication   [[id number]]         Vital Signs     Blood Pressure Pulse Temperature Respirations Height Weight    122/50 mmHg 82 36.7 °C (98 °F) 16 1.854 m (6' 0.99\") 80.287 kg (177 lb)    Body Mass Index Oxygen Saturation Smoking Status             23.36 kg/m2 100% Former Smoker         Basic Information     Date Of Birth Sex Race Ethnicity Preferred Language    1940 Male White Non- English      Your appointments     2017  2:30 PM   PACER CHECK ONLY with PACER CHECK-CAM B 2   [facility] (--)    [address], [address]  [place] [id number] [phone]            May 01, 2017  1:20 PM   Established Patient with Casey Wick D.O.   [address]  [place] [address] [phone]           You will be receiving a confirmation call a few days before your appointment from our automated call confirmation system.            2017  1:30 PM   PACER CHECK ONLY with PACER CHECK-CAM B 2   [facility] (--)    [address], [address]  [place] [id number] [phone]            2017  2:00 PM   FOLLOW UP with Navneet Brunner M.D.   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   8/10/2015 - Present    History of prostate cancer Z85.46   11/4/2015 - Present    Acute on chronic renal failure (CMS-HCC) N17.9, N18.9 High  12/16/2015 - Present    BPH (benign prostatic hyperplasia) (Chronic) N40.0   12/17/2015 - Present    S/P placement of cardiac pacemaker (Chronic) Z95.0   12/17/2015 - Present    Cough R05   1/21/2016 - Present    Lactic acidosis E87.2 High  1/21/2016 - Present    Type 2 diabetes mellitus (CMS-HCC) E11.9   2/3/2016 - Present    Proteinuria R80.9 High  1/16/2017 - Present    Normocytic anemia (Chronic) D64.9   1/16/2017 - Present    Hyperchloremia E87.8 High  1/16/2017 - Present    Right upper lobe pneumonia J18.9 High  2/16/2017 - Present    Protein calorie malnutrition (CMS-HCC) E46 Low  2/17/2017 - Present    Ascites controlled with medication R18.8   3/30/2017 - Present      Health Maintenance        Date Due Completion Dates    IMM ZOSTER VACCINE 10/11/1998 ---    RETINAL SCREENING 1/30/2016 1/30/2015 (Done), 6/10/2013, 4/13/2011 (N/S)    Override on 1/30/2015: Done (Dr. Wilkinson. )    Override on 4/13/2011: (N/S) (Dr Wilkinson macular degeneration)    IMM PNEUMOCOCCAL 65+ (ADULT) LOW/MEDIUM RISK SERIES (2 of 2 - PPSV23) 12/16/2016 12/16/2015    URINE ACR / MICROALBUMIN 3/22/2017 3/22/2016, 5/12/2014, 10/10/2013, 9/5/2012, 6/21/2012, 1/26/2012, 4/12/2011, 4/6/2010    FASTING LIPID PROFILE 7/19/2017 7/19/2016, 7/15/2015, 2/4/2014, 1/26/2012, 9/14/2011 (N/S)    Override on 9/14/2011: (N/S) (See labs from [facility])    A1C SCREENING 8/16/2017 2/16/2017,  7/19/2016, 3/22/2016, 11/18/2015, 7/15/2015, 2/17/2015, 11/13/2014, 8/12/2014, 5/12/2014, 2/4/2014, 10/10/2013, 7/9/2013, 2/26/2013, 12/27/2012, 9/5/2012, 6/21/2012, 1/26/2012, 11/10/2011, 4/12/2011, 7/13/2010, 4/6/2010, 1/12/2010, 11/10/2009, 8/12/2009    DIABETES MONOFILAMENT / LE EXAM 9/8/2017 9/8/2016 (Done), 3/10/2015 (Done), 2/21/2014 (Done), 1/4/2013 (Done), 7/2/2012 (N/S), 6/13/2011 (N/S)    Override on 9/8/2016: Done    Override on 3/10/2015: Done (Dr. Wick)    Override on 2/21/2014: Done    Override on 1/4/2013: Done    Override on 7/2/2012: (N/S)    Override on 6/13/2011: (N/S) (Dr. Wick)    SERUM CREATININE 3/28/2018 3/28/2017, 2/18/2017, 2/17/2017, 2/16/2017, 2/15/2017, 1/19/2017, 1/18/2017, 1/17/2017, 1/16/2017, 7/19/2016, 1/22/2016, 1/21/2016, 12/18/2015, 12/17/2015, 12/16/2015, 11/18/2015, 7/15/2015, 2/17/2015, 11/13/2014, 5/12/2014, 7/9/2013, 1/26/2012, 12/18/2011, 12/16/2011, 11/10/2009    IMM DTaP/Tdap/Td Vaccine (2 - Td) 12/12/2021 12/12/2011    COLONOSCOPY 7/17/2023 7/17/2013 (Declined)    Override on 7/17/2013: Patient Declined            Current Immunizations     13-VALENT PCV PREVNAR 12/16/2015  6:44 PM    INFLUENZA VACCINE H1N1 1/14/2010    Influenza TIV (IM) 10/16/2016, 10/13/2014, 10/21/2013, 11/1/2011    Influenza Vaccine Adult HD 10/28/2015    Influenza Vaccine Pediatric 12/14/2009    Pneumococcal Vaccine (UF)Historical Data 10/1/2003    Tdap Vaccine 12/12/2011      Below and/or attached are the medications your provider expects you to take. Review all of your home medications and newly ordered medications with your provider and/or pharmacist. Follow medication instructions as directed by your provider and/or pharmacist. Please keep your medication list with you and share with your provider. Update the information when medications are discontinued, doses are changed, or new medications (including over-the-counter products) are added; and carry medication information at all  times in the event of emergency situations     Allergies:  VALSARTAN - (reactions not documented)               Medications  Valid as of: March 30, 2017 -  2:34 PM    Generic Name Brand Name Tablet Size Instructions for use    Doxazosin Mesylate (Tab) CARDURA 2 MG Take 2 mg by mouth every day.        Furosemide (Tab) LASIX 20 MG Take 1 Tab by mouth every day.        Insulin Glargine (Solution) LANTUS 100 UNIT/ML Inject 5-7 Units as instructed every evening.        Losartan Potassium (Tab) COZAAR 100 MG Take 100 mg by mouth every day.        Multiple Vitamins-Minerals (Tab) OCUVITE  Take 1 Tab by mouth every day.        Pioglitazone HCl (Tab) ACTOS 15 MG TAKE 1 TABLET BY MOUTH EVERY DAY        Rosuvastatin Calcium (Tab) CRESTOR 20 MG Take 1 Tab by mouth every evening.        Sodium Bicarbonate (Tab) SODIUM BICARBONATE 650 MG Take 650 mg by mouth 2 times a day.        .                 Medicines prescribed today were sent to:     [id number] - [place], [place] - [address] [address]    Phone: [phone] Fax: [phone]    Open 24 Hours?: No      Medication refill instructions:       If your prescription bottle indicates you have medication refills left, it is not necessary to call your provider’s office. Please contact your pharmacy and they will refill your medication.    If your prescription bottle indicates you do not have any refills left, you may request refills at any time through one of the following ways: The online [employer] system (except Urgent Care), by calling your provider’s office, or by asking your pharmacy to contact your provider’s office with a refill request. Medication refills are processed only during regular business hours and may not be available until the next business day. Your provider may request additional information or to have a follow-up visit with you prior to refilling your medication.   *Please Note: Medication  refills are assigned a new Rx number when refilled electronically. Your pharmacy may indicate that no refills were authorized even though a new prescription for the same medication is available at the pharmacy. Please request the medicine by name with the pharmacy before contacting your provider for a refill.        Your To Do List     Future Labs/Procedures Complete By Expires    CBC WITH DIFFERENTIAL  As directed 3/31/2018    COMP METABOLIC PANEL  As directed 3/31/2018      Referral     A referral request has been sent to our patient care coordination department. Please allow 3-5 business days for us to process this request and contact you either by phone or mail. If you do not hear from us by the 5th business day, please call us at [phone].           MyChart Status: Patient Declined

## 2017-03-31 NOTE — TELEPHONE ENCOUNTER
1. Caller Name: Khari with radiology                       Call Back Number: [phone] Fax# [phone]    2. Message: Khari wants to clarify the order for interventional radiology is this therapeutic or diagnostics? Also is it with albumin or not? Please make changes and khari will make the call to patient for scheduling. Thank you.     3. Patient approves office to leave a detailed voicemail/MyChart message: yes

## 2017-04-14 NOTE — TELEPHONE ENCOUNTER
VOICEMAIL  1. Caller Name: Merly from Radiology                      Call Back Number: [phone]    2. Message: patient called radiology and asked to be scheduled sooner than the order indicated. Patient is scheduled for June 16th 2017 as directed by the order. If the patient is able to be seen sooner, please send a new order or advise otherwise. Thank you.     3. Patient approves office to leave a detailed voicemail/MyChart message: yes

## 2017-04-16 NOTE — DISCHARGE INSTRUCTIONS
Paracentesis  Paracentesis is a procedure used to remove excess fluid from the belly (abdomen). Excess fluid in the belly is called ascites. Excess fluid can be the result of certain conditions, such as infection, inflammation, abdominal injury, heart failure, chronic scarring of the liver (cirrhosis), or cancer. The excess fluid is removed using a needle inserted through the skin and tissue into the abdomen.   A paracentesis may be done to:  · Determine the cause of the excess fluid through examination of the fluid.  · Relieve symptoms of shortness of breath or pain caused by the excess fluid.  · Determine presence of bleeding after an abdominal injury.  LET YOUR CAREGIVERS KNOW ABOUT:  · Allergies.  · Medications taken including herbs, eye drops, over-the-counter medications, and creams.  · Use of steroids (by mouth or creams).  · Previous problems with anesthetics or numbing medicine.  · Possibility of pregnancy, if this applies.  · History of blood clots (thrombophlebitis).  · History of bleeding or blood problems.  · Previous surgery.  · Other health problems.  RISKS AND COMPLICATIONS  · Injury to an abdominal organ, such as the bowel (large intestine), liver, spleen, or bladder.  · Possible infection.  · Bleeding.  · Low blood pressure (hypotension).  BEFORE THE PROCEDURE  This is a procedure that can be done as an outpatient. Confirm the time that you need to arrive for your procedure. A blood sample may be done to determine your blood clotting time. The presence of a severe bleeding disorder (coagulopathy) which cannot be promptly corrected may make this procedure inadvisable. You may be asked to urinate.  PROCEDURE  The procedure will take about 30 minutes. This time will vary depending on the amount of fluid that is removed. You may be asked to lie on your back with your head elevated. An area on your abdomen will be cleansed. A numbing medicine may then be injected (local anesthesia) into the skin and  tissue. A needle is inserted through your abdominal skin and tissues until it is positioned in your abdomen. You may feel pressure or slight pain as the needle is positioned into the abdomen. Fluid is removed from the abdomen through the needle. Tell your caregiver if you feel dizzy or lightheaded. The needle is withdrawn once the desired amount of fluid has been removed. A sample of the fluid may be sent for examination.   AFTER THE PROCEDURE  Your recovery will be assessed and monitored. If there are no problems, as an outpatient, you should be able to go home shortly after the procedure. There may be a very limited amount of clear fluid draining from the needle insertion site over the next 2 days. Confirm with your caregiver as to the expected amount of drainage.  Obtaining the Test Results  It is your responsibility to obtain your test results. Do not assume everything is normal if you have not heard from your caregiver or the medical facility. It is important for you to follow up on all of your test results.  HOME CARE INSTRUCTIONS   · You may resume normal diet and activities as directed or allowed.  · Only take over-the-counter or prescription medicines for pain, discomfort, or fever as directed by your caregiver.  SEEK IMMEDIATE MEDICAL CARE IF:  · You develop shortness of breath or chest pain.  · You develop increasing pain, discomfort, or swelling in your abdomen.  · You develop new drainage or pus coming from site where fluid was removed.  · You develop swelling or increased redness from site where fluid was removed.  · You develop an unexplained temperature of 102° F (38.9° C) or above.     This information is not intended to replace advice given to you by your health care provider. Make sure you discuss any questions you have with your health care provider.     Document Released: 07/03/2006 Document Revised: 03/11/2013 Document Reviewed: 03/01/2016  Elsevier Interactive Patient Education ©2016 [employer]    Ascites  Ascites is a collection of excess fluid in the abdomen. Ascites can range from mild to severe. It can get worse without treatment.  CAUSES  Possible causes include:  · Cirrhosis. This is the most common cause of ascites.  · Infection or inflammation in the abdomen.  · Cancer in the abdomen.  · Heart failure.  · Kidney disease.  · Inflammation of the pancreas.  · Clots in the veins of the liver.  SIGNS AND SYMPTOMS  Signs and symptoms may include:  · A feeling of fullness in your abdomen. This is common.  · An increase in the size of your abdomen or your waist.  · Swelling in your legs.  · Swelling of the scrotum in men.  · Difficulty breathing.  · Abdominal pain.  · Sudden weight gain.  If the condition is mild, you may not have symptoms.  DIAGNOSIS  To make a diagnosis, your health care provider will:  · Ask about your medical history.  · Perform a physical exam.  · Order imaging tests, such as an ultrasound or CT scan of your abdomen.  TREATMENT  Treatment depends on the cause of the ascites. It may include:  · Taking a pill to make you urinate. This is called a water pill (diuretic pill).  · Strictly reducing your salt (sodium) intake. Salt can cause extra fluid to be kept in the body, and this makes ascites worse.  · Having a procedure to remove fluid from your abdomen (paracentesis).  · Having a procedure to transfer fluid from your abdomen into a vein.  · Having a procedure that connects two of the major veins within your liver and relieves pressure on your liver (TIPS procedure).  Ascites may go away or improve with treatment of the condition that caused it.   HOME CARE INSTRUCTIONS  · Keep track of your weight. To do this, weigh yourself at the same time every day and record your weight.  · Keep track of how much you drink and any changes in the amount you urinate.  · Follow any instructions that your health care provider gives you about how much to drink.  · Try not to eat salty  (high-sodium) foods.  · Take medicines only as directed by your health care provider.  · Keep all follow-up visits as directed by your health care provider. This is important.  · Report any changes in your health to your health care provider, especially if you develop new symptoms or your symptoms get worse.  SEEK MEDICAL CARE IF:  · Your gain more than 3 pounds in 3 days.  · Your abdominal size or your waist size increases.  · You have new swelling in your legs.  · The swelling in your legs gets worse.  SEEK IMMEDIATE MEDICAL CARE IF:  · You develop a fever.  · You develop confusion.  · You develop new or worsening difficulty breathing.  · You develop new or worsening abdominal pain.  · You develop new or worsening swelling in the scrotum (in men).     This information is not intended to replace advice given to you by your health care provider. Make sure you discuss any questions you have with your health care provider.     Document Released: 12/18/2006 Document Revised: 01/08/2016 Document Reviewed: 07/17/2015  [employer] Interactive Patient Education ©2016 [employer]

## 2017-04-16 NOTE — ED NOTES
Chief Complaint   Patient presents with   • Abdominal Swelling     pt bib remsa c/o abdominal swelling and sob. has hx of ascites, last paracentesis done was march.      Pt scheduled for paracentesis for tomorrow but states that he can't wait longer d/t increasing sob.

## 2017-04-16 NOTE — ED AVS SNAPSHOT
Yugma Access Code: [email or web address]  Expires: 4/18/2017 10:51 AM    Your email address is not on file at [facility].  Email Addresses are required for you to sign up for Yugma, please contact [phone] to verify your personal information and to provide your email address prior to attempting to register for Yugma.    Tico Whalen Solorzano  [address] [place] [address]    Yugma  A secure, online tool to manage your health information     [facility]’s Yugma® is a secure, online tool that connects you to your personalized health information from the privacy of your home -- day or night - making it very easy for you to manage your healthcare. Once the activation process is completed, you can even access your medical information using the Yugma chapincito, which is available for free in the Apple Chapincito store or Google Play store.     To learn more about Yugma, visit [email or web address]    There are two levels of access available (as shown below):   My Chart Features  [facility] Primary Care Doctor [facility]  Specialists [facility]  Urgent  Care Non-[facility] Primary Care Doctor   Email your healthcare team securely and privately 24/7 X X X    Manage appointments: schedule your next appointment; view details of past/upcoming appointments X      Request prescription refills. X      View recent personal medical records, including lab and immunizations X X X X   View health record, including health history, allergies, medications X X X X   Read reports about your outpatient visits, procedures, consult and ER notes X X X X   See your discharge summary, which is a recap of your hospital and/or ER visit that includes your diagnosis, lab results, and care plan X X  X     How to register for [employer]-Now[employer]:  Once your e-mail address has been verified, follow the following steps to sign up for Yugma.     1. Go to  [email or web address]  2. Click on the Sign Up Now box, which takes you to the New Member Sign Up page. You will need  to provide the following information:  a. Enter your GANTEC Access Code exactly as it appears at the top of this page. (You will not need to use this code after you’ve completed the sign-up process. If you do not sign up before the expiration date, you must request a new code.)   b. Enter your date of birth.   c. Enter your home email address.   d. Click Submit, and follow the next screen’s instructions.  3. Create a GANTEC ID. This will be your GANTEC login ID and cannot be changed, so think of one that is secure and easy to remember.  4. Create a GANTEC password. You can change your password at any time.  5. Enter your Password Reset Question and Answer. This can be used at a later time if you forget your password.   6. Enter your e-mail address. This allows you to receive e-mail notifications when new information is available in GANTEC.  7. Click Sign Up. You can now view your health information.    For assistance activating your GANTEC account, call [phone]

## 2017-04-16 NOTE — ED AVS SNAPSHOT
4/16/2017    Tico Solorzano  [address] [address]    Dear Tico:    [facility] wants to ensure your discharge home is safe and you or your loved ones have had all of your questions answered regarding your care after you leave the hospital.    Below is a list of resources and contact information should you have any questions regarding your hospital stay, follow-up instructions, or active medical symptoms.    Questions or Concerns Regarding… Contact   Medical Questions Related to Your Discharge  (7 days a week, 8am-5pm) Contact a Nurse Care Coordinator   [phone]   Medical Questions Not Related to Your Discharge  (24 hours a day / 7 days a week)  Contact the Nurse Health Line   [phone]    Medications or Discharge Instructions Refer to your discharge packet   or contact your [facility] Primary Care Provider   [phone]   Follow-up Appointment(s) Schedule your appointment via [employer]   or contact Scheduling [phone]   Billing Review your statement via PostRocket  or contact Billing [phone]   Medical Records Review your records via PostRocket   or contact Medical Records [phone]     You may receive a telephone call within two days of discharge. This call is to make certain you understand your discharge instructions and have the opportunity to have any questions answered. You can also easily access your medical information, test results and upcoming appointments via the PostRocket free online health management tool. You can learn more and sign up at [employer]/PostRocket. For assistance setting up your PostRocket account, please call [phone].    Once again, we want to ensure your discharge home is safe and that you have a clear understanding of any next steps in your care. If you have any questions or concerns, please do not hesitate to contact us, we are here for you. Thank you for choosing [facility] for your healthcare needs.    Sincerely,    Your [facility] Healthcare Team

## 2017-04-16 NOTE — ED PROVIDER NOTES
ED Provider Note    Scribed for Jeanie Walton M.D. by Casey Tracey. 4/16/2017  11:23 AM    Primary care provider: Casey Wick D.O.  Means of arrival: [facility]  History obtained from: Patient  History limited by: None    CHIEF COMPLAINT  Chief Complaint   Patient presents with   • Abdominal Swelling     pt bib remsa c/o abdominal swelling and sob. has hx of ascites, last paracentesis done was march.        HPI  Tico Solorzano is a 76 y.o. male with a history of Ascites who presents to the Emergency Department complaining of abdominal swelling onset in January. His last paracentesis was done in March and has swelled again. The patient has associated shortness of breath due to the distention in his abdomen. He notes he's also had persistent blurry vision. He denies fever or dizziness. He denies any nausea or vomiting. He has no jaundice. He has no increase in bruising.    REVIEW OF SYSTEMS  HEENT:  No sore throat.  EYES: Positive blurry vision.  CARDIAC: no chest pain.  PULMONARY: positive dyspnea, cough, or congestion   GI: no vomiting, diarrhea. Positive abdominal distention  : no dysuria, back pain, or hematuria   Neuro: no dizziness.  Musculoskeletal: no deformity, pain.   Endocrine: no fevers.    See history of present illness.    E.     PAST MEDICAL HISTORY   has a past medical history of Diabetes; Hypertension; Renal disorder; Arthritis; Pneumonia (1961); Cancer (CMS-Formerly [facility]) (2003); CATARACT; Vitamin d deficiency (1/16/2012); Protein in urine (7/2/2012); and Ascites controlled with medication (3/30/2017).    SURGICAL HISTORY   has past surgical history that includes tonsillectomy; cholecystectomy; other (2003); open reduction; and incision and drainage orthopedic (12/17/2011).    SOCIAL HISTORY  Social History   Substance Use Topics   • Smoking status: Former Smoker -- 2.00 packs/day for 40 years     Quit date: 07/13/1991   • Smokeless tobacco: Never Used      Comment: quit 20 yrs ago   • Alcohol Use:  "No      History   Drug Use No       FAMILY HISTORY  Family History   Problem Relation Age of Onset   • Other Mother    • Other Father    • Cancer Brother        CURRENT MEDICATIONS  Home Medications     Reviewed by Alina Slade R.N. (Registered Nurse) on 04/16/17 at 1115  Med List Status: Partial    Medication Last Dose Status    doxazosin (CARDURA) 2 MG Tab 3/30/2017 Active    furosemide (LASIX) 20 MG Tab 3/30/2017 Active    insulin glargine (LANTUS) 100 UNIT/ML Solution 3/30/2017 Active    losartan (COZAAR) 100 MG Tab 3/30/2017 Active    Multiple Vitamins-Minerals (OCUVITE) Tab 3/30/2017 Active    pioglitazone (ACTOS) 15 MG Tab 3/30/2017 Active    rosuvastatin (CRESTOR) 20 MG Tab 3/30/2017 Active    sodium bicarbonate (SODIUM BICARBONATE) 650 MG Tab 3/30/2017 Active                ALLERGIES  Allergies   Allergen Reactions   • Valsartan      12/16/15 pt states he doesn't remember if he's allergic to this medication.       PHYSICAL EXAM  VITAL SIGNS: /71 mmHg  Pulse 92  Temp(Src) 36.8 °C (98.2 °F)  Resp 24  Ht 1.85 m (6' 0.83\")  Wt 81.647 kg (180 lb)  BMI 23.86 kg/m2  SpO2 100%    Constitutional: Well developed, Well nourished, No acute distress, Non-toxic appearance.   HEENT: Normocephalic, Atraumatic,  external ears normal, pharynx pink,  Mucous  Membranes moist, No rhinorrhea or mucosal edema  Eyes: PERRL, EOMI, Conjunctiva normal, No discharge.   Neck: Normal range of motion, No tenderness, Supple, No stridor.   Lymphatic: No lymphadenopathy    Cardiovascular: Regular Rate and Rhythm, No murmurs,  rubs, or gallops.   Thorax & Lungs: Lungs clear to auscultation bilaterally, No respiratory distress, decreased breath sound in the bases  No wheezes, rhales or rhonchi, No chest wall tenderness.   Abdomen: Tense ascites. No abdominal erythema. Mild diffuse tenderness to palpation but not peritoneal pain.  Skin: Warm, Dry, No erythema, No rash, no jaundice  Back:  No CVA tenderness,  No spinal " tenderness, bony crepitance, step offs, or instability.   Neurologic: Alert & oriented x 3, Normal motor function, Normal sensory function, No focal deficits noted. Normal reflexes. Normal Cranial Nerves. Speaking full sentences.  Extremities: Equal, intact distal pulses, No cyanosis, clubbing or edema,  No tenderness.   Musculoskeletal: Good range of motion in all major joints. No tenderness to palpation or major deformities noted.    DIAGNOSTIC STUDIES / PROCEDURES    Paracentesis Procedure Note    Indication: Ascites    Consent: The patient provided verbal consent for this procedure.    Procedure: The patient was placed in the supine position with the head of the bed slightly elevated and the appropriate landmarks were identified. The skin over the puncture site in the right lower quadrant region was prepped with betadine and draped in a sterile fashion. Local anesthesia was obtained by infiltration using 2% Lidocaine without epinephrine. A paracentesis catheter was then advanced into the abdominal cavity over a needle and the needle was withdrawn. Fluid was returned which was serous.  A total volume of 650 cc was withdrawn which was sent not sent to the lab due to a theraupeutic paracentesis. The catheter was then withdrawn and a sterile dressing was placed over the site.     The patient tolerated the procedure well.    Complications: None    COURSE & MEDICAL DECISION MAKING  Nursing notes, VS, PMSFHx reviewed in chart.    11:23 AM - Patient seen and examined at bedside. The differential diagnoses include but are not limited to: Ascites    12:05 AM - Performed Paracentesis as noted above.     The patient will return for new or worsening symptoms and is stable at the time of discharge.    The patient is referred to a primary physician for blood pressure management, diabetic screening, and for all other preventative health concerns.    1:27 PM The patient's orthostatic vital signs are slightly positive. However,  he is not dizzy when he stands up and his blood pressure is still above 100 systolic. He'll be discharged home in stable condition and is to follow up with his primary care physician this week.    DISPOSITION:  Patient will be discharged home in stable condition.    FOLLOW UP:  Casey Wick D.O.  [address]  [address]  [place] [address]  [phone]    Call in 1 day  for recheck      OUTPATIENT MEDICATIONS:  New Prescriptions    No medications on file     FINAL IMPRESSION  1. Ascites of liver          Casey ORTIZ (Scribe), am scribing for, and in the presence of, Jeanie Walton M.D..    Electronically signed by: Casey Tracey (Scribe), 4/16/2017    Jeanie ORTIZ M.D. personally performed the services described in this documentation, as scribed by Casey Tracey in my presence, and it is both accurate and complete.    The note accurately reflects work and decisions made by me.  Jeanie Walton  4/16/2017  1:28 PM

## 2017-04-16 NOTE — ED NOTES
All lines and monitors d/c'd. Discharge paperwork and medications reviewed. Pt states he understands and has no questions. Pt encouraged to follow-up with PCP and come back to ER with worsening symptoms. Pt verbalizes understanding. Pt ambulated out of ED using his cane.

## 2017-04-16 NOTE — ED AVS SNAPSHOT
Home Care Instructions                                                                                                                Tico Solorzano   MRN: [id number]    Department:  [facility], Emergency Dept   Date of Visit:  4/16/2017            [facility], Emergency Dept    [address] [facility] [address]    Phone:  [phone]      You were seen by     Jeanie Walton M.D.      Your Diagnosis Was     Ascites of liver     R18.8       These are the medications you received during your hospitalization from 04/16/2017 1107 to 04/16/2017 1331     Date/Time Order Dose Route Action    04/16/2017 1145 lidocaine (XYLOCAINE) 2 % injection 20 mL 20 mL Other Given      Follow-up Information     1. Follow up with Casey Wick D.O.. Call in 1 day.    Specialty:  Internal Medicine    Why:  for recheck    Contact information    [address] [place] [address] [address]  [address]  [facility] [address] [phone]        Medication Information     Review all of your home medications and newly ordered medications with your primary doctor and/or pharmacist as soon as possible. Follow medication instructions as directed by your doctor and/or pharmacist.     Please keep your complete medication list with you and share with your physician. Update the information when medications are discontinued, doses are changed, or new medications (including over-the-counter products) are added; and carry medication information at all times in the event of emergency situations.               Medication List      ASK your doctor about these medications        Instructions    Morning Afternoon Evening Bedtime    doxazosin 2 MG Tabs   Commonly known as:  CARDURA        Take 2 mg by mouth every day.   Dose:  2 mg                        furosemide 20 MG Tabs   Commonly known as:  LASIX        Take 1 Tab by mouth every day.   Dose:  20 mg                        insulin glargine 100 UNIT/ML Soln   Commonly known as:  LANTUS        Inject 5-7 Units as instructed every evening.   Dose:  5-7 Units                        losartan 100 MG Tabs   Commonly known as:  COZAAR        Take 100 mg by mouth every day.   Dose:  100 mg                        OCUVITE Tabs        Take 1 Tab by mouth every day.   Dose:  1 Tab                        pioglitazone 15 MG Tabs   Commonly known as:  ACTOS        TAKE 1 TABLET BY MOUTH EVERY DAY                        rosuvastatin 20 MG Tabs   Commonly known as:  CRESTOR        Take 1 Tab by mouth every evening.   Dose:  20 mg                        sodium bicarbonate 650 MG Tabs   Commonly known as:  SODIUM BICARBONATE        Take 650 mg by mouth 2 times a day.   Dose:  650 mg                                Procedures and tests performed during your visit     Procedure/Test Number of Times Performed    CONSENT FOR NON-SURGERY W/O SED 2    CONTAINER EMPTY EVAC 1000ML 1    NURSING COMMUNICATION 2        Discharge Instructions       Paracentesis  Paracentesis is a procedure used to remove excess fluid from the belly (abdomen). Excess fluid in the belly is called ascites. Excess fluid can be the result of certain conditions, such as infection, inflammation, abdominal injury, heart failure, chronic scarring of the liver (cirrhosis), or cancer. The excess fluid is removed using a needle inserted through the skin and tissue into the abdomen.   A paracentesis may be done to:  · Determine the cause of the excess fluid through examination of the fluid.  · Relieve symptoms of shortness of breath or pain caused by the excess fluid.  · Determine presence of bleeding after an abdominal injury.  LET YOUR CAREGIVERS KNOW ABOUT:  · Allergies.  · Medications taken including herbs, eye drops, over-the-counter medications, and creams.  · Use of steroids (by mouth or creams).  · Previous problems with anesthetics or numbing medicine.  · Possibility of pregnancy, if this applies.  · History of blood clots (thrombophlebitis).  · History of  bleeding or blood problems.  · Previous surgery.  · Other health problems.  RISKS AND COMPLICATIONS  · Injury to an abdominal organ, such as the bowel (large intestine), liver, spleen, or bladder.  · Possible infection.  · Bleeding.  · Low blood pressure (hypotension).  BEFORE THE PROCEDURE  This is a procedure that can be done as an outpatient. Confirm the time that you need to arrive for your procedure. A blood sample may be done to determine your blood clotting time. The presence of a severe bleeding disorder (coagulopathy) which cannot be promptly corrected may make this procedure inadvisable. You may be asked to urinate.  PROCEDURE  The procedure will take about 30 minutes. This time will vary depending on the amount of fluid that is removed. You may be asked to lie on your back with your head elevated. An area on your abdomen will be cleansed. A numbing medicine may then be injected (local anesthesia) into the skin and tissue. A needle is inserted through your abdominal skin and tissues until it is positioned in your abdomen. You may feel pressure or slight pain as the needle is positioned into the abdomen. Fluid is removed from the abdomen through the needle. Tell your caregiver if you feel dizzy or lightheaded. The needle is withdrawn once the desired amount of fluid has been removed. A sample of the fluid may be sent for examination.   AFTER THE PROCEDURE  Your recovery will be assessed and monitored. If there are no problems, as an outpatient, you should be able to go home shortly after the procedure. There may be a very limited amount of clear fluid draining from the needle insertion site over the next 2 days. Confirm with your caregiver as to the expected amount of drainage.  Obtaining the Test Results  It is your responsibility to obtain your test results. Do not assume everything is normal if you have not heard from your caregiver or the medical facility. It is important for you to follow up on all of  your test results.  HOME CARE INSTRUCTIONS   · You may resume normal diet and activities as directed or allowed.  · Only take over-the-counter or prescription medicines for pain, discomfort, or fever as directed by your caregiver.  SEEK IMMEDIATE MEDICAL CARE IF:  · You develop shortness of breath or chest pain.  · You develop increasing pain, discomfort, or swelling in your abdomen.  · You develop new drainage or pus coming from site where fluid was removed.  · You develop swelling or increased redness from site where fluid was removed.  · You develop an unexplained temperature of 102° F (38.9° C) or above.     This information is not intended to replace advice given to you by your health care provider. Make sure you discuss any questions you have with your health care provider.     Document Released: 07/03/2006 Document Revised: 03/11/2013 Document Reviewed: 03/01/2016  [employer] Interactive Patient Education ©2016 [employer]    Ascites  Ascites is a collection of excess fluid in the abdomen. Ascites can range from mild to severe. It can get worse without treatment.  CAUSES  Possible causes include:  · Cirrhosis. This is the most common cause of ascites.  · Infection or inflammation in the abdomen.  · Cancer in the abdomen.  · Heart failure.  · Kidney disease.  · Inflammation of the pancreas.  · Clots in the veins of the liver.  SIGNS AND SYMPTOMS  Signs and symptoms may include:  · A feeling of fullness in your abdomen. This is common.  · An increase in the size of your abdomen or your waist.  · Swelling in your legs.  · Swelling of the scrotum in men.  · Difficulty breathing.  · Abdominal pain.  · Sudden weight gain.  If the condition is mild, you may not have symptoms.  DIAGNOSIS  To make a diagnosis, your health care provider will:  · Ask about your medical history.  · Perform a physical exam.  · Order imaging tests, such as an ultrasound or CT scan of your abdomen.  TREATMENT  Treatment depends on the cause  of the ascites. It may include:  · Taking a pill to make you urinate. This is called a water pill (diuretic pill).  · Strictly reducing your salt (sodium) intake. Salt can cause extra fluid to be kept in the body, and this makes ascites worse.  · Having a procedure to remove fluid from your abdomen (paracentesis).  · Having a procedure to transfer fluid from your abdomen into a vein.  · Having a procedure that connects two of the major veins within your liver and relieves pressure on your liver (TIPS procedure).  Ascites may go away or improve with treatment of the condition that caused it.   HOME CARE INSTRUCTIONS  · Keep track of your weight. To do this, weigh yourself at the same time every day and record your weight.  · Keep track of how much you drink and any changes in the amount you urinate.  · Follow any instructions that your health care provider gives you about how much to drink.  · Try not to eat salty (high-sodium) foods.  · Take medicines only as directed by your health care provider.  · Keep all follow-up visits as directed by your health care provider. This is important.  · Report any changes in your health to your health care provider, especially if you develop new symptoms or your symptoms get worse.  SEEK MEDICAL CARE IF:  · Your gain more than 3 pounds in 3 days.  · Your abdominal size or your waist size increases.  · You have new swelling in your legs.  · The swelling in your legs gets worse.  SEEK IMMEDIATE MEDICAL CARE IF:  · You develop a fever.  · You develop confusion.  · You develop new or worsening difficulty breathing.  · You develop new or worsening abdominal pain.  · You develop new or worsening swelling in the scrotum (in men).     This information is not intended to replace advice given to you by your health care provider. Make sure you discuss any questions you have with your health care provider.     Document Released: 12/18/2006 Document Revised: 01/08/2016 Document Reviewed:  07/17/2015  [employer] Interactive Patient Education ©2016 [employer]            Patient Information     Patient Information    Following emergency treatment: all patient requiring follow-up care must return either to a private physician or a clinic if your condition worsens before you are able to obtain further medical attention, please return to the emergency room.     Billing Information    At [facility], we work to make the billing process streamlined for our patients.  Our Representatives are here to answer any questions you may have regarding your hospital bill.  If you have insurance coverage and have supplied your insurance information to us, we will submit a claim to your insurer on your behalf.  Should you have any questions regarding your bill, we can be reached online or by phone as follows:  Online: You are able pay your bills online or live chat with our representatives about any billing questions you may have. We are here to help Monday - Friday from 8:00am to 7:30pm and 9:00am - 12:00pm on Saturdays.  Please visit [email or web address]  for more information.   Phone:  [phone] or [phone]    Please note that your emergency physician, surgeon, pathologist, radiologist, anesthesiologist, and other specialists are not employed by [facility] and will therefore bill separately for their services.  Please contact them directly for any questions concerning their bills at the numbers below:     Emergency Physician Services:  [phone]  [facility]:  [phone]  [facility]:  [phone]  [facility]:  [phone]    1. Your final bill may vary from the amount quoted upon discharge if all procedures are not complete at that time, or if your doctor has additional procedures of which we are not aware. You will receive an additional bill if you return to the Emergency Department at [facility] for suture removal  regardless of the facility of which the sutures were placed.     2. Please arrange for settlement of this account at the emergency registration.    3. All self-pay accounts are due in full at the time of treatment.  If you are unable to meet this obligation then payment is expected within 4-5 days.     4. If you have had radiology studies (CT, X-ray, Ultrasound, MRI), you have received a preliminary result during your emergency department visit. Please contact the radiology department [phone] to receive a copy of your final result. Please discuss the Final result with your primary physician or with the follow up physician provided.     Crisis Hotline:  [place] Crisis Hotline:  1-764-RRYLQRU or 1-174.331.1020  [place] Crisis Hotline:    1-696.599.7707 or 204-856-8595         ED Discharge Follow Up Questions    1. In order to provide you with very good care, we would like to follow up with a phone call in the next few days.  May we have your permission to contact you?     YES /  NO    2. What is the best phone number to call you? (       )_____-__________    3. What is the best time to call you?      Morning  /  Afternoon  /  Evening                   Patient Signature:  ____________________________________________________________    Date:  ____________________________________________________________      Your appointments     Apr 17, 2017  3:00 PM   US-PARA/THORA with [facility] US 1   [facility] - ULTRASOUND - [facility] ([facility])    [address] [facility]  [place] [address] [phone]           Ask for an H&P to be faxed (good only for 30 days).            Apr 24, 2017  2:30 PM   PACER CHECK ONLY with PACER CHECK-CAM B 2   [facility] B (--)    [address]  [place] [address]   [phone]            May 01, 2017  1:20 PM   Established Patient with Casey Wick D.O.   [facility] ([facility])    [address] [place] [address] [address]  [place]  [address]   [phone]           You will be receiving a confirmation call a few days before your appointment from our automated call confirmation system.            Jul 24, 2017  1:30 PM   PACER CHECK ONLY with PACER CHECK-CAM B 2   [facility] (--)    [address], [address]  [facility] [address]   [phone]            Jul 24, 2017  2:00 PM   FOLLOW UP with Navneet Brunner M.D.   [facility] B (--)    [address], [address]  [facility] [address]   [phone]

## 2017-04-16 NOTE — ED NOTES
Pt up ambulated to the restroom w/steady gait to urinate.  Inform consent signed by pt and witnessed by RN.  Pt back room and hooked back up to the monitor/pulse ox.

## 2017-04-16 NOTE — ED NOTES
MD notified of orthostatics. MD states if pt is able to walk without feeling dizzy, discharge is approved.

## 2017-04-17 NOTE — TELEPHONE ENCOUNTER
I called the patient, polyp apologize for the error and not changing the standing order for paracentesis

## 2017-04-17 NOTE — TELEPHONE ENCOUNTER
Please call Merly k[facility], Instructions are written for every 2 weeks, should have #10 procedure is available.

## 2017-04-17 NOTE — TELEPHONE ENCOUNTER
1. Caller Name: [facility]                      Call Back Number: [phone]    2. Message: Merly with radiology called the IR-PARACENTESIS, ABD WITH IMAGING was canceled because pt was in ED 4/16/17 and had it done, per Merly pt thinks he should be having this more frequent, please advise it you will like patient to have this done more often please enter a standing order other wise the order has been canceled since it was done in ED yesterday, thank you.     3. Patient approves office to leave a detailed voicemail/MyChart message: N\A

## 2017-04-18 NOTE — TELEPHONE ENCOUNTER
Understanding order recurrences it should show 10 / 10 meaning there are additional 10 procedures that can be done

## 2017-04-28 NOTE — TELEPHONE ENCOUNTER
ESTABLISHED PATIENT PRE-VISIT PLANNING     Note: Patient will not be contacted if there is no indication to call.     1.  Reviewed note from last office visit with PCP and/or other med group provider: Yes    2.  If any orders were placed at last visit, do we have Results/Consult Notes?        •  Labs - Labs ordered, completed and results are in chart.       •  Imaging - Imaging was not ordered at last office visit.       •  Referrals - Referral ordered, patient has NOT been seen. Interventional Radiology     3.  Immunizations were updated in [facility] using WebIZ?: Yes       •  Web Iz Recommendations: HEPATITIS A  HEPATITIS B PNEUMOVAX (PPSV23) TD    4.  Patient is due for the following Health Maintenance Topics:   Health Maintenance Due   Topic Date Due   • IMM ZOSTER VACCINE  10/11/1998   • RETINAL SCREENING  01/30/2016   • IMM PNEUMOCOCCAL 65+ (ADULT) LOW/MEDIUM RISK SERIES (2 of 2 - PPSV23) 12/16/2016   • URINE ACR / MICROALBUMIN  03/22/2017   • Annual Wellness Visit  04/01/2017       5.  Patient was not informed to arrive 15 min prior to their scheduled appointment and bring in their medication bottles.

## 2017-05-01 NOTE — MR AVS SNAPSHOT
Tico Whalen Rashad   2017 1:20 PM   Office Visit   MRN: [id number]    Department:  [facility]   Dept Phone:  [phone]    Description:  Male : 1940   Provider:  Casey Wick D.O.           Reason for Visit     Follow-Up           Allergies as of 2017     Allergen Noted Reactions    Valsartan 2015       12/16/15 pt states he doesn't remember if he's allergic to this medication.      You were diagnosed with     Ascites controlled with medication   [[id number]]         Vital Signs     Blood Pressure Pulse Temperature Respirations Height Weight    124/56 mmHg 92 36.9 °C (98.4 °F) 20 1.829 m (6') 86.592 kg (190 lb 14.4 oz)    Body Mass Index Oxygen Saturation Smoking Status             25.89 kg/m2 96% Former Smoker         Basic Information     Date Of Birth Sex Race Ethnicity Preferred Language    1940 Male White Non- English      Your appointments     May 02, 2017 10:00 AM   Interventional Exam 2-Hours with [facility] IR US OUTPATIENT 1, [facility] IR 6   [facility] IMAGING - INTERVENTIONAL - [facility] [address]  [place] [address]   [phone]            May 16, 2017 10:00 AM   Interventional Exam 2-Hours with [facility] IR US OUTPATIENT 1, [facility] IR 7   [facility] - INTERVENTIONAL - [facility] [address]  [place] [address]   [phone]            May 30, 2017 10:00 AM   Interventional Exam 2-Hours with [facility] IR US OUTPATIENT 1, [facility] IR 7   [facility] IMAGING - INTERVENTIONAL - [facility] [address]  [place] [address]   [phone]            2017  1:30 PM   PACER CHECK ONLY with PACER CHECK-CAM B 2   [facility]-CAM B (--)    [address], [address]  [place] [address]   [phone]            2017  2:00 PM   FOLLOW UP with Navneet MORRISSEY  RUFINO Brunner.   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   8/10/2015 - Present    History of prostate cancer Z85.46   11/4/2015 - Present    Acute on chronic renal failure (CMS-HCC) N17.9, N18.9 High  12/16/2015 - Present    BPH (benign prostatic hyperplasia) (Chronic) N40.0   12/17/2015 - Present    S/P placement of cardiac pacemaker (Chronic) Z95.0   12/17/2015 - Present    Cough R05   1/21/2016 - Present    Lactic acidosis E87.2 High  1/21/2016 - Present    Type 2 diabetes mellitus (CMS-HCC) E11.9   2/3/2016 - Present    Proteinuria R80.9 High  1/16/2017 - Present    Normocytic anemia (Chronic) D64.9   1/16/2017 - Present    Hyperchloremia E87.8 High  1/16/2017 - Present    Right upper lobe pneumonia J18.9 High  2/16/2017 - Present    Protein calorie malnutrition (CMS-HCC) E46 Low  2/17/2017 - Present    Ascites controlled with medication R18.8   3/30/2017 - Present      Health Maintenance        Date Due Completion Dates    IMM ZOSTER VACCINE 10/11/1998 ---    RETINAL SCREENING 1/30/2016 1/30/2015 (Done), 6/10/2013, 4/13/2011 (N/S)    Override on 1/30/2015: Done (Dr. Wilkinson. )    Override on 4/13/2011: (N/S) (Dr Wilkinson macular degeneration)    IMM PNEUMOCOCCAL 65+ (ADULT) LOW/MEDIUM RISK SERIES (2 of 2 - PPSV23) 12/16/2016 12/16/2015    URINE ACR / MICROALBUMIN 3/22/2017 3/22/2016, 5/12/2014, 10/10/2013, 9/5/2012, 6/21/2012, 1/26/2012, 4/12/2011, 4/6/2010    FASTING LIPID  PROFILE 7/19/2017 7/19/2016, 7/15/2015, 2/4/2014, 1/26/2012, 9/14/2011 (N/S)    Override on 9/14/2011: (N/S) (See labs from [facility])    A1C SCREENING 8/16/2017 2/16/2017, 7/19/2016, 3/22/2016, 11/18/2015, 7/15/2015, 2/17/2015, 11/13/2014, 8/12/2014, 5/12/2014, 2/4/2014, 10/10/2013, 7/9/2013, 2/26/2013, 12/27/2012, 9/5/2012, 6/21/2012, 1/26/2012, 11/10/2011, 4/12/2011, 7/13/2010, 4/6/2010, 1/12/2010, 11/10/2009, 8/12/2009    DIABETES MONOFILAMENT / LE EXAM 9/8/2017 9/8/2016 (Done), 3/10/2015 (Done), 2/21/2014 (Done), 1/4/2013 (Done), 7/2/2012 (N/S), 6/13/2011 (N/S)    Override on 9/8/2016: Done    Override on 3/10/2015: Done (Dr. Wick)    Override on 2/21/2014: Done    Override on 1/4/2013: Done    Override on 7/2/2012: (N/S)    Override on 6/13/2011: (N/S) (Dr. Wick)    SERUM CREATININE 3/28/2018 3/28/2017, 2/18/2017, 2/17/2017, 2/16/2017, 2/15/2017, 1/19/2017, 1/18/2017, 1/17/2017, 1/16/2017, 7/19/2016, 1/22/2016, 1/21/2016, 12/18/2015, 12/17/2015, 12/16/2015, 11/18/2015, 7/15/2015, 2/17/2015, 11/13/2014, 5/12/2014, 7/9/2013, 1/26/2012, 12/18/2011, 12/16/2011, 11/10/2009    IMM DTaP/Tdap/Td Vaccine (2 - Td) 12/12/2021 12/12/2011    COLONOSCOPY 7/17/2023 7/17/2013 (Declined)    Override on 7/17/2013: Patient Declined            Current Immunizations     13-VALENT PCV PREVNAR 12/16/2015  6:44 PM    INFLUENZA VACCINE H1N1 1/14/2010    Influenza TIV (IM) 10/16/2016, 10/13/2014, 10/21/2013, 11/1/2011    Influenza Vaccine Adult HD 10/28/2015    Influenza Vaccine Pediatric 12/14/2009    Pneumococcal Vaccine (UF)Historical Data 10/1/2003    Tdap Vaccine 12/12/2011      Below and/or attached are the medications your provider expects you to take. Review all of your home medications and newly ordered medications with your provider and/or pharmacist. Follow medication instructions as directed by your provider and/or pharmacist. Please keep your medication list with you and share with your  provider. Update the information when medications are discontinued, doses are changed, or new medications (including over-the-counter products) are added; and carry medication information at all times in the event of emergency situations     Allergies:  VALSARTAN - (reactions not documented)               Medications  Valid as of: May 01, 2017 -  1:48 PM    Generic Name Brand Name Tablet Size Instructions for use    Doxazosin Mesylate (Tab) CARDURA 2 MG Take 2 mg by mouth every day.        Furosemide (Tab) LASIX 20 MG Take 1 Tab by mouth every day.        Insulin Glargine (Solution) LANTUS 100 UNIT/ML Inject 5-7 Units as instructed every evening.        Losartan Potassium (Tab) COZAAR 100 MG Take 100 mg by mouth every day.        Multiple Vitamins-Minerals (Tab) OCUVITE  Take 1 Tab by mouth every day.        Pioglitazone HCl (Tab) ACTOS 15 MG TAKE 1 TABLET BY MOUTH EVERY DAY        Rosuvastatin Calcium (Tab) CRESTOR 20 MG Take 1 Tab by mouth every evening.        Sodium Bicarbonate (Tab) SODIUM BICARBONATE 650 MG Take 650 mg by mouth 2 times a day.        .                 Medicines prescribed today were sent to:     [id number] - [place], [place] - [address] [place] [address]    Phone: [phone] Fax: [phone]    Open 24 Hours?: No      Medication refill instructions:       If your prescription bottle indicates you have medication refills left, it is not necessary to call your provider’s office. Please contact your pharmacy and they will refill your medication.    If your prescription bottle indicates you do not have any refills left, you may request refills at any time through one of the following ways: The online [employer] system (except Urgent Care), by calling your provider’s office, or by asking your pharmacy to contact your provider’s office with a refill request. Medication refills are processed only during regular business hours and may not be  available until the next business day. Your provider may request additional information or to have a follow-up visit with you prior to refilling your medication.   *Please Note: Medication refills are assigned a new Rx number when refilled electronically. Your pharmacy may indicate that no refills were authorized even though a new prescription for the same medication is available at the pharmacy. Please request the medicine by name with the pharmacy before contacting your provider for a refill.        Your To Do List     Future Labs/Procedures Complete By Expires    CT-ABDOMEN-PELVIS W/O  As directed 5/1/2018         [facility] Status: Patient Declined

## 2017-05-02 NOTE — PROGRESS NOTES
CC: Tico Solorzano is a 76 y.o. male is suffering from   Chief Complaint   Patient presents with   • Follow-Up         SUBJECTIVE:  1. Ascites controlled with medication  Ed's here for follow-up, we've discussed that he is continuing to have problems with ascites. Is scheduled for pleurocentesis. I'm still uncertain as the etiology behind his ascitic fluid concentration. Possibly secondary to his renal failure.         Past social, family, history: , Ashleigh  Social History   Substance Use Topics   • Smoking status: Former Smoker -- 2.00 packs/day for 40 years     Quit date: 07/13/1991   • Smokeless tobacco: Never Used      Comment: quit 20 yrs ago   • Alcohol Use: No         MEDICATIONS:    Current outpatient prescriptions:   •  losartan (COZAAR) 100 MG Tab, Take 100 mg by mouth every day., Disp: , Rfl:   •  sodium bicarbonate (SODIUM BICARBONATE) 650 MG Tab, Take 650 mg by mouth 2 times a day., Disp: , Rfl:   •  furosemide (LASIX) 20 MG Tab, Take 1 Tab by mouth every day., Disp: 60 Tab, Rfl: 11  •  doxazosin (CARDURA) 2 MG Tab, Take 2 mg by mouth every day., Disp: , Rfl:   •  insulin glargine (LANTUS) 100 UNIT/ML Solution, Inject 5-7 Units as instructed every evening., Disp: , Rfl:   •  pioglitazone (ACTOS) 15 MG Tab, TAKE 1 TABLET BY MOUTH EVERY DAY, Disp: 90 Tab, Rfl: 3  •  Multiple Vitamins-Minerals (OCUVITE) Tab, Take 1 Tab by mouth every day., Disp: , Rfl:   •  rosuvastatin (CRESTOR) 20 MG Tab, Take 1 Tab by mouth every evening., Disp: 30 Tab, Rfl: 11    PROBLEMS:  Patient Active Problem List    Diagnosis Date Noted   • Right upper lobe pneumonia 02/16/2017     Priority: High   • Proteinuria 01/16/2017     Priority: High   • Hyperchloremia 01/16/2017     Priority: High   • Lactic acidosis 01/21/2016     Priority: High   • Acute on chronic renal failure (CMS-HCC) 12/16/2015     Priority: High   • Protein calorie malnutrition (CMS-HCC) 02/17/2017     Priority: Low   • Gout 06/03/2009     Priority: Low    • Ascites controlled with medication 03/30/2017   • Normocytic anemia 01/16/2017   • Type 2 diabetes mellitus (CMS-HCC) 02/03/2016   • Cough 01/21/2016   • BPH (benign prostatic hyperplasia) 12/17/2015   • S/P placement of cardiac pacemaker 12/17/2015   • History of prostate cancer 11/04/2015   • 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  Heart: No chest pain.  Lungs:  No shortness of Breath.  Psychological: Alvin unusual Anxiety depression     PHYSICAL EXAM   Constitutional: Alert, cooperative, not in acute distress.  Cardiovascular:  Rate Rhythm is regular without murmurs rubs clicks.     Thorax & Lungs: Clear to auscultation, no wheezing, rhonchi, or rales  HENT: Normocephalic, Atraumatic.  Eyes: PERRLA, EOMI, Conjunctiva normal.   Abdomin: Tense abdomen without rebound or guarding.   Skin: Warm, Dry, No erythema, No rash.   Neurologic: Alert & oriented x 3, cranial nerves II through XII are intact, Normal motor function, Normal sensory function, No focal deficits noted.   Psychiatric: Affect normal, Judgment normal, Mood normal.     VITAL SIGNS:/56 mmHg  Pulse 92  Temp(Src) 36.9 °C (98.4 °F)  Resp 20  Ht 1.829 m (6')  Wt 86.592 kg (190 lb 14.4 oz)  BMI 25.89 kg/m2  SpO2 96%    Labs: Reviewed    Assessment:                                                     Plan:    1. Ascites controlled with medication  Ascites, etiology uncertain CT ordered, echocardiogram reviewed no evidence of pulmonary hypertension on echo, patient's PT/INR is slightly prolonged possibly consistent with cirrhosis. Patient does not have autoimmune hepatitis or hepatitis A B or C.  - CT-ABDOMEN-PELVIS W/O; Future

## 2017-05-02 NOTE — PROGRESS NOTES
US guided therapeutic paracentesis done by Dr. Durham; RLQ access site; 8,100 mL  Yellow cloudy fluid obtained and discarded; pt tolerated the procedure well; pt hemodynamically stable pre/intra/post procedure; all questions and concerns answered prior to d/c; patient provided with all appropriate education for procedure; pt d/c home.

## 2017-05-16 NOTE — PROGRESS NOTES
US guided therapeutic paracentesis done by Dr. Durham; RLQ access site; 7,300 mL  of yellow clear fluid obtained and discarded; pt tolerated the procedure well; pt hemodynamically stable pre/intra/post procedure; all questions and concerns answered prior to d/c; patient provided with all appropriate education for procedure; pt d/c home.

## 2017-05-30 NOTE — PROGRESS NOTES
US guided therapeutic paracentesis done by Dr. Ramirez assisted by Alyssa Carpenter and Francia Betancourt; RLQ access site; 7,500mL of cloudy yellow fluid obtained and discarded; pt tolerated the procedure well; pt hemodynamically stable pre/intra/post procedure; all questions and concerns answered prior to d/c; patient provided with all appropriate education for procedure; pt d/c home.

## 2017-06-05 NOTE — PROGRESS NOTES
CC: Tico Solorzano is a 76 y.o. male is suffering from   Chief Complaint   Patient presents with   • Follow-Up         SUBJECTIVE:  1. Acute on chronic renal failure (CMS-HCC)  Ed is here for follow-up has a history of renal failure, is being followed by nephrology.     2. Hepatic insufficiency  Patient with a history of hepatic insufficiency notes are reviewed from gastroenterology.     3. Other ascites  Patient's undergoing pleurocentesis on a regular basis secondary to significant shifts in osmotic gradient.         Past social, family, history: , Valery  Social History   Substance Use Topics   • Smoking status: Former Smoker -- 2.00 packs/day for 40 years     Quit date: 07/13/1991   • Smokeless tobacco: Never Used      Comment: quit 20 yrs ago   • Alcohol Use: No         MEDICATIONS:    Current outpatient prescriptions:   •  spironolactone (ALDACTONE) 25 MG Tab, Take 25 mg by mouth every day., Disp: , Rfl:   •  losartan (COZAAR) 100 MG Tab, Take 1 Tab by mouth every day., Disp: 30 Tab, Rfl: 6  •  sodium bicarbonate (SODIUM BICARBONATE) 650 MG Tab, Take 650 mg by mouth 2 times a day., Disp: , Rfl:   •  furosemide (LASIX) 20 MG Tab, Take 1 Tab by mouth every day., Disp: 60 Tab, Rfl: 11  •  doxazosin (CARDURA) 2 MG Tab, Take 2 mg by mouth every day., Disp: , Rfl:   •  insulin glargine (LANTUS) 100 UNIT/ML Solution, Inject 5-7 Units as instructed every evening., Disp: , Rfl:   •  pioglitazone (ACTOS) 15 MG Tab, TAKE 1 TABLET BY MOUTH EVERY DAY, Disp: 90 Tab, Rfl: 3  •  Multiple Vitamins-Minerals (OCUVITE) Tab, Take 1 Tab by mouth every day., Disp: , Rfl:   •  rosuvastatin (CRESTOR) 20 MG Tab, Take 1 Tab by mouth every evening., Disp: 30 Tab, Rfl: 11    PROBLEMS:  Patient Active Problem List    Diagnosis Date Noted   • Right upper lobe pneumonia 02/16/2017     Priority: High   • Proteinuria 01/16/2017     Priority: High   • Hyperchloremia 01/16/2017     Priority: High   • Lactic acidosis 01/21/2016      "Priority: High   • Acute on chronic renal failure (CMS-HCC) 12/16/2015     Priority: High   • Protein calorie malnutrition (CMS-HCC) 02/17/2017     Priority: Low   • Gout 06/03/2009     Priority: Low   • Ascites controlled with medication 03/30/2017   • Normocytic anemia 01/16/2017   • Type 2 diabetes mellitus (CMS-HCC) 02/03/2016   • Cough 01/21/2016   • BPH (benign prostatic hyperplasia) 12/17/2015   • S/P placement of cardiac pacemaker 12/17/2015   • History of prostate cancer 11/04/2015   • 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  Heart: No chest pain.  Lungs:  No shortness of Breath.  Psychological: Alvin unusual Anxiety depression     PHYSICAL EXAM   Constitutional: Alert, cooperative, not in acute distress.  Cardiovascular:  Rate Rhythm is regular without murmurs rubs clicks.     Thorax & Lungs: Clear to auscultation, no wheezing, rhonchi, or rales  HENT: Normocephalic, Atraumatic.  Eyes: PERRLA, EOMI, Conjunctiva normal.   Neck: Trachia is midline no swelling of the thyroid.   Lymphatic: No lymphadenopathy noted.   Neurologic: Alert & oriented x 3, cranial nerves II through XII are intact, Normal motor function, Normal sensory function, No focal deficits noted.   Psychiatric: Affect normal, Judgment normal, Mood normal.     VITAL SIGNS:/54 mmHg  Pulse 84  Temp(Src) 36.9 °C (98.4 °F)  Resp 18  Ht 1.829 m (6' 0.01\")  Wt 79.198 kg (174 lb 9.6 oz)  BMI 23.67 kg/m2  SpO2 97%    Labs: Reviewed    Assessment:                                                     Plan:    1. Acute on chronic renal failure (CMS-HCC)  Labs reviewed continued renal insufficiency follow-up with [facility] nephrology.     2. Hepatic " insufficiency  Consultation from gastroenterology reviewed with the patient    3. Other ascites  Patient is scheduled for pleurocentesis in one week encourage patient to comply with a low-sodium diet

## 2017-06-05 NOTE — MR AVS SNAPSHOT
"        Tico Whalen Rashad   2017 2:00 PM   Office Visit   MRN: [id number]    Department:  [facility]   Dept Phone:  [phone]    Description:  Male : 1940   Provider:  Casey Wick D.O.           Reason for Visit     Follow-Up           Allergies as of 2017     Allergen Noted Reactions    Valsartan 2015       12/16/15 pt states he doesn't remember if he's allergic to this medication.      Vital Signs     Blood Pressure Pulse Temperature Respirations Height Weight    132/54 mmHg 84 36.9 °C (98.4 °F) 18 1.829 m (6' 0.01\") 79.198 kg (174 lb 9.6 oz)    Body Mass Index Oxygen Saturation Smoking Status             23.67 kg/m2 97% Former Smoker         Basic Information     Date Of Birth Sex Race Ethnicity Preferred Language    1940 Male White Non- English      Your appointments     2017  9:00 AM   Interventional Exam 2-Hours with [facility] IR US OUTPATIENT 1, [facility] IR 5   [facility] IM[facility] - INTERVENTIONAL - [facility]ICAL CTR [address] [place] [address]   [phone]            2017 10:00 AM   Interventional Exam 2-Hours with [facility] IR US OUTPATIENT 1, [facility] IR 7   [facility] - INTERVENTIONAL - [facility] CTR [address] [place] [address]   [phone]            2017 10:00 AM   Interventional Exam 2-Hours with [facility] IR US OUTPATIENT 1, [facility] IR 7   [facility] IMAGING - INTERVENTIONAL - [facility] MEDICAL CTR [address] [place] [address]   [phone]            2017  1:30 PM   PACER CHECK ONLY with PACER CHECK-CAM B 2   [facility]-CAM B (--)    [address]  [place] [address]   [phone]            2017  2:00 PM   FOLLOW UP with Navneet Burnner M.D.   [facility]-CAM B (--)   " [address], [address]  [place] [address]   [phone]            Jul 25, 2017 10:00 AM   Interventional Exam 2-Hours with [facility] IR US OUTPATIENT 1, [facility] IR 7   [facility] - INTERVENTIONAL - [facility] MEDICAL CTR ([facility])    [facility]  [address] [facility]  [place] [address]   [phone]              Problem List              ICD-10-CM Priority Class Noted - Resolved    Renal insufficiency N28.9   5/22/2009 - Present    Dyslipidemia (Chronic) E78.5   5/22/2009 - Present    Gout M10.9 Low  6/3/2009 - Present    HTN (hypertension) (Chronic) I10   9/16/2010 - Present    Vitamin d deficiency    1/16/2012 - Present    Protein in urine R80.9   7/2/2012 - Present    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   8/10/2015 - Present    History of prostate cancer Z85.46   11/4/2015 - Present    Acute on chronic renal failure (CMS-HCC) N17.9, N18.9 High  12/16/2015 - Present    BPH (benign prostatic hyperplasia) (Chronic) N40.0   12/17/2015 - Present    S/P placement of cardiac pacemaker (Chronic) Z95.0   12/17/2015 - Present    Cough R05   1/21/2016 - Present    Lactic acidosis E87.2 High  1/21/2016 - Present    Type 2 diabetes mellitus (CMS-HCC) E11.9   2/3/2016 - Present    Proteinuria R80.9 High  1/16/2017 - Present    Normocytic anemia (Chronic) D64.9   1/16/2017 - Present    Hyperchloremia E87.8 High  1/16/2017 - Present    Right upper lobe pneumonia J18.1 High  2/16/2017 - Present    Protein calorie malnutrition (CMS-HCC) E46 Low  2/17/2017 - Present    Ascites controlled with medication R18.8   3/30/2017 - Present      Health Maintenance        Date Due Completion Dates    IMM ZOSTER VACCINE 10/11/1998 ---    RETINAL SCREENING 1/30/2016 1/30/2015 (Done), 6/10/2013, 4/13/2011 (N/S)    Override on 1/30/2015: Done (Dr. Wilkinson. )    Override on 4/13/2011: (N/S) (Dr Wilkinson macular degeneration)    IMM PNEUMOCOCCAL 65+ (ADULT) LOW/MEDIUM RISK SERIES  (2 of 2 - PPSV23) 12/16/2016 12/16/2015    URINE ACR / MICROALBUMIN 3/22/2017 3/22/2016, 5/12/2014, 10/10/2013, 9/5/2012, 6/21/2012, 1/26/2012, 4/12/2011, 4/6/2010    FASTING LIPID PROFILE 7/19/2017 7/19/2016, 7/15/2015, 2/4/2014, 1/26/2012, 9/14/2011 (N/S)    Override on 9/14/2011: (N/S) (See labs from [facility])    A1C SCREENING 8/16/2017 2/16/2017, 7/19/2016, 3/22/2016, 11/18/2015, 7/15/2015, 2/17/2015, 11/13/2014, 8/12/2014, 5/12/2014, 2/4/2014, 10/10/2013, 7/9/2013, 2/26/2013, 12/27/2012, 9/5/2012, 6/21/2012, 1/26/2012, 11/10/2011, 4/12/2011, 7/13/2010, 4/6/2010, 1/12/2010, 11/10/2009, 8/12/2009    DIABETES MONOFILAMENT / LE EXAM 9/8/2017 9/8/2016 (Done), 3/10/2015 (Done), 2/21/2014 (Done), 1/4/2013 (Done), 7/2/2012 (N/S), 6/13/2011 (N/S)    Override on 9/8/2016: Done    Override on 3/10/2015: Done (Dr. Wick)    Override on 2/21/2014: Done    Override on 1/4/2013: Done    Override on 7/2/2012: (N/S)    Override on 6/13/2011: (N/S) (Dr. Wikc)    SERUM CREATININE 3/28/2018 3/28/2017, 2/18/2017, 2/17/2017, 2/16/2017, 2/15/2017, 1/19/2017, 1/18/2017, 1/17/2017, 1/16/2017, 7/19/2016, 1/22/2016, 1/21/2016, 12/18/2015, 12/17/2015, 12/16/2015, 11/18/2015, 7/15/2015, 2/17/2015, 11/13/2014, 5/12/2014, 7/9/2013, 1/26/2012, 12/18/2011, 12/16/2011, 11/10/2009    IMM DTaP/Tdap/Td Vaccine (2 - Td) 12/12/2021 12/12/2011    COLONOSCOPY 7/17/2023 7/17/2013 (Declined)    Override on 7/17/2013: Patient Declined            Current Immunizations     13-VALENT PCV PREVNAR 12/16/2015  6:44 PM    INFLUENZA VACCINE H1N1 1/14/2010    Influenza TIV (IM) 10/16/2016, 10/13/2014, 10/21/2013, 11/1/2011    Influenza Vaccine Adult HD 10/28/2015    Influenza Vaccine Pediatric 12/14/2009    Pneumococcal Vaccine (UF)Historical Data 10/1/2003    Tdap Vaccine 12/12/2011      Below and/or attached are the medications your provider expects you to take. Review all of your home medications and newly ordered medications  with your provider and/or pharmacist. Follow medication instructions as directed by your provider and/or pharmacist. Please keep your medication list with you and share with your provider. Update the information when medications are discontinued, doses are changed, or new medications (including over-the-counter products) are added; and carry medication information at all times in the event of emergency situations     Allergies:  VALSARTAN - (reactions not documented)               Medications  Valid as of: June 05, 2017 -  2:24 PM    Generic Name Brand Name Tablet Size Instructions for use    Doxazosin Mesylate (Tab) CARDURA 2 MG Take 2 mg by mouth every day.        Furosemide (Tab) LASIX 20 MG Take 1 Tab by mouth every day.        Insulin Glargine (Solution) LANTUS 100 UNIT/ML Inject 5-7 Units as instructed every evening.        Losartan Potassium (Tab) COZAAR 100 MG Take 1 Tab by mouth every day.        Multiple Vitamins-Minerals (Tab) OCUVITE  Take 1 Tab by mouth every day.        Pioglitazone HCl (Tab) ACTOS 15 MG TAKE 1 TABLET BY MOUTH EVERY DAY        Rosuvastatin Calcium (Tab) CRESTOR 20 MG Take 1 Tab by mouth every evening.        Sodium Bicarbonate (Tab) SODIUM BICARBONATE 650 MG Take 650 mg by mouth 2 times a day.        Spironolactone (Tab) ALDACTONE 25 MG Take 25 mg by mouth every day.        .                 Medicines prescribed today were sent to:     [facility]/PHARMACY #[id number] - [place], [place] - [address] [place] [address] AT [address] [address]    Phone: [phone] Fax: [phone]    Open 24 Hours?: No      Medication refill instructions:       If your prescription bottle indicates you have medication refills left, it is not necessary to call your provider’s office. Please contact your pharmacy and they will refill your medication.    If your prescription bottle indicates you do not have any refills left, you may request refills at any time through one of the following  ways: The online [employer] system (except Urgent Care), by calling your provider’s office, or by asking your pharmacy to contact your provider’s office with a refill request. Medication refills are processed only during regular business hours and may not be available until the next business day. Your provider may request additional information or to have a follow-up visit with you prior to refilling your medication.   *Please Note: Medication refills are assigned a new Rx number when refilled electronically. Your pharmacy may indicate that no refills were authorized even though a new prescription for the same medication is available at the pharmacy. Please request the medicine by name with the pharmacy before contacting your provider for a refill.           MyChart Status: Patient Declined

## 2017-06-13 NOTE — PROGRESS NOTES
US guided therapeutic paracentesis done by Dr. Borden assisted by PABLO Miranda RN and Cat Joya; RLQ access site; 7,600 mL obtained and discarded; pt tolerated the procedure well; pt hemodynamically stable pre/intra/post procedure; all questions and concerns answered prior to d/c; patient provided with all appropriate education for procedure; pt d/c home.

## 2017-06-27 NOTE — PROGRESS NOTES
US guided therapeutic paracentesis done by Dr. Durham assisted by Leann Gonzales RN and Cat Viera; RLQ access site; 5,200 mL of cloudy yellow fluid obtained and discarded; pt tolerated the procedure well; pt hemodynamically stable pre/intra/post procedure; all questions and concerns answered prior to d/c; patient provided with all appropriate education for procedure; pt d/c home.

## 2017-06-28 NOTE — PROGRESS NOTES
Late note : 5200 mL yellow fluid removed via paracentesis from right side of abdomen.  Per protocol patient offered albumin.  Patient declined albumin.  Risks and benefits reviewed with patient and wife.  Patient adamant about not getting albumin.   VSS, slaved into EPIC.   RN did not give albumin.  Patient discharged in a stable condition.

## 2017-07-11 NOTE — OR SURGEON
Immediate Post- Operative Note        PostOp Diagnosis: ascites      Procedure(s): US guided paracentesis      Estimated Blood Loss: Less than 5 ml        Complications: None            7/11/2017     11:17 AM     Aj Cruz

## 2017-07-17 PROBLEM — K74.69 CIRRHOSIS, CRYPTOGENIC (HCC): Status: ACTIVE | Noted: 2017-01-01

## 2017-07-17 PROBLEM — E88.01 ALPHA-1-ANTITRYPSIN DEFICIENCY (HCC): Status: ACTIVE | Noted: 2017-01-01

## 2017-07-17 NOTE — MR AVS SNAPSHOT
"        Louiscarmen Soodsesar Solorzano   2017 1:20 PM   Office Visit   MRN: [id number]    Department:  [facility]   Dept Phone:  [phone]    Description:  Male : 1940   Provider:  Casey Wick D.O.           Reason for Visit     Follow-Up           Allergies as of 2017     Allergen Noted Reactions    Valsartan 2015       12/16/15 pt states he doesn't remember if he's allergic to this medication.      You were diagnosed with     Cirrhosis, cryptogenic (CMS-HCC)   [217621]       Alpha-1-antitrypsin deficiency (CMS-HCC)   [273.4.ICD-9-CM]         Vital Signs     Blood Pressure Pulse Temperature Respirations Height Weight    122/60 mmHg 78 36.7 °C (98 °F) 18 1.854 m (6' 0.99\") 77.565 kg (171 lb)    Body Mass Index Oxygen Saturation Smoking Status             22.57 kg/m2 98% Former Smoker         Basic Information     Date Of Birth Sex Race Ethnicity Preferred Language    1940 Male White Non- English      Your appointments     2017  1:30 PM   PACER CHECK ONLY with PACER CHECK-CAM B   [facility]-CAM B (--)    [address], [address]  [place] [address]   [phone]            2017  2:00 PM   FOLLOW UP with Navneet Brunner M.D.   [facility]-CAM B (--)    [address]  [place] [address] [phone]            2017 10:00 AM   Interventional Exam 2-Hours with [facility] IR US OUTPATIENT 1, [facility] IR 7   [facility] IMAGING - INTERVENTIONAL - [facility] ([facility])    [facility]  [address] [facility] [address]  [place] [address] [phone]            Aug 30, 2017  1:00 PM   Established Patient with Casey Wick D.O.   [facility])    [address] [facility]  [place] [address] [phone]           You will be receiving a confirmation call a few days before your appointment from our automated call confirmation system.              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   8/10/2015 - Present    History of prostate cancer Z85.46   11/4/2015 - Present    Acute on chronic renal failure (CMS-HCC) N17.9, N18.9 High  12/16/2015 - Present    BPH (benign prostatic hyperplasia) (Chronic) N40.0   12/17/2015 - Present    S/P placement of cardiac pacemaker (Chronic) Z95.0   12/17/2015 - Present    Cough R05   1/21/2016 - Present    Lactic acidosis E87.2 High  1/21/2016 - Present    Type 2 diabetes mellitus (CMS-HCC) E11.9   2/3/2016 - Present    Proteinuria R80.9 High  1/16/2017 - Present    Normocytic anemia (Chronic) D64.9   1/16/2017 - Present    Hyperchloremia E87.8 High  1/16/2017 - Present    Right upper lobe pneumonia J18.1 High  2/16/2017 - Present    Protein calorie malnutrition (CMS-[facility]) E46 Low  2/17/2017 - Present    Ascites controlled with medication R18.8   3/30/2017 - Present    Cirrhosis, cryptogenic (CMS-HCC) K74.69   7/17/2017 - Present    Alpha-1-antitrypsin deficiency (CMS-HCC) E88.01   7/17/2017 - Present      Health Maintenance        Date Due Completion Dates    IMM ZOSTER VACCINE 10/11/1998 ---    RETINAL SCREENING 1/30/2016 1/30/2015 (Done), 6/10/2013, 4/13/2011 (N/S)    Override on 1/30/2015: Done (Dr. Wilkinson. )    Override on 4/13/2011: (N/S) (Dr Wilkinson macular degeneration)    IMM PNEUMOCOCCAL 65+ (ADULT) LOW/MEDIUM RISK SERIES (2 of 2 - PPSV23) 12/16/2016 12/16/2015    FASTING LIPID PROFILE 7/19/2017 7/19/2016, 7/15/2015, 2/4/2014, 1/26/2012, 9/14/2011 (N/S)    Override on 9/14/2011: (N/S) (See labs from [facility])    A1C SCREENING 8/16/2017 2/16/2017, 7/19/2016, 3/22/2016, 11/18/2015, 7/15/2015, 2/17/2015, 11/13/2014, 8/12/2014, 5/12/2014, 2/4/2014, 10/10/2013, 7/9/2013, 2/26/2013, 12/27/2012, 9/5/2012, 6/21/2012, 1/26/2012, 11/10/2011, 4/12/2011, 7/13/2010, 4/6/2010, 1/12/2010, 11/10/2009, 8/12/2009    IMM INFLUENZA (1) 9/1/2017 10/16/2016, 10/28/2015, 10/13/2014, 10/21/2013, 11/1/2011, 12/14/2009    DIABETES MONOFILAMENT / LE EXAM 9/8/2017 9/8/2016 (Done), 3/10/2015 (Done), 2/21/2014 (Done), 1/4/2013 (Done), 7/2/2012 (N/S), 6/13/2011 (N/S)    Override on 9/8/2016: Done    Override on 3/10/2015: Done (Dr. Wick)    Override on 2/21/2014: Done    Override on 1/4/2013: Done    Override on 7/2/2012: (N/S)    Override on 6/13/2011: (N/S) (Dr. Wick)    URINE ACR / MICROALBUMIN 1/18/2018 1/18/2017, 1/17/2017, 7/19/2016, 3/22/2016, 5/12/2014, 10/10/2013, 9/5/2012, 6/21/2012, 1/26/2012, 4/12/2011, 4/6/2010    SERUM CREATININE 3/28/2018 3/28/2017, 2/18/2017, 2/17/2017, 2/16/2017, 2/15/2017, 1/19/2017, 1/18/2017, 1/17/2017, 1/16/2017, 7/19/2016, 1/22/2016, 1/21/2016, 12/18/2015, 12/17/2015, 12/16/2015, 11/18/2015, 7/15/2015, 2/17/2015, 11/13/2014, 5/12/2014, 7/9/2013, 1/26/2012, 12/18/2011, 12/16/2011, 11/10/2009    IMM DTaP/Tdap/Td Vaccine (2 - Td) 12/12/2021 12/12/2011    COLONOSCOPY 7/17/2023 7/17/2013 (Declined)    Override on 7/17/2013: Patient Declined            Current Immunizations     13-VALENT PCV PREVNAR 12/16/2015  6:44 PM    INFLUENZA VACCINE H1N1 1/14/2010    Influenza TIV (IM) 10/16/2016, 10/13/2014, 10/21/2013, 11/1/2011    Influenza Vaccine Adult HD 10/28/2015    Influenza Vaccine Pediatric 12/14/2009    Pneumococcal Vaccine (UF)Historical Data 10/1/2003    Tdap Vaccine 12/12/2011      Below and/or attached are the medications your provider expects you to take. Review all of your home medications and newly ordered medications with your provider and/or pharmacist. Follow medication  instructions as directed by your provider and/or pharmacist. Please keep your medication list with you and share with your provider. Update the information when medications are discontinued, doses are changed, or new medications (including over-the-counter products) are added; and carry medication information at all times in the event of emergency situations     Allergies:  VALSARTAN - (reactions not documented)               Medications  Valid as of: July 17, 2017 -  2:01 PM    Generic Name Brand Name Tablet Size Instructions for use    Doxazosin Mesylate (Tab) CARDURA 2 MG Take 2 mg by mouth every day.        Furosemide (Tab) LASIX 20 MG Take 1 Tab by mouth every day.        Insulin Glargine (Solution) LANTUS 100 UNIT/ML Inject 5-7 Units as instructed every evening.        Losartan Potassium (Tab) COZAAR 100 MG Take 1 Tab by mouth every day.        Multiple Vitamins-Minerals (Tab) OCUVITE  Take 1 Tab by mouth every day.        Pioglitazone HCl (Tab) ACTOS 15 MG TAKE 1 TABLET BY MOUTH EVERY DAY        Rosuvastatin Calcium (Tab) CRESTOR 20 MG Take 1 Tab by mouth every evening.        Sodium Bicarbonate (Tab) SODIUM BICARBONATE 650 MG Take 650 mg by mouth 2 times a day.        Spironolactone (Tab) ALDACTONE 25 MG Take 25 mg by mouth every day.        .                 Medicines prescribed today were sent to:     [facility]/PHARMACY #[id number] - [place], [place] - [address] [place] [address]    Phone: [phone] Fax: [phone]    Open 24 Hours?: No      Medication refill instructions:       If your prescription bottle indicates you have medication refills left, it is not necessary to call your provider’s office. Please contact your pharmacy and they will refill your medication.    If your prescription bottle indicates you do not have any refills left, you may request refills at any time through one of the following ways: The online [employer] system (except Urgent Care), by  calling your provider’s office, or by asking your pharmacy to contact your provider’s office with a refill request. Medication refills are processed only during regular business hours and may not be available until the next business day. Your provider may request additional information or to have a follow-up visit with you prior to refilling your medication.   *Please Note: Medication refills are assigned a new Rx number when refilled electronically. Your pharmacy may indicate that no refills were authorized even though a new prescription for the same medication is available at the pharmacy. Please request the medicine by name with the pharmacy before contacting your provider for a refill.        Your To Do List     Future Labs/Procedures Complete By Expires    ALPHA-1 ANTITRYPSIN PHENOTYPE  As directed 7/17/2018         [employer] Access Code: Activation code not generated  Current [employer] Status: Active

## 2017-07-18 NOTE — PROGRESS NOTES
CC: Tico Solorzano is a 76 y.o. male is suffering from   Chief Complaint   Patient presents with   • Follow-Up         SUBJECTIVE:  1. Cirrhosis, cryptogenic (CMS-HCC)  Is here for follow-up, has cryptogenic cirrhosis, in talking with the patient he's been diagnosed with alpha one antitrypsin deficiency.     2. Alpha-1-antitrypsin deficiency (CMS-HCC)  In talking with the patient he does not have any problems with shortness of breath, denies COPD. I've requested that he undergo testing to further quantify whether he suffers from this disease process, we've discussed the use of Prolastin.         Past social, family, history:    Social History   Substance Use Topics   • Smoking status: Former Smoker -- 2.00 packs/day for 40 years     Quit date: 07/13/1991   • Smokeless tobacco: Never Used      Comment: quit 20 yrs ago   • Alcohol Use: No         MEDICATIONS:    Current outpatient prescriptions:   •  losartan (COZAAR) 100 MG Tab, Take 1 Tab by mouth every day., Disp: 30 Tab, Rfl: 6  •  sodium bicarbonate (SODIUM BICARBONATE) 650 MG Tab, Take 650 mg by mouth 2 times a day., Disp: , Rfl:   •  doxazosin (CARDURA) 2 MG Tab, Take 2 mg by mouth every day., Disp: , Rfl:   •  insulin glargine (LANTUS) 100 UNIT/ML Solution, Inject 5-7 Units as instructed every evening., Disp: , Rfl:   •  pioglitazone (ACTOS) 15 MG Tab, TAKE 1 TABLET BY MOUTH EVERY DAY, Disp: 90 Tab, Rfl: 3  •  Multiple Vitamins-Minerals (OCUVITE) Tab, Take 1 Tab by mouth every day., Disp: , Rfl:   •  rosuvastatin (CRESTOR) 20 MG Tab, Take 1 Tab by mouth every evening., Disp: 30 Tab, Rfl: 11  •  spironolactone (ALDACTONE) 25 MG Tab, Take 25 mg by mouth every day., Disp: , Rfl:   •  furosemide (LASIX) 20 MG Tab, Take 1 Tab by mouth every day., Disp: 60 Tab, Rfl: 11    PROBLEMS:  Patient Active Problem List    Diagnosis Date Noted   • Right upper lobe pneumonia 02/16/2017     Priority: High   • Proteinuria 01/16/2017     Priority: High   • Hyperchloremia  "01/16/2017     Priority: High   • Lactic acidosis 01/21/2016     Priority: High   • Acute on chronic renal failure (CMS-HCC) 12/16/2015     Priority: High   • Protein calorie malnutrition (CMS-HCC) 02/17/2017     Priority: Low   • Gout 06/03/2009     Priority: Low   • Cirrhosis, cryptogenic (CMS-HCC) 07/17/2017   • Alpha-1-antitrypsin deficiency (CMS-HCC) 07/17/2017   • Ascites controlled with medication 03/30/2017   • Normocytic anemia 01/16/2017   • Type 2 diabetes mellitus (CMS-HCC) 02/03/2016   • Cough 01/21/2016   • BPH (benign prostatic hyperplasia) 12/17/2015   • S/P placement of cardiac pacemaker 12/17/2015   • History of prostate cancer 11/04/2015   • 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  Heart: No chest pain.  Lungs:  No shortness of Breath.  Psychological: Alvin unusual Anxiety depression     PHYSICAL EXAM   Constitutional: Alert, cooperative, not in acute distress.  Cardiovascular:  Rate Rhythm is regular without murmurs rubs clicks.     Thorax & Lungs: Clear to auscultation, no wheezing, rhonchi, or rales  HENT: Normocephalic, Atraumatic.  Eyes: PERRLA, EOMI, Conjunctiva normal.   Neck: Trachia is midline no swelling of the thyroid.   Lymphatic: No lymphadenopathy noted.   Neurologic: Alert & oriented x 3, cranial nerves II through XII are intact, Normal motor function, Normal sensory function, No focal deficits noted.   Psychiatric: Affect normal, Judgment normal, Mood normal.     VITAL SIGNS:/60 mmHg  Pulse 78  Temp(Src) 36.7 °C (98 °F)  Resp 18  Ht 1.854 m (6' 0.99\")  Wt 77.565 kg (171 lb)  BMI 22.57 kg/m2  SpO2 98%    Labs: Reviewed    Assessment:                                                     Plan:    1. " Cirrhosis, cryptogenic (CMS-HCC)  Recheck phenotype  - ALPHA-1 ANTITRYPSIN PHENOTYPE; Future    2. Alpha-1-antitrypsin deficiency (CMS-HCC)  Asked patient to undergo phenotype testing, concerned that we may be withholding therapy (Prolastin) which may be of benefit to the patient.  - ALPHA-1 ANTITRYPSIN PHENOTYPE; Future

## 2017-07-24 NOTE — MR AVS SNAPSHOT
Tico Whalen Rashad   2017 2:00 PM   Office Visit   MRN: [id number]    Department:  [facility]   Dept Phone:  [phone]    Description:  Male : 1940   Provider:  Navneet Brunner M.D.           Reason for Visit     Follow-Up           Allergies as of 2017     Allergen Noted Reactions    Valsartan 2015       12/16/15 pt states he doesn't remember if he's allergic to this medication.      Vital Signs     Blood Pressure Pulse Height Weight Body Mass Index Oxygen Saturation    176/82 mmHg 82 1.829 m (6') 78.019 kg (172 lb) 23.32 kg/m2 97%    Smoking Status                   Former Smoker           Basic Information     Date Of Birth Sex Race Ethnicity Preferred Language    1940 Male White Non- English      Your appointments     2017 10:00 AM   Interventional Exam 2-Hours with [facility] IR US OUTPATIENT 1, [facility] IR 7   [facility] IMAGING - INTERVENTIONAL - [facility])    [facility]  [address] [place] [address]  [place] [address]   [phone]            Aug 30, 2017  1:00 PM   Established Patient with Casey Wick D.O.   [facility])    [address] [place] [address] [address]  [place] [address]   [phone]           You will be receiving a confirmation call a few days before your appointment from our automated call confirmation system.              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   8/10/2015 - Present    History of prostate cancer Z85.46   2015 - Present    Acute on chronic renal failure (CMS-HCC)  N17.9, N18.9 High  12/16/2015 - Present    BPH (benign prostatic hyperplasia) (Chronic) N40.0   12/17/2015 - Present    S/P placement of cardiac pacemaker (Chronic) Z95.0   12/17/2015 - Present    Cough R05   1/21/2016 - Present    Lactic acidosis E87.2 High  1/21/2016 - Present    Type 2 diabetes mellitus (CMS-HCC) E11.9   2/3/2016 - Present    Proteinuria R80.9 High  1/16/2017 - Present    Normocytic anemia (Chronic) D64.9   1/16/2017 - Present    Hyperchloremia E87.8 High  1/16/2017 - Present    Right upper lobe pneumonia J18.1 High  2/16/2017 - Present    Protein calorie malnutrition (CMS-HCC) E46 Low  2/17/2017 - Present    Ascites controlled with medication R18.8   3/30/2017 - Present    Cirrhosis, cryptogenic (CMS-HCC) K74.69   7/17/2017 - Present    Alpha-1-antitrypsin deficiency (CMS-[facility]) E88.01   7/17/2017 - Present      Health Maintenance        Date Due Completion Dates    IMM ZOSTER VACCINE 10/11/1998 ---    RETINAL SCREENING 1/30/2016 1/30/2015 (Done), 6/10/2013, 4/13/2011 (N/S)    Override on 1/30/2015: Done (Dr. Wilkinson. )    Override on 4/13/2011: (N/S) (Dr Wilkinson macular degeneration)    IMM PNEUMOCOCCAL 65+ (ADULT) LOW/MEDIUM RISK SERIES (2 of 2 - PPSV23) 12/16/2016 12/16/2015    FASTING LIPID PROFILE 7/19/2017 7/19/2016, 7/15/2015, 2/4/2014, 1/26/2012, 9/14/2011 (N/S)    Override on 9/14/2011: (N/S) (See labs from [facility])    A1C SCREENING 8/16/2017 2/16/2017, 7/19/2016, 3/22/2016, 11/18/2015, 7/15/2015, 2/17/2015, 11/13/2014, 8/12/2014, 5/12/2014, 2/4/2014, 10/10/2013, 7/9/2013, 2/26/2013, 12/27/2012, 9/5/2012, 6/21/2012, 1/26/2012, 11/10/2011, 4/12/2011, 7/13/2010, 4/6/2010, 1/12/2010, 11/10/2009, 8/12/2009    IMM INFLUENZA (1) 9/1/2017 10/16/2016, 10/28/2015, 10/13/2014, 10/21/2013, 11/1/2011, 12/14/2009    DIABETES MONOFILAMENT / LE EXAM 9/8/2017 9/8/2016 (Done), 3/10/2015 (Done), 2/21/2014 (Done), 1/4/2013 (Done), 7/2/2012 (N/S), 6/13/2011 (N/S)    Override on  9/8/2016: Done    Override on 3/10/2015: Done (Dr. Wick)    Override on 2/21/2014: Done    Override on 1/4/2013: Done    Override on 7/2/2012: (N/S)    Override on 6/13/2011: (N/S) (Dr. Wick)    URINE ACR / MICROALBUMIN 1/18/2018 1/18/2017, 1/17/2017, 7/19/2016, 3/22/2016, 5/12/2014, 10/10/2013, 9/5/2012, 6/21/2012, 1/26/2012, 4/12/2011, 4/6/2010    SERUM CREATININE 3/28/2018 3/28/2017, 2/18/2017, 2/17/2017, 2/16/2017, 2/15/2017, 1/19/2017, 1/18/2017, 1/17/2017, 1/16/2017, 7/19/2016, 1/22/2016, 1/21/2016, 12/18/2015, 12/17/2015, 12/16/2015, 11/18/2015, 7/15/2015, 2/17/2015, 11/13/2014, 5/12/2014, 7/9/2013, 1/26/2012, 12/18/2011, 12/16/2011, 11/10/2009    IMM DTaP/Tdap/Td Vaccine (2 - Td) 12/12/2021 12/12/2011    COLONOSCOPY 7/17/2023 7/17/2013 (Declined)    Override on 7/17/2013: Patient Declined            Current Immunizations     13-VALENT PCV PREVNAR 12/16/2015  6:44 PM    INFLUENZA VACCINE H1N1 1/14/2010    Influenza TIV (IM) 10/16/2016, 10/13/2014, 10/21/2013, 11/1/2011    Influenza Vaccine Adult HD 10/28/2015    Influenza Vaccine Pediatric 12/14/2009    Pneumococcal Vaccine (UF)Historical Data 10/1/2003    Tdap Vaccine 12/12/2011      Below and/or attached are the medications your provider expects you to take. Review all of your home medications and newly ordered medications with your provider and/or pharmacist. Follow medication instructions as directed by your provider and/or pharmacist. Please keep your medication list with you and share with your provider. Update the information when medications are discontinued, doses are changed, or new medications (including over-the-counter products) are added; and carry medication information at all times in the event of emergency situations     Allergies:  VALSARTAN - (reactions not documented)               Medications  Valid as of: July 24, 2017 -  2:08 PM    Generic Name Brand Name Tablet Size Instructions for use    Doxazosin Mesylate (Tab) CARDURA 2  MG Take 2 mg by mouth every day.        Furosemide (Tab) LASIX 20 MG Take 1 Tab by mouth every day.        Insulin Glargine (Solution) LANTUS 100 UNIT/ML Inject 5-7 Units as instructed every evening.        Losartan Potassium (Tab) COZAAR 100 MG Take 1 Tab by mouth every day.        Multiple Vitamins-Minerals (Tab) OCUVITE  Take 1 Tab by mouth every day.        Pioglitazone HCl (Tab) ACTOS 15 MG TAKE 1 TABLET BY MOUTH EVERY DAY        Rosuvastatin Calcium (Tab) CRESTOR 20 MG Take 1 Tab by mouth every evening.        Sodium Bicarbonate (Tab) SODIUM BICARBONATE 650 MG Take 650 mg by mouth 2 times a day.        Spironolactone (Tab) ALDACTONE 25 MG Take 25 mg by mouth every day.        .                 Medicines prescribed today were sent to:     [facility][id number] - [place], [place] - [address] [place] [address] AT [address] [address]    Phone: [phone] Fax: [phone]    Open 24 Hours?: No      Medication refill instructions:       If your prescription bottle indicates you have medication refills left, it is not necessary to call your provider’s office. Please contact your pharmacy and they will refill your medication.    If your prescription bottle indicates you do not have any refills left, you may request refills at any time through one of the following ways: The online [employer] system (except Urgent Care), by calling your provider’s office, or by asking your pharmacy to contact your provider’s office with a refill request. Medication refills are processed only during regular business hours and may not be available until the next business day. Your provider may request additional information or to have a follow-up visit with you prior to refilling your medication.   *Please Note: Medication refills are assigned a new Rx number when refilled electronically. Your pharmacy may indicate that no refills were authorized even though a new prescription for the same medication is available  at the pharmacy. Please request the medicine by name with the pharmacy before contacting your provider for a refill.           MyChart Access Code: Activation code not generated  Current MyChart Status: Active

## 2017-07-24 NOTE — Clinical Note
[facility]   [address]  [place] [address]  Phone: [phone]  Fax: [phone]              Tico Solorzano  1940    Encounter Date: 7/24/2017    Navneet Brunner M.D.          PROGRESS NOTE:  Subjective:   Tico Solorzano is a 76 y.o. male who presents today as a follow-up for his heart block with pacemaker his chronic kidney disease and his chronic hepatic insufficiency. His last pacemaker check was normal. He is having only lower extremity edema of fatigue and lack of energy. His creatinine is still ranging between 3 and 3.6. He is undergoing scheduled paracenteses every 2 weeks.    Past Medical History   Diagnosis Date   • Diabetes    • Hypertension    • Renal disorder      50% function   • Arthritis      fingers   • Pneumonia 1961   • Cancer (CMS-HCC) 2003     prostate cancer   • CATARACT      repaired in right eye   • Vitamin d deficiency 1/16/2012   • Protein in urine 7/2/2012   • Ascites controlled with medication 3/30/2017   • Alpha-1-antitrypsin deficiency (CMS-HCC) 7/17/2017     Past Surgical History   Procedure Laterality Date   • Tonsillectomy     • Cholecystectomy     • Other  2003     radical prostatectomy   • Open reduction       rods in left leg, spontaneous bone deficit   • Incision and drainage orthopedic  12/17/2011     Performed by MADISON MENDEZ at SURGERY [facility]     Family History   Problem Relation Age of Onset   • Other Mother    • Other Father    • Cancer Brother      History   Smoking status   • Former Smoker -- 2.00 packs/day for 40 years   • Quit date: 07/13/1991   Smokeless tobacco   • Never Used     Comment: quit 20 yrs ago     Allergies   Allergen Reactions   • Valsartan      12/16/15 pt states he doesn't remember if he's allergic to this medication.     Outpatient Encounter Prescriptions as of 7/24/2017   Medication Sig Dispense Refill   • losartan (COZAAR) 100 MG Tab Take 1 Tab by mouth every day.  30 Tab 6   • sodium bicarbonate (SODIUM BICARBONATE) 650 MG Tab Take 650 mg by mouth 2 times a day.     • doxazosin (CARDURA) 2 MG Tab Take 2 mg by mouth every day.     • insulin glargine (LANTUS) 100 UNIT/ML Solution Inject 5-7 Units as instructed every evening.     • pioglitazone (ACTOS) 15 MG Tab TAKE 1 TABLET BY MOUTH EVERY DAY 90 Tab 3   • Multiple Vitamins-Minerals (OCUVITE) Tab Take 1 Tab by mouth every day.     • rosuvastatin (CRESTOR) 20 MG Tab Take 1 Tab by mouth every evening. 30 Tab 11   • spironolactone (ALDACTONE) 25 MG Tab Take 25 mg by mouth every day.     • furosemide (LASIX) 20 MG Tab Take 1 Tab by mouth every day. (Patient not taking: Reported on 7/24/2017) 60 Tab 11     No facility-administered encounter medications on file as of 7/24/2017.     Review of Systems   Constitutional: Negative.  Negative for fever, chills and malaise/fatigue.   HENT: Negative.  Negative for sore throat.    Eyes: Negative.    Respiratory: Negative.  Negative for cough, hemoptysis, sputum production, shortness of breath, wheezing and stridor.    Cardiovascular: Negative.  Negative for chest pain, palpitations, orthopnea, claudication, leg swelling and PND.   Gastrointestinal: Negative.    Genitourinary: Negative.    Musculoskeletal: Negative.    Skin: Negative.    Neurological: Negative.  Negative for dizziness, loss of consciousness and weakness.   Endo/Heme/Allergies: Negative.  Does not bruise/bleed easily.   All other systems reviewed and are negative.       Objective:   /82 mmHg  Pulse 82  Ht 1.829 m (6')  Wt 78.019 kg (172 lb)  BMI 23.32 kg/m2  SpO2 97%    Physical Exam   Constitutional: He is oriented to person, place, and time. He appears well-developed and well-nourished. No distress.   HENT:   Head: Normocephalic.   Mouth/Throat: Oropharynx is clear and moist.   Eyes: EOM are normal. Pupils are equal, round, and reactive to light. Right eye exhibits no discharge. Left eye exhibits no discharge. No  scleral icterus.   Neck: Normal range of motion. Neck supple. No JVD present. No tracheal deviation present.   Cardiovascular: Normal rate, regular rhythm, S1 normal, S2 normal, normal heart sounds, intact distal pulses and normal pulses.  Exam reveals no gallop, no S3, no S4 and no friction rub.    No murmur heard.   No systolic murmur is present    No diastolic murmur is present   Pulses:       Carotid pulses are 2+ on the right side, and 2+ on the left side.       Radial pulses are 2+ on the right side, and 2+ on the left side.        Dorsalis pedis pulses are 2+ on the right side, and 2+ on the left side.        Posterior tibial pulses are 2+ on the right side, and 2+ on the left side.   Pulmonary/Chest: Effort normal and breath sounds normal. No respiratory distress. He has no wheezes. He has no rales.   Abdominal: Soft. Bowel sounds are normal. He exhibits no distension and no mass. There is no tenderness. There is no rebound and no guarding.   Musculoskeletal: He exhibits no edema.   Neurological: He is alert and oriented to person, place, and time. No cranial nerve deficit.   Skin: Skin is warm and dry. He is not diaphoretic. No pallor.   Psychiatric: He has a normal mood and affect. His behavior is normal. Judgment and thought content normal.   Nursing note and vitals reviewed.      Assessment:     1. Acute on chronic renal failure (CMS-HCC)     2. Alpha-1-antitrypsin deficiency (CMS-HCC)     3. Ascites controlled with medication     4. Cirrhosis, cryptogenic (CMS-HCC)     5. Dyslipidemia     6. Essential hypertension     7. Hyperchloremia     8. Protein calorie malnutrition (CMS-HCC)     9. Renal insufficiency     10. S/P placement of cardiac pacemaker     11. Type 2 diabetes mellitus with stage 2 chronic kidney disease, with long-term current use of insulin (CMS-[facility])         Medical Decision Making:  Today's Assessment / Status / Plan:     75-year-old gentleman with complete heart block status post  pacemaker, hypertension hyperlipidemia. He is continuing to undergo every 2 week paracenteses. He is tracking his weight. He is getting erythropoietin injections that I hope will make him feel more energy.  For his heart he is stable and I will see him back in 6 months.    1. 3rd degree s/p PPM    - no issues    2. HTN    - consider stopping doxazonsin    - cont losartan 100    3. HLD    - cont rosuva 20    4. Cirrhosis    - cont kit 25    Thank for you allowing me to take part in your patient's care, please call should you have any questions or would like to discuss this patient.    - cont lasix 20      Casey Wick D.O.  [address]  [place] [address]  VIA In Basket

## 2017-07-24 NOTE — PROGRESS NOTES
Subjective:   Tico Solorzano is a 76 y.o. male who presents today as a follow-up for his heart block with pacemaker his chronic kidney disease and his chronic hepatic insufficiency. His last pacemaker check was normal. He is having only lower extremity edema of fatigue and lack of energy. His creatinine is still ranging between 3 and 3.6. He is undergoing scheduled paracenteses every 2 weeks.    Past Medical History   Diagnosis Date   • Diabetes    • Hypertension    • Renal disorder      50% function   • Arthritis      fingers   • Pneumonia 1961   • Cancer (CMS-HCC) 2003     prostate cancer   • CATARACT      repaired in right eye   • Vitamin d deficiency 1/16/2012   • Protein in urine 7/2/2012   • Ascites controlled with medication 3/30/2017   • Alpha-1-antitrypsin deficiency (CMS-[facility]) 7/17/2017     Past Surgical History   Procedure Laterality Date   • Tonsillectomy     • Cholecystectomy     • Other  2003     radical prostatectomy   • Open reduction       rods in left leg, spontaneous bone deficit   • Incision and drainage orthopedic  12/17/2011     Performed by MADISON MENDEZ at [facility]ERY [facility]     Family History   Problem Relation Age of Onset   • Other Mother    • Other Father    • Cancer Brother      History   Smoking status   • Former Smoker -- 2.00 packs/day for 40 years   • Quit date: 07/13/1991   Smokeless tobacco   • Never Used     Comment: quit 20 yrs ago     Allergies   Allergen Reactions   • Valsartan      12/16/15 pt states he doesn't remember if he's allergic to this medication.     Outpatient Encounter Prescriptions as of 7/24/2017   Medication Sig Dispense Refill   • losartan (COZAAR) 100 MG Tab Take 1 Tab by mouth every day. 30 Tab 6   • sodium bicarbonate (SODIUM BICARBONATE) 650 MG Tab Take 650 mg by mouth 2 times a day.     • doxazosin (CARDURA) 2 MG Tab Take 2 mg by mouth every day.     • insulin glargine (LANTUS) 100 UNIT/ML Solution Inject 5-7 Units as instructed every evening.      • pioglitazone (ACTOS) 15 MG Tab TAKE 1 TABLET BY MOUTH EVERY DAY 90 Tab 3   • Multiple Vitamins-Minerals (OCUVITE) Tab Take 1 Tab by mouth every day.     • rosuvastatin (CRESTOR) 20 MG Tab Take 1 Tab by mouth every evening. 30 Tab 11   • spironolactone (ALDACTONE) 25 MG Tab Take 25 mg by mouth every day.     • furosemide (LASIX) 20 MG Tab Take 1 Tab by mouth every day. (Patient not taking: Reported on 7/24/2017) 60 Tab 11     No facility-administered encounter medications on file as of 7/24/2017.     Review of Systems   Constitutional: Negative.  Negative for fever, chills and malaise/fatigue.   HENT: Negative.  Negative for sore throat.    Eyes: Negative.    Respiratory: Negative.  Negative for cough, hemoptysis, sputum production, shortness of breath, wheezing and stridor.    Cardiovascular: Negative.  Negative for chest pain, palpitations, orthopnea, claudication, leg swelling and PND.   Gastrointestinal: Negative.    Genitourinary: Negative.    Musculoskeletal: Negative.    Skin: Negative.    Neurological: Negative.  Negative for dizziness, loss of consciousness and weakness.   Endo/Heme/Allergies: Negative.  Does not bruise/bleed easily.   All other systems reviewed and are negative.       Objective:   /82 mmHg  Pulse 82  Ht 1.829 m (6')  Wt 78.019 kg (172 lb)  BMI 23.32 kg/m2  SpO2 97%    Physical Exam   Constitutional: He is oriented to person, place, and time. He appears well-developed and well-nourished. No distress.   HENT:   Head: Normocephalic.   Mouth/Throat: Oropharynx is clear and moist.   Eyes: EOM are normal. Pupils are equal, round, and reactive to light. Right eye exhibits no discharge. Left eye exhibits no discharge. No scleral icterus.   Neck: Normal range of motion. Neck supple. No JVD present. No tracheal deviation present.   Cardiovascular: Normal rate, regular rhythm, S1 normal, S2 normal, normal heart sounds, intact distal pulses and normal pulses.  Exam reveals no gallop, no  S3, no S4 and no friction rub.    No murmur heard.   No systolic murmur is present    No diastolic murmur is present   Pulses:       Carotid pulses are 2+ on the right side, and 2+ on the left side.       Radial pulses are 2+ on the right side, and 2+ on the left side.        Dorsalis pedis pulses are 2+ on the right side, and 2+ on the left side.        Posterior tibial pulses are 2+ on the right side, and 2+ on the left side.   Pulmonary/Chest: Effort normal and breath sounds normal. No respiratory distress. He has no wheezes. He has no rales.   Abdominal: Soft. Bowel sounds are normal. He exhibits no distension and no mass. There is no tenderness. There is no rebound and no guarding.   Musculoskeletal: He exhibits no edema.   Neurological: He is alert and oriented to person, place, and time. No cranial nerve deficit.   Skin: Skin is warm and dry. He is not diaphoretic. No pallor.   Psychiatric: He has a normal mood and affect. His behavior is normal. Judgment and thought content normal.   Nursing note and vitals reviewed.      Assessment:     1. Acute on chronic renal failure (CMS-HCC)     2. Alpha-1-antitrypsin deficiency (CMS-HCC)     3. Ascites controlled with medication     4. Cirrhosis, cryptogenic (CMS-HCC)     5. Dyslipidemia     6. Essential hypertension     7. Hyperchloremia     8. Protein calorie malnutrition (CMS-HCC)     9. Renal insufficiency     10. S/P placement of cardiac pacemaker     11. Type 2 diabetes mellitus with stage 2 chronic kidney disease, with long-term current use of insulin (CMS-[facility])         Medical Decision Making:  Today's Assessment / Status / Plan:     75-year-old gentleman with complete heart block status post pacemaker, hypertension hyperlipidemia. He is continuing to undergo every 2 week paracenteses. He is tracking his weight. He is getting erythropoietin injections that I hope will make him feel more energy.  For his heart he is stable and I will see him back in 6  months.    1. 3rd degree s/p PPM    - no issues    2. HTN    - consider stopping doxazonsin    - cont losartan 100    3. HLD    - cont rosuva 20    4. Cirrhosis    - cont kit 25    Thank for you allowing me to take part in your patient's care, please call should you have any questions or would like to discuss this patient.    - cont lasix 20

## 2017-07-25 NOTE — PROGRESS NOTES
US guided therapeutic paracentesis done by Dr. Ramirez; RLQ access site; 4900mL obtained and discarded; pt tolerated the procedure well; pt hemodynamically stable pre/intra/post procedure; all questions and concerns answered prior to d/c; patient provided with all appropriate education for procedure; pt d/c home.

## 2017-08-08 NOTE — PROGRESS NOTES
US guided therapeutic paracentesis done by Dr. Durham; RLQ access site; 6850mL clear yellow fluid obtained and discarded; procedural RN: Ankit;  pt tolerated the procedure well; pt hemodynamically stable pre/intra/post procedure; all questions and concerns answered prior to d/c; patient provided with all appropriate education for procedure; pt d/c home.      Albumin Protocol: patient refused albumin protocol despite patient education, Dr Durham aware and OK with patient refusal.

## 2017-08-22 NOTE — PROGRESS NOTES
"Patient given [facility] \"Preventing the Spread of Infection\" Brochure upon being checked in.     US guided therapeutic paracentesis done by Dr. Epperson; RLQ access site; 6100mL Clear Yellow Fluid obtained and discarded; procedural RN: Leann;  pt tolerated the procedure well; pt hemodynamically stable pre/intra/post procedure; all questions and concerns answered prior to d/c; patient provided with all appropriate education for procedure; pt d/c home.      Albumin Protocol: 50g albumin given per albumin infusion protocol, please see MAR for medication documentation.       "

## 2017-09-05 NOTE — PROGRESS NOTES
"Patient given [facility] \"Preventing the Spread of Infection\" Brochure upon being checked in.     US guided therapeutic paracentesis done by Dr. Ramirez; RLQ access site; 4700mL cloudy yellow fluid obtained and discarded; procedural RN: Ankit;  pt tolerated the procedure well; pt hemodynamically stable pre/intra/post procedure; all questions and concerns answered prior to d/c; patient provided with all appropriate education for procedure; pt d/c home.            "

## 2017-09-06 NOTE — TELEPHONE ENCOUNTER
90 days supply    Was the patient seen in the last year in this department? Yes     Does patient have an active prescription for medications requested? Yes     Received Request Via: Pharmacy

## 2017-09-07 NOTE — TELEPHONE ENCOUNTER
Future Appointments       Provider Department Center    9/8/2017 2:20 PM Casey Wick D.O. [facility]    9/19/2017 10:00 AM [facility] OPIR 01; [facility] IR US OUTPATIENT 1 [facility] IMAGING - INTERVENTIONAL - REGIONAL MEDICAL [facility] [place]    1/30/2018 1:15 PM PACER CHECK-[facility] B 2 [facility]     1/30/2018 2:30 PM Navneet Brunner M.D. [facility]           ESTABLISHED PATIENT PRE-VISIT PLANNING     Note: Patient will not be contacted if there is no indication to call. PT was not Contacted.    1.    Reviewed note from last office visit with PCP: YES Last office visit: 7/17/17    2.  If any orders were placed at last visit, do we have Results/Consult Notes?        •  Labs - Labs ordered, completed and results are in chart. ALPHA-1-ANTITRYPSIN         •  Imaging - Imaging was not ordered at last office visit.        •  Referrals - No referrals were ordered at last office visit.     3.  Immunizations were updated in Epic using WebIZ?: No WebIZ record        4.  Patient is due for the following Health Maintenance Topics:   Health Maintenance Due   Topic Date Due   • IMM ZOSTER VACCINE  10/11/1998   • RETINAL SCREENING  01/30/2016   • IMM PNEUMOCOCCAL 65+ (ADULT) LOW/MEDIUM RISK SERIES (2 of 2 - PPSV23) 12/16/2016   • Annual Wellness Visit  04/01/2017   • FASTING LIPID PROFILE  07/19/2017   • A1C SCREENING  08/16/2017   • IMM INFLUENZA (1) 09/01/2017           5.  Patient was not informed to arrive 15 min prior to their scheduled appointment and bring in their medication bottles.

## 2017-09-08 NOTE — PROGRESS NOTES
CC: Tico Solorzano is a 76 y.o. male is suffering from   Chief Complaint   Patient presents with   • Follow-Up         SUBJECTIVE:  1. Need for influenza vaccination  Ed's here for follow-up, is in need of an influenza vaccination which is given today    2. Type 2 diabetes mellitus without complication, without long-term current use of insulin (CMS-[facility])  Patient has a history of type 2 diabetes with very good control this juncture    3. Renal failure  Patient with renal failure is pending arterial venous graft placement in his left arm        Past social, family, history:   Social History   Substance Use Topics   • Smoking status: Former Smoker     Packs/day: 2.00     Years: 40.00     Quit date: 7/13/1991   • Smokeless tobacco: Never Used      Comment: quit 20 yrs ago   • Alcohol use No         MEDICATIONS:    Current Outpatient Prescriptions:   •  losartan (COZAAR) 100 MG Tab, Take 1 Tab by mouth every day., Disp: 90 Tab, Rfl: 3  •  pioglitazone (ACTOS) 15 MG Tab, TAKE 1 TABLET BY MOUTH EVERY DAY, Disp: 90 Tab, Rfl: 3  •  spironolactone (ALDACTONE) 25 MG Tab, Take 25 mg by mouth every day., Disp: , Rfl:   •  sodium bicarbonate (SODIUM BICARBONATE) 650 MG Tab, Take 650 mg by mouth 2 times a day., Disp: , Rfl:   •  furosemide (LASIX) 20 MG Tab, Take 1 Tab by mouth every day., Disp: 60 Tab, Rfl: 11  •  doxazosin (CARDURA) 2 MG Tab, Take 2 mg by mouth every day., Disp: , Rfl:   •  insulin glargine (LANTUS) 100 UNIT/ML Solution, Inject 5-7 Units as instructed every evening., Disp: , Rfl:   •  Multiple Vitamins-Minerals (OCUVITE) Tab, Take 1 Tab by mouth every day., Disp: , Rfl:   •  rosuvastatin (CRESTOR) 20 MG Tab, Take 1 Tab by mouth every evening., Disp: 30 Tab, Rfl: 11    PROBLEMS:  Patient Active Problem List    Diagnosis Date Noted   • Right upper lobe pneumonia 02/16/2017     Priority: High   • Proteinuria 01/16/2017     Priority: High   • Hyperchloremia 01/16/2017     Priority: High   • Lactic acidosis  01/21/2016     Priority: High   • Acute on chronic renal failure (CMS-HCC) 12/16/2015     Priority: High   • Protein calorie malnutrition (CMS-[facility]) 02/17/2017     Priority: Low   • Gout 06/03/2009     Priority: Low   • Cirrhosis, cryptogenic (CMS-[facility]) 07/17/2017   • Alpha-1-antitrypsin deficiency (CMS-HCC) 07/17/2017   • Ascites controlled with medication 03/30/2017   • Normocytic anemia 01/16/2017   • Type 2 diabetes mellitus (CMS-[facility]) 02/03/2016   • Cough 01/21/2016   • BPH (benign prostatic hyperplasia) 12/17/2015   • S/P placement of cardiac pacemaker 12/17/2015   • History of prostate cancer 11/04/2015   • 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  Heart: No chest pain.  Lungs:  No shortness of Breath.  Psychological: Alvin unusual Anxiety depression     PHYSICAL EXAM   Constitutional: Alert, cooperative, not in acute distress.  Cardiovascular:  Rate Rhythm is regular without murmurs rubs clicks.     Thorax & Lungs: Clear to auscultationDullness at the right lung base  HENT: Normocephalic, Atraumatic.  Eyes: PERRLA, EOMI, Conjunctiva normal.   Neck: Trachia is midline no swelling of the thyroid.   Lymphatic: No lymphadenopathy noted.   Abdomin: Soft non-tender, no rebound, no guarding.   Neurologic: Alert & oriented x 3, cranial nerves II through XII are intact, Normal motor function, Normal sensory function, No focal deficits noted.   Psychiatric: Affect normal, Judgment normal, Mood normal.     VITAL SIGNS:/58   Pulse 80   Temp 36.9 °C (98.5 °F)   Resp 18   Ht 1.829 m (6')   Wt 77.4 kg (170 lb 11.2 oz)   SpO2 95%   BMI 23.15 kg/m²     Labs: Reviewed    Assessment:                                                     Plan:    1.  Need for influenza vaccination  Vaccination given  - INFLUENZA VACCINE, HIGH DOSE (65+ ONLY)    2. Type 2 diabetes mellitus without complication, without long-term current use of insulin (CMS-HCC)  Patient is to undergo additional lab testing will follow up in one month  - POCT A1C  - POCT Retinal Eye Exam  - LIPID PROFILE; Future    3. Renal failure  Patient is pending arterial venous graft left arm    4. Pleural effusion  Recurrent pleural effusion patients undergoing thoracentesis every week

## 2017-09-14 NOTE — OR SURGEON
Immediate Post- Operative Note        PostOp Diagnosis: ascites      Procedure(s): paracentesis      Estimated Blood Loss: Less than 5 ml        Complications: None            9/14/2017     10:41 AM     Santa Bills

## 2017-09-14 NOTE — ED PROVIDER NOTES
ED Provider Note  CHIEF COMPLAINT  Chief Complaint   Patient presents with   • Bloated       HPI  Tico Solorzano is a 76 y.o. male who presentsWith increasing abdominal swelling. He has a history of cirrhosis of the liver. He comes in periodically to have a paracentesis.She was then for goes to a center where he gets the fluid removed from his belly every 2-3 weeks. Came here because he could not get into the other facility. No headache, chest pain, no difficulty breathing. He would like to go home after the drainage of the fluid.    REVIEW OF SYSTEMS  No headache, no chest pain, no abdominal pain. No dizziness.  ALL OTHER SYSTEMS NEGATIVE    ALLERGIES  Allergies   Allergen Reactions   • Valsartan      12/16/15 pt states he doesn't remember if he's allergic to this medication.         PAST MEDICAL HISTORY  Past Medical History:   Diagnosis Date   • Alpha-1-antitrypsin deficiency (CMS-[facility]) 7/17/2017   • Ascites controlled with medication 3/30/2017   • Protein in urine 7/2/2012   • Vitamin d deficiency 1/16/2012   • Cancer (CMS-HCC) 2003    prostate cancer   • Pneumonia 1961   • Arthritis     fingers   • CATARACT     repaired in right eye   • Diabetes    • Hypertension    • Renal disorder     50% function       SURGICAL HISTORY  Past Surgical History:   Procedure Laterality Date   • INCISION AND DRAINAGE ORTHOPEDIC  12/17/2011    Performed by MADISON MENDEZ at SURGERY [facility] ORS   • OTHER  2003    radical prostatectomy   • CHOLECYSTECTOMY     • OPEN REDUCTION      rods in left leg, spontaneous bone deficit   • TONSILLECTOMY         FAMILY HISTORY  Family History   Problem Relation Age of Onset   • Other Mother    • Other Father    • Cancer Brother        SOCIAL HISTORY  Social History     Social History   • Marital status:      Spouse name: N/A   • Number of children: N/A   • Years of education: N/A     Social History Main Topics   • Smoking status: Former Smoker     Packs/day: 2.00     Years: 40.00      Quit date: 7/13/1991   • Smokeless tobacco: Never Used      Comment: quit 20 yrs ago   • Alcohol use No   • Drug use: No   • Sexual activity: Not Currently     Partners: Female     Other Topics Concern   • Not on file     Social History Narrative   • No narrative on file       PHYSICAL EXAM  GENERAL:Alert male adult  VITAL SIGNS: /54   Pulse (!) 101   Temp 36.5 °C (97.7 °F)   Resp 16   Ht 1.829 m (6')   Wt 80.8 kg (178 lb 2.1 oz)   SpO2 98%   BMI 24.16 kg/m²    Constitutional: Alert healthy-appearing adult HENT: Scalp is normal size and nontender. Ears are clear. Nose is clear. Throat is clear with no stridor no drooling no trismus. Teeth are all intact.  Eyes: Pupils equal round and reactive to light, extraocular motor fall. There is no scleral icterus.  Neck: Neck is supple and nontender. There is no meningismus. No adenitis. No thyromegaly.  Lymphatic: No adenopathy.   Cardiovascular: Heart regular rhythm without murmurs or gallops   Thorax & Lungs: No chest wall tenderness. Lungs are clear. Patient has good breath sounds bilateral. No rales, no rhonchi, no wheezes.  Abdomen: Abdomen is soft, nontender, not rigid, no guarding, and no organomegaly. There is no palpable hernia .distended abdomen with clinical ascites.  Skin: Warm, pink, and dry with no erythema and no rash.   Back: Nontender, no midline bony tenderness, no flank tenderness.  Extremities: Full range of motion  No tenderness to palpation and no deformities noted. No calf or thigh swelling. No calf or thigh tenderness. No clinical DVT.  Neurologic: Alert & oriented . Cranial nerves are grossly intact as tested. Patient moves all 4 extremities well. Patient has good strong flexion and extension of the ankle joints knee joints hip joints and elbow joints. Sensation is normal and symmetrical in the upper and lower extremities.   Psychiatric: Patient is alert oriented coherent and rational.       COURSE & MEDICAL DECISION MAKING  Patient has  liver failure. He comes in periodically because of abdominal swelling. He has obvious ascites. He gets periodic paracentesis.    Plan: #1 IV #2 LAD including CBC, CMP, pro time, PTT, CT the abdomen. #3. Paracentesis by interventional radiology.    Laboratory and reexamination: Creatinine and BUN are both elevated with some mild to moderate renal insufficiency. No anemia. No bandemia. INR slightly elevated.  Results for orders placed or performed during the hospital encounter of 09/14/17   CBC WITH DIFFERENTIAL   Result Value Ref Range    WBC 3.2 (L) 4.8 - 10.8 K/uL    RBC 3.54 (L) 4.70 - 6.10 M/uL    Hemoglobin 10.3 (L) 14.0 - 18.0 g/dL    Hematocrit 31.1 (L) 42.0 - 52.0 %    MCV 87.9 81.4 - 97.8 fL    MCH 29.1 27.0 - 33.0 pg    MCHC 33.1 (L) 33.7 - 35.3 g/dL    RDW 48.6 35.9 - 50.0 fL    Platelet Count 108 (L) 164 - 446 K/uL    MPV 11.2 9.0 - 12.9 fL    Neutrophils-Polys 67.10 44.00 - 72.00 %    Lymphocytes 22.70 22.00 - 41.00 %    Monocytes 8.40 0.00 - 13.40 %    Eosinophils 0.90 0.00 - 6.90 %    Basophils 0.60 0.00 - 1.80 %    Immature Granulocytes 0.30 0.00 - 0.90 %    Nucleated RBC 0.00 /100 WBC    Neutrophils (Absolute) 2.15 1.82 - 7.42 K/uL    Lymphs (Absolute) 0.73 (L) 1.00 - 4.80 K/uL    Monos (Absolute) 0.27 0.00 - 0.85 K/uL    Eos (Absolute) 0.03 0.00 - 0.51 K/uL    Baso (Absolute) 0.02 0.00 - 0.12 K/uL    Immature Granulocytes (abs) 0.01 0.00 - 0.11 K/uL    NRBC (Absolute) 0.00 K/uL   COMP METABOLIC PANEL   Result Value Ref Range    Sodium 142 135 - 145 mmol/L    Potassium 3.9 3.6 - 5.5 mmol/L    Chloride 110 96 - 112 mmol/L    Co2 18 (L) 20 - 33 mmol/L    Anion Gap 14.0 (H) 0.0 - 11.9    Glucose 144 (H) 65 - 99 mg/dL    Bun 45 (H) 8 - 22 mg/dL    Creatinine 5.42 (HH) 0.50 - 1.40 mg/dL    Calcium 8.2 (L) 8.5 - 10.5 mg/dL    AST(SGOT) 26 12 - 45 U/L    ALT(SGPT) 10 2 - 50 U/L    Alkaline Phosphatase 103 (H) 30 - 99 U/L    Total Bilirubin 0.6 0.1 - 1.5 mg/dL    Albumin 2.4 (L) 3.2 - 4.9 g/dL    Total  Protein 5.5 (L) 6.0 - 8.2 g/dL    Globulin 3.1 1.9 - 3.5 g/dL    A-G Ratio 0.8 g/dL   LIPASE   Result Value Ref Range    Lipase 17 11 - 82 U/L   PROTHROMBIN TIME (INR)   Result Value Ref Range    PT 16.4 (H) 12.0 - 14.6 sec    INR 1.28 (H) 0.87 - 1.13   APTT   Result Value Ref Range    APTT 30.9 24.7 - 36.0 sec   ESTIMATED GFR   Result Value Ref Range    GFR If  12 (A) >60 mL/min/1.73 m 2    GFR If Non African American 10 (A) >60 mL/min/1.73 m 2        Patient was observed in ED from 8 AM until 12 noon. His abdomen is now soft and nontender with no rigidity and no guarding. No distention. He feels great and wants to go home.6 L of fluid were drained from the patient's abdomen.    Home treatment: #1 follow up with his family physician number to return here as needed.  FINAL IMPRESSION  1. Ascites  2. Liver failure         Electronically signed by: Gary Gansert, 9/14/2017noon.

## 2017-09-14 NOTE — ED NOTES
"Ambulated to room, pt changed to gown.   C/o \"needing drained\" pt has hx cirrhosis and reports needing a paracentesis.   "

## 2017-09-14 NOTE — PROGRESS NOTES
Brought patient to ultrasound department.  Scanned abdomen and found a pocket of fluid.  Called Dr Bills and she came and performed the paracentesis.  Patients vitals were monitored during procedure.  5800 ml of fluid was drained and 0 ml sent to lab.  Patient tolerated procedure well and was taken back in stable condition.  Nurse notified of how much fluid was removed

## 2017-09-14 NOTE — ED NOTES
Pt to triage c/o abd bloating. Pt requesting paracentesis to be done. Pt scheduled to have procedure on 9/19 but is now having discomfort.    Pt advised to return to triage nurse for any changes or concerns.

## 2017-09-26 NOTE — PROGRESS NOTES
"Patient given [facility] \"Preventing the Spread of Infection\" Brochure upon being checked in.     US guided therapeutic paracentesis done by Dr. Epperson; RLQ access site; 7200mL clear yellow obtained and discarded; procedural RN: Adela;  pt tolerated the procedure well; pt hemodynamically stable pre/intra/post procedure; all questions and concerns answered prior to d/c; patient provided with all appropriate education for procedure; pt d/c home.      Albumin Protocol: 50g albumin given per albumin infusion protocol, please see MAR for medication documentation.       "

## 2017-10-10 NOTE — PROGRESS NOTES
US guided therapeutic paracentesis done by Dr. Durham; RLQ access site; 6650 mL obtained and discarded; pt tolerated the procedure well; pt hemodynamically stable pre/intra/post procedure; all questions and concerns answered prior to d/c; patient provided with all appropriate education for procedure; pt d/c home.     6650 mL of fluid removed clear, yellow, Albumin protocol initiated, order signed by MD Durham  4 bottles of albumin infused by RN   Patient tolerated procedure

## 2017-10-10 NOTE — PROGRESS NOTES
CC: Tico Solorzano is a 76 y.o. male is suffering from No chief complaint on file.        SUBJECTIVE:  1. Acute renal failure superimposed on stage 5 chronic kidney disease, not on chronic dialysis, unspecified acute renal failure type (CMS-HCC)  Ed is here for follow-up, is suffering from acute renal failure, denies any nausea vomiting chest pain or discomfort but does have a significant issue associated with ascites.     2. Other ascites  Patient ascites secondary to underlying lung disease clinically stable is undergoing peritoneal drainage today    3. Anemia, unspecified type  Patient with anemia etiology uncertain suspect secondary to acute on chronic renal failure patient is being followed by nephrology. Patient is being scheduled for an arterial venous shunt        Past social, family, history:   Social History   Substance Use Topics   • Smoking status: Former Smoker     Packs/day: 2.00     Years: 40.00     Quit date: 7/13/1991   • Smokeless tobacco: Never Used      Comment: quit 20 yrs ago   • Alcohol use No         MEDICATIONS:    Current Outpatient Prescriptions:   •  losartan (COZAAR) 100 MG Tab, Take 1 Tab by mouth every day., Disp: 90 Tab, Rfl: 3  •  pioglitazone (ACTOS) 15 MG Tab, TAKE 1 TABLET BY MOUTH EVERY DAY, Disp: 90 Tab, Rfl: 3  •  spironolactone (ALDACTONE) 25 MG Tab, Take 25 mg by mouth every day., Disp: , Rfl:   •  sodium bicarbonate (SODIUM BICARBONATE) 650 MG Tab, Take 650 mg by mouth 2 times a day., Disp: , Rfl:   •  furosemide (LASIX) 20 MG Tab, Take 1 Tab by mouth every day., Disp: 60 Tab, Rfl: 11  •  insulin glargine (LANTUS) 100 UNIT/ML Solution, Inject 5-7 Units as instructed every evening., Disp: , Rfl:   •  Multiple Vitamins-Minerals (OCUVITE) Tab, Take 1 Tab by mouth every day., Disp: , Rfl:   •  rosuvastatin (CRESTOR) 20 MG Tab, Take 1 Tab by mouth every evening., Disp: 30 Tab, Rfl: 11  •  doxazosin (CARDURA) 2 MG Tab, TAKE 1 TABLET BY MOUTH EVERY DAY, Disp: 90 Tab, Rfl:  3  •  doxazosin (CARDURA) 2 MG Tab, Take 2 mg by mouth every day., Disp: , Rfl:     PROBLEMS:  Patient Active Problem List    Diagnosis Date Noted   • Right upper lobe pneumonia (CMS-HCC) 02/16/2017     Priority: High   • Proteinuria 01/16/2017     Priority: High   • Hyperchloremia 01/16/2017     Priority: High   • Lactic acidosis 01/21/2016     Priority: High   • Acute on chronic renal failure (CMS-HCC) 12/16/2015     Priority: High   • Protein calorie malnutrition (CMS-HCC) 02/17/2017     Priority: Low   • Gout 06/03/2009     Priority: Low   • Cirrhosis, cryptogenic (CMS-HCC) 07/17/2017   • Alpha-1-antitrypsin deficiency (CMS-HCC) 07/17/2017   • Ascites controlled with medication 03/30/2017   • Normocytic anemia 01/16/2017   • Type 2 diabetes mellitus (CMS-HCC) 02/03/2016   • Cough 01/21/2016   • BPH (benign prostatic hyperplasia) 12/17/2015   • S/P placement of cardiac pacemaker 12/17/2015   • History of prostate cancer 11/04/2015   • 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  Heart: No chest pain.  Lungs:  No shortness of Breath.  Psychological: Alvin unusual Anxiety depression     PHYSICAL EXAM   Constitutional: Alert, cooperative, not in acute distress.  Cardiovascular:  Rate Rhythm is regular without murmurs rubs clicks.     Thorax & Lungs: Clear to auscultation, no wheezing, rhonchi, or rales  HENT: Normocephalic, Atraumatic.  Eyes: PERRLA, EOMI, Conjunctiva normal.   Neck: Trachia is midline no swelling of the thyroid.   Lymphatic: No lymphadenopathy noted.   Neurologic: Alert & oriented x 3, cranial nerves II through XII are intact, Normal motor function, Normal sensory function, No focal deficits noted.   Psychiatric: Affect normal,  Judgment normal, Mood normal.     VITAL SIGNS:/52   Pulse 86   Temp 36.8 °C (98.3 °F)   Resp 16   Ht 1.829 m (6')   Wt 80.3 kg (177 lb 1.6 oz)   SpO2 96%   BMI 24.02 kg/m²     Labs: Reviewed    Assessment:                                                     Plan:    1. Acute renal failure superimposed on stage 5 chronic kidney disease, not on chronic dialysis, unspecified acute renal failure type (CMS-HCC)  Acute renal failure recheck basic metabolic panel one month  - BASIC METABOLIC PANEL; Future    2. Other ascites  Ascites currently undergoing peritoneal drainage    3. Anemia, unspecified type  Anemia secondary to renal failure

## 2017-10-20 NOTE — PROGRESS NOTES
Brought patient back and scanned abdomen.  Found a pocket of fluid and called Dr Zhao.  He came and performed the paracentesis.  Vitals were monitored during exam. Removed 5800 ml of fluid and sent 0 ml to lab.  Patient tolerated procedure well and left in stable condition.  Patient did not want albumin after the procedure. Arnulfo WINKLER talked to patient about this

## 2017-10-20 NOTE — PROGRESS NOTES
IR RN note:    Paracentesis completed by MD Zhao, 5,800 ml of Fluid removed , patient refusing albumin at this time, stating he has had this much before removed and not gotten the albumin and has felt fine. Pt explained the benefits and hospital protocol. Pt still refusing albumin

## 2017-10-31 NOTE — PROGRESS NOTES
Procedure PABLO Carreon    US guided therapeutic paracentesis done by Dr. Ramirez; RLQ access site; 6,900mL obtained and discarded; pt tolerated the procedure well; pt hemodynamically stable pre/intra/post procedure; all questions and concerns answered prior to d/c; patient provided with all appropriate education for procedure; pt d/c home.     6,900 mL of fluid removed, Albumin protocol initiated, order signed by MD  4 bottles of albumin infused  Patient tolerated procedure

## 2017-11-09 NOTE — PROGRESS NOTES
Procedure PABLO Noriega    US guided therapeutic paracentesis done by Dr. Patel; RLQ access site; 5100 mL obtained and discarded; pt tolerated the procedure well; pt hemodynamically stable pre/intra/post procedure; all questions and concerns answered prior to d/c; patient provided with all appropriate education for procedure; pt d/c home.     5100 mL of fluid removed, Patient tolerated procedure. Pt is Refusing albumin, pt educated and still refusing

## 2017-11-22 NOTE — PROGRESS NOTES
"Patient given [facility] \"Preventing the Spread of Infection\" Brochure upon being checked in.     US guided therapeutic paracentesis done by Dr. Durham; RLQ access site; 6300mL clear yellow obtained and discarded; procedural RN: Cy;  pt tolerated the procedure well; pt hemodynamically stable pre/intra/post procedure; all questions and concerns answered prior to d/c; patient provided with all appropriate education for procedure; pt d/c home.      Albumin Protocol: 50g albumin given per albumin infusion protocol, please see MAR for medication documentation.       "

## 2017-11-27 NOTE — OR NURSING
Pt to recovery, sleeping, rouses easily to voice. Denies pain or nausea at this time. R arm wrapped in ACE dressing, CDI. Bruit and thrill easily detected upon initial assessment. Pt educated that ACE wrap may be removed at 1530. VSS

## 2017-11-27 NOTE — DISCHARGE INSTRUCTIONS
ACTIVITY: Rest and take it easy for the first 24 hours.  A responsible adult is recommended to remain with you during that time.  It is normal to feel sleepy.  We encourage you to not do anything that requires balance, judgment or coordination.    MILD FLU-LIKE SYMPTOMS ARE NORMAL. YOU MAY EXPERIENCE GENERALIZED MUSCLE ACHES, THROAT IRRITATION, HEADACHE AND/OR SOME NAUSEA.    FOR 24 HOURS DO NOT:  Drive, operate machinery or run household appliances.  Drink beer or alcoholic beverages.   Make important decisions or sign legal documents.    SPECIAL INSTRUCTIONS & SURGICAL DRESSING/BATHING:   AV Fistula D/C instructions:     * Remove ace wrap 2 hours post-procedure if present.     1. DIET: Upon discharge from the hospital you may resume your normal preoperative diet. Depending on how you are feeling and whether you have nausea or not, you may wish to stay with a bland diet for the first few days. However, you can advance this as quickly as you feel ready.     2. ACTIVITIES: After discharge from the hospital, you may resume full routine activities. However, there should be no heavy lifting (greater than 15 pounds) and no strenuous activities until after your follow-up visit. Otherwise, routine activities of daily living are acceptable.     3. DRIVING: You may drive whenever you are off pain medications and are able to perform the activities needed to drive, i.e. turning, bending, twisting, etc.     4. BATHING: You may get the wound wet at any time after leaving the hospital. You may shower, but do not submerge in a bath for at least a week. Dressings may come off after 48 hours. Leave steri strips on until they fall off.  It may be necessary to trim the edges.     5. BOWEL FUNCTION: Constipation is common after an operation, especially with pain medications. The combination of pain medication and decreased activity level can cause constipation in otherwise normal patients. If you feel this is occurring, take a  laxative (Milk of Magnesia, Ex-Lax, Senokot, etc.) until the problem has resolved.     6. PAIN MEDICATION: You will be given a prescription for pain medication at discharge. Please take these as directed. It is important to remember not to take medications on an empty stomach as this may cause nausea.     7.CALL IF YOU HAVE: (1) Fevers to more than 1010 F, (2) Unusual chest or leg pain, (3) Drainage or fluid from incision that may be foul smelling, increased tenderness or soreness at the wound or the wound edges are no longer together, redness or swelling at the incision site. Please do not hesitate to call with any other questions.     8. APPOINTMENT: Follow up [facility] as instructed.    DIET: To avoid nausea, slowly advance diet as tolerated, avoiding spicy or greasy foods for the first day.  Add more substantial food to your diet according to your physician's instructions. INCREASE FLUIDS AND FIBER TO AVOID CONSTIPATION.    FOLLOW-UP APPOINTMENT:  A follow-up appointment should be arranged with your doctor; call to schedule.    You should CALL YOUR PHYSICIAN if you develop:  Fever greater than 101 degrees F.  Pain not relieved by medication, or persistent nausea or vomiting.  Excessive bleeding (blood soaking through dressing) or unexpected drainage from the wound.  Extreme redness or swelling around the incision site, drainage of pus or foul smelling drainage.  Inability to urinate or empty your bladder within 8 hours.  Problems with breathing or chest pain.    You should call 911 if you develop problems with breathing or chest pain.  If you are unable to contact your doctor or surgical center, you should go to the nearest emergency room or urgent care center.  Physician's telephone #: Dr. Guardado [phone]    If any questions arise, call your doctor.  If your doctor is not available, please feel free to call the Surgical Center at [phone].  The Center is open Monday through Friday  from 7AM to 7PM.  You can also call the HEALTH HOTLINE open 24 hours/day, 7 days/week and speak to a nurse at [phone], or toll free at [phone].    A registered nurse may call you a few days after your surgery to see how you are doing after your procedure.    MEDICATIONS: Resume taking daily medication.  Take prescribed pain medication with food.  If no medication is prescribed, you may take non-aspirin pain medication if needed.  PAIN MEDICATION CAN BE VERY CONSTIPATING.  Take a stool softener or laxative such as senokot, pericolace, or milk of magnesia if needed.    Prescription given for Norco.  No pain medication given in recovery.    If your physician has prescribed pain medication that includes Acetaminophen (Tylenol), do not take additional Acetaminophen (Tylenol) while taking the prescribed medication.    Depression / Suicide Risk    As you are discharged from this [facility] facility, it is important to learn how to keep safe from harming yourself.    Recognize the warning signs:  · Abrupt changes in personality, positive or negative- including increase in energy   · Giving away possessions  · Change in eating patterns- significant weight changes-  positive or negative  · Change in sleeping patterns- unable to sleep or sleeping all the time   · Unwillingness or inability to communicate  · Depression  · Unusual sadness, discouragement and loneliness  · Talk of wanting to die  · Neglect of personal appearance   · Rebelliousness- reckless behavior  · Withdrawal from people/activities they love  · Confusion- inability to concentrate     If you or a loved one observes any of these behaviors or has concerns about self-harm, here's what you can do:  · Talk about it- your feelings and reasons for harming yourself  · Remove any means that you might use to hurt yourself (examples: pills, rope, extension cords, firearm)  · Get professional help from the community (Mental Health, Substance Abuse,  psychological counseling)  · Do not be alone:Call your Safe Contact- someone whom you trust who will be there for you.  · Call your local CRISIS HOTLINE 854-0787 or 081-066-6829  · Call your local Children's Mobile Crisis Response Team [place] (369) 034-7121 or www.[facility]  · Call the toll free National Suicide Prevention Hotlines   · National Suicide Prevention Lifeline 644-267-CUAI (4260)  · National Hope Line Network 800-SUICIDE (883-5717)

## 2017-11-27 NOTE — H&P
Date of Service:  11/27/2017      Dialysis Access History and Physical     PCP: Casey Wick D.O.    CC:  Need for vascular access for impending dialysis    HPI: This is a 77 y.o. male with chronic kidney disease who needs AVF creation.  He is currently not receiving dialysis.    ROS: As above. The remainder of a complete review of systems is negative in all systems except as noted.    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) 08/10/2015   • History of prostate cancer 11/04/2015   • Acute on chronic renal failure (CMS-HCC) 12/16/2015   • BPH (benign prostatic hyperplasia) 12/17/2015   • S/P placement of cardiac pacemaker 12/17/2015   • Cough 01/21/2016   • Lactic acidosis 01/21/2016   • Type 2 diabetes mellitus (CMS-HCC) 02/03/2016   • Proteinuria 01/16/2017   • Normocytic anemia 01/16/2017   • Hyperchloremia 01/16/2017   • Right upper lobe pneumonia (CMS-HCC) 02/16/2017   • Protein calorie malnutrition (CMS-[facility]) 02/17/2017   • Ascites controlled with medication 03/30/2017   • Cirrhosis, cryptogenic (CMS-[facility]) 07/17/2017   • Alpha-1-antitrypsin deficiency (CMS-[facility]) 07/17/2017     Resolved Ambulatory Problems     Diagnosis Date Noted   • DM (diabetes mellitus) (CMS-HCC) 05/22/2009   • Bradycardia 12/16/2015     Past Medical History:   Diagnosis Date   • Alpha-1-antitrypsin deficiency (CMS-HCC) 7/17/2017   • Arthritis    • Ascites controlled with medication 3/30/2017   • Cancer (CMS-HCC) 2003   • CATARACT    • Diabetes    • Hypertension    • Pneumonia 1961   • Protein in urine 7/2/2012   • Renal disorder    • Vitamin d deficiency 1/16/2012       SHx:  Social History     Social History   • Marital status:      Spouse name: N/A   •  Number of children: N/A   • Years of education: N/A     Occupational History   • Not on file.     Social History Main Topics   • Smoking status: Former Smoker     Packs/day: 2.00     Years: 40.00     Quit date: 7/13/1991   • Smokeless tobacco: Never Used      Comment: quit 20 yrs ago   • Alcohol use No   • Drug use: No   • Sexual activity: Not Currently     Partners: Female     Other Topics Concern   • Not on file     Social History Narrative   • No narrative on file       FHx:  family history includes Cancer in his brother; Other in his father and mother.    Allergies:  Allergies   Allergen Reactions   • Valsartan      12/16/15 pt states he doesn't remember if he's allergic to this medication.       Medications:  No current facility-administered medications on file prior to encounter.      Current Outpatient Prescriptions on File Prior to Encounter   Medication Sig Dispense Refill   • losartan (COZAAR) 100 MG Tab Take 1 Tab by mouth every day. 90 Tab 3   • pioglitazone (ACTOS) 15 MG Tab TAKE 1 TABLET BY MOUTH EVERY DAY 90 Tab 3   • sodium bicarbonate (SODIUM BICARBONATE) 650 MG Tab Take 650 mg by mouth 2 times a day.     • furosemide (LASIX) 20 MG Tab Take 1 Tab by mouth every day. 60 Tab 11   • insulin glargine (LANTUS) 100 UNIT/ML Solution Inject 5-7 Units as instructed every evening.     • rosuvastatin (CRESTOR) 20 MG Tab Take 1 Tab by mouth every evening. 30 Tab 11       Objective Exam:  Vitals:    11/27/17 1002 11/27/17 1011   BP:  (!) 167/75   Pulse:  63   Resp:  17   Temp:  36.7 °C (98.1 °F)   SpO2:  100%   Weight: 76.1 kg (167 lb 12.3 oz)    Height: 1.829 m (6')          General: Awake, alert  HEENT: Normocephalic, atraumatic  Chest: Clear, Pacemaker left chest  CV: RRR  Abd: Soft  Neuro: Intact  Skin: Intact      Laboratory--reviewed personally and are as follows:  Lab Results   Component Value Date/Time    WBC 5.0 11/27/2017 10:30 AM    RBC 3.95 (L) 11/27/2017 10:30 AM    HEMOGLOBIN 11.5 (L) 11/27/2017  10:30 AM    HEMATOCRIT 35.7 (L) 11/27/2017 10:30 AM    MCV 90.4 11/27/2017 10:30 AM    MCH 29.1 11/27/2017 10:30 AM    MCHC 32.2 (L) 11/27/2017 10:30 AM    MPV 11.3 11/27/2017 10:30 AM    NEUTSPOLYS 67.10 09/14/2017 08:40 AM    LYMPHOCYTES 22.70 09/14/2017 08:40 AM    MONOCYTES 8.40 09/14/2017 08:40 AM    EOSINOPHILS 0.90 09/14/2017 08:40 AM    BASOPHILS 0.60 09/14/2017 08:40 AM    ANISOCYTOSIS 1+ 01/21/2016 03:50 PM      Lab Results   Component Value Date/Time    SODIUM 138 11/27/2017 10:30 AM    POTASSIUM 4.4 11/27/2017 10:30 AM    CHLORIDE 111 11/27/2017 10:30 AM    CO2 16 (L) 11/27/2017 10:30 AM    GLUCOSE 142 (H) 11/27/2017 10:30 AM    BUN 42 (H) 11/27/2017 10:30 AM    CREATININE 4.14 (H) 11/27/2017 10:30 AM    CREATININE 1.85 (H) 01/26/2012 08:36 AM    BUNCREATRAT 9 (L) 11/06/2017 02:14 PM    BUNCREATRAT 10 01/26/2012 08:36 AM    GLOMRATE 41 (L) 11/10/2009 12:00 AM      Lab Results   Component Value Date/Time    PROTHROMBTM 16.4 (H) 09/14/2017 08:40 AM    INR 1.28 (H) 09/14/2017 08:40 AM            MDM:  77 y.o. male here for AVF creation, side to to be determined.    Assessment/Plan:  Active Problems:    * No active hospital problems. *  Resolved Problems:    * No resolved hospital problems. *      Planned Procedure :  AVF creation

## 2017-11-27 NOTE — OP REPORT
11/27/17    OPERATIVE NOTE    PRE-OPERATIVE DIAGNOSIS -renal failure, cirrhosis    POST-OPERATIVE DIAGNOSIS -same    PROCEDURE PERFORMED -right upper extremity brachiobasilic AV graft placement    SURGEON - Cirilo Guardado M.D.    ASSISTANT - JOSE Sharp    TYPE OF ANESTHESIA - General     ANESTHESIOLOGIST  -Dr. Fajardo      SPECIMENS -none    INDICATIONS - 77 y.o.      PROCEDURE IN DETAIL -the patient was taken to the operating suite placed in the supine position after adequate anesthesia the patient's right upper extremity was circumferentially dissected and isolated. Small incision was made in the left upper arm in the region of the basilic vein which was localized preoperatively with ultrasound. Incision was carried down to the subtenon's tissue in the basilic vein was circumferentially dissected and isolated. Another incision was made more distally just proximal to the antecubital crease in the region of the brachial artery. Incision was carried through the subcutaneous tissue in the brachial artery was circumferentially dissected and isolated. Next a Francia-Wick tunneler was then used to tunnel a graduated 4-7 mm PTFE graft from one incision to the next. 5000 units of heparin was administered after appropriate period of time a longitudinal venotomy incision was made in the basilic vein. The graft was probably spatulated and an end-to-side anastomosis was completed between the PTFE graft and the basilic vein using a 6-0 Prolene suture.  Longitudinal arteriotomy was made in the brachial artery. The graft was probably spatulated and an end-to-side anastomoses was completed between the brachial artery and the PTFE graft using 6-0 Prolene suture. After fore flushing and backflushing. The superior graft with good flow. Protamine was administered. 3-0 Vicryl subcutaneous sutures placed followed by 40 Stratus 6. Benzoin Steri-Strips sterile dressings were applied and an Ace wrap was applied.      Cirilo Guardado,  M.D.

## 2017-12-01 NOTE — TELEPHONE ENCOUNTER
VOICEMAIL  1. Caller Name: Tico Solorzano                        Call Back Number: [phone] (home)     2. Message: patient left a voice message stating his diabetic supply company is going out of business and the new company should have a faxed a request. Please fax refill as soon as possible.     3. Patient approves office to leave a detailed voicemail/MyChart message: N\A

## 2017-12-05 NOTE — PROGRESS NOTES
"Patient given [facility] \"Preventing the Spread of Infection\" Brochure upon being checked in.     US guided therapeutic paracentesis done by Dr. Cortez; RLQ access site; 5000mL yellow orange fluid obtained and discarded; procedural RN: Ankit;  pt tolerated the procedure well; pt hemodynamically stable pre/intra/post procedure; all questions and concerns answered prior to d/c; patient provided with all appropriate education for procedure; pt d/c home.            "

## 2017-12-15 NOTE — PROGRESS NOTES
Procedure RN     US guided therapeutic paracentesis done by Dr. Ramirez; RLQ access site; 6180mL obtained and discarded; pt tolerated the procedure well; pt hemodynamically stable pre/intra/post procedure; all questions and concerns answered prior to d/c; patient provided with all appropriate education for procedure; pt d/c home.     6180 mL of fluid removed, Albumin protocol initiated, order signed by MD  4 bottles of albumin infused  Patient tolerated procedure

## 2017-12-27 NOTE — PROGRESS NOTES
PHYSICIAN: DR KIM  TECH: JACQUELINE    PROCEDURE: PARACENTESIS      PT CAME TO ULTRASOUND DEPARTMENT FOR A PARACENTESIS. 4700 ML ASCITES WAS REMOVED. PT WAS IN A STABLE CONDITION WHEN HE ARRIVED TO ULTRASOUND AND IN A STABLE CONDITION WHEN HE DEPARTED. HE WAS GIVEN A POST PARACENTESIS PAPER AND WAS WALKED TO THE FRONT DOOR IN  A WHEEL CHAIR DUE TO PT REQUEST.

## 2017-12-27 NOTE — OR SURGEON
Immediate Post- Operative Note        PostOp Diagnosis: ascites      Procedure(s): paracentesis      Estimated Blood Loss: Less than 5 ml        Complications: None            12/27/2017     1:42 PM     Santa Bills

## 2017-12-29 PROBLEM — J18.9 CAP (COMMUNITY ACQUIRED PNEUMONIA): Status: ACTIVE | Noted: 2017-01-01

## 2017-12-29 PROBLEM — E11.10 DKA (DIABETIC KETOACIDOSES): Status: ACTIVE | Noted: 2017-01-01

## 2017-12-29 PROBLEM — R19.7 DIARRHEA: Status: ACTIVE | Noted: 2017-01-01

## 2017-12-29 PROBLEM — R65.21 SEPTIC SHOCK (HCC): Status: ACTIVE | Noted: 2017-01-01

## 2017-12-29 PROBLEM — A41.9 SEPTIC SHOCK (HCC): Status: ACTIVE | Noted: 2017-01-01

## 2017-12-29 PROBLEM — E87.20 METABOLIC ACIDOSIS: Status: ACTIVE | Noted: 2017-01-01

## 2017-12-29 PROBLEM — K74.69 CRYPTOGENIC CIRRHOSIS (HCC): Status: ACTIVE | Noted: 2017-01-01

## 2017-12-29 PROBLEM — R65.20 SEVERE SEPSIS (HCC): Status: ACTIVE | Noted: 2017-01-01

## 2017-12-29 PROBLEM — I50.9 HEART FAILURE (HCC): Status: ACTIVE | Noted: 2017-01-01

## 2017-12-29 PROBLEM — E87.29 HIGH ANION GAP METABOLIC ACIDOSIS: Status: ACTIVE | Noted: 2017-01-01

## 2017-12-29 PROBLEM — E11.22 CKD STAGE 5 DUE TO TYPE 2 DIABETES MELLITUS (HCC): Status: ACTIVE | Noted: 2017-01-01

## 2017-12-29 PROBLEM — N18.5 CKD STAGE 5 DUE TO TYPE 2 DIABETES MELLITUS (HCC): Status: ACTIVE | Noted: 2017-01-01

## 2017-12-29 PROBLEM — I48.91 ATRIAL FIBRILLATION (HCC): Status: ACTIVE | Noted: 2017-01-01

## 2017-12-29 PROBLEM — J10.1 INFLUENZA A: Status: ACTIVE | Noted: 2017-01-01

## 2017-12-29 PROBLEM — I49.9 ARRHYTHMIA: Status: ACTIVE | Noted: 2017-01-01

## 2017-12-29 NOTE — ED NOTES
Lab called with critical result of lactic acid at 0945. Critical lab result read back to lab.   Dr. Christianson notified of critical lab result Critical lab result read back by .

## 2017-12-29 NOTE — ED NOTES
Pt BIB [facility], c/o cough/ fever/ chills/ SOB, pt c/o increased weakness , pt w/ noted productive cough

## 2017-12-29 NOTE — ED PROVIDER NOTES
ED Provider Note    CHIEF COMPLAINT  Chief Complaint   Patient presents with   • Flu Like Symptoms   • Cough   • Weakness   • Shortness of Breath       HPI  Tico Solorzano is a 77 y.o. male who presentsComplains of generalized weakness, falling at home for the last 3 days coughing for the last 4 weeks. Worse the last 3 days. No fever no pain does have increasing shortness of breath for the last 24 hours. No diarrhea no vomiting no dysuria urgency or frequency. No chest pain.    REVIEW OF SYSTEMS  See HPI for further details. History of arthritis, ascites, prostate cancer diabetes hypertension and pneumonia renal insufficiency All other systems are negative.     PAST MEDICAL HISTORY  Past Medical History:   Diagnosis Date   • Alpha-1-antitrypsin deficiency (CMS-HCC) 7/17/2017   • Arthritis     fingers   • Ascites controlled with medication 3/30/2017   • Cancer (CMS-HCC) 2003    prostate cancer   • CATARACT     repaired in right eye   • Diabetes    • Hypertension    • Pneumonia 1961   • Protein in urine 7/2/2012   • Renal disorder     50% function   • Vitamin d deficiency 1/16/2012       FAMILY HISTORY  Family History   Problem Relation Age of Onset   • Other Mother    • Other Father    • Cancer Brother        SOCIAL HISTORY  Social History     Social History   • Marital status:      Spouse name: N/A   • Number of children: N/A   • Years of education: N/A     Social History Main Topics   • Smoking status: Former Smoker     Packs/day: 2.00     Years: 40.00     Quit date: 7/13/1991   • Smokeless tobacco: Never Used      Comment: quit 20 yrs ago   • Alcohol use No   • Drug use: No   • Sexual activity: Not Currently     Partners: Female     Other Topics Concern   • Not on file     Social History Narrative   • No narrative on file       SURGICAL HISTORY  Past Surgical History:   Procedure Laterality Date   • AV FISTULA CREATION Right 11/27/2017    Procedure: AV FISTULA CREATION - UPPER EXTREMITY BRACHIAL AXILLARY  GRAFT PLACEMENT;  Surgeon: Madison Guardado M.D.;  Location: SURGERY [facility];  Service: General   • INCISION AND DRAINAGE ORTHOPEDIC  12/17/2011    Performed by MADISON MENDEZ at SURGERY [facility]   • OTHER  2003    radical prostatectomy   • CHOLECYSTECTOMY     • OPEN REDUCTION      rods in left leg, spontaneous bone deficit   • TONSILLECTOMY         CURRENT MEDICATIONS  Home Medications    **Home medications have not yet been reviewed for this encounter**         ALLERGIES  Allergies   Allergen Reactions   • Valsartan      12/16/15 pt states he doesn't remember if he's allergic to this medication.       PHYSICAL EXAM  VITAL SIGNS: /91   Pulse 98   Temp 36.5 °C (97.7 °F)   Resp (!) 24   Ht 1.829 m (6')   Wt 76.1 kg (167 lb 12.3 oz)   SpO2 96%   BMI 22.75 kg/m²   Constitutional:Appears to be chronically debilitated, No acute distress, Non-toxic appearance.   HENT: Normocephalic, Atraumatic, Bilateral external ears normal, Oropharynx moist, No oral exudates, Nose normal.   Eyes: PERRL, EOMI, Conjunctiva normal, No discharge.   Neck: Normal range of motion, No tenderness, Supple, No stridor.   Lymphatic: No lymphadenopathy noted.   Cardiovascular: Normal heart rate, Normal rhythm, No murmurs, No rubs, No gallops.   Thorax & Lungs: Normal breath sounds, No respiratory distress, No wheezing, No chest tenderness.   Abdomen:  No tenderness, no guarding no rigidity and the abdomen is soft.  No masses, No pulsatile masses.  Skin: Warm, Dry, No erythema, No rash.   Back: No tenderness, No CVA tenderness.   Extremities: Intact distal pulses, No edema, No tenderness, No cyanosis, No clubbing.   Musculoskeletal: Good range of motion in all major joints. No tenderness to palpation or major deformities noted.   Neurologic: Alert & oriented x 3, Normal motor function, Normal sensory function, No focal deficits noted.   Psychiatric: Affect normal, Judgment normal, Mood normal.   EKG  Interpretation    Interpreted by me    Rhythm: Atrial fibrillation     Rate: 90     Axis: -30 Ectopy: none  Conduction: normal  ST Segments: no acute change  T Waves: no acute change  Q Waves: none    Clinical Impression: I do have old EKGs compare to, EKG done a month ago demonstrated a paced rhythm. EKG 9 months ago demonstrated sinus rhythm.      RADIOLOGY/PROCEDURES  DX-CHEST-PORTABLE (1 VIEW)   Final Result         Diffuse interstitial prominence and patchy multifocal opacities throughout both lungs could be seen with multifocal infection or pulmonary edema.              COURSE & MEDICAL DECISION MAKING  Pertinent Labs & Imaging studies reviewed. (See chart for details) white count elevated 16.6 hematocrit 30, there is no shift. Lactic acid is 6, electrolytes, bicarb low at 8. Glucose 141, BUN and creatinine both markedly elevated., Troponin, 0.06, BNP is normal influenza is positive        His patient appears to be chronically debilitated, coughing, elevated white count, lactate is 6, concern for community-acquired pneumonia, given intravenous antibiotics consistent with community-acquired pneumonia. I will talk with the hospitalist about admission. Her chest x-ray is read as possible pulmonary edema or fracture, B is normal, so suspect infection. He is given aggressive treatment with antibiotics. Does have a history of renal insufficiency in the past, no improvement now.      FINAL IMPRESSION  1.   1. Bronchitis    2. Weakness    3. Shortness of breath    4. Body aches    5. Pneumonia due to infectious organism, unspecified laterality, unspecified part of lung    6. Renal insufficiency    7. Influenza            2.   3.     Disposition  I have talked with the hospitalist about admission, [facility] nephrology who will consult his kidney disease.  Electronically signed by: Cliff Christianson, 12/29/2017 10:09 AM

## 2017-12-29 NOTE — CONSULTS
DATE OF SERVICE:  12/29/2017    REASON FOR CONSULTATION:  Management of chronic renal failure and patient   hospitalized with pneumonia.    HISTORY OF PRESENT ILLNESS:  The patient is a 77-year-old  male well   known to our service who I have followed as an outpatient for years.  He has   CKD stage V due to diabetes and hypertension.  We have varied in by already   having placed an AV fistula in his right upper arm.  He routinely follows with   this, was most recently seen 4 weeks ago.  At that time, he had creatinine of   4.8, hemoglobin of 10, normal electrolytes.  He was not uremic or fluid   overloaded.  Hemodialysis has not yet been initiated.    The patient presented to the emergency room complaining of coughing for 2 or 3   weeks.  He has been only productive over the last couple of days.  He became   increasingly weak, unable to provide care for himself.  He could not get   around.  He was not eating very well ____ difficult to get around because he   was so weak and tired.    In the ER, he presented, he was normotensive with a blood pressure of 140/96,   pulse 80.  He was mildly hypoxic on room air, was started on a little bit of   oxygen.  Laboratory showed white count of 16 with a left shift.  He also has   evidence potentially of fluid.  Chest x-ray suggests a possible pneumonia.    Laboratory results showed BUN 91, creatinine 5.3, potassium 5.2, was not fluid   overloaded on exam.  His lactic acid levels were elevated.  He is being   admitted to the hospital under the hospitalist care.  He was given some   intravenous fluids.  We have been asked to follow him for CKD stage V.    PAST MEDICAL HISTORY:  1.  Diabetes mellitus, type 2 with neuropathy and nephropathy.  2.  Longstanding chronic essential hypertension.  3.  History of pacemaker placement.  4.  Anemia of renal disease.  5.  History of right upper arm AV fistula placement.  6.  History of gout.  7.  History of vitamin D deficiency.  8.   Hyperlipidemia, unclear to me what blood pressure medications he is taking   if any.    ALLERGIES:  Undocumented.    FAMILY HISTORY:  Noncontributory.    SOCIAL HISTORY:  Noncontributory.    REVIEW OF SYSTEMS:  GENERAL:  He actually denies fevers, had some chills, no heat or cold   intolerance.  Generally, he has felt weak and tired.  PULMONARY:  He denies hemoptysis, but has had a cough with some mildly   productive sputum.  CARDIOVASCULAR:  No chest pain, palpitations, or diaphoresis.  GASTROINTESTINAL:  Diminished appetite, but no hematemesis, melena or   vomiting.  GENITOURINARY:  Still makes urine.  Denies dysuria or gross hematuria.  NEUROLOGIC:  Denies focal weakness, just feels generally weak.    PHYSICAL EXAMINATION:  VITAL SIGNS:  As noted above.  HEENT:  Normocephalic, atraumatic.  Sclerae anicteric.  GENERAL:  This is an elderly  male in no distress on oxygen.    Speaking in full sentences.  NECK:  Supple without adenopathy.  CHEST:  He has bilateral crackles, right worse than left.  HEART:  RRR.  No appreciable rubs appreciated.  Pacemaker in the chest wall.  ABDOMEN:  Soft, nontender.  No organomegaly palpable.  No bruits noted.  EXTREMITIES:  Show no edema.  He has palpable pulses in all his extremities.    He has AV fistula in his right arm with good thrill.  NEUROLOGIC:  Grossly nonfocal.    LABORATORY DATA:  White count 16.6, hemoglobin 9.7, hematocrit 30.2, platelet   count 163,000.  Sodium 135, potassium 5.2, BUN 91, creatinine 5.4, albumin   2.7.  Liver enzymes within normal limits.    Blood culture is pending.  Sputum culture is pending.    PROBLEMS IDENTIFIED:  1.  Chronic renal failure.  2.  Probable pneumonia/influenza.  3.  Anemia, chronic.  4.  Diabetes, chronic.  5.  Hypertension, chronic.  6.  Acute debility/weakness.    IMPRESSION:  1.  Chronic kidney disease, stage V.  The patient is not uremic.  He is not   fluid overloaded, not hyperkalemic.  There is no indication for acute    hemodialysis at this time.  Prefer to avoid IV contrast exposure, hypotension,   hopefully we can prevent the need for hemodialysis.  He does have an elevated   BUN probably from not taking much in the way of p.o. for the last several   days.  I recommend we monitor his chemistries closely, I's and O's closely and   electrolytes closely and hopefully, with a little fluid resuscitation at this   time.  He will not require acute hemodialysis during this hospitalization.    All medications to be adjusted assuming a GFR less than 15.  2.  Anemia.  He has chronic anemia, followed by us in our office, he gets   erythropoietin injections as needed.  We will check iron studies while he is   here.  Monitor hemoglobin as required.  3.  History of hypertension.  I do not know what blood pressure medications he   is on.  Blood pressure is currently relatively normotensive.  I will not   reinitiate them at this time.  4.  Pneumonia.       ____________________________________     MD NEDA REAVES / DIONICIO    DD:  12/29/2017 13:15:15  DT:  12/29/2017 15:57:42    D#:  [id number]  Job#:  [id number]

## 2017-12-29 NOTE — ED NOTES
Med Rec completed per patient  Allergies reviewed  No ORAL antibiotics in last 30 days    Patient hasn't taken his medications in 4-5 days

## 2017-12-29 NOTE — ED NOTES
Assist RN:  Lab called with critical result of Venous CO2 9. Critical lab result read back to lab.   Dr. Christianson notified of critical lab result.  Critical lab result read back by Dr. Christianson.

## 2017-12-29 NOTE — ED NOTES
Lab called with critical result of lactic acid at 1208. Critical lab result read back to .   Dr. Christianson notified of critical lab result at 1208.  Critical lab result read back by

## 2017-12-30 NOTE — ASSESSMENT & PLAN NOTE
- This is severe sepsis with the following associated acute organ dysfunction(s): acute kidney failure, acute respiratory failure, critical illness myopathy.   - d/t MRSA bacteremia, pneumonia and influenza  - continue Teflaro total 6 wks from neg cultures  Completed 5 days tamiflu  - BP improved with fluids  ID following  TTE neg for vegetations  Cards consulted for DORETAH

## 2017-12-30 NOTE — PROGRESS NOTES
[facility] Hospitalist Progress Note    Date of Service: 2017    Chief Complaint  77 y.o. male admitted 2017 with flu like symptoms, shortness of breath.    Interval Problem Update  Pt noted to have influenza a.  Also likely with pneumonia.  Also with diarrhea, c-diff pending.  Initially had low BP.  Very acidotic with worsening renal failure and elevated lactic acid.  Discussed plan and pt condition with RN at bedside.  Pt seen and examined in ICU, ICU care given.    Consultants/Specialty  Nephrology - Dr Ram    Disposition  Patient is critically ill.   The patient continues to have : severe acidosis  The vital organ system that is effected is the: all are at risk  If untreated there is a high chance of deterioration into: PEA arrest   The critical care that I am providing today is: bicarb gtt  The critical care that has been undertaken is medically complex.   There has been no overlap in critical care time.  Critical care time not including procedures, no overlap: 42 minutes        Review of Systems   Unable to perform ROS: Acuity of condition      Physical Exam  Laboratory/Imaging   Hemodynamics  Temp (24hrs), Av.2 °C (97.2 °F), Min:35.4 °C (95.7 °F), Max:36.5 °C (97.7 °F)   Temperature: (!) 35.4 °C (95.7 °F), Monitored Temp: 36.1 °C (97 °F)  Pulse  Av.3  Min: 75  Max: 110 Heart Rate (Monitored): 81  Blood Pressure : 145/91, NIBP: 103/43      Respiratory      Respiration: (!) 23, Pulse Oximetry: 95 %     Work Of Breathing / Effort: Moderate;Tachypnea  RUL Breath Sounds: Crackles, RML Breath Sounds: Crackles, RLL Breath Sounds: Crackles, EMERSON Breath Sounds: Crackles    Fluids    Intake/Output Summary (Last 24 hours) at 17 1802  Last data filed at 17 1700   Gross per 24 hour   Intake                0 ml   Output              100 ml   Net             -100 ml       Nutrition  Orders Placed This Encounter   Procedures   • DIET ORDER     Standing Status:   Standing     Number of  Occurrences:   1     Order Specific Question:   Diet:     Answer:   Diabetic [3]     Comments:   if passes bedside swallow     Physical Exam   Constitutional:  Non-toxic appearance. He appears ill. No distress.   HENT:   Head: Atraumatic. Macrocephalic.   Mouth/Throat: Mucous membranes are dry. No oropharyngeal exudate.   Eyes: Right eye exhibits no discharge. Left eye exhibits no discharge.   Neck: Neck supple. No tracheal deviation, no edema and no erythema present.   Cardiovascular: An irregular rhythm present. Tachycardia present.    Pulmonary/Chest: Effort normal. No stridor. No respiratory distress. He has no wheezes. He has rhonchi.   Abdominal: Soft. Bowel sounds are normal. He exhibits no distension.   Musculoskeletal: He exhibits no edema.   Lymphadenopathy:     He has no cervical adenopathy.   Neurological:   Awake but altered    Skin: Skin is warm and dry. No rash noted. He is not diaphoretic. No erythema.   Psychiatric: He is slowed. Cognition and memory are impaired.   Nursing note and vitals reviewed.      Recent Labs      12/29/17   0926   WBC  16.6*   RBC  3.24*   HEMOGLOBIN  9.7*   HEMATOCRIT  30.2*   MCV  93.2   MCH  29.9   MCHC  32.1*   RDW  58.4*   PLATELETCT  163*   MPV  11.6     Recent Labs      12/29/17   0926  12/29/17   1607   SODIUM  135  138   POTASSIUM  5.2  5.1   CHLORIDE  110  117*   CO2  9*  8*   GLUCOSE  141*  140*   BUN  91*  90*   CREATININE  5.37*  5.78*   CALCIUM  7.8*  7.6*         Recent Labs      12/29/17   0926   BNPBTYPENAT  108*              Assessment/Plan     * Severe sepsis (CMS-HCC)   Assessment & Plan    - This is severe sepsis with the following associated acute organ dysfunction(s): acute kidney failure, acute respiratory failure, critical illness myopathy.   - d/t pneumonia and influenza, possibly d/t c-diff  - continue rocephin/azithromycin and tamiflu  - await cultures  - BP improved with fluids, continue but change to bicarb  - trend lactic acid        High anion  gap metabolic acidosis   Assessment & Plan    - severe, due to sepsis/lactic acidosis and renal failure  - will get bmp q6 and start bicarb gtt  - may need HD        Acute on chronic renal failure (CMS-HCC)- (present on admission)   Assessment & Plan    - significant worsening  - now with severe acidosis  - will start bicarb gtt  - repeat bmp q6 hours  - additional recommendations per nephro, I text Dr Ram the repeat bmp results         Atrial fibrillation (CMS-HCC)   Assessment & Plan    - initially rate controlled but now tachycardic  - likely d/t sepsis and dehydration  - will give IVF  - if it worsens may consider amio vs rate control if BP tolerates        Diarrhea   Assessment & Plan    - will obtain c-diff  - keep in isolation        CAP (community acquired pneumonia)- (present on admission)   Assessment & Plan    - continue abx and await cultures  - repeat cxr in am        Insulin dependent diabetes mellitus (CMS-HCC)   Assessment & Plan    - diabetic diet if he passes bedside swallow  - continue lantus and ISS, adjust as needed  - glucose is only mildly elevated, no reason to suspect DKA is the cause of his acidosis          Influenza A   Assessment & Plan    - continue tamiflu        Cryptogenic cirrhosis (CMS-HCC)   Assessment & Plan    - due to alpha-1 antitrypsin.           Normocytic anemia- (present on admission)   Assessment & Plan    - repeat cbc in am  - no sign of gross bleeding            Reviewed items::  Labs reviewed, Medications reviewed and Radiology images reviewed  DVT prophylaxis pharmacological::  Heparin  Antibiotics:  Treating active infection/contamination beyond 24 hours perioperative coverage

## 2017-12-30 NOTE — PROGRESS NOTES
Pt admitted to the floor at 1345, report was called in by PABLO Mcneil from the ED. Admitting dx is influenza, and was admitted by hospitalist Pt is not ambulatory, A&Ox 4. Denies pain At this time the admit profile is complete, assessment complete, pt verbalized understanding of education of call lights and phone numbers, appropriate signs have been placed. Bed in locked and low position, call light in reach. Bed alarm placed d/t immobility/altered/age/medications given while at hospital. Will continue to monitor pt progress.

## 2017-12-30 NOTE — H&P
Hospital Medicine History and Physical    Date of Service  12/29/2017    Chief Complaint  Chief Complaint   Patient presents with   • Flu Like Symptoms   • Cough   • Weakness   • Shortness of Breath       History of Presenting Illness  77 y.o. male with a past medical history of Hypertension, BPH, dyslipidemia, type II diabetes mellitus and CKD stage 5 with mature AV fistula not undergoing dialysis at this time,  presented 12/29/2017 with generalized weakness whic he has been falling a lot and is home for the past several weeks. Addition of steroid have been nonproductive cough over the past several days. He denied having any diarrhea or dysuria. Upon her evaluation patient looks very ill with noted acute worsening of his renal failure, significant metabolic acidosis with a serum bicarb of 5 and a gap of 16, lactic acid of 4.5, and a serum leukocytosis of 16,600. Upon her initial diagnosis of the chest x-ray shows evidence of bilateral multifocal pneumonia. +Influenza A, In addition given his severe acidosis nephrology Dr. Ram was consulted hence did not recommend dialysis at this given point recommended IV fluids. Patient will be per the ICU for higher level of care and closer monitoring.      Primary Care Physician  Casey Wick D.O.    Consultants   Nephrology      Code Status  Code: Full code    Review of Systems  Review of Systems   Constitutional: Positive for malaise/fatigue. Negative for chills and fever.   HENT: Negative for congestion, hearing loss, sore throat and tinnitus.    Eyes: Negative for blurred vision, double vision, photophobia and pain.   Respiratory: Positive for cough, hemoptysis and shortness of breath. Negative for sputum production and stridor.    Cardiovascular: Negative for chest pain, palpitations, orthopnea, claudication and PND.   Gastrointestinal: Negative for blood in stool, constipation, heartburn, melena, nausea and vomiting.   Genitourinary: Negative for dysuria,  frequency and urgency.   Musculoskeletal: Negative for back pain, myalgias and neck pain.   Neurological: Positive for weakness. Negative for dizziness, tingling, tremors, sensory change, speech change and headaches.   Psychiatric/Behavioral: Negative for depression, memory loss and suicidal ideas. The patient is nervous/anxious.           Past Medical History  Past Medical History:   Diagnosis Date   • Alpha-1-antitrypsin deficiency (CMS-HCC) 7/17/2017   • Ascites controlled with medication 3/30/2017   • Protein in urine 7/2/2012   • Vitamin d deficiency 1/16/2012   • Cancer (CMS-HCC) 2003    prostate cancer   • Pneumonia 1961   • Arthritis     fingers   • CATARACT     repaired in right eye   • Diabetes    • Hypertension    • Renal disorder     50% function       Surgical History  Past Surgical History:   Procedure Laterality Date   • AV FISTULA CREATION Right 11/27/2017    Procedure: AV FISTULA CREATION - UPPER EXTREMITY BRACHIAL AXILLARY GRAFT PLACEMENT;  Surgeon: Madison Guardado M.D.;  Location: SURGERY [facility];  Service: General   • INCISION AND DRAINAGE ORTHOPEDIC  12/17/2011    Performed by MADISON MENDEZ at SURGERY [facility]   • OTHER  2003    radical prostatectomy   • CHOLECYSTECTOMY     • OPEN REDUCTION      rods in left leg, spontaneous bone deficit   • TONSILLECTOMY         Medications  No current facility-administered medications on file prior to encounter.      Current Outpatient Prescriptions on File Prior to Encounter   Medication Sig Dispense Refill   • losartan (COZAAR) 100 MG Tab Take 1 Tab by mouth every day. 90 Tab 3   • pioglitazone (ACTOS) 15 MG Tab TAKE 1 TABLET BY MOUTH EVERY DAY 90 Tab 3   • sodium bicarbonate (SODIUM BICARBONATE) 650 MG Tab Take 650 mg by mouth 2 times a day.     • furosemide (LASIX) 20 MG Tab Take 1 Tab by mouth every day. 60 Tab 11   • insulin glargine (LANTUS) 100 UNIT/ML Solution Inject 5-7 Units as instructed every evening.     • rosuvastatin (CRESTOR)  20 MG Tab Take 1 Tab by mouth every evening. 30 Tab 11       Family History  Family History   Problem Relation Age of Onset   • Other Mother    • Other Father    • Cancer Brother        Social History  Social History   Substance Use Topics   • Smoking status: Former Smoker     Packs/day: 2.00     Years: 40.00     Quit date: 1991   • Smokeless tobacco: Never Used      Comment: quit 20 yrs ago   • Alcohol use No       Allergies  Allergies   Allergen Reactions   • Valsartan      12/16/15 pt states he doesn't remember if he's allergic to this medication.        Physical Exam  Laboratory   Hemodynamics  Temp (24hrs), Av.2 °C (97.2 °F), Min:35.4 °C (95.7 °F), Max:36.5 °C (97.7 °F)   Temperature: (!) 35.4 °C (95.7 °F), Monitored Temp: 36.1 °C (97 °F)  Pulse  Av.3  Min: 75  Max: 110 Heart Rate (Monitored): 81  Blood Pressure : 145/91, NIBP: 103/43      Respiratory      Respiration: (!) 23, Pulse Oximetry: 95 %     Work Of Breathing / Effort: Moderate;Tachypnea  RUL Breath Sounds: Crackles, RML Breath Sounds: Crackles, RLL Breath Sounds: Crackles, EMERSON Breath Sounds: Crackles    Physical Exam   Constitutional: He is oriented to person, place, and time. He appears well-developed. He appears distressed.   HENT:   Head: Normocephalic and atraumatic.   Mouth/Throat: No oropharyngeal exudate.   Eyes: Conjunctivae are normal. Pupils are equal, round, and reactive to light. Right eye exhibits no discharge. No scleral icterus.   Neck: Neck supple. No JVD present. No thyromegaly present.   Cardiovascular: Intact distal pulses.    No murmur heard.  Irregularly irregular.     Pulmonary/Chest: No stridor. No respiratory distress. He has no wheezes. He has rales (bibasilar rales).   Abdominal: Soft. Bowel sounds are normal. He exhibits no distension. There is no tenderness. There is no rebound.   Musculoskeletal: Normal range of motion. He exhibits no edema.   Neurological: He is alert and oriented to person, place, and  time. No cranial nerve deficit.   Skin: Skin is warm. He is diaphoretic. No erythema.   Psychiatric: His behavior is normal. Thought content normal.         Assessment/Plan  * Severe sepsis (CMS-HCC)   Assessment & Plan    This is severe sepsis with the following associated acute organ dysfunction(s): acute kidney failure, acute respiratory failure, critical illness myopathy.   Most likely secondary to multifocal pneumonia plus influenza A  Patient started on sepsis protocol,  IV Rocephin and azithromycin have been started  Tamiflu has been started  Blood sputum cultures and urine cultures pending        Acute on chronic renal failure (CMS-HCC)- (present on admission)   Assessment & Plan    History of CKD stage 5, follows with [facility] nephrology  Mild worsening secondary to sepsis continue IV fluids recheck BMP in the morning  If patient's serum bicarbonate does not improve we'll recontact nephrology for possible dialysis.        Atrial fibrillation (CMS-HCC)   Assessment & Plan    Rate controlled, not on anticoagulation continue to monitor        Diarrhea   Assessment & Plan    Rule out C. diff        Insulin dependent diabetes mellitus (CMS-HCC)   Assessment & Plan    Question compliance  We'll continue home dose of Lantus 5 units  Continue Insulin-sliding scale, accu-checks and hypoglycemia protocol.  Checking a glycohemoglobin H A1c          Influenza A   Assessment & Plan    Started patient on Tamiflu.        High anion gap metabolic acidosis   Assessment & Plan    2/2 to sepsis, and rneal failure.   following nephrology, no dialysis yet.  continue IV fluids, repeat bmp.         Cryptogenic cirrhosis (CMS-HCC)   Assessment & Plan    2/2 to alpha-1 antitrypsin.           Normocytic anemia- (present on admission)   Assessment & Plan    Hemoglobin and hematocrit appear within his baseline no signs grossly to follow daily CBCs.            I anticipate this patient will require at least two midnights  for appropriate medical management, necessitating inpatient admission.    Prophylaxis: sc heparin    Recent Labs      12/29/17   0926   WBC  16.6*   RBC  3.24*   HEMOGLOBIN  9.7*   HEMATOCRIT  30.2*   MCV  93.2   MCH  29.9   MCHC  32.1*   RDW  58.4*   PLATELETCT  163*   MPV  11.6     Recent Labs      12/29/17   0926  12/29/17   1607   SODIUM  135  138   POTASSIUM  5.2  5.1   CHLORIDE  110  117*   CO2  9*  8*   GLUCOSE  141*  140*   BUN  91*  90*   CREATININE  5.37*  5.78*   CALCIUM  7.8*  7.6*     Recent Labs      12/29/17   0926  12/29/17   1607   ALTSGPT  11   --    ASTSGOT  35   --    ALKPHOSPHAT  98   --    TBILIRUBIN  0.5   --    GLUCOSE  141*  140*         Recent Labs      12/29/17   0926   BNPBTYPENAT  108*         Lab Results   Component Value Date    TROPONINI 0.08 (H) 12/29/2017       Imaging  DX-CHEST-PORTABLE (1 VIEW)   Final Result         Diffuse interstitial prominence and patchy multifocal opacities throughout both lungs could be seen with multifocal infection or pulmonary edema.

## 2017-12-30 NOTE — PROGRESS NOTES
Hospital lab notified me that the patient's blood cultures grew gram positive cocci and clusters which could be staph (per the lab). Hospitalist paged to notify of lab findings. Awaiting a return page.

## 2017-12-30 NOTE — PROGRESS NOTES
MD Nguyen notified of results of positive blood culture with gram positive cocci and clusters (potential staph per the lab). MD ordered vancomycin dosing per pharmacy and echocardiogram without contrast to rule out vegetation.

## 2017-12-30 NOTE — PROGRESS NOTES
Tiffanie from Lab called with critical result of blood culture PCR at 1336. Critical lab result read back to Tiffanie.   Dr. Luna notified of critical lab result at 1337.  Critical lab result read back by Dr. Luna.

## 2017-12-30 NOTE — ASSESSMENT & PLAN NOTE
- severe, due to sepsis/lactic acidosis and renal failure  - continue oral bicarbonate  -Acidosis resolved, gap still present

## 2017-12-30 NOTE — ASSESSMENT & PLAN NOTE
- due to alpha-1 antitrypsin  - With ascites, significant, causing abdominal pain  - s/p tap 1/1  -cont to follow

## 2017-12-30 NOTE — RESPIRATORY CARE
COPD EDUCATION by COPD CLINICAL EDUCATOR  12/30/2017 at 12:37 PM by Jeni Hays     Patient reviewed by COPD education team. Patient does not qualify for COPD program.

## 2017-12-30 NOTE — ASSESSMENT & PLAN NOTE
- initially rate controlled but then tachycardic  - likely d/t sepsis and dehydration  - improved with IVF, rate now controlled

## 2017-12-30 NOTE — PROGRESS NOTES
hospitalist updated about patient's labs and critical CO2 level despite adding bicarb gtt. Also updated that patient has crackles bilaterally in lungs and low UOP. Pt Denies SOB, on 4L NC. Per MD monitor for now and continue to trend BMP values.

## 2017-12-30 NOTE — PROGRESS NOTES
Amina from Lab called with critical result of CO2 at 1640. Critical lab result read back to Amina.   Dr. Stein notified of critical lab result at 1655.  Critical lab result read back by Dr. Stein. Order ABG and lactic acid by RT draw

## 2017-12-30 NOTE — PROGRESS NOTES
Pharmacy Kinetics 77 y.o. male on vancomycin day # 1 2017    Currently on Vancomycin New Start    Other antibiotics: ceftriaxone 2 g q24h, azithromycin 250 mg PO q24h    Indication for Treatment: Empiric - GPC in blood culture     Pertinent history per medical record: Admitted on 2017 with  with flu like symptoms & shortness of breath.  Patient was started on Tamiflu for influenza A and empiric antibiotics for CAP coverage.  Blood culture this morning was positive for possible Staph species and vancomycin was added until culture finalizes.  Patient has a h/o CKD stage V (not on dialysis) but does have a fistula.  Echocardiogram has been ordered.         Allergies: Valsartan     List concerns for renal function: CKD stage V    Pertinent cultures to date:   17 - Peripheral BC x 1: Possible Staphylococcus sp.   17 - Peripheral BC x 1: in process     Recent Labs      17   0926   WBC  16.6*   NEUTSPOLYS  63.40   BANDSSTABS  25.00*     Recent Labs      17   0926  17   1607  17   2200  17   0150  17   0415   BUN  91*  90*  95*  89*  90*   CREATININE  5.37*  5.78*  5.18*  4.80*  4.75*   ALBUMIN  2.7*   --    --   2.2*   --      No results for input(s): VANCOTROUGH, VANCOPEAK, VANCORANDOM in the last 72 hours.  Intake/Output Summary (Last 24 hours) at 17 0705  Last data filed at 17 0400   Gross per 24 hour   Intake             4600 ml   Output              340 ml   Net             4260 ml      Blood pressure 145/91, pulse 90, temperature 38 °C (100.4 °F), resp. rate (!) 22, height 1.829 m (6'), weight 76.8 kg (169 lb 5 oz), SpO2 94 %. Temp (24hrs), Av.6 °C (97.9 °F), Min:35.4 °C (95.7 °F), Max:38 °C (100.4 °F)      A/P   1. Vancomycin dose change: 1900 mg IV x 1 (25 mg/kg)  2. Next vancomycin level:  at 0500  3. Goal trough: 16-20 mcg/mL (until culture finalizes)   4. Comments: Patient started on vancomycin due to positive blood culture with  Possible Staph species.  Possible source include HD fistula.  Patient does have CKD stage V but has not been started on dialysis.  Echocardiogram has been ordered to rule out vegetations.  Follow-up level ordered with AM labs tomorrow.  Pharmacy will continue to follow cultures and recommend de-escalation of antibiotics if continued MRSA coverage is no longer indicated.      Yarelis Payne, PharmD, BCPS

## 2017-12-30 NOTE — CONSULTS
Reason for PC Consult: Advance Care Planning    Consulted by:   Dr. David    Assessment:  General:   77 year old male admitted for lactic acidosis on 12/29/17. Pt has a history of prostate cancer, status post prostatectomy, arthritis, cataract, diabetes, HTN and CKD stage 5, AV fistula not currently on dialysis. Pt presented to the ED for generalized weakness and frequent falls at home. Pt tested positive for influenza A. Pt's spouse is currently admitted in this hospital as well.    Dyspnea: No, 92% on 6L NC  Last BM: 12/30/17   Pain: No   Depression: No    Dementia: No       Spiritual:  Is Mandaen or spirituality important for coping with this illness? No, Declined a visit from   Has a  or spiritual provider visit been requested? No    Palliative Performance Scale: 60%    Advance Directive: None on File    DPOA: None on File, ANA Solorzano ([phone]); Lui Solorzano ([phone])    POLST: None on File     Code Status: Full     Outcome:  PC RN visited pt at bedside, introduced self and role of PC. Discussed pt's understanding of clinical picture, pt verbalized that he has been falling down at home a lot and he feels weaker everyday. PC RN discussed pt's living arrangement, currently it is just him and his wife at home. Pt's spouse is currently admitted for an active c-diff infection. Pt states his health has been OK except for his weakness and falls. PC RN discussed pt's healthcare goals, pt would like to get better and return home. PC RN discussed SNF for therapies and having more assistance at home. Pt became sleepy and wanting to discuss later when he feels better.     Updated:   Bedside RN    Plan:   Continue to support pt, will address GOC when pt feels better and willing to discuss.    Recommendations: I do not recommend an ethics or hospice consult at this time because as pt wants to continue treatment at this time.    Thank you for allowing Palliative Care to participate in this  patient's care. Please feel free to call [phone] with any questions or concerns.

## 2017-12-30 NOTE — PROGRESS NOTES
[facility] Hospitalist Progress Note    Date of Service: 2017    Chief Complaint  77 y.o. male admitted 2017 with flu like symptoms, shortness of breath.    Interval Problem Update  Pt noted to have influenza a.  Also likely with pneumonia.  Also with diarrhea, c-diff pending.  Initially had low BP.  Very acidotic with worsening renal failure and elevated lactic acid.  Discussed plan and pt condition with RN at bedside.  Pt seen and examined in ICU, ICU care given.    Consultants/Specialty  Nephrology - Dr Ram    Disposition  Patient is critically ill.   The patient continues to have : severe acidosis  The vital organ system that is effected is the: all are at risk  If untreated there is a high chance of deterioration into: PEA arrest   The critical care that I am providing today is: bicarb gtt  The critical care that has been undertaken is medically complex.   There has been no overlap in critical care time.  Critical care time not including procedures, no overlap: 37 minutes        Review of Systems   Unable to perform ROS: Acuity of condition      Physical Exam  Laboratory/Imaging   Hemodynamics  Temp (24hrs), Av.7 °C (98 °F), Min:35.4 °C (95.7 °F), Max:38 °C (100.4 °F)   Temperature: 38 °C (100.4 °F), Monitored Temp: 37.3 °C (99.1 °F)  Pulse  Av  Min: 74  Max: 130 Heart Rate (Monitored): 88  NIBP: 118/44      Respiratory      Respiration: (!) 24, Pulse Oximetry: 94 %, O2 Daily Delivery Respiratory : Silicone Nasal Cannula     PEP/CPT Method: Positive Airway Pressure Device, Work Of Breathing / Effort: Mild  RUL Breath Sounds: Crackles, RML Breath Sounds: Crackles, RLL Breath Sounds: Diminished, EMERSON Breath Sounds: Crackles, LLL Breath Sounds: Diminished    Fluids    Intake/Output Summary (Last 24 hours) at 17 0935  Last data filed at 17 0800   Gross per 24 hour   Intake             6020 ml   Output              930 ml   Net             5090 ml       Nutrition  Orders Placed This  Encounter   Procedures   • DIET ORDER     Standing Status:   Standing     Number of Occurrences:   1     Order Specific Question:   Diet:     Answer:   Clear Liquid [10]     Order Specific Question:   Diet:     Answer:   Diabetic [3]     Physical Exam   Constitutional:  Non-toxic appearance. He appears ill.   HENT:   Head: Atraumatic. Macrocephalic.   Mouth/Throat: Mucous membranes are dry.   Eyes: Right eye exhibits no discharge. Left eye exhibits no discharge. No scleral icterus.   Neck: Neck supple. No edema and no erythema present.   Cardiovascular: An irregular rhythm present. Tachycardia present.    Murmur heard.  Pulmonary/Chest: Effort normal. No stridor. No respiratory distress. He has no wheezes. He has rhonchi. He has no rales.   Abdominal: Soft. Bowel sounds are normal.   Musculoskeletal: He exhibits no edema.   Lymphadenopathy:     He has no cervical adenopathy.   Neurological:   Awake but altered    Skin: Skin is warm and dry. He is not diaphoretic. No erythema.   Psychiatric: He is slowed. Cognition and memory are impaired.   Nursing note and vitals reviewed.      Recent Labs      12/29/17   0926   WBC  16.6*   RBC  3.24*   HEMOGLOBIN  9.7*   HEMATOCRIT  30.2*   MCV  93.2   MCH  29.9   MCHC  32.1*   RDW  58.4*   PLATELETCT  163*   MPV  11.6     Recent Labs      12/29/17   2200  12/30/17   0150  12/30/17   0415   SODIUM  138  137  139   POTASSIUM  4.9  4.7  4.6   CHLORIDE  112  111  111   CO2  7*  11*  12*   GLUCOSE  125*  101*  95   BUN  95*  89*  90*   CREATININE  5.18*  4.80*  4.75*   CALCIUM  7.6*  7.4*  7.3*         Recent Labs      12/29/17   0926   BNPBTYPENAT  108*              Assessment/Plan     * Severe sepsis (CMS-[facility])   Assessment & Plan    - This is severe sepsis with the following associated acute organ dysfunction(s): acute kidney failure, acute respiratory failure, critical illness myopathy.   - d/t pneumonia and influenza, possibly d/t c-diff  - continue rocephin/azithromycin and  tamiflu  - await cultures  - BP improved with fluids, continue but change to bicarb  - trend lactic acid        High anion gap metabolic acidosis   Assessment & Plan    - severe, due to sepsis/lactic acidosis and renal failure  - continue bicarb gtt  - may need HD        Acute on chronic renal failure (CMS-HCC)- (present on admission)   Assessment & Plan    - significant worsening  - now with severe acidosis  - continue bicarb gtt  - repeat frequent bmps   - additional recommendations per nephro        Atrial fibrillation (CMS-HCC)   Assessment & Plan    - initially rate controlled but then tachycardic  - likely d/t sepsis and dehydration  - improved with IVF, rate now controlled        Diarrhea   Assessment & Plan    - c-diff negative        CAP (community acquired pneumonia)- (present on admission)   Assessment & Plan    - continue abx and await cultures  - repeat cxr in am        Insulin dependent diabetes mellitus (CMS-HCC)   Assessment & Plan    - diabetic diet if he passes bedside swallow  - continue lantus and ISS, adjust as needed  - glucose is only mildly elevated, no reason to suspect DKA is the cause of his acidosis          Influenza A   Assessment & Plan    - continue tamiflu        Cryptogenic cirrhosis (CMS-HCC)   Assessment & Plan    - due to alpha-1 antitrypsin.           Normocytic anemia- (present on admission)   Assessment & Plan    - repeat cbc in am  - no sign of gross bleeding            Reviewed items::  Labs reviewed, Medications reviewed and Radiology images reviewed  DVT prophylaxis pharmacological::  Heparin  Antibiotics:  Treating active infection/contamination beyond 24 hours perioperative coverage

## 2017-12-30 NOTE — CARE PLAN
Problem: Safety  Goal: Will remain free from falls  Outcome: PROGRESSING AS EXPECTED  Appropriate signs posted. Fall risk assessment performed. Bed placed in low position. Call light within reach. Patient was educated to call the nurse for assistance. Personal belongings within reach. Hourly rounding performed to ensure all needs were met.     Problem: Bowel/Gastric:  Goal: Will not experience complications related to bowel motility  Outcome: PROGRESSING AS EXPECTED  Rectal tube in place to collect frequent liquid stool. Patient's rectal tube had an output of 375 mL this shift.

## 2017-12-30 NOTE — CARE PLAN
Problem: Oxygenation:  Goal: Maintain adequate oxygenation dependent on patient condition  Outcome: PROGRESSING AS EXPECTED  Pt resting on 6 Lm Oxy Mask    Problem: Hyperinflation:  Goal: Prevent or improve atelectasis  Outcome: PROGRESSING AS EXPECTED  PEP and IS QID  IS values today 1200  60% of predicted = 1860

## 2017-12-31 PROBLEM — E43 PROTEIN-CALORIE MALNUTRITION, SEVERE (HCC): Status: ACTIVE | Noted: 2017-01-01

## 2017-12-31 NOTE — PALLIATIVE CARE
Palliative Care follow-up    Pt now on HFNC with /36.  Addressed pt's code status and care wishes.  Pt reiterated full code and his desire to be intubated if necessary.  Pt would not want a tracheostomy or long term dependence on ventilator.  Pt would like his son Lui Solorzano to make his medical decisions if he cannot make them for himself.  Pt felt too tired to complete AD and POLST at this time      Updated: Dr. Luna, ICU RN Kay    Plan: PC to continue to follow and address POC/GOC as clinical picture evolves, as well as document pt wishes in AD/POLST when pt agreeable.    Thank you for allowing Palliative Care to participate in this patient's care. Please feel free to call [phone] with any questions or concerns.

## 2017-12-31 NOTE — CARE PLAN
Problem: Respiratory:  Goal: Respiratory status will improve    Intervention: Administer and titrate oxygen therapy  Assess pt work of breathing at each pt interaction. Assess rate, rhythm, depth, and effort; communicate with RT to medicate PRN per MAR. Keep O2 in place and monitor SpO2. Keep HOB > 30 degrees. Encourage TCBD. Encourage use of incentive spirometer.      Problem: Skin Integrity  Goal: Risk for impaired skin integrity will decrease    Intervention: Assess risk factors for impaired skin integrity and/or pressure ulcers  Assess pt skin for integrity and wounds every shift. Document findings appropriately. Turn off of pressure points q 2 hours. Use pillows for positioning and pressure reduction boots as ordered. Apply barrier cream/ointement to prominent moisture prone areas.

## 2017-12-31 NOTE — CARE PLAN
Problem: Respiratory:  Goal: Respiratory status will improve  Outcome: PROGRESSING SLOWER THAN EXPECTED  Patient desat to low 80s. Increased work of breathing. Patient was placed on high flow nasal cannula by RT per MD order. Will continue to monitor respiratory status.     Problem: Skin Integrity  Goal: Risk for impaired skin integrity will decrease  Outcome: PROGRESSING AS EXPECTED  Rectal tube in place to prevent skin breakdown from incontinence. Mepilex in use to for skin prevention measures. Patient turned every two hours. Pillows in use for position.

## 2017-12-31 NOTE — PROGRESS NOTES
MD Mora with hospitalist returned page regarding critical calcium. MD is aware and stated no interventions at this time.

## 2017-12-31 NOTE — CARE PLAN
Problem: Oxygenation:  Goal: Maintain adequate oxygenation dependent on patient condition  Outcome: PROGRESSING AS EXPECTED  Pt placed on high flow 90% FIO2 30L.

## 2017-12-31 NOTE — PROGRESS NOTES
[facility] Hospitalist Progress Note    Date of Service: 2017    Chief Complaint  77 y.o. male admitted 2017 with flu like symptoms, shortness of breath.    Interval Problem Update  Pt noted to have influenza a.  Also likely with pneumonia.  Also with diarrhea, c-diff pending.  Initially had low BP.  Very acidotic with worsening renal failure and elevated lactic acid.  Discussed plan and pt condition with RN at bedside.  Pt seen and examined in ICU, ICU care given.  Mentation improved.    Consultants/Specialty  Nephrology - Dr Ram    Disposition  Patient is critically ill.   The patient continues to have : severe acidosis  The vital organ system that is effected is the: all are at risk  If untreated there is a high chance of deterioration into: PEA arrest   The critical care that I am providing today is: bicarb gtt  The critical care that has been undertaken is medically complex.   There has been no overlap in critical care time.  Critical care time not including procedures, no overlap: 35 minutes        Review of Systems   Unable to perform ROS: Acuity of condition      Physical Exam  Laboratory/Imaging   Hemodynamics  Temp (24hrs), Av.8 °C (98.3 °F), Min:36.8 °C (98.2 °F), Max:36.9 °C (98.4 °F)   Temperature: 36.9 °C (98.4 °F), Monitored Temp: 36.9 °C (98.42 °F)  Pulse  Av.6  Min: 74  Max: 130 Heart Rate (Monitored): (!) 101  NIBP: (!) 93/43      Respiratory      Respiration: (!) 21, Pulse Oximetry: 96 %, O2 Daily Delivery Respiratory : Highflow Nasal Cannula     PEP/CPT Method: Positive Airway Pressure Device, Work Of Breathing / Effort: Moderate  RUL Breath Sounds: Diminished, RML Breath Sounds: Diminished, RLL Breath Sounds: Diminished, EMERSON Breath Sounds: Diminished, LLL Breath Sounds: Diminished    Fluids    Intake/Output Summary (Last 24 hours) at 17 0926  Last data filed at 17 0600   Gross per 24 hour   Intake             4465 ml   Output              975 ml   Net              3490 ml       Nutrition  Orders Placed This Encounter   Procedures   • DIET ORDER     Standing Status:   Standing     Number of Occurrences:   1     Order Specific Question:   Diet:     Answer:   Clear Liquid [10]     Order Specific Question:   Diet:     Answer:   Diabetic [3]     Physical Exam   Constitutional: He appears cachectic.  Non-toxic appearance. He appears ill.   HENT:   Head: Normocephalic and atraumatic.   Mouth/Throat: Mucous membranes are dry.   Eyes: Right eye exhibits no discharge. Left eye exhibits no discharge.   Neck: Neck supple. No tracheal deviation, no edema and no erythema present.   Cardiovascular: Normal rate.  An irregular rhythm present. Exam reveals no gallop.    Murmur heard.  Pulmonary/Chest: Effort normal. No stridor. No respiratory distress. He has no wheezes. He has rhonchi.   Abdominal: Soft. Bowel sounds are normal. He exhibits no distension.   Musculoskeletal: He exhibits no edema.   Lymphadenopathy:     He has no cervical adenopathy.   Neurological:   Awake but altered    Skin: Skin is warm and dry. He is not diaphoretic.   Psychiatric: He is slowed. Cognition and memory are impaired.   Nursing note and vitals reviewed.      Recent Labs      12/29/17   0926  12/30/17   0950  12/31/17   0550   WBC  16.6*  12.5*  14.4*   RBC  3.24*  2.86*  3.01*   HEMOGLOBIN  9.7*  8.6*  8.9*   HEMATOCRIT  30.2*  26.1*  26.6*   MCV  93.2  91.3  88.4   MCH  29.9  30.1  29.6   MCHC  32.1*  33.0*  33.5*   RDW  58.4*  56.9*  54.0*   PLATELETCT  163*  106*  122*   MPV  11.6  12.3  12.4     Recent Labs      12/30/17   1630  12/31/17   0300  12/31/17   0550   SODIUM  137  135  135   POTASSIUM  4.2  3.8  4.1   CHLORIDE  106  102  101   CO2  15*  17*  17*   GLUCOSE  94  90  94   BUN  92*  92*  91*   CREATININE  5.02*  4.98*  5.02*   CALCIUM  7.0*  6.8*  6.8*         Recent Labs      12/29/17   0926   BNPBTYPENAT  108*              Assessment/Plan     * Severe sepsis (CMS-[facility])   Assessment & Plan    - This  is severe sepsis with the following associated acute organ dysfunction(s): acute kidney failure, acute respiratory failure, critical illness myopathy.   - d/t pneumonia and influenza, possibly d/t c-diff  - continue rocephin/azithromycin and tamiflu  - await cultures  - BP improved with fluids        High anion gap metabolic acidosis   Assessment & Plan    - severe, due to sepsis/lactic acidosis and renal failure  - continue bicarb gtt  - may need HD        Acute on chronic renal failure (CMS-HCC)- (present on admission)   Assessment & Plan    - significant worsening  - with severe acidosis  - continue bicarb gtt  - repeat frequent bmps   - additional recommendations per nephro        Atrial fibrillation (CMS-HCC)   Assessment & Plan    - initially rate controlled but then tachycardic  - likely d/t sepsis and dehydration  - improved with IVF, rate now controlled        Diarrhea   Assessment & Plan    - c-diff negative        CAP (community acquired pneumonia)- (present on admission)   Assessment & Plan    - continue abx and await cultures  - repeat cxr in am        Insulin dependent diabetes mellitus (CMS-HCC)   Assessment & Plan    - diabetic diet if he passes bedside swallow  - continue lantus and ISS, adjust as needed  - glucose is only mildly elevated, no reason to suspect DKA is the cause of his acidosis          Influenza A   Assessment & Plan    - continue tamiflu        Cryptogenic cirrhosis (CMS-HCC)   Assessment & Plan    - due to alpha-1 antitrypsin.           Normocytic anemia- (present on admission)   Assessment & Plan    - repeat cbc in am  - no sign of gross bleeding            Reviewed items::  Labs reviewed, Medications reviewed and Radiology images reviewed  DVT prophylaxis pharmacological::  Heparin  Antibiotics:  Treating active infection/contamination beyond 24 hours perioperative coverage

## 2017-12-31 NOTE — PROGRESS NOTES
"Pharmacy Kinetics 77 y.o. male on vancomycin day # 2 2017    Currently on Vancomycin pulse dosing    Indication for Treatment: MRSA bacteremia    Pertinent history per medical record: Admitted on 2017 for flu like symptoms & shortness of breath.  Patient was started on Tamiflu for influenza A and empiric antibiotics for CAP coverage.  Blood culture this morning was positive for possible Staph species and vancomycin was added until culture finalizes.  Patient has a h/o CKD stage V (not on dialysis) but does have a fistula.  Echocardiogram has been ordered.         Other antibiotics: ceftriaxone 2 g IV q24h, azithromycin 250 mg PO q24h    Allergies: Valsartan     List concerns for renal function: CKD stage V not on dialysis    Pertinent cultures to date:    PBC x 2: 1 MRSA   c diff: negative   influenza A positive   urine: NGTD    Recent Labs      17   0926  17   0950  17   0550   WBC  16.6*  12.5*  14.4*   NEUTSPOLYS  63.40   --    --    BANDSSTABS  25.00*   --    --      Recent Labs      17   0926   17   0150  17   0415  17   0950  17   1630  17   0300  17   0550   BUN  91*   < >  89*  90*  91*  92*  92*  91*   CREATININE  5.37*   < >  4.80*  4.75*  5.16*  5.02*  4.98*  5.02*   ALBUMIN  2.7*   --   2.2*   --    --    --   2.0*   --     < > = values in this interval not displayed.     Recent Labs      17   0550   VANCORANDOM  22.5     Intake/Output Summary (Last 24 hours) at 17 1515  Last data filed at 17 1200   Gross per 24 hour   Intake             4365 ml   Output             2365 ml   Net             2000 ml      Blood pressure 145/91, pulse 79, temperature 36.9 °C (98.4 °F), resp. rate (!) 23, height 1.829 m (6' 0.01\"), weight 77.3 kg (170 lb 6.7 oz), SpO2 (!) 83 %. Temp (24hrs), Av.9 °C (98.4 °F), Min:36.9 °C (98.4 °F), Max:36.9 °C (98.4 °F)      A/P   1. Vancomycin dose change: pulse dose of 15 " mg/kg x 1 today  2. Next vancomycin level: ~2 days (not yet ordered)  3. Goal trough: 16-20 mcg/mL  4. Comments: level from this AM is slightly above goal, but will likely tolerate a pulse dose this afternoon. ID is following. Anticipate possible transition to alternative agent given poor renal function. However, patient has had good UOP despite persistently high SCr and BUN. PBC positive with MRSA. Will need repeat cultures today. Will continue to follow.    .ig

## 2017-12-31 NOTE — CONSULTS
ADULT INFECTIOUS DISEASE CONSULT     Date of Service:12/31/17    Consult Requested By: Cabrera Luna D.O.    Reason for Consultation: Staph aureus bacteremia    History of Present Illness:   Tico Solorzano is a 77 y.o male with alpha-1 antitrypsin deficiency, cryptogenic cirrhosis, diabetes mellitus, stage V renal failure. He received right upper brachial basilic AV graft on 11/27/17. He presented to the emergency room on 12/29/17 with cough weakness shortness of breath and flulike symptoms. In the ER he had a WBC count of 16.6 with 25% bands. He was positive for influenza A. his blood cultures have come back positive for methicillin-resistant staph aureus. He has been in respiratory failure requiring high flow oxygen. In view of all these issues infectious disease consult has been called.    Review Of Systems:  Gen.-Complains of fevers. Denies any chills.  HEENT-Complains of some sore throat  Pulmonary- plains of cough and shortness of breath  Cardiovascular- denies any chest pain, leg swelling.    GI- denies any nausea vomiting . Does complain of diarrhea  Musculoskeletal- denies any joint pains or swelling  Neuro- denies any weakness or sensory change  Psych- denies any depression or suicidal ideation  Genitourinary- denies any frequency or dysuria        PMH:   Past Medical History:   Diagnosis Date   • Alpha-1-antitrypsin deficiency (CMS-HCC) 7/17/2017   • Arthritis     fingers   • Ascites controlled with medication 3/30/2017   • Cancer (CMS-HCC) 2003    prostate cancer   • CATARACT     repaired in right eye   • Diabetes    • Hypertension    • Pneumonia 1961   • Protein in urine 7/2/2012   • Renal disorder     50% function   • Vitamin d deficiency 1/16/2012         PSH:  Past Surgical History:   Procedure Laterality Date   • AV FISTULA CREATION Right 11/27/2017    Procedure: AV FISTULA CREATION - UPPER EXTREMITY BRACHIAL AXILLARY GRAFT PLACEMENT;  Surgeon: Cirilo Guardado M.D.;  Location: SURGERY [facility]  TOWER ORS;  Service: General   • INCISION AND DRAINAGE ORTHOPEDIC  12/17/2011    Performed by MADISON MENDEZ at SURGERY [facility] ORS   • OTHER  2003    radical prostatectomy   • CHOLECYSTECTOMY     • OPEN REDUCTION      rods in left leg, spontaneous bone deficit   • TONSILLECTOMY         FAMILY HX:  Family History   Problem Relation Age of Onset   • Other Mother    • Other Father    • Cancer Brother        SOCIAL HX:  Social History     Social History   • Marital status:      Spouse name: N/A   • Number of children: N/A   • Years of education: N/A     Occupational History   • Not on file.     Social History Main Topics   • Smoking status: Former Smoker     Packs/day: 2.00     Years: 40.00     Quit date: 7/13/1991   • Smokeless tobacco: Never Used      Comment: quit 20 yrs ago   • Alcohol use No   • Drug use: No   • Sexual activity: Not Currently     Partners: Female     Other Topics Concern   • Not on file     Social History Narrative   • No narrative on file     History   Smoking Status   • Former Smoker   • Packs/day: 2.00   • Years: 40.00   • Quit date: 7/13/1991   Smokeless Tobacco   • Never Used     Comment: quit 20 yrs ago     History   Alcohol Use No       Allergies/Intolerances:  Allergies   Allergen Reactions   • Valsartan      12/16/15 pt states he doesn't remember if he's allergic to this medication.       History reviewed with the patient    Other Current Medications:    Current Facility-Administered Medications:   •  calcium acetate (PHOS-LO) 667 MG tablet 1,334 mg, 1,334 mg, Oral, TID AC, Nacho Ram M.D., 1,334 mg at 12/31/17 1154  •  vancomycin 1,200 mg in D5W 250 mL IVPB, 15 mg/kg, Intravenous, Once, Cabrera Luna D.O.  •  MD ALERT... vancomycin per pharmacy protocol, , Other, pharmacy to dose, Latasha Nguyen M.D.  •  sodium bicarbonate (SODIUM BICARBONATE) tablet 650 mg, 650 mg, Oral, BID, Nacho aRm M.D., 650 mg at 12/31/17 0829  •  [START ON 1/1/2018] epoetin dariana  (EPOGEN,PROCRIT) injection 10,000 Units, 10,000 Units, Subcutaneous, MO, WE + FR, Nacho Ram M.D.  •  rosuvastatin (CRESTOR) tablet 20 mg, 20 mg, Oral, Q EVENING, Larry Davdi M.D., 20 mg at 17 2155  •  NS infusion 2,283 mL, 30 mL/kg, Intravenous, Once PRN, Larry David M.D., Stopped at 17 1232  •  NS (BOLUS) infusion 1,000 mL, 1,000 mL, Intravenous, Once PRN, Larry David M.D., Stopped at 17 1529  •  cefTRIAXone (ROCEPHIN) syringe 2 g, 2 g, Intravenous, Q24HRS, Larry David M.D., 2 g at 17 0900  •  oseltamivir (TAMIFLU) capsule 30 mg, 30 mg, Oral, DAILY, Larry David M.D., 30 mg at 17 0829  •  azithromycin (ZITHROMAX) tablet 250 mg, 250 mg, Oral, DAILY, Larry David M.D., 250 mg at 17 0829  •  insulin regular (HUMULIN R) injection 2-9 Units, 2-9 Units, Subcutaneous, 4X/DAY ACHS, Stopped at 17 1700 **AND** Accu-Chek ACHS, , , Q AC AND BEDTIME(S) **AND** NOTIFY MD and PharmD, , , Once **AND** glucose 4 g chewable tablet 16 g, 16 g, Oral, Q15 MIN PRN **AND** dextrose 50% (D50W) injection 25 mL, 25 mL, Intravenous, Q15 MIN PRN, Larry David M.D.  •  Respiratory Care per Protocol, , Nebulization, Continuous RT, Cabrera Luna D.O.  •  heparin injection 5,000 Units, 5,000 Units, Subcutaneous, Q8HRS, DEQUAN Riggs.O., 5,000 Units at 17 0609  [unfilled]    Most Recent Vital Signs:  /91   Pulse 79   Temp 36.9 °C (98.4 °F)   Resp (!) 23   Ht 1.829 m (6')   Wt 77.3 kg (170 lb 6.7 oz)   SpO2 96%   BMI 23.11 kg/m²   Temp  Av.7 °C (98.1 °F)  Min: 35.4 °C (95.7 °F)  Max: 38 °C (100.4 °F)    Physical Exam:  General: Looks well  HEENT: sclera anicteric, PERRL, EOMI, MMM, no oral lesions  Neck: supple, no lymphadenopathy  Chest: Coarse Breath sounds. Has a left-sided pacemaker  Cardiac: Regular, no murmurs no gallops heard  Abdomen: + bowel sounds, soft, non-tender, non-distended, no HSM  Extremities: Plus  "edema. Breasts of right upper arm graft.  Skin: no rashes or erythema  Neuro: Alert and oriented times 3, non-focal exam    Pertinent Lab Results:  Recent Labs      12/29/17   0926  12/30/17   0950  12/31/17   0550   WBC  16.6*  12.5*  14.4*      Recent Labs      12/29/17   0926  12/30/17   0950  12/31/17   0550   HEMOGLOBIN  9.7*  8.6*  8.9*   HEMATOCRIT  30.2*  26.1*  26.6*   MCV  93.2  91.3  88.4   MCH  29.9  30.1  29.6   MACROCYTOSIS  1+   --    --    ANISOCYTOSIS  1+   --    --    PLATELETCT  163*  106*  122*         Recent Labs      12/30/17   1630  12/31/17   0300  12/31/17   0550   SODIUM  137  135  135   POTASSIUM  4.2  3.8  4.1   CHLORIDE  106  102  101   CO2  15*  17*  17*   CREATININE  5.02*  4.98*  5.02*        Recent Labs      12/29/17   0926  12/30/17   0150  12/31/17   0300   ALBUMIN  2.7*  2.2*  2.0*        Pertinent Micro:  Results     Procedure Component Value Units Date/Time    URINE CULTURE(NEW) [[id number]] Collected:  12/30/17 1559    Order Status:  Completed Specimen:  Urine Updated:  12/31/17 1244     Significant Indicator NEG     Source UR     Site --     Urine Culture No growth at 24 hours.    Narrative:       Indication for culture:->Emergency Room Patient  If not done within the last 24 hours    BLOOD CULTURE [[id number]]  (Abnormal) Collected:  12/29/17 1014    Order Status:  Completed Specimen:  Blood from Peripheral Updated:  12/31/17 1109     Significant Indicator POS (POS)     Source BLD     Site PERIPHERAL     Blood Culture -- (A)     Growth detected by Bactec instrument.  12/30/2017  05:43  Gram Stain: Gram positive cocci: Possible Staphylococcus sp.  Methicillin resistant Staphylococcus aureus (MRSA)  detected by PCR.  Susceptibility to follow.       Blood Culture Staphylococcus aureus (A)    Narrative:       CALL  [facility]  161 tel. [phone],  CALLED  161 tel. [phone] 12/30/2017, 13:38, RB PERF. RESULTS CALLED TO:[phone]  Per Hospital Policy: Only change Specimen Src: to \"Line\" " "if  specified by physician order.    Influenza By PCR, A/B [[id number]] Collected:  12/30/17 1559    Order Status:  Completed Specimen:  Respirate from Nasopharyngeal Updated:  12/30/17 2115    Narrative:       Indication for culture:->Emergency Room Patient  If not done within the last 24 hours    C Diff by PCR rflx Toxin [[id number]] Collected:  12/29/17 1530    Order Status:  Completed Specimen:  Stool from Stool Updated:  12/30/17 0913     C Diff by PCR Negative     Comment: C. difficile NOT detected by PCR.  Treatment not indicated per guidelines.  Repeat testing not indicated within 7 days.          027-NAP1-BI Presumptive Negative     Comment: Presumptive 027/NAP1/BI target DNA sequences are NOT DETECTED.       Narrative:       Collected By:[id number] TRUE NEGRON  Does this patient have risk factors for C-diff?->Yes    BLOOD CULTURE [[id number]] Collected:  12/29/17 0926    Order Status:  Completed Specimen:  Blood from Peripheral Updated:  12/30/17 0844     Significant Indicator NEG     Source BLD     Site PERIPHERAL     Blood Culture --     No Growth    Note: Blood cultures are incubated for 5 days and  are monitored continuously.Positive blood cultures  are called to the RN and reported as soon as  they are identified.      Narrative:       Per Hospital Policy: Only change Specimen Src: to \"Line\" if  specified by physician order.    CULTURE RESPIRATORY W/ GRM STN [[id number]]     Order Status:  Completed Specimen:  Respirate from Sputum     URINALYSIS [[id number]]  (Abnormal) Collected:  12/29/17 1559    Order Status:  Completed Specimen:  Urine from Urine, Ca Cath Updated:  12/29/17 1610     Color Yellow     Character Cloudy (A)     Specific Gravity 1.017     Ph 5.0     Glucose Negative mg/dL      Ketones Trace (A) mg/dL      Protein 30 (A) mg/dL      Bilirubin Negative     Urobilinogen, Urine 0.2     Nitrite Negative     Leukocyte Esterase Negative     Occult Blood Negative     Micro Urine Req Microscopic    " Narrative:       Collected By:[id number] TRUE NEGRON    Blood Culture [[id number]]     Order Status:  Sent Specimen:  Blood from Peripheral     Blood Culture [[id number]]     Order Status:  Sent Specimen:  Blood from Peripheral     Urinalysis [[id number]]     Order Status:  Canceled Specimen:  Urine from Urine, Cath     Culture Respiratory W/ GRM STN [[id number]]     Order Status:  Completed Specimen:  Respirate from Sputum     INFLUENZA A/B BY PCR [[id number]]  (Abnormal) Collected:  12/29/17 0949    Order Status:  Completed Specimen:  Blood from Nasopharyngeal Updated:  12/29/17 1028     Influenza virus A RNA POSITIVE (A)     Influenza virus B, PCR Negative    Influenza Rapid [[id number]]     Order Status:  Sent Specimen:  Respirate from Respiratory     URINALYSIS [[id number]]     Order Status:  Sent Specimen:  Urine     BLOOD CULTURE [[id number]] Collected:  12/29/17 0000    Order Status:  Canceled Specimen:  Other from Peripheral     BLOOD CULTURE [[id number]] Collected:  12/29/17 0000    Order Status:  Canceled Specimen:  Other from Peripheral         Blood Culture   Date Value Ref Range Status   12/29/2017 (A)  Preliminary    Growth detected by Bactec instrument.  12/30/2017  05:43  Gram Stain: Gram positive cocci: Possible Staphylococcus sp.  Methicillin resistant Staphylococcus aureus (MRSA)  detected by PCR.  Susceptibility to follow.     12/29/2017 Staphylococcus aureus (A)  Preliminary        Studies:  Dx-chest-limited (1 View)    Result Date: 12/31/2017 12/31/2017 9:40 AM HISTORY/REASON FOR EXAM:  Shortness of Breath. TECHNIQUE/EXAM DESCRIPTION AND NUMBER OF VIEWS: Single AP view of the chest. COMPARISON:  1 view chest 12/30/2017 FINDINGS: There are bilateral upper lobe airspace process most consistent with edema. Cardiac Silhouette: normal in size. There is aortic atherosclerosis.There is a cardiac pacemaker. Pleura: There are no pleural effusion or pneumothoraces. Osseous structures: No significant bony  abnormality is present.     Unchanged bilateral upper lobe airspace process that are most consistent with edema    Dx-chest-portable (1 View)    Result Date: 12/30/2017 12/30/2017 2:43 AM HISTORY/REASON FOR EXAM: Shortness of Breath TECHNIQUE/EXAM DESCRIPTION:  Single AP view of the chest. COMPARISON: Yesterday FINDINGS: Position of medical devices appears stable. The heart is in the upper limits of normal for size. Atherosclerotic calcification of the aorta is noted.  The central  pulmonary vasculature appears prominent and indistinct. The lungs appear well expanded bilaterally.  Diffuse scattered hazy pulmonary parenchymal opacities are seen. No significant pleural effusions are identified. The bony structures appear age-appropriate.     1.  Pulmonary edema and/or infiltrates are identified, which appear increased since the prior exam. 2.  Atherosclerosis    Dx-chest-portable (1 View)    Result Date: 12/29/2017 12/29/2017 10:41 AM HISTORY/REASON FOR EXAM:  Possible sepsis. TECHNIQUE/EXAM DESCRIPTION AND NUMBER OF VIEWS: Single portable view of the chest. COMPARISON: 2/16/2017 FINDINGS: Left-sided pacemaker in place. Diffuse interstitial prominence and patchy multifocal opacities throughout both lungs. No pleural effusion. No pneumothorax. Stable cardiopericardial silhouette.     Diffuse interstitial prominence and patchy multifocal opacities throughout both lungs could be seen with multifocal infection or pulmonary edema.    Us-paracentesis, Abd With Imaging    Result Date: 12/27/2017 12/26/2017 4:54 PM HISTORY/REASON FOR EXAM:  Ascites TECHNIQUE/EXAM DESCRIPTION: Ultrasound-guided paracentesis. COMPARISON:  12/24/17 PROCEDURE:  Informed consent was obtained. A timeout was taken. Ascites was localized with real-time ultrasound. The right lower quadrant of the abdominal wall was prepared and draped in a sterile manner. Following local anesthesia with 1% lidocaine, a 5  Portuguese EcoGroomereh pigtail catheter was advanced  into the peritoneal cavity with trocar technique. Ascites was drained. The patient tolerated the procedure well with no evidence of complication. FINDINGS: Ascites is present. Fluid was not sent to the laboratory. Fluid character: straw colored     1. Ultrasound-guided therapeutic paracentesis of the right lower quadrant of the abdominal wall. 2. 4700 mL of fluid withdrawn.    Ir-paracentesis, Abd With Imaging    Result Date: 12/14/2017 12/14/2017 2:24 PM HISTORY/REASON FOR EXAM:  PARACENTESIS with Albumin - Epic standing order TECHNIQUE/EXAM DESCRIPTION: Ultrasound-guided paracentesis. COMPARISON:  None PROCEDURE:  Informed consent was obtained. A timeout was taken. Ascites was localized with real-time ultrasound. The right lower quadrant of the abdominal wall was prepared and draped in a sterile manner. Following local anesthesia with 1% lidocaine, a 5  [place]guill[place] Yueh pigtail catheter was advanced into the peritoneal cavity with trocar technique. 6100 cc of ascites was drained. The patient tolerated the procedure well with no evidence of complication. FINDINGS: Ascites is present.     Ultrasound-guided therapeutic paracentesis of the right lower quadrant of the abdominal wall.    Ir-paracentesis, Abd With Imaging    Result Date: 12/5/2017 12/5/2017 8:25 AM HISTORY/REASON FOR EXAM:  PARACENTESIS with Albumin - Epic standing order 12/5/2017 8:25 AM TECHNIQUE/EXAM DESCRIPTION: Ultrasound-guided paracentesis. COMPARISON:  Ultrasound guided paracentesis 11/21/2017 PROCEDURE:  Informed consent was obtained.  Ascites was localized with real-time ultrasound.  The right lower quadrant of the abdominal wall was prepped and draped in a sterile manner.  Following local anesthesia with 1% lidocaine, a 5 [place]guill[place] Yueh pigtail catheter was advanced into the peritoneal cavity with trocar technique. 5000 cc of ascites fluid was drained.  The patient tolerated the procedure well with no evidence of complication. FINDINGS: Marked  ascites is identified with real-time ultrasound.     Ultrasound-guided therapeutic paracentesis via the right lower quadrant .    Echocardiogram Comp W/o Cont    Result Date: 2017  Transthoracic Echo Report Echocardiography Laboratory CONCLUSIONS Mild concentric left ventricular hypertrophy. Pacer/ICD wire seen in right ventricle. The mitral valve is not well visualized. The tricuspid valve is not well visualized. Trace tricuspid regurgitation. Structurally normal aortic valve without significant stenosis or regurgitation. No vegetation seen JOSE MANUEL AARON Exam Date:         2017                    14:46 Exam Location:     Inpatient Priority:          Routine Ordering Physician:        LATOYA MONTILLA Referring Physician:       [id number] Christian Sonographer:               Leticia Armas RDCS Age:    77     Gender:    M MRN:    [id number] :    1940 BSA:    1.97   Ht (in):    72     Wt (lb):    167 Exam Type:     Complete Indications:     Fever ICD Codes:       780.6 CPT Codes:       08978 BP:   145    /   91     HR:   85 Technical Quality:       Technically difficult study -                          adequate information is obtained MEASUREMENTS  (Male / Female) Normal Values 2D ECHO LV Diastolic Diameter PLAX        4.2 cm                4.2 - 5.9 / 3.9 - 5.3 cm LV Systolic Diameter PLAX         3 cm                  2.1 - 4.0 cm IVS Diastolic Thickness           1.4 cm                LVPW Diastolic Thickness          1.4 cm                LVOT Diameter                     2.4 cm                Estimated LV Ejection Fraction    75 %                  M-MODE Aortic Root Diameter MM           3.9 cm                DOPPLER AV Peak Velocity                  1.2 m/s               AV Peak Gradient                  6 mmHg                AV Mean Gradient                  3.3 mmHg              LVOT Peak Velocity                0.93 m/s              AV Area Cont Eq vti               3.7 cm²                Mitral E Point Velocity           0.61 m/s              Mitral E to A Ratio               0.74                  Mitral A Duration                 145 ms                MV Pressure Half Time             70.3 ms               MV Area PHT                       3.1 cm²               MV Deceleration Time              242 ms                Pulmonary Vein Systolic Velocity  0.52 m/s              Pulmonary Vein Diastolic Velocit  0.37 m/s              Pulmonary Vein S/D Ratio          1.4                   Pulmonary Vein A Velocity         0.41 m/s              TR Peak Velocity                  252 cm/s              PV Peak Velocity                  1.1 m/s               PV Peak Gradient                  4.8 mmHg              RVOT Peak Velocity                0.8 m/s               * Indicates values subject to auto-interpretation LV EF:  75    % FINDINGS Left Ventricle Normal left ventricular chamber size. Mild concentric left ventricular hypertrophy. Normal left ventricular systolic function. Left ventricular ejection fraction is visually estimated to be greater than 75%. Right Ventricle The right ventricle was normal in size and function.  Pacer/ICD wire seen in right ventricle. Right Atrium The right atrium is normal in size.  Catheter/pacemaker wire present in the right atrial cavity. Left Atrium The left atrium is normal in size. Mitral Valve The mitral valve is not well visualized. Aortic Valve Structurally normal aortic valve without significant stenosis or regurgitation. Tricuspid Valve The tricuspid valve is not well visualized.  Trace tricuspid regurgitation. Right ventricular systolic pressure is estimated to be 25 mmHg. Pulmonic Valve Structurally normal pulmonic valve without significant stenosis or regurgitation. Pericardium Normal pericardium without effusion.  Ascites present. Aorta The aortic root is normal. Soham Gottlieb M.D. (Electronically Signed) Final Date:     30 December 2017                  17:19  IMPRESSION:   MRSA bacteremia  Sepsis  Pneumonia  Influenza A  Renal failure  Recent AV graft-possibility of it being infected secondarily  Pacemaker in place-possibility of it being infected    PLAN:   Tico Solorzano is a 77 y.o. Male with MRSA bacteremia and influenza A  Patient has high lactic acid with sepsis  Repeat blood culture  Discontinue Zithromax and Rocephin and vancomycin  Start ceftarolin  Unfortunately worry about the pacemaker as well as the graft being infected. TTE was negative for endocarditis  C. difficile being checked  I have reviewed all the records  Discussed with IM. Will continue to follow    Helena Erickson M.D.

## 2017-12-31 NOTE — PROGRESS NOTES
"Pt tolerating high flow 02 at 30 liters, maintaining 02 saturations within prescribed parameters.  Pt denies pain, states \"I just don't feel good\".  PO intake poor this morning, ~ 15% of tray, some jello, lemon ice, tea.   Pt states the vitamin water is \"no good\" and describes \"poor appetite\" as to decreased intake.  Offered Boost supplement, pt states \"I don't like those\".  Pt is very weak and moves feebly in bed, able to feed self and make needs known.  "

## 2017-12-31 NOTE — DIETARY
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    Past Medical History:  Vitamin D deficiency, Renal disorder, Protein in Urine, Pneumonia, HTN, Diabetes, Cataract, Prostate Cancer, Ascites controlled with medication, Arthritis, Alpha 1-antitrypsin deficiency.    Spoke with pt at bedside.  Pt reports a low appetite d/t not feeling well.  Pt states no recent wt loss.  Per chart review, wt on 9/8 was 77.4 kg (170 lbs); current wt is 77.3 kg (170 lbs).  Pt recently advanced from clear liquid, diabetic diet to a regular, high calorie diet.  Per diet order, added high protein vanilla milkshakes with each meal.      Diet:  Regular, High Calorie  Wt:  77.3 kg (170 lbs) - bed scale wt 12/31  BMI:  23.11  Pertinent Labs:  BUN 91, Creatinine 5.02  Pertinent Medications:  Azithromycin, Phos-Lo, Rocephin, Heparin, SSI, Sodium bicarbonate, Vancomycin  Fluids:  none  Skin:  Incontinence associated dermatitis sacrum, wound, skin tear arm left distal - not currently being seen by the wound team.  GI:  Last BM 12/31    Plan/Recommend:  Record percentage of meals in ADL flowsheet to ensure adequate PO intake.  Nutrition Representative to see pt for meals daily.  RD to monitor wt and lab trends.    RD following.

## 2017-12-31 NOTE — PROGRESS NOTES
Patient was on 6L oxymask at the beginning of my shift. Oxygen saturation 89%. Performed incentive spirometry with the patient. Patient had volumes between 500 and 1000 mL. Oxygen saturation began to decline down to 82% with an increased work of breathing HR increased up to 140s. Oxymask increased to 10L and oxygen saturation increased to 92%. Respiratory contacted for  potential respiratory treatments and to give an update on the patient. Respiratory therapy remained at bedside. Hospitalist MD Camarena paged for an update on the patient. MD Camarena was updated on all events. MD Camarena ordered high flow nasal cannula for this patient. Charge PABLO Lucio was updated on the patient's condition. Will continue to monitor.

## 2018-01-01 ENCOUNTER — APPOINTMENT (OUTPATIENT)
Dept: RADIOLOGY | Facility: MEDICAL CENTER | Age: 78
DRG: 871 | End: 2018-01-01
Attending: HOSPITALIST
Payer: MEDICARE

## 2018-01-01 ENCOUNTER — APPOINTMENT (OUTPATIENT)
Dept: RADIOLOGY | Facility: MEDICAL CENTER | Age: 78
DRG: 871 | End: 2018-01-01
Attending: INTERNAL MEDICINE
Payer: MEDICARE

## 2018-01-01 VITALS
OXYGEN SATURATION: 51 % | BODY MASS INDEX: 20.51 KG/M2 | DIASTOLIC BLOOD PRESSURE: 50 MMHG | TEMPERATURE: 98.3 F | WEIGHT: 151.46 LBS | SYSTOLIC BLOOD PRESSURE: 122 MMHG | HEIGHT: 72 IN

## 2018-01-01 PROBLEM — R78.81 BACTEREMIA DUE TO STAPHYLOCOCCUS AUREUS: Status: ACTIVE | Noted: 2018-01-01

## 2018-01-01 PROBLEM — B95.61 BACTEREMIA DUE TO STAPHYLOCOCCUS AUREUS: Status: ACTIVE | Noted: 2018-01-01

## 2018-01-01 LAB
ACTION RANGE TRIGGERED IACRT: YES
ACTION RANGE TRIGGERED IACRT: YES
ALBUMIN FLD-MCNC: <1 G/DL
ANION GAP SERPL CALC-SCNC: 12 MMOL/L (ref 0–11.9)
ANION GAP SERPL CALC-SCNC: 12 MMOL/L (ref 0–11.9)
ANION GAP SERPL CALC-SCNC: 13 MMOL/L (ref 0–11.9)
ANION GAP SERPL CALC-SCNC: 15 MMOL/L (ref 0–11.9)
ANION GAP SERPL CALC-SCNC: 18 MMOL/L (ref 0–11.9)
ANION GAP SERPL CALC-SCNC: 23 MMOL/L (ref 0–11.9)
APPEARANCE FLD: NORMAL
APTT PPP: 33.1 SEC (ref 24.7–36)
BACTERIA BLD CULT: ABNORMAL
BACTERIA BLD CULT: ABNORMAL
BACTERIA BLD CULT: NORMAL
BACTERIA FLD AEROBE CULT: NORMAL
BACTERIA UR CULT: NORMAL
BASE EXCESS BLDA CALC-SCNC: -10 MMOL/L (ref -4–3)
BASE EXCESS BLDA CALC-SCNC: -3 MMOL/L (ref -4–3)
BODY FLD TYPE: NORMAL
BODY FLD TYPE: NORMAL
BODY TEMPERATURE: ABNORMAL DEGREES
BODY TEMPERATURE: ABNORMAL DEGREES
BUN SERPL-MCNC: 100 MG/DL (ref 8–22)
BUN SERPL-MCNC: 101 MG/DL (ref 8–22)
BUN SERPL-MCNC: 108 MG/DL (ref 8–22)
BUN SERPL-MCNC: 32 MG/DL (ref 8–22)
BUN SERPL-MCNC: 35 MG/DL (ref 8–22)
BUN SERPL-MCNC: 44 MG/DL (ref 8–22)
BUN SERPL-MCNC: 49 MG/DL (ref 8–22)
BUN SERPL-MCNC: 67 MG/DL (ref 8–22)
BUN SERPL-MCNC: 68 MG/DL (ref 8–22)
CALCIUM SERPL-MCNC: 6.6 MG/DL (ref 8.5–10.5)
CALCIUM SERPL-MCNC: 7 MG/DL (ref 8.5–10.5)
CALCIUM SERPL-MCNC: 7.2 MG/DL (ref 8.5–10.5)
CALCIUM SERPL-MCNC: 7.5 MG/DL (ref 8.5–10.5)
CALCIUM SERPL-MCNC: 8 MG/DL (ref 8.5–10.5)
CALCIUM SERPL-MCNC: 8.1 MG/DL (ref 8.5–10.5)
CALCIUM SERPL-MCNC: 8.3 MG/DL (ref 8.5–10.5)
CALCIUM SERPL-MCNC: 8.3 MG/DL (ref 8.5–10.5)
CALCIUM SERPL-MCNC: 8.4 MG/DL (ref 8.5–10.5)
CHLORIDE SERPL-SCNC: 102 MMOL/L (ref 96–112)
CHLORIDE SERPL-SCNC: 102 MMOL/L (ref 96–112)
CHLORIDE SERPL-SCNC: 103 MMOL/L (ref 96–112)
CHLORIDE SERPL-SCNC: 104 MMOL/L (ref 96–112)
CHLORIDE SERPL-SCNC: 106 MMOL/L (ref 96–112)
CHLORIDE SERPL-SCNC: 106 MMOL/L (ref 96–112)
CHLORIDE SERPL-SCNC: 109 MMOL/L (ref 96–112)
CHLORIDE SERPL-SCNC: 98 MMOL/L (ref 96–112)
CHLORIDE SERPL-SCNC: 98 MMOL/L (ref 96–112)
CO2 BLDA-SCNC: 19 MMOL/L (ref 20–33)
CO2 BLDA-SCNC: 20 MMOL/L (ref 20–33)
CO2 SERPL-SCNC: 18 MMOL/L (ref 20–33)
CO2 SERPL-SCNC: 19 MMOL/L (ref 20–33)
CO2 SERPL-SCNC: 20 MMOL/L (ref 20–33)
CO2 SERPL-SCNC: 20 MMOL/L (ref 20–33)
CO2 SERPL-SCNC: 21 MMOL/L (ref 20–33)
CO2 SERPL-SCNC: 22 MMOL/L (ref 20–33)
COLOR FLD: YELLOW
CREAT SERPL-MCNC: 2.61 MG/DL (ref 0.5–1.4)
CREAT SERPL-MCNC: 2.9 MG/DL (ref 0.5–1.4)
CREAT SERPL-MCNC: 3.44 MG/DL (ref 0.5–1.4)
CREAT SERPL-MCNC: 3.63 MG/DL (ref 0.5–1.4)
CREAT SERPL-MCNC: 4.41 MG/DL (ref 0.5–1.4)
CREAT SERPL-MCNC: 4.65 MG/DL (ref 0.5–1.4)
CREAT SERPL-MCNC: 5.01 MG/DL (ref 0.5–1.4)
CREAT SERPL-MCNC: 6.13 MG/DL (ref 0.5–1.4)
CREAT SERPL-MCNC: 6.25 MG/DL (ref 0.5–1.4)
CRP SERPL HS-MCNC: 27 MG/DL (ref 0–0.75)
EKG IMPRESSION: NORMAL
EKG IMPRESSION: NORMAL
ERYTHROCYTE [DISTWIDTH] IN BLOOD BY AUTOMATED COUNT: 49.5 FL (ref 35.9–50)
ERYTHROCYTE [DISTWIDTH] IN BLOOD BY AUTOMATED COUNT: 51.8 FL (ref 35.9–50)
ERYTHROCYTE [DISTWIDTH] IN BLOOD BY AUTOMATED COUNT: 52 FL (ref 35.9–50)
ERYTHROCYTE [DISTWIDTH] IN BLOOD BY AUTOMATED COUNT: 53.3 FL (ref 35.9–50)
ERYTHROCYTE [DISTWIDTH] IN BLOOD BY AUTOMATED COUNT: 53.8 FL (ref 35.9–50)
ERYTHROCYTE [DISTWIDTH] IN BLOOD BY AUTOMATED COUNT: 54.3 FL (ref 35.9–50)
ERYTHROCYTE [DISTWIDTH] IN BLOOD BY AUTOMATED COUNT: 56 FL (ref 35.9–50)
ERYTHROCYTE [DISTWIDTH] IN BLOOD BY AUTOMATED COUNT: 59.4 FL (ref 35.9–50)
GFR SERPL CREATININE-BSD FRML MDRD: 11 ML/MIN/1.73 M 2
GFR SERPL CREATININE-BSD FRML MDRD: 12 ML/MIN/1.73 M 2
GFR SERPL CREATININE-BSD FRML MDRD: 13 ML/MIN/1.73 M 2
GFR SERPL CREATININE-BSD FRML MDRD: 16 ML/MIN/1.73 M 2
GFR SERPL CREATININE-BSD FRML MDRD: 17 ML/MIN/1.73 M 2
GFR SERPL CREATININE-BSD FRML MDRD: 21 ML/MIN/1.73 M 2
GFR SERPL CREATININE-BSD FRML MDRD: 24 ML/MIN/1.73 M 2
GFR SERPL CREATININE-BSD FRML MDRD: 9 ML/MIN/1.73 M 2
GFR SERPL CREATININE-BSD FRML MDRD: 9 ML/MIN/1.73 M 2
GLUCOSE BLD-MCNC: 116 MG/DL (ref 65–99)
GLUCOSE BLD-MCNC: 134 MG/DL (ref 65–99)
GLUCOSE BLD-MCNC: 135 MG/DL (ref 65–99)
GLUCOSE BLD-MCNC: 143 MG/DL (ref 65–99)
GLUCOSE BLD-MCNC: 145 MG/DL (ref 65–99)
GLUCOSE BLD-MCNC: 145 MG/DL (ref 65–99)
GLUCOSE BLD-MCNC: 155 MG/DL (ref 65–99)
GLUCOSE BLD-MCNC: 156 MG/DL (ref 65–99)
GLUCOSE BLD-MCNC: 161 MG/DL (ref 65–99)
GLUCOSE BLD-MCNC: 171 MG/DL (ref 65–99)
GLUCOSE BLD-MCNC: 179 MG/DL (ref 65–99)
GLUCOSE BLD-MCNC: 182 MG/DL (ref 65–99)
GLUCOSE BLD-MCNC: 195 MG/DL (ref 65–99)
GLUCOSE BLD-MCNC: 201 MG/DL (ref 65–99)
GLUCOSE BLD-MCNC: 218 MG/DL (ref 65–99)
GLUCOSE BLD-MCNC: 219 MG/DL (ref 65–99)
GLUCOSE BLD-MCNC: 224 MG/DL (ref 65–99)
GLUCOSE BLD-MCNC: 231 MG/DL (ref 65–99)
GLUCOSE BLD-MCNC: 241 MG/DL (ref 65–99)
GLUCOSE BLD-MCNC: 244 MG/DL (ref 65–99)
GLUCOSE BLD-MCNC: 252 MG/DL (ref 65–99)
GLUCOSE BLD-MCNC: 256 MG/DL (ref 65–99)
GLUCOSE BLD-MCNC: 267 MG/DL (ref 65–99)
GLUCOSE BLD-MCNC: 268 MG/DL (ref 65–99)
GLUCOSE BLD-MCNC: 290 MG/DL (ref 65–99)
GLUCOSE BLD-MCNC: 299 MG/DL (ref 65–99)
GLUCOSE BLD-MCNC: 326 MG/DL (ref 65–99)
GLUCOSE BLD-MCNC: 360 MG/DL (ref 65–99)
GLUCOSE BLD-MCNC: 77 MG/DL (ref 65–99)
GLUCOSE SERPL-MCNC: 114 MG/DL (ref 65–99)
GLUCOSE SERPL-MCNC: 140 MG/DL (ref 65–99)
GLUCOSE SERPL-MCNC: 145 MG/DL (ref 65–99)
GLUCOSE SERPL-MCNC: 152 MG/DL (ref 65–99)
GLUCOSE SERPL-MCNC: 205 MG/DL (ref 65–99)
GLUCOSE SERPL-MCNC: 226 MG/DL (ref 65–99)
GLUCOSE SERPL-MCNC: 236 MG/DL (ref 65–99)
GLUCOSE SERPL-MCNC: 252 MG/DL (ref 65–99)
GLUCOSE SERPL-MCNC: 331 MG/DL (ref 65–99)
GRAM STN SPEC: NORMAL
GRAM STN SPEC: NORMAL
HAV IGM SERPL QL IA: NEGATIVE
HBV CORE IGM SER QL: NEGATIVE
HBV SURFACE AB SERPL IA-ACNC: <3.1 MIU/ML (ref 0–10)
HBV SURFACE AG SER QL: NEGATIVE
HCO3 BLDA-SCNC: 17.4 MMOL/L (ref 17–25)
HCO3 BLDA-SCNC: 19.2 MMOL/L (ref 17–25)
HCT VFR BLD AUTO: 21.9 % (ref 42–52)
HCT VFR BLD AUTO: 22.2 % (ref 42–52)
HCT VFR BLD AUTO: 22.7 % (ref 42–52)
HCT VFR BLD AUTO: 22.7 % (ref 42–52)
HCT VFR BLD AUTO: 23.3 % (ref 42–52)
HCT VFR BLD AUTO: 23.5 % (ref 42–52)
HCT VFR BLD AUTO: 24 % (ref 42–52)
HCT VFR BLD AUTO: 25.8 % (ref 42–52)
HCV AB SER QL: NEGATIVE
HGB BLD-MCNC: 7.3 G/DL (ref 14–18)
HGB BLD-MCNC: 7.3 G/DL (ref 14–18)
HGB BLD-MCNC: 7.4 G/DL (ref 14–18)
HGB BLD-MCNC: 7.6 G/DL (ref 14–18)
HGB BLD-MCNC: 7.6 G/DL (ref 14–18)
HGB BLD-MCNC: 7.8 G/DL (ref 14–18)
HGB BLD-MCNC: 8.2 G/DL (ref 14–18)
HGB BLD-MCNC: 8.8 G/DL (ref 14–18)
INR PPP: 1.15 (ref 0.87–1.13)
INST. QUALIFIED PATIENT IIQPT: YES
INST. QUALIFIED PATIENT IIQPT: YES
LACTATE BLD-SCNC: 8.5 MMOL/L (ref 0.5–2)
LYMPHOCYTES NFR FLD: 15 %
MAGNESIUM SERPL-MCNC: 2 MG/DL (ref 1.5–2.5)
MCH RBC QN AUTO: 29.3 PG (ref 27–33)
MCH RBC QN AUTO: 29.7 PG (ref 27–33)
MCH RBC QN AUTO: 29.7 PG (ref 27–33)
MCH RBC QN AUTO: 29.9 PG (ref 27–33)
MCH RBC QN AUTO: 30 PG (ref 27–33)
MCH RBC QN AUTO: 30 PG (ref 27–33)
MCH RBC QN AUTO: 30.2 PG (ref 27–33)
MCH RBC QN AUTO: 30.3 PG (ref 27–33)
MCHC RBC AUTO-ENTMCNC: 31.1 G/DL (ref 33.7–35.3)
MCHC RBC AUTO-ENTMCNC: 32.2 G/DL (ref 33.7–35.3)
MCHC RBC AUTO-ENTMCNC: 33.3 G/DL (ref 33.7–35.3)
MCHC RBC AUTO-ENTMCNC: 33.5 G/DL (ref 33.7–35.3)
MCHC RBC AUTO-ENTMCNC: 33.5 G/DL (ref 33.7–35.3)
MCHC RBC AUTO-ENTMCNC: 34.1 G/DL (ref 33.7–35.3)
MCHC RBC AUTO-ENTMCNC: 34.2 G/DL (ref 33.7–35.3)
MCHC RBC AUTO-ENTMCNC: 34.7 G/DL (ref 33.7–35.3)
MCV RBC AUTO: 86.6 FL (ref 81.4–97.8)
MCV RBC AUTO: 87.2 FL (ref 81.4–97.8)
MCV RBC AUTO: 87.9 FL (ref 81.4–97.8)
MCV RBC AUTO: 89.3 FL (ref 81.4–97.8)
MCV RBC AUTO: 90.4 FL (ref 81.4–97.8)
MCV RBC AUTO: 90.6 FL (ref 81.4–97.8)
MCV RBC AUTO: 92.3 FL (ref 81.4–97.8)
MCV RBC AUTO: 94.4 FL (ref 81.4–97.8)
MESOTHL CELL NFR FLD: 5 %
NEUTROPHILS NFR FLD: 80 %
O2/TOTAL GAS SETTING VFR VENT: 100 %
O2/TOTAL GAS SETTING VFR VENT: 50 %
PCO2 BLDA: 24.8 MMHG (ref 26–37)
PCO2 BLDA: 46.9 MMHG (ref 26–37)
PCO2 TEMP ADJ BLDA: 24.2 MMHG (ref 26–37)
PCO2 TEMP ADJ BLDA: 46.1 MMHG (ref 26–37)
PH BLDA: 7.18 [PH] (ref 7.4–7.5)
PH BLDA: 7.5 [PH] (ref 7.4–7.5)
PH TEMP ADJ BLDA: 7.18 [PH] (ref 7.4–7.5)
PH TEMP ADJ BLDA: 7.5 [PH] (ref 7.4–7.5)
PLATELET # BLD AUTO: 103 K/UL (ref 164–446)
PLATELET # BLD AUTO: 106 K/UL (ref 164–446)
PLATELET # BLD AUTO: 127 K/UL (ref 164–446)
PLATELET # BLD AUTO: 75 K/UL (ref 164–446)
PLATELET # BLD AUTO: 81 K/UL (ref 164–446)
PLATELET # BLD AUTO: 82 K/UL (ref 164–446)
PLATELET # BLD AUTO: 83 K/UL (ref 164–446)
PLATELET # BLD AUTO: 91 K/UL (ref 164–446)
PMV BLD AUTO: 12.5 FL (ref 9–12.9)
PMV BLD AUTO: 12.6 FL (ref 9–12.9)
PMV BLD AUTO: 12.6 FL (ref 9–12.9)
PMV BLD AUTO: 12.7 FL (ref 9–12.9)
PMV BLD AUTO: 12.7 FL (ref 9–12.9)
PMV BLD AUTO: 12.9 FL (ref 9–12.9)
PMV BLD AUTO: 13.4 FL (ref 9–12.9)
PMV BLD AUTO: 13.5 FL (ref 9–12.9)
PO2 BLDA: 42 MMHG (ref 64–87)
PO2 BLDA: 48 MMHG (ref 64–87)
PO2 TEMP ADJ BLDA: 41 MMHG (ref 64–87)
PO2 TEMP ADJ BLDA: 46 MMHG (ref 64–87)
POTASSIUM SERPL-SCNC: 3.3 MMOL/L (ref 3.6–5.5)
POTASSIUM SERPL-SCNC: 3.4 MMOL/L (ref 3.6–5.5)
POTASSIUM SERPL-SCNC: 3.4 MMOL/L (ref 3.6–5.5)
POTASSIUM SERPL-SCNC: 3.5 MMOL/L (ref 3.6–5.5)
POTASSIUM SERPL-SCNC: 3.6 MMOL/L (ref 3.6–5.5)
POTASSIUM SERPL-SCNC: 3.7 MMOL/L (ref 3.6–5.5)
POTASSIUM SERPL-SCNC: 3.9 MMOL/L (ref 3.6–5.5)
POTASSIUM SERPL-SCNC: 4 MMOL/L (ref 3.6–5.5)
POTASSIUM SERPL-SCNC: 4 MMOL/L (ref 3.6–5.5)
PREALB SERPL-MCNC: 3 MG/DL (ref 18–38)
PROT FLD-MCNC: <3 G/DL
PROTHROMBIN TIME: 14.4 SEC (ref 12–14.6)
RBC # BLD AUTO: 2.45 M/UL (ref 4.7–6.1)
RBC # BLD AUTO: 2.46 M/UL (ref 4.7–6.1)
RBC # BLD AUTO: 2.49 M/UL (ref 4.7–6.1)
RBC # BLD AUTO: 2.51 M/UL (ref 4.7–6.1)
RBC # BLD AUTO: 2.53 M/UL (ref 4.7–6.1)
RBC # BLD AUTO: 2.61 M/UL (ref 4.7–6.1)
RBC # BLD AUTO: 2.73 M/UL (ref 4.7–6.1)
RBC # BLD AUTO: 2.96 M/UL (ref 4.7–6.1)
RBC # FLD: <2000 CELLS/UL
SAO2 % BLDA: 64 % (ref 93–99)
SAO2 % BLDA: 88 % (ref 93–99)
SIGNIFICANT IND 70042: ABNORMAL
SIGNIFICANT IND 70042: NORMAL
SITE SITE: ABNORMAL
SITE SITE: NORMAL
SODIUM SERPL-SCNC: 135 MMOL/L (ref 135–145)
SODIUM SERPL-SCNC: 135 MMOL/L (ref 135–145)
SODIUM SERPL-SCNC: 136 MMOL/L (ref 135–145)
SODIUM SERPL-SCNC: 137 MMOL/L (ref 135–145)
SODIUM SERPL-SCNC: 137 MMOL/L (ref 135–145)
SODIUM SERPL-SCNC: 139 MMOL/L (ref 135–145)
SODIUM SERPL-SCNC: 139 MMOL/L (ref 135–145)
SODIUM SERPL-SCNC: 140 MMOL/L (ref 135–145)
SODIUM SERPL-SCNC: 142 MMOL/L (ref 135–145)
SOURCE SOURCE: ABNORMAL
SOURCE SOURCE: NORMAL
SPECIMEN DRAWN FROM PATIENT: ABNORMAL
SPECIMEN DRAWN FROM PATIENT: ABNORMAL
WBC # BLD AUTO: 10.7 K/UL (ref 4.8–10.8)
WBC # BLD AUTO: 13.9 K/UL (ref 4.8–10.8)
WBC # BLD AUTO: 14.3 K/UL (ref 4.8–10.8)
WBC # BLD AUTO: 15.5 K/UL (ref 4.8–10.8)
WBC # BLD AUTO: 20.1 K/UL (ref 4.8–10.8)
WBC # BLD AUTO: 21.3 K/UL (ref 4.8–10.8)
WBC # BLD AUTO: 21.8 K/UL (ref 4.8–10.8)
WBC # BLD AUTO: 9.5 K/UL (ref 4.8–10.8)
WBC # FLD: 98 CELLS/UL

## 2018-01-01 PROCEDURE — 02HV33Z INSERTION OF INFUSION DEVICE INTO SUPERIOR VENA CAVA, PERCUTANEOUS APPROACH: ICD-10-PCS | Performed by: INTERNAL MEDICINE

## 2018-01-01 PROCEDURE — 700111 HCHG RX REV CODE 636 W/ 250 OVERRIDE (IP): Mod: JG | Performed by: INTERNAL MEDICINE

## 2018-01-01 PROCEDURE — 700102 HCHG RX REV CODE 250 W/ 637 OVERRIDE(OP): Performed by: HOSPITALIST

## 2018-01-01 PROCEDURE — 700105 HCHG RX REV CODE 258: Performed by: INTERNAL MEDICINE

## 2018-01-01 PROCEDURE — 94640 AIRWAY INHALATION TREATMENT: CPT

## 2018-01-01 PROCEDURE — 700111 HCHG RX REV CODE 636 W/ 250 OVERRIDE (IP): Performed by: FAMILY MEDICINE

## 2018-01-01 PROCEDURE — 700111 HCHG RX REV CODE 636 W/ 250 OVERRIDE (IP)

## 2018-01-01 PROCEDURE — 700101 HCHG RX REV CODE 250: Performed by: INTERNAL MEDICINE

## 2018-01-01 PROCEDURE — 89051 BODY FLUID CELL COUNT: CPT

## 2018-01-01 PROCEDURE — A9270 NON-COVERED ITEM OR SERVICE: HCPCS | Performed by: INTERNAL MEDICINE

## 2018-01-01 PROCEDURE — 90935 HEMODIALYSIS ONE EVALUATION: CPT

## 2018-01-01 PROCEDURE — 84134 ASSAY OF PREALBUMIN: CPT

## 2018-01-01 PROCEDURE — 80048 BASIC METABOLIC PNL TOTAL CA: CPT

## 2018-01-01 PROCEDURE — 770022 HCHG ROOM/CARE - ICU (200)

## 2018-01-01 PROCEDURE — 700111 HCHG RX REV CODE 636 W/ 250 OVERRIDE (IP): Performed by: INTERNAL MEDICINE

## 2018-01-01 PROCEDURE — 82962 GLUCOSE BLOOD TEST: CPT | Mod: 91

## 2018-01-01 PROCEDURE — 36600 WITHDRAWAL OF ARTERIAL BLOOD: CPT

## 2018-01-01 PROCEDURE — 85027 COMPLETE CBC AUTOMATED: CPT

## 2018-01-01 PROCEDURE — A9270 NON-COVERED ITEM OR SERVICE: HCPCS | Performed by: HOSPITALIST

## 2018-01-01 PROCEDURE — 700105 HCHG RX REV CODE 258

## 2018-01-01 PROCEDURE — 82042 OTHER SOURCE ALBUMIN QUAN EA: CPT

## 2018-01-01 PROCEDURE — 83735 ASSAY OF MAGNESIUM: CPT

## 2018-01-01 PROCEDURE — 71045 X-RAY EXAM CHEST 1 VIEW: CPT

## 2018-01-01 PROCEDURE — 86706 HEP B SURFACE ANTIBODY: CPT

## 2018-01-01 PROCEDURE — 700102 HCHG RX REV CODE 250 W/ 637 OVERRIDE(OP): Performed by: INTERNAL MEDICINE

## 2018-01-01 PROCEDURE — 83605 ASSAY OF LACTIC ACID: CPT

## 2018-01-01 PROCEDURE — 99233 SBSQ HOSP IP/OBS HIGH 50: CPT | Performed by: HOSPITALIST

## 2018-01-01 PROCEDURE — 5A1935Z RESPIRATORY VENTILATION, LESS THAN 24 CONSECUTIVE HOURS: ICD-10-PCS | Performed by: INTERNAL MEDICINE

## 2018-01-01 PROCEDURE — 5A1D70Z PERFORMANCE OF URINARY FILTRATION, INTERMITTENT, LESS THAN 6 HOURS PER DAY: ICD-10-PCS | Performed by: INTERNAL MEDICINE

## 2018-01-01 PROCEDURE — 87205 SMEAR GRAM STAIN: CPT

## 2018-01-01 PROCEDURE — 302146: Performed by: INTERNAL MEDICINE

## 2018-01-01 PROCEDURE — 85610 PROTHROMBIN TIME: CPT

## 2018-01-01 PROCEDURE — 94668 MNPJ CHEST WALL SBSQ: CPT

## 2018-01-01 PROCEDURE — 87040 BLOOD CULTURE FOR BACTERIA: CPT | Mod: 91

## 2018-01-01 PROCEDURE — 99233 SBSQ HOSP IP/OBS HIGH 50: CPT | Performed by: INTERNAL MEDICINE

## 2018-01-01 PROCEDURE — 80074 ACUTE HEPATITIS PANEL: CPT

## 2018-01-01 PROCEDURE — 82962 GLUCOSE BLOOD TEST: CPT

## 2018-01-01 PROCEDURE — P9047 ALBUMIN (HUMAN), 25%, 50ML: HCPCS | Mod: JG | Performed by: INTERNAL MEDICINE

## 2018-01-01 PROCEDURE — 87070 CULTURE OTHR SPECIMN AEROBIC: CPT

## 2018-01-01 PROCEDURE — 93010 ELECTROCARDIOGRAM REPORT: CPT | Performed by: INTERNAL MEDICINE

## 2018-01-01 PROCEDURE — 94002 VENT MGMT INPAT INIT DAY: CPT

## 2018-01-01 PROCEDURE — B544ZZA ULTRASONOGRAPHY OF LEFT JUGULAR VEINS, GUIDANCE: ICD-10-PCS | Performed by: INTERNAL MEDICINE

## 2018-01-01 PROCEDURE — 306802 CATHETER,INSTAFLOW BOWEL: Performed by: INTERNAL MEDICINE

## 2018-01-01 PROCEDURE — 84157 ASSAY OF PROTEIN OTHER: CPT

## 2018-01-01 PROCEDURE — 77001 FLUOROGUIDE FOR VEIN DEVICE: CPT

## 2018-01-01 PROCEDURE — 70450 CT HEAD/BRAIN W/O DYE: CPT

## 2018-01-01 PROCEDURE — 94669 MECHANICAL CHEST WALL OSCILL: CPT

## 2018-01-01 PROCEDURE — 0BH17EZ INSERTION OF ENDOTRACHEAL AIRWAY INTO TRACHEA, VIA NATURAL OR ARTIFICIAL OPENING: ICD-10-PCS | Performed by: INTERNAL MEDICINE

## 2018-01-01 PROCEDURE — 99232 SBSQ HOSP IP/OBS MODERATE 35: CPT | Performed by: HOSPITALIST

## 2018-01-01 PROCEDURE — 49083 ABD PARACENTESIS W/IMAGING: CPT

## 2018-01-01 PROCEDURE — 87015 SPECIMEN INFECT AGNT CONCNTJ: CPT

## 2018-01-01 PROCEDURE — 85730 THROMBOPLASTIN TIME PARTIAL: CPT

## 2018-01-01 PROCEDURE — 87040 BLOOD CULTURE FOR BACTERIA: CPT

## 2018-01-01 PROCEDURE — 0W9G3ZX DRAINAGE OF PERITONEAL CAVITY, PERCUTANEOUS APPROACH, DIAGNOSTIC: ICD-10-PCS | Performed by: RADIOLOGY

## 2018-01-01 PROCEDURE — 86140 C-REACTIVE PROTEIN: CPT

## 2018-01-01 PROCEDURE — 82803 BLOOD GASES ANY COMBINATION: CPT

## 2018-01-01 PROCEDURE — 93005 ELECTROCARDIOGRAM TRACING: CPT | Performed by: HOSPITALIST

## 2018-01-01 RX ORDER — HEPARIN SODIUM 1000 [USP'U]/ML
3000 INJECTION, SOLUTION INTRAVENOUS; SUBCUTANEOUS PRN
Status: DISCONTINUED | OUTPATIENT
Start: 2018-01-01 | End: 2018-01-01

## 2018-01-01 RX ORDER — ALBUMIN (HUMAN) 12.5 G/50ML
25 SOLUTION INTRAVENOUS ONCE
Status: COMPLETED | OUTPATIENT
Start: 2018-01-01 | End: 2018-01-01

## 2018-01-01 RX ORDER — ALBUMIN (HUMAN) 12.5 G/50ML
50 SOLUTION INTRAVENOUS ONCE
Status: COMPLETED | OUTPATIENT
Start: 2018-01-01 | End: 2018-01-01

## 2018-01-01 RX ORDER — ATROPINE SULFATE 10 MG/ML
2 SOLUTION/ DROPS OPHTHALMIC EVERY 4 HOURS PRN
Status: DISCONTINUED | OUTPATIENT
Start: 2018-01-01 | End: 2018-01-01 | Stop reason: HOSPADM

## 2018-01-01 RX ORDER — SODIUM CHLORIDE 9 MG/ML
INJECTION, SOLUTION INTRAVENOUS
Status: ACTIVE
Start: 2018-01-01 | End: 2018-01-01

## 2018-01-01 RX ORDER — SODIUM CHLORIDE 9 MG/ML
INJECTION, SOLUTION INTRAVENOUS
Status: COMPLETED
Start: 2018-01-01 | End: 2018-01-01

## 2018-01-01 RX ORDER — DEXTROSE MONOHYDRATE 25 G/50ML
25 INJECTION, SOLUTION INTRAVENOUS
Status: DISCONTINUED | OUTPATIENT
Start: 2018-01-01 | End: 2018-01-01

## 2018-01-01 RX ORDER — OXYCODONE HYDROCHLORIDE 5 MG/1
5 TABLET ORAL EVERY 4 HOURS PRN
Status: DISCONTINUED | OUTPATIENT
Start: 2018-01-01 | End: 2018-01-01

## 2018-01-01 RX ORDER — MICONAZOLE NITRATE 20 MG/G
CREAM TOPICAL EVERY 6 HOURS
Status: DISCONTINUED | OUTPATIENT
Start: 2018-01-01 | End: 2018-01-01

## 2018-01-01 RX ORDER — BISACODYL 10 MG
10 SUPPOSITORY, RECTAL RECTAL
Status: DISCONTINUED | OUTPATIENT
Start: 2018-01-01 | End: 2018-01-01

## 2018-01-01 RX ORDER — ALBUMIN (HUMAN) 12.5 G/50ML
12.5 SOLUTION INTRAVENOUS
Status: DISCONTINUED | OUTPATIENT
Start: 2018-01-01 | End: 2018-01-01

## 2018-01-01 RX ORDER — FAMOTIDINE 20 MG/1
20 TABLET, FILM COATED ORAL
Status: DISCONTINUED | OUTPATIENT
Start: 2018-01-01 | End: 2018-01-01

## 2018-01-01 RX ORDER — BISACODYL 10 MG
10 SUPPOSITORY, RECTAL RECTAL
Status: DISCONTINUED | OUTPATIENT
Start: 2018-01-01 | End: 2018-01-01 | Stop reason: HOSPADM

## 2018-01-01 RX ORDER — PROPOFOL 10 MG/ML
100 INJECTION, EMULSION INTRAVENOUS ONCE
Status: ACTIVE | OUTPATIENT
Start: 2018-01-01 | End: 2018-01-01

## 2018-01-01 RX ORDER — OXYCODONE HYDROCHLORIDE 10 MG/1
10 TABLET ORAL EVERY 4 HOURS PRN
Status: DISCONTINUED | OUTPATIENT
Start: 2018-01-01 | End: 2018-01-01

## 2018-01-01 RX ORDER — POLYETHYLENE GLYCOL 3350 17 G/17G
1 POWDER, FOR SOLUTION ORAL
Status: DISCONTINUED | OUTPATIENT
Start: 2018-01-01 | End: 2018-01-01

## 2018-01-01 RX ORDER — ETOMIDATE 2 MG/ML
20 INJECTION INTRAVENOUS ONCE
Status: COMPLETED | OUTPATIENT
Start: 2018-01-01 | End: 2018-01-01

## 2018-01-01 RX ORDER — ONDANSETRON 2 MG/ML
8 INJECTION INTRAMUSCULAR; INTRAVENOUS EVERY 8 HOURS PRN
Status: DISCONTINUED | OUTPATIENT
Start: 2018-01-01 | End: 2018-01-01 | Stop reason: HOSPADM

## 2018-01-01 RX ORDER — SODIUM CHLORIDE 9 MG/ML
INJECTION, SOLUTION INTRAVENOUS
Status: DISCONTINUED
Start: 2018-01-01 | End: 2018-01-01

## 2018-01-01 RX ORDER — GLYCOPYRROLATE 1 MG/1
1 TABLET ORAL 3 TIMES DAILY PRN
Status: DISCONTINUED | OUTPATIENT
Start: 2018-01-01 | End: 2018-01-01 | Stop reason: HOSPADM

## 2018-01-01 RX ORDER — ACETAMINOPHEN 325 MG/1
650 TABLET ORAL ONCE
Status: COMPLETED | OUTPATIENT
Start: 2018-01-01 | End: 2018-01-01

## 2018-01-01 RX ORDER — MORPHINE SULFATE 100 MG/5ML
10 SOLUTION ORAL
Status: DISCONTINUED | OUTPATIENT
Start: 2018-01-01 | End: 2018-01-01 | Stop reason: HOSPADM

## 2018-01-01 RX ORDER — SCOLOPAMINE TRANSDERMAL SYSTEM 1 MG/1
1 PATCH, EXTENDED RELEASE TRANSDERMAL
Status: DISCONTINUED | OUTPATIENT
Start: 2018-01-01 | End: 2018-01-01 | Stop reason: HOSPADM

## 2018-01-01 RX ORDER — IPRATROPIUM BROMIDE AND ALBUTEROL SULFATE 2.5; .5 MG/3ML; MG/3ML
3 SOLUTION RESPIRATORY (INHALATION)
Status: DISCONTINUED | OUTPATIENT
Start: 2018-01-01 | End: 2018-01-01

## 2018-01-01 RX ORDER — CHLORHEXIDINE GLUCONATE ORAL RINSE 1.2 MG/ML
15 SOLUTION DENTAL 2 TIMES DAILY
Status: DISCONTINUED | OUTPATIENT
Start: 2018-01-01 | End: 2018-01-01 | Stop reason: HOSPADM

## 2018-01-01 RX ORDER — NOREPINEPHRINE BITARTRATE 1 MG/ML
INJECTION, SOLUTION INTRAVENOUS
Status: DISCONTINUED
Start: 2018-01-01 | End: 2018-01-01

## 2018-01-01 RX ORDER — AMOXICILLIN 250 MG
2 CAPSULE ORAL 2 TIMES DAILY
Status: DISCONTINUED | OUTPATIENT
Start: 2018-01-01 | End: 2018-01-01

## 2018-01-01 RX ORDER — ROCURONIUM BROMIDE 10 MG/ML
70 INJECTION, SOLUTION INTRAVENOUS ONCE
Status: COMPLETED | OUTPATIENT
Start: 2018-01-01 | End: 2018-01-01

## 2018-01-01 RX ORDER — MORPHINE SULFATE 10 MG/ML
10 INJECTION, SOLUTION INTRAMUSCULAR; INTRAVENOUS
Status: DISCONTINUED | OUTPATIENT
Start: 2018-01-01 | End: 2018-01-01 | Stop reason: HOSPADM

## 2018-01-01 RX ORDER — DOCUSATE SODIUM 100 MG/1
100 CAPSULE, LIQUID FILLED ORAL EVERY 12 HOURS
Status: DISCONTINUED | OUTPATIENT
Start: 2018-01-01 | End: 2018-01-01 | Stop reason: HOSPADM

## 2018-01-01 RX ORDER — LORAZEPAM 2 MG/ML
1 CONCENTRATE ORAL
Status: DISCONTINUED | OUTPATIENT
Start: 2018-01-01 | End: 2018-01-01 | Stop reason: HOSPADM

## 2018-01-01 RX ORDER — LIDOCAINE HYDROCHLORIDE 10 MG/ML
1-2 INJECTION, SOLUTION INFILTRATION; PERINEURAL
Status: DISCONTINUED | OUTPATIENT
Start: 2018-01-01 | End: 2018-01-01

## 2018-01-01 RX ORDER — HALOPERIDOL 5 MG/ML
5 INJECTION INTRAMUSCULAR EVERY 6 HOURS PRN
Status: DISCONTINUED | OUTPATIENT
Start: 2018-01-01 | End: 2018-01-01

## 2018-01-01 RX ORDER — LORAZEPAM 2 MG/ML
1 INJECTION INTRAMUSCULAR
Status: DISCONTINUED | OUTPATIENT
Start: 2018-01-01 | End: 2018-01-01 | Stop reason: HOSPADM

## 2018-01-01 RX ORDER — MIDAZOLAM HYDROCHLORIDE 1 MG/ML
INJECTION INTRAMUSCULAR; INTRAVENOUS
Status: DISPENSED
Start: 2018-01-01 | End: 2018-01-01

## 2018-01-01 RX ORDER — ONDANSETRON 4 MG/1
8 TABLET, ORALLY DISINTEGRATING ORAL EVERY 8 HOURS PRN
Status: DISCONTINUED | OUTPATIENT
Start: 2018-01-01 | End: 2018-01-01 | Stop reason: HOSPADM

## 2018-01-01 RX ORDER — LACTULOSE 20 G/30ML
10 SOLUTION ORAL
Status: DISCONTINUED | OUTPATIENT
Start: 2018-01-01 | End: 2018-01-01 | Stop reason: HOSPADM

## 2018-01-01 RX ORDER — MORPHINE SULFATE 10 MG/ML
5 INJECTION, SOLUTION INTRAMUSCULAR; INTRAVENOUS
Status: DISCONTINUED | OUTPATIENT
Start: 2018-01-01 | End: 2018-01-01 | Stop reason: HOSPADM

## 2018-01-01 RX ADMIN — Medication 1334 MG: at 06:02

## 2018-01-01 RX ADMIN — HEPARIN SODIUM 5000 UNITS: 5000 INJECTION, SOLUTION INTRAVENOUS; SUBCUTANEOUS at 15:29

## 2018-01-01 RX ADMIN — HEPARIN SODIUM 5000 UNITS: 5000 INJECTION, SOLUTION INTRAVENOUS; SUBCUTANEOUS at 07:15

## 2018-01-01 RX ADMIN — INSULIN HUMAN 6 UNITS: 100 INJECTION, SOLUTION PARENTERAL at 10:09

## 2018-01-01 RX ADMIN — CEFTAROLINE FOSAMIL 200 MG: 600 POWDER, FOR SOLUTION INTRAVENOUS at 13:57

## 2018-01-01 RX ADMIN — CEFTAROLINE FOSAMIL 300 MG: 600 POWDER, FOR SOLUTION INTRAVENOUS at 09:00

## 2018-01-01 RX ADMIN — HEPARIN SODIUM 5000 UNITS: 5000 INJECTION, SOLUTION INTRAVENOUS; SUBCUTANEOUS at 20:55

## 2018-01-01 RX ADMIN — Medication 1334 MG: at 12:09

## 2018-01-01 RX ADMIN — ALBUMIN (HUMAN) 12.5 G: 0.25 INJECTION, SOLUTION INTRAVENOUS at 12:34

## 2018-01-01 RX ADMIN — SODIUM BICARBONATE 650 MG: 650 TABLET ORAL at 07:55

## 2018-01-01 RX ADMIN — Medication 1334 MG: at 17:00

## 2018-01-01 RX ADMIN — INSULIN HUMAN 5 UNITS: 100 INJECTION, SOLUTION PARENTERAL at 20:50

## 2018-01-01 RX ADMIN — INSULIN HUMAN 5 UNITS: 100 INJECTION, SOLUTION PARENTERAL at 17:11

## 2018-01-01 RX ADMIN — CEFTAROLINE FOSAMIL 300 MG: 600 POWDER, FOR SOLUTION INTRAVENOUS at 20:50

## 2018-01-01 RX ADMIN — MICONAZOLE NITRATE: 20 CREAM TOPICAL at 06:02

## 2018-01-01 RX ADMIN — HALOPERIDOL LACTATE 5 MG: 5 INJECTION, SOLUTION INTRAMUSCULAR at 16:25

## 2018-01-01 RX ADMIN — CEFTAROLINE FOSAMIL 200 MG: 600 POWDER, FOR SOLUTION INTRAVENOUS at 21:07

## 2018-01-01 RX ADMIN — SODIUM BICARBONATE 650 MG: 650 TABLET ORAL at 20:20

## 2018-01-01 RX ADMIN — HEPARIN SODIUM 5000 UNITS: 5000 INJECTION, SOLUTION INTRAVENOUS; SUBCUTANEOUS at 21:07

## 2018-01-01 RX ADMIN — Medication 1334 MG: at 07:15

## 2018-01-01 RX ADMIN — SODIUM BICARBONATE 150 MEQ: 84 INJECTION, SOLUTION INTRAVENOUS at 23:48

## 2018-01-01 RX ADMIN — Medication 1334 MG: at 05:31

## 2018-01-01 RX ADMIN — HEPARIN SODIUM 5000 UNITS: 5000 INJECTION, SOLUTION INTRAVENOUS; SUBCUTANEOUS at 20:47

## 2018-01-01 RX ADMIN — HEPARIN SODIUM 5000 UNITS: 5000 INJECTION, SOLUTION INTRAVENOUS; SUBCUTANEOUS at 17:10

## 2018-01-01 RX ADMIN — ALBUMIN (HUMAN) 50 G: 0.25 INJECTION, SOLUTION INTRAVENOUS at 15:12

## 2018-01-01 RX ADMIN — INSULIN HUMAN 5 UNITS: 100 INJECTION, SUSPENSION SUBCUTANEOUS at 06:36

## 2018-01-01 RX ADMIN — OSELTAMIVIR PHOSPHATE 30 MG: 30 CAPSULE ORAL at 07:29

## 2018-01-01 RX ADMIN — INSULIN HUMAN 3 UNITS: 100 INJECTION, SOLUTION PARENTERAL at 20:46

## 2018-01-01 RX ADMIN — Medication 1334 MG: at 05:14

## 2018-01-01 RX ADMIN — INSULIN HUMAN 8 UNITS: 100 INJECTION, SOLUTION PARENTERAL at 05:45

## 2018-01-01 RX ADMIN — CEFTAROLINE FOSAMIL 200 MG: 600 POWDER, FOR SOLUTION INTRAVENOUS at 20:14

## 2018-01-01 RX ADMIN — ROSUVASTATIN CALCIUM 20 MG: 10 TABLET, FILM COATED ORAL at 22:20

## 2018-01-01 RX ADMIN — SODIUM CHLORIDE: 9 INJECTION, SOLUTION INTRAVENOUS at 20:45

## 2018-01-01 RX ADMIN — CEFTAROLINE FOSAMIL 300 MG: 600 POWDER, FOR SOLUTION INTRAVENOUS at 09:46

## 2018-01-01 RX ADMIN — MICONAZOLE NITRATE: 20 CREAM TOPICAL at 17:18

## 2018-01-01 RX ADMIN — HEPARIN SODIUM 5000 UNITS: 5000 INJECTION, SOLUTION INTRAVENOUS; SUBCUTANEOUS at 13:57

## 2018-01-01 RX ADMIN — INSULIN HUMAN 5 UNITS: 100 INJECTION, SOLUTION PARENTERAL at 05:13

## 2018-01-01 RX ADMIN — MICONAZOLE NITRATE: 20 CREAM TOPICAL at 05:20

## 2018-01-01 RX ADMIN — INSULIN HUMAN 2 UNITS: 100 INJECTION, SOLUTION PARENTERAL at 07:00

## 2018-01-01 RX ADMIN — ERYTHROPOIETIN 10000 UNITS: 10000 INJECTION, SOLUTION INTRAVENOUS; SUBCUTANEOUS at 07:29

## 2018-01-01 RX ADMIN — MICONAZOLE NITRATE: 20 CREAM TOPICAL at 20:00

## 2018-01-01 RX ADMIN — HEPARIN SODIUM 5000 UNITS: 5000 INJECTION, SOLUTION INTRAVENOUS; SUBCUTANEOUS at 05:14

## 2018-01-01 RX ADMIN — SODIUM BICARBONATE 650 MG: 650 TABLET ORAL at 21:16

## 2018-01-01 RX ADMIN — ERYTHROPOIETIN 10000 UNITS: 10000 INJECTION, SOLUTION INTRAVENOUS; SUBCUTANEOUS at 08:15

## 2018-01-01 RX ADMIN — CEFTAROLINE FOSAMIL 200 MG: 600 POWDER, FOR SOLUTION INTRAVENOUS at 08:56

## 2018-01-01 RX ADMIN — INSULIN HUMAN 3 UNITS: 100 INJECTION, SOLUTION PARENTERAL at 20:58

## 2018-01-01 RX ADMIN — FAMOTIDINE 20 MG: 10 INJECTION, SOLUTION INTRAVENOUS at 21:16

## 2018-01-01 RX ADMIN — MICONAZOLE NITRATE: 20 CREAM TOPICAL at 00:12

## 2018-01-01 RX ADMIN — HEPARIN SODIUM 5000 UNITS: 5000 INJECTION, SOLUTION INTRAVENOUS; SUBCUTANEOUS at 20:41

## 2018-01-01 RX ADMIN — CEFTAROLINE FOSAMIL 200 MG: 600 POWDER, FOR SOLUTION INTRAVENOUS at 12:42

## 2018-01-01 RX ADMIN — HALOPERIDOL LACTATE 5 MG: 5 INJECTION, SOLUTION INTRAMUSCULAR at 02:12

## 2018-01-01 RX ADMIN — Medication 1334 MG: at 06:25

## 2018-01-01 RX ADMIN — SODIUM BICARBONATE 650 MG: 650 TABLET ORAL at 20:41

## 2018-01-01 RX ADMIN — CEFTAROLINE FOSAMIL 200 MG: 600 POWDER, FOR SOLUTION INTRAVENOUS at 09:24

## 2018-01-01 RX ADMIN — Medication 1334 MG: at 17:47

## 2018-01-01 RX ADMIN — Medication 1334 MG: at 12:08

## 2018-01-01 RX ADMIN — SODIUM BICARBONATE 650 MG: 650 TABLET ORAL at 21:07

## 2018-01-01 RX ADMIN — HEPARIN SODIUM 5000 UNITS: 5000 INJECTION, SOLUTION INTRAVENOUS; SUBCUTANEOUS at 14:00

## 2018-01-01 RX ADMIN — OXYCODONE HYDROCHLORIDE 5 MG: 5 TABLET ORAL at 13:27

## 2018-01-01 RX ADMIN — ALBUMIN (HUMAN) 12.5 G: 0.25 INJECTION, SOLUTION INTRAVENOUS at 18:42

## 2018-01-01 RX ADMIN — HALOPERIDOL LACTATE 5 MG: 5 INJECTION, SOLUTION INTRAMUSCULAR at 08:43

## 2018-01-01 RX ADMIN — POTASSIUM BICARBONATE 25 MEQ: 25 TABLET, EFFERVESCENT ORAL at 11:40

## 2018-01-01 RX ADMIN — Medication 1334 MG: at 17:51

## 2018-01-01 RX ADMIN — HALOPERIDOL LACTATE 5 MG: 5 INJECTION, SOLUTION INTRAMUSCULAR at 20:13

## 2018-01-01 RX ADMIN — INSULIN HUMAN 2 UNITS: 100 INJECTION, SOLUTION PARENTERAL at 17:35

## 2018-01-01 RX ADMIN — ROCURONIUM BROMIDE 70 MG: 10 SOLUTION INTRAVENOUS at 13:35

## 2018-01-01 RX ADMIN — CHLORHEXIDINE GLUCONATE 15 ML: 1.2 RINSE ORAL at 21:16

## 2018-01-01 RX ADMIN — INSULIN HUMAN 2 UNITS: 100 INJECTION, SOLUTION PARENTERAL at 12:02

## 2018-01-01 RX ADMIN — SODIUM BICARBONATE 650 MG: 650 TABLET ORAL at 08:43

## 2018-01-01 RX ADMIN — PHENYLEPHRINE HYDROCHLORIDE 50 MCG/MIN: 10 INJECTION INTRAVENOUS at 23:43

## 2018-01-01 RX ADMIN — Medication 1334 MG: at 11:02

## 2018-01-01 RX ADMIN — NOREPINEPHRINE BITARTRATE 10 MCG/MIN: 1 INJECTION INTRAVENOUS at 16:04

## 2018-01-01 RX ADMIN — HEPARIN SODIUM 5000 UNITS: 5000 INJECTION, SOLUTION INTRAVENOUS; SUBCUTANEOUS at 06:02

## 2018-01-01 RX ADMIN — ACETAMINOPHEN 650 MG: 325 TABLET, FILM COATED ORAL at 16:59

## 2018-01-01 RX ADMIN — Medication 1334 MG: at 11:47

## 2018-01-01 RX ADMIN — MICONAZOLE NITRATE: 20 CREAM TOPICAL at 23:13

## 2018-01-01 RX ADMIN — SODIUM BICARBONATE 650 MG: 650 TABLET ORAL at 09:23

## 2018-01-01 RX ADMIN — MICONAZOLE NITRATE: 20 CREAM TOPICAL at 08:56

## 2018-01-01 RX ADMIN — SODIUM BICARBONATE 650 MG: 650 TABLET ORAL at 20:13

## 2018-01-01 RX ADMIN — HEPARIN SODIUM 3000 UNITS: 1000 INJECTION, SOLUTION INTRAVENOUS; SUBCUTANEOUS at 13:37

## 2018-01-01 RX ADMIN — Medication 1334 MG: at 11:53

## 2018-01-01 RX ADMIN — HEPARIN SODIUM 5000 UNITS: 5000 INJECTION, SOLUTION INTRAVENOUS; SUBCUTANEOUS at 22:20

## 2018-01-01 RX ADMIN — ALBUMIN (HUMAN) 12.5 G: 0.25 INJECTION, SOLUTION INTRAVENOUS at 10:37

## 2018-01-01 RX ADMIN — INSULIN HUMAN 5 UNITS: 100 INJECTION, SUSPENSION SUBCUTANEOUS at 10:34

## 2018-01-01 RX ADMIN — HALOPERIDOL LACTATE 5 MG: 5 INJECTION, SOLUTION INTRAMUSCULAR at 01:47

## 2018-01-01 RX ADMIN — MICONAZOLE NITRATE: 20 CREAM TOPICAL at 17:00

## 2018-01-01 RX ADMIN — Medication 1334 MG: at 12:00

## 2018-01-01 RX ADMIN — SODIUM BICARBONATE 650 MG: 650 TABLET ORAL at 20:49

## 2018-01-01 RX ADMIN — CEFTAROLINE FOSAMIL 300 MG: 600 POWDER, FOR SOLUTION INTRAVENOUS at 20:48

## 2018-01-01 RX ADMIN — HALOPERIDOL LACTATE 5 MG: 5 INJECTION, SOLUTION INTRAMUSCULAR at 01:27

## 2018-01-01 RX ADMIN — HEPARIN SODIUM 5000 UNITS: 5000 INJECTION, SOLUTION INTRAVENOUS; SUBCUTANEOUS at 14:48

## 2018-01-01 RX ADMIN — HALOPERIDOL LACTATE 5 MG: 5 INJECTION, SOLUTION INTRAMUSCULAR at 23:22

## 2018-01-01 RX ADMIN — INSULIN HUMAN 3 UNITS: 100 INJECTION, SOLUTION PARENTERAL at 21:12

## 2018-01-01 RX ADMIN — SODIUM CHLORIDE 5 ML: 9 INJECTION, SOLUTION INTRAVENOUS at 12:14

## 2018-01-01 RX ADMIN — INSULIN HUMAN 5 UNITS: 100 INJECTION, SOLUTION PARENTERAL at 06:36

## 2018-01-01 RX ADMIN — HALOPERIDOL LACTATE 5 MG: 5 INJECTION, SOLUTION INTRAMUSCULAR at 17:00

## 2018-01-01 RX ADMIN — MICONAZOLE NITRATE: 20 CREAM TOPICAL at 12:03

## 2018-01-01 RX ADMIN — CEFTAROLINE FOSAMIL 300 MG: 600 POWDER, FOR SOLUTION INTRAVENOUS at 08:44

## 2018-01-01 RX ADMIN — OXYCODONE HYDROCHLORIDE 5 MG: 5 TABLET ORAL at 06:33

## 2018-01-01 RX ADMIN — ERYTHROPOIETIN 10000 UNITS: 10000 INJECTION, SOLUTION INTRAVENOUS; SUBCUTANEOUS at 09:25

## 2018-01-01 RX ADMIN — HEPARIN SODIUM 5000 UNITS: 5000 INJECTION, SOLUTION INTRAVENOUS; SUBCUTANEOUS at 05:00

## 2018-01-01 RX ADMIN — ROSUVASTATIN CALCIUM 20 MG: 10 TABLET, FILM COATED ORAL at 20:20

## 2018-01-01 RX ADMIN — SODIUM CHLORIDE 1000 ML: 9 INJECTION, SOLUTION INTRAVENOUS at 08:41

## 2018-01-01 RX ADMIN — SODIUM BICARBONATE 150 MEQ: 84 INJECTION, SOLUTION INTRAVENOUS at 23:58

## 2018-01-01 RX ADMIN — HALOPERIDOL LACTATE 5 MG: 5 INJECTION, SOLUTION INTRAMUSCULAR at 11:02

## 2018-01-01 RX ADMIN — HEPARIN SODIUM 5000 UNITS: 5000 INJECTION, SOLUTION INTRAVENOUS; SUBCUTANEOUS at 06:24

## 2018-01-01 RX ADMIN — Medication 1334 MG: at 16:27

## 2018-01-01 RX ADMIN — Medication 1334 MG: at 17:33

## 2018-01-01 RX ADMIN — ROSUVASTATIN CALCIUM 20 MG: 10 TABLET, FILM COATED ORAL at 20:12

## 2018-01-01 RX ADMIN — MICONAZOLE NITRATE: 20 CREAM TOPICAL at 12:12

## 2018-01-01 RX ADMIN — VASOPRESSIN 0.03 UNITS/MIN: 20 INJECTION INTRAVENOUS at 23:20

## 2018-01-01 RX ADMIN — Medication 1334 MG: at 11:40

## 2018-01-01 RX ADMIN — CEFTAROLINE FOSAMIL 200 MG: 600 POWDER, FOR SOLUTION INTRAVENOUS at 20:19

## 2018-01-01 RX ADMIN — CEFTAROLINE FOSAMIL 200 MG: 600 POWDER, FOR SOLUTION INTRAVENOUS at 21:48

## 2018-01-01 RX ADMIN — HEPARIN SODIUM 3000 UNITS: 1000 INJECTION, SOLUTION INTRAVENOUS; SUBCUTANEOUS at 16:02

## 2018-01-01 RX ADMIN — HEPARIN SODIUM 5000 UNITS: 5000 INJECTION, SOLUTION INTRAVENOUS; SUBCUTANEOUS at 20:19

## 2018-01-01 RX ADMIN — MICONAZOLE NITRATE: 20 CREAM TOPICAL at 12:00

## 2018-01-01 RX ADMIN — CEFTAROLINE FOSAMIL 200 MG: 600 POWDER, FOR SOLUTION INTRAVENOUS at 20:41

## 2018-01-01 RX ADMIN — ROSUVASTATIN CALCIUM 20 MG: 10 TABLET, FILM COATED ORAL at 21:16

## 2018-01-01 RX ADMIN — INSULIN HUMAN 2 UNITS: 100 INJECTION, SOLUTION PARENTERAL at 16:31

## 2018-01-01 RX ADMIN — MICONAZOLE NITRATE: 20 CREAM TOPICAL at 23:55

## 2018-01-01 RX ADMIN — OXYCODONE HYDROCHLORIDE 10 MG: 10 TABLET ORAL at 18:37

## 2018-01-01 RX ADMIN — MICONAZOLE NITRATE: 20 CREAM TOPICAL at 00:54

## 2018-01-01 RX ADMIN — ALBUMIN (HUMAN) 25 G: 12.5 SOLUTION INTRAVENOUS at 13:35

## 2018-01-01 RX ADMIN — INSULIN HUMAN 2 UNITS: 100 INJECTION, SOLUTION PARENTERAL at 16:33

## 2018-01-01 RX ADMIN — PROPOFOL 5 MCG/KG/MIN: 10 INJECTION, EMULSION INTRAVENOUS at 13:45

## 2018-01-01 RX ADMIN — HEPARIN SODIUM 5000 UNITS: 5000 INJECTION, SOLUTION INTRAVENOUS; SUBCUTANEOUS at 21:16

## 2018-01-01 RX ADMIN — SODIUM BICARBONATE 650 MG: 650 TABLET ORAL at 09:00

## 2018-01-01 RX ADMIN — ROSUVASTATIN CALCIUM 20 MG: 10 TABLET, FILM COATED ORAL at 20:48

## 2018-01-01 RX ADMIN — CEFTAROLINE FOSAMIL 200 MG: 600 POWDER, FOR SOLUTION INTRAVENOUS at 22:21

## 2018-01-01 RX ADMIN — HEPARIN SODIUM 5000 UNITS: 5000 INJECTION, SOLUTION INTRAVENOUS; SUBCUTANEOUS at 06:09

## 2018-01-01 RX ADMIN — SODIUM BICARBONATE 650 MG: 650 TABLET ORAL at 07:29

## 2018-01-01 RX ADMIN — INSULIN HUMAN 5 UNITS: 100 INJECTION, SOLUTION PARENTERAL at 06:45

## 2018-01-01 RX ADMIN — EPOPROSTENOL SODIUM 0.05 MCG/KG/MIN: 1.5 INJECTION, POWDER, LYOPHILIZED, FOR SOLUTION INTRAVENOUS at 23:27

## 2018-01-01 RX ADMIN — ROSUVASTATIN CALCIUM 20 MG: 10 TABLET, FILM COATED ORAL at 21:07

## 2018-01-01 RX ADMIN — SODIUM BICARBONATE 650 MG: 650 TABLET ORAL at 07:57

## 2018-01-01 RX ADMIN — HEPARIN SODIUM 5000 UNITS: 5000 INJECTION, SOLUTION INTRAVENOUS; SUBCUTANEOUS at 05:41

## 2018-01-01 RX ADMIN — HALOPERIDOL LACTATE 5 MG: 5 INJECTION, SOLUTION INTRAMUSCULAR at 02:32

## 2018-01-01 RX ADMIN — HEPARIN SODIUM 3000 UNITS: 1000 INJECTION, SOLUTION INTRAVENOUS; SUBCUTANEOUS at 19:45

## 2018-01-01 RX ADMIN — ROSUVASTATIN CALCIUM 20 MG: 10 TABLET, FILM COATED ORAL at 21:00

## 2018-01-01 RX ADMIN — SODIUM BICARBONATE 650 MG: 650 TABLET ORAL at 22:20

## 2018-01-01 RX ADMIN — INSULIN HUMAN 5 UNITS: 100 INJECTION, SOLUTION PARENTERAL at 22:26

## 2018-01-01 RX ADMIN — INSULIN HUMAN 5 UNITS: 100 INJECTION, SUSPENSION SUBCUTANEOUS at 16:32

## 2018-01-01 RX ADMIN — Medication 1334 MG: at 06:09

## 2018-01-01 RX ADMIN — ERYTHROPOIETIN 10000 UNITS: 10000 INJECTION, SOLUTION INTRAVENOUS; SUBCUTANEOUS at 08:40

## 2018-01-01 RX ADMIN — ETOMIDATE 20 MG: 2 INJECTION INTRAVENOUS at 13:34

## 2018-01-01 RX ADMIN — HEPARIN SODIUM 5000 UNITS: 5000 INJECTION, SOLUTION INTRAVENOUS; SUBCUTANEOUS at 22:55

## 2018-01-01 RX ADMIN — Medication 1334 MG: at 16:59

## 2018-01-01 RX ADMIN — MICONAZOLE NITRATE: 20 CREAM TOPICAL at 17:47

## 2018-01-01 RX ADMIN — MORPHINE SULFATE 10 MG: 10 INJECTION INTRAVENOUS at 00:55

## 2018-01-01 RX ADMIN — ALBUMIN (HUMAN) 12.5 G: 0.25 INJECTION, SOLUTION INTRAVENOUS at 11:00

## 2018-01-01 RX ADMIN — HEPARIN 500 UNITS: 100 SYRINGE at 15:20

## 2018-01-01 RX ADMIN — CEFTAROLINE FOSAMIL 200 MG: 600 POWDER, FOR SOLUTION INTRAVENOUS at 09:23

## 2018-01-01 RX ADMIN — OSELTAMIVIR PHOSPHATE 30 MG: 30 CAPSULE ORAL at 08:43

## 2018-01-01 RX ADMIN — HEPARIN SODIUM 3000 UNITS: 1000 INJECTION, SOLUTION INTRAVENOUS; SUBCUTANEOUS at 13:03

## 2018-01-01 ASSESSMENT — PAIN SCALES - GENERAL
PAINLEVEL_OUTOF10: 0
PAINLEVEL_OUTOF10: 1
PAINLEVEL_OUTOF10: 2
PAINLEVEL_OUTOF10: 0
PAINLEVEL_OUTOF10: 6
PAINLEVEL_OUTOF10: 0
PAINLEVEL_OUTOF10: 6
PAINLEVEL_OUTOF10: 0
PAINLEVEL_OUTOF10: 0
PAINLEVEL_OUTOF10: 2
PAINLEVEL_OUTOF10: 0
PAINLEVEL_OUTOF10: 2
PAINLEVEL_OUTOF10: 0

## 2018-01-01 ASSESSMENT — ENCOUNTER SYMPTOMS
WEAKNESS: 1
SPEECH CHANGE: 0
COUGH: 1
CARDIOVASCULAR NEGATIVE: 1
FEVER: 0
EYES NEGATIVE: 1
COUGH: 1
SPUTUM PRODUCTION: 1
WEAKNESS: 1
EYES NEGATIVE: 1
SPEECH CHANGE: 0
SPUTUM PRODUCTION: 1
WEAKNESS: 1
SPUTUM PRODUCTION: 1
FEVER: 0
COUGH: 1
MUSCULOSKELETAL NEGATIVE: 1
FEVER: 0
MYALGIAS: 1
WEAKNESS: 1
WEAKNESS: 1
EYES NEGATIVE: 1
COUGH: 1
CARDIOVASCULAR NEGATIVE: 1
FEVER: 0
GASTROINTESTINAL NEGATIVE: 1
GASTROINTESTINAL NEGATIVE: 1
SHORTNESS OF BREATH: 1
MUSCULOSKELETAL NEGATIVE: 1
MUSCULOSKELETAL NEGATIVE: 1
EYES NEGATIVE: 1
GASTROINTESTINAL NEGATIVE: 1
WEAKNESS: 1
GASTROINTESTINAL NEGATIVE: 1
CARDIOVASCULAR NEGATIVE: 1
WEAKNESS: 1
COUGH: 1
COUGH: 1
MUSCULOSKELETAL NEGATIVE: 1
EYES NEGATIVE: 1
GASTROINTESTINAL NEGATIVE: 1
COUGH: 1
FEVER: 0
ABDOMINAL PAIN: 1
FEVER: 0
WEAKNESS: 1
CARDIOVASCULAR NEGATIVE: 1
MUSCULOSKELETAL NEGATIVE: 1
SPUTUM PRODUCTION: 1
MUSCULOSKELETAL NEGATIVE: 1
EYES NEGATIVE: 1
CARDIOVASCULAR NEGATIVE: 1
SPUTUM PRODUCTION: 1
SPUTUM PRODUCTION: 1
EYES NEGATIVE: 1
SPUTUM PRODUCTION: 1
CARDIOVASCULAR NEGATIVE: 1
CARDIOVASCULAR NEGATIVE: 1
FEVER: 0
MUSCULOSKELETAL NEGATIVE: 1
GASTROINTESTINAL NEGATIVE: 1
GASTROINTESTINAL NEGATIVE: 1

## 2018-01-01 ASSESSMENT — LIFESTYLE VARIABLES
DO YOU DRINK ALCOHOL: NO

## 2018-01-01 NOTE — PROGRESS NOTES
[facility] Hospitalist Progress Note    Date of Service: 2018    Chief Complaint  77 y.o. male admitted 2017 with flu like symptoms, shortness of breath.    Interval Problem Update  Pt noted to have influenza a.  Also likely with pneumonia.  Also with diarrhea, c-diff negative.  Initially had low BP.  Very acidotic with worsening renal failure and elevated lactic acid initially.  Discussed plan and pt condition with RN at bedside.  Pt seen and examined in ICU, ICU care given.  Mentation improved.    Agitated overnight  abd pain  Pacing  Decreased oral intake  Diarrhea   Wants paracentesis today  PEP and IS  Refusing everything this morning    Consultants/Specialty  Nephrology - Dr Ram  Infectious disease    Disposition  Patient requires additional treatment in the hospital, will likely need placement at the time of discharge        Review of Systems   Unable to perform ROS: Acuity of condition      Physical Exam  Laboratory/Imaging   Hemodynamics  No data recorded.   Monitored Temp: 36.7 °C (98.1 °F)  Pulse  Av.5  Min: 69  Max: 130 Heart Rate (Monitored): 84  NIBP: 121/53      Respiratory      Respiration: 19, Pulse Oximetry: 93 %, O2 Daily Delivery Respiratory : Highflow Nasal Cannula     PEP/CPT Method: Positive Airway Pressure Device, Work Of Breathing / Effort: Moderate  RUL Breath Sounds: Diminished, RML Breath Sounds: Diminished, RLL Breath Sounds: Diminished, EMERSON Breath Sounds: Diminished, LLL Breath Sounds: Diminished    Fluids    Intake/Output Summary (Last 24 hours) at 18 0915  Last data filed at 18 0600   Gross per 24 hour   Intake             2100 ml   Output              770 ml   Net             1330 ml       Nutrition  Orders Placed This Encounter   Procedures   • DIET ORDER     Standing Status:   Standing     Number of Occurrences:   1     Order Specific Question:   Diet:     Answer:   Regular [1]     Order Specific Question:   Calorie modifications:     Answer:   High Calorie  [1]     Comments:   please send vanilla milkshakes with each meal     Physical Exam   Constitutional: He appears cachectic.  Non-toxic appearance. He appears ill. No distress.   HENT:   Head: Normocephalic and atraumatic.   Mouth/Throat: Mucous membranes are dry. No oropharyngeal exudate.   Eyes: Right eye exhibits no discharge. Left eye exhibits no discharge. No scleral icterus.   Neck: Neck supple. No edema and no erythema present.   Cardiovascular: Normal rate.  An irregular rhythm present.   Murmur heard.  Pulmonary/Chest: Effort normal. No stridor. No respiratory distress. He has no wheezes. He has rhonchi. He has rales.   Abdominal: Soft. Bowel sounds are normal. He exhibits no distension. There is tenderness.   Musculoskeletal: He exhibits no edema.   Lymphadenopathy:     He has no cervical adenopathy.   Neurological:   Awake but altered    Skin: Skin is warm and dry. He is not diaphoretic. No erythema.   Psychiatric: He is slowed. Cognition and memory are impaired.   Nursing note and vitals reviewed.      Recent Labs      12/30/17   0950  12/31/17   0550  01/01/18   0210   WBC  12.5*  14.4*  15.5*   RBC  2.86*  3.01*  2.96*   HEMOGLOBIN  8.6*  8.9*  8.8*   HEMATOCRIT  26.1*  26.6*  25.8*   MCV  91.3  88.4  87.2   MCH  30.1  29.6  29.7   MCHC  33.0*  33.5*  34.1   RDW  56.9*  54.0*  51.8*   PLATELETCT  106*  122*  127*   MPV  12.3  12.4  12.5     Recent Labs      12/31/17   0300  12/31/17   0550  01/01/18   0210   SODIUM  135  135  136   POTASSIUM  3.8  4.1  3.9   CHLORIDE  102  101  98   CO2  17*  17*  20   GLUCOSE  90  94  145*   BUN  92*  91*  100*   CREATININE  4.98*  5.02*  5.01*   CALCIUM  6.8*  6.8*  7.0*         Recent Labs      12/29/17   0926   BNPBTYPENAT  108*              Assessment/Plan     * Severe sepsis (CMS-[facility])   Assessment & Plan    - This is severe sepsis with the following associated acute organ dysfunction(s): acute kidney failure, acute respiratory failure, critical illness myopathy.    - d/t MRSA bacteremia, pneumonia and influenza  - continue Teflaro and tamiflu  - BP improved with fluids        High anion gap metabolic acidosis   Assessment & Plan    - severe, due to sepsis/lactic acidosis and renal failure  - continue oral bicarbonate  -Acidosis resolved, gap still present        Acute on chronic renal failure (CMS-HCC)- (present on admission)   Assessment & Plan    - significant worsening initially, now improved with bicarb drip  - with severe acidosis resolved, gap still present  - continue oral bicarbonate  - additional recommendations per nephro        Bacteremia due to Staphylococcus aureus   Assessment & Plan    - MRSA, discussed with Dr. Goetz, patient now on Teflaro        Protein-calorie malnutrition, severe (CMS-HCC)   Assessment & Plan    - Patient now refusing to eat, he states he is hopeful he'll be able to eat after the paracentesis  - Palliative care following  - Patient is going to continue to get weaker and weaker, poor prognosis        Atrial fibrillation (CMS-HCC)   Assessment & Plan    - initially rate controlled but then tachycardic  - likely d/t sepsis and dehydration  - improved with IVF, rate now controlled        Diarrhea   Assessment & Plan    - c-diff negative        CAP (community acquired pneumonia)- (present on admission)   Assessment & Plan    - continue abx and await cultures  - repeat cxr in am        Insulin dependent diabetes mellitus (CMS-HCC)   Assessment & Plan    - diabetic diet  - continue lantus and ISS, adjust as needed  - glucose is only mildly elevated, no reason to suspect DKA is the cause of his acidosis          Influenza A   Assessment & Plan    - continue tamiflu        Cryptogenic cirrhosis (CMS-HCC)   Assessment & Plan    - due to alpha-1 antitrypsin  - With ascites, significant, causing abdominal pain  - Obtain paracentesis today, patient states he gets it every 2 weeks, this is the day, he is refusing everything until he gets the  paracentesis           Normocytic anemia- (present on admission)   Assessment & Plan    - repeat cbc in am  - no sign of gross bleeding            Reviewed items::  Labs reviewed, Medications reviewed and Radiology images reviewed  DVT prophylaxis pharmacological::  Heparin  Antibiotics:  Treating active infection/contamination beyond 24 hours perioperative coverage

## 2018-01-01 NOTE — PROGRESS NOTES
Monitor Summary    V paced with underlying sinus tachycardia; occasional underlying afib.   HR 80s-115    12 hour chart check complete

## 2018-01-01 NOTE — PROGRESS NOTES
PROCEDURE: US guided paracentesis  SONOGRAPHER: Lisseth STANTON  RADIOLOGIST: Dr. Epperson    PT supine for US guided paracentesis of RLQ done portably. PT's vitals were monitored by [facility] RN, Karla. 4900 mL was removed and 1000 mL was sent to lab. PT tolerated procedure well. I left with PT in stable condition.

## 2018-01-01 NOTE — PROGRESS NOTES
"Pt very concerned about getting \"his belly drained\".  States he has a paracentesis every 2 weeks to relieve the pressure in his abdomen.  Pt states he is \"in a lot of pain, can't eat, can't breathe\".  02 requirements increased overnight, urine output 70 ml for the night shift.  Pt refusing pills and food at this time.  "

## 2018-01-01 NOTE — PROGRESS NOTES
Pt's son in to visit and updated on pt condition and plan of care.  He assisted with some dinner intake encouraging father to eat with poor results, pt trying to drink vanilla milkshake.   Monitor summary sinus rhythm, sinus tachycardia with paced beats.

## 2018-01-01 NOTE — PROGRESS NOTES
Infectious Disease Progress Note    Author: Helena Erickson M.D. Date & Time of service: 2018  10:44 AM    Chief Complaint:  Follow-up for MRSA bacteremia and influenza a    Interval History:  2018-MAXIMUM TEMPERATURE 99 remains on high flow oxygen . WBC 15.5 creatinine 5  Labs Reviewed, Medications Reviewed and Radiology Reviewed.    Review of Systems:  Review of Systems   Constitutional: Positive for malaise/fatigue. Negative for fever.   HENT: Negative for hearing loss.    Respiratory: Positive for cough and shortness of breath.    Cardiovascular: Negative for chest pain.   Gastrointestinal: Positive for abdominal pain.   Musculoskeletal: Positive for myalgias.   Neurological: Positive for weakness. Negative for speech change.       Hemodynamics:  No data recorded.  Monitored Temp: 36.7 °C (98.1 °F)  Pulse  Av.5  Min: 69  Max: 130Heart Rate (Monitored): 84  NIBP: 121/53       Physical Exam:  Physical Exam   Constitutional: He is oriented to person, place, and time. He appears ill.   HENT:   Mouth/Throat: No oropharyngeal exudate.   Eyes: No scleral icterus.   Neck: Neck supple.   Cardiovascular:   Irregularly Irregular   Pulmonary/Chest:   Course breath sounds  Left-sided pacemaker present-site clean   Abdominal: Soft. He exhibits distension. There is no tenderness. There is no rebound.   Musculoskeletal: He exhibits no edema.   Right-sided AV graft   Neurological: He is alert and oriented to person, place, and time.   Vitals reviewed.      Meds:    Current Facility-Administered Medications:   •  calcium acetate  •  ceftaroline (TEFLARO) ivpb  •  sodium bicarbonate  •  epoetin dariana  •  rosuvastatin  •  NS  •  NS  •  oseltamivir  •  insulin regular **AND** Accu-Chek ACHS **AND** NOTIFY MD and PharmD **AND** glucose 4 g **AND** dextrose 50%  •  Respiratory Care per Protocol  •  heparin    Labs:  Recent Labs      17   0950  17   0550  18   0210   WBC  12.5*  14.4*  15.5*   RBC  2.86*   3.01*  2.96*   HEMOGLOBIN  8.6*  8.9*  8.8*   HEMATOCRIT  26.1*  26.6*  25.8*   MCV  91.3  88.4  87.2   MCH  30.1  29.6  29.7   RDW  56.9*  54.0*  51.8*   PLATELETCT  106*  122*  127*   MPV  12.3  12.4  12.5     Recent Labs      12/31/17   0300  12/31/17   0550  01/01/18   0210   SODIUM  135  135  136   POTASSIUM  3.8  4.1  3.9   CHLORIDE  102  101  98   CO2  17*  17*  20   GLUCOSE  90  94  145*   BUN  92*  91*  100*     Recent Labs      12/30/17   0150   12/31/17   0300  12/31/17   0550  01/01/18   0210   ALBUMIN  2.2*   --   2.0*   --    --    TBILIRUBIN  0.3   --   0.3   --    --    ALKPHOSPHAT  75   --   79   --    --    TOTPROTEIN  4.8*   --   4.3*   --    --    ALTSGPT  9   --   11   --    --    ASTSGOT  28   --   31   --    --    CREATININE  4.80*   < >  4.98*  5.02*  5.01*    < > = values in this interval not displayed.       Imaging:  Dx-chest-limited (1 View)    Result Date: 12/31/2017 12/31/2017 9:40 AM HISTORY/REASON FOR EXAM:  Shortness of Breath. TECHNIQUE/EXAM DESCRIPTION AND NUMBER OF VIEWS: Single AP view of the chest. COMPARISON:  1 view chest 12/30/2017 FINDINGS: There are bilateral upper lobe airspace process most consistent with edema. Cardiac Silhouette: normal in size. There is aortic atherosclerosis.There is a cardiac pacemaker. Pleura: There are no pleural effusion or pneumothoraces. Osseous structures: No significant bony abnormality is present.     Unchanged bilateral upper lobe airspace process that are most consistent with edema    Dx-chest-portable (1 View)    Result Date: 12/30/2017 12/30/2017 2:43 AM HISTORY/REASON FOR EXAM: Shortness of Breath TECHNIQUE/EXAM DESCRIPTION:  Single AP view of the chest. COMPARISON: Yesterday FINDINGS: Position of medical devices appears stable. The heart is in the upper limits of normal for size. Atherosclerotic calcification of the aorta is noted.  The central  pulmonary vasculature appears prominent and indistinct. The lungs appear well expanded  bilaterally.  Diffuse scattered hazy pulmonary parenchymal opacities are seen. No significant pleural effusions are identified. The bony structures appear age-appropriate.     1.  Pulmonary edema and/or infiltrates are identified, which appear increased since the prior exam. 2.  Atherosclerosis    Dx-chest-portable (1 View)    Result Date: 12/29/2017 12/29/2017 10:41 AM HISTORY/REASON FOR EXAM:  Possible sepsis. TECHNIQUE/EXAM DESCRIPTION AND NUMBER OF VIEWS: Single portable view of the chest. COMPARISON: 2/16/2017 FINDINGS: Left-sided pacemaker in place. Diffuse interstitial prominence and patchy multifocal opacities throughout both lungs. No pleural effusion. No pneumothorax. Stable cardiopericardial silhouette.     Diffuse interstitial prominence and patchy multifocal opacities throughout both lungs could be seen with multifocal infection or pulmonary edema.    Us-paracentesis, Abd With Imaging    Result Date: 12/27/2017 12/26/2017 4:54 PM HISTORY/REASON FOR EXAM:  Ascites TECHNIQUE/EXAM DESCRIPTION: Ultrasound-guided paracentesis. COMPARISON:  12/24/17 PROCEDURE:  Informed consent was obtained. A timeout was taken. Ascites was localized with real-time ultrasound. The right lower quadrant of the abdominal wall was prepared and draped in a sterile manner. Following local anesthesia with 1% lidocaine, a 5  Upper sorbian Yueh pigtail catheter was advanced into the peritoneal cavity with trocar technique. Ascites was drained. The patient tolerated the procedure well with no evidence of complication. FINDINGS: Ascites is present. Fluid was not sent to the laboratory. Fluid character: straw colored     1. Ultrasound-guided therapeutic paracentesis of the right lower quadrant of the abdominal wall. 2. 4700 mL of fluid withdrawn.    Ir-paracentesis, Abd With Imaging    Result Date: 12/14/2017 12/14/2017 2:24 PM HISTORY/REASON FOR EXAM:  PARACENTESIS with Albumin - Epic standing order TECHNIQUE/EXAM DESCRIPTION:  Ultrasound-guided paracentesis. COMPARISON:  None PROCEDURE:  Informed consent was obtained. A timeout was taken. Ascites was localized with real-time ultrasound. The right lower quadrant of the abdominal wall was prepared and draped in a sterile manner. Following local anesthesia with 1% lidocaine, a 5  Frisian Yueh pigtail catheter was advanced into the peritoneal cavity with trocar technique. 6100 cc of ascites was drained. The patient tolerated the procedure well with no evidence of complication. FINDINGS: Ascites is present.     Ultrasound-guided therapeutic paracentesis of the right lower quadrant of the abdominal wall.    Ir-paracentesis, Abd With Imaging    Result Date: 12/5/2017 12/5/2017 8:25 AM HISTORY/REASON FOR EXAM:  PARACENTESIS with Albumin - Epic standing order 12/5/2017 8:25 AM TECHNIQUE/EXAM DESCRIPTION: Ultrasound-guided paracentesis. COMPARISON:  Ultrasound guided paracentesis 11/21/2017 PROCEDURE:  Informed consent was obtained.  Ascites was localized with real-time ultrasound.  The right lower quadrant of the abdominal wall was prepped and draped in a sterile manner.  Following local anesthesia with 1% lidocaine, a 5 Frisian Yueh pigtail catheter was advanced into the peritoneal cavity with trocar technique. 5000 cc of ascites fluid was drained.  The patient tolerated the procedure well with no evidence of complication. FINDINGS: Marked ascites is identified with real-time ultrasound.     Ultrasound-guided therapeutic paracentesis via the right lower quadrant .    Echocardiogram Comp W/o Cont    Result Date: 12/30/2017  Transthoracic Echo Report Echocardiography Laboratory CONCLUSIONS Mild concentric left ventricular hypertrophy. Pacer/ICD wire seen in right ventricle. The mitral valve is not well visualized. The tricuspid valve is not well visualized. Trace tricuspid regurgitation. Structurally normal aortic valve without significant stenosis or regurgitation. No vegetation seen JOSE MANUEL AARON  Exam Date:         2017                    14:46 Exam Location:     Inpatient Priority:          Routine Ordering Physician:        LATOYA MONTILLA Referring Physician:       [id number] Amin Sonographer:               Leticia Armas RDCS Age:    77     Gender:    M MRN:    [id number] :    [id number] BSA:    1.97   Ht (in):    72     Wt (lb):    167 Exam Type:     Complete Indications:     Fever ICD Codes:       780.6 CPT Codes:       95608 BP:   145    /   91     HR:   85 Technical Quality:       Technically difficult study -                          adequate information is obtained MEASUREMENTS  (Male / Female) Normal Values 2D ECHO LV Diastolic Diameter PLAX        4.2 cm                4.2 - 5.9 / 3.9 - 5.3 cm LV Systolic Diameter PLAX         3 cm                  2.1 - 4.0 cm IVS Diastolic Thickness           1.4 cm                LVPW Diastolic Thickness          1.4 cm                LVOT Diameter                     2.4 cm                Estimated LV Ejection Fraction    75 %                  M-MODE Aortic Root Diameter MM           3.9 cm                DOPPLER AV Peak Velocity                  1.2 m/s               AV Peak Gradient                  6 mmHg                AV Mean Gradient                  3.3 mmHg              LVOT Peak Velocity                0.93 m/s              AV Area Cont Eq vti               3.7 cm²               Mitral E Point Velocity           0.61 m/s              Mitral E to A Ratio               0.74                  Mitral A Duration                 145 ms                MV Pressure Half Time             70.3 ms               MV Area PHT                       3.1 cm²               MV Deceleration Time              242 ms                Pulmonary Vein Systolic Velocity  0.52 m/s              Pulmonary Vein Diastolic Velocit  0.37 m/s              Pulmonary Vein S/D Ratio          1.4                   Pulmonary Vein A Velocity         0.41 m/s              TR Peak  Velocity                  252 cm/s              PV Peak Velocity                  1.1 m/s               PV Peak Gradient                  4.8 mmHg              RVOT Peak Velocity                0.8 m/s               * Indicates values subject to auto-interpretation LV EF:  75    % FINDINGS Left Ventricle Normal left ventricular chamber size. Mild concentric left ventricular hypertrophy. Normal left ventricular systolic function. Left ventricular ejection fraction is visually estimated to be greater than 75%. Right Ventricle The right ventricle was normal in size and function.  Pacer/ICD wire seen in right ventricle. Right Atrium The right atrium is normal in size.  Catheter/pacemaker wire present in the right atrial cavity. Left Atrium The left atrium is normal in size. Mitral Valve The mitral valve is not well visualized. Aortic Valve Structurally normal aortic valve without significant stenosis or regurgitation. Tricuspid Valve The tricuspid valve is not well visualized.  Trace tricuspid regurgitation. Right ventricular systolic pressure is estimated to be 25 mmHg. Pulmonic Valve Structurally normal pulmonic valve without significant stenosis or regurgitation. Pericardium Normal pericardium without effusion.  Ascites present. Aorta The aortic root is normal. Soham Gottlieb M.D. (Electronically Signed) Final Date:     30 December 2017                 17:19      Micro:  Results     Procedure Component Value Units Date/Time    BLOOD CULTURE [[id number]]  (Abnormal)  (Susceptibility) Collected:  12/29/17 1014    Order Status:  Completed Specimen:  Blood from Peripheral Updated:  01/01/18 1040     Significant Indicator POS (POS)     Source BLD     Site PERIPHERAL     Blood Culture -- (A)     Growth detected by Bactec instrument.  12/30/2017  05:43  Methicillin resistant Staphylococcus aureus (MRSA)  detected by PCR.       Blood Culture Methicillin Resistant Staphylococcus aureus (A)    Narrative:       CALL  [facility]   "161 tel. [phone],  CALLED  161 tel. [phone] 12/30/2017, 13:38, RB PERF. RESULTS CALLED TO:[phone]  Per Hospital Policy: Only change Specimen Src: to \"Line\" if  specified by physician order.    Culture & Susceptibility     METHICILLIN RESISTANT STAPHYLOCOCCUS AUREUS     Antibiotic Sensitivity Microscan Unit Status    Ampicillin/sulbactam Resistant 16/8 mcg/mL Final    Clindamycin Resistant >4 mcg/mL Final    Daptomycin Sensitive 1 mcg/mL Final    Erythromycin Resistant >4 mcg/mL Final    Moxifloxacin Resistant >4 mcg/mL Final    Oxacillin Resistant >2 mcg/mL Final    Penicillin Resistant >8 mcg/mL Final    Tetracycline Sensitive <=4 mcg/mL Final    Trimeth/Sulfa Sensitive <=0.5/9.5 mcg/mL Final    Vancomycin Sensitive 2 mcg/mL Final                       Fluid Culture with Gram Stain [[id number]]     Order Status:  No result Specimen:  Body Fluid from Peritoneal Fluid     URINE CULTURE(NEW) [[id number]] Collected:  12/30/17 1559    Order Status:  Completed Specimen:  Urine Updated:  01/01/18 0826     Significant Indicator NEG     Source UR     Site --     Urine Culture No growth at 48 hours    Narrative:       Indication for culture:->Emergency Room Patient  If not done within the last 24 hours    Influenza By PCR, A/B [[id number]] Collected:  12/29/17 0000    Order Status:  Canceled Specimen:  Respirate from Nasopharyngeal Updated:  12/30/17 2115    C Diff by PCR rflx Toxin [[id number]] Collected:  12/29/17 1530    Order Status:  Completed Specimen:  Stool from Stool Updated:  12/30/17 0913     C Diff by PCR Negative     Comment: C. difficile NOT detected by PCR.  Treatment not indicated per guidelines.  Repeat testing not indicated within 7 days.          027-NAP1-BI Presumptive Negative     Comment: Presumptive 027/NAP1/BI target DNA sequences are NOT DETECTED.       Narrative:       Collected By:[id number] TRUE NEGRON  Does this patient have risk factors for C-diff?->Yes    BLOOD CULTURE [[id number]] Collected:  " "12/29/17 0926    Order Status:  Completed Specimen:  Blood from Peripheral Updated:  12/30/17 0844     Significant Indicator NEG     Source BLD     Site PERIPHERAL     Blood Culture --     No Growth    Note: Blood cultures are incubated for 5 days and  are monitored continuously.Positive blood cultures  are called to the RN and reported as soon as  they are identified.      Narrative:       Per Hospital Policy: Only change Specimen Src: to \"Line\" if  specified by physician order.    CULTURE RESPIRATORY W/ GRM STN [[id number]]     Order Status:  Completed Specimen:  Respirate from Sputum     URINALYSIS [[id number]]  (Abnormal) Collected:  12/29/17 1559    Order Status:  Completed Specimen:  Urine from Urine, Ca Cath Updated:  12/29/17 1610     Color Yellow     Character Cloudy (A)     Specific Gravity 1.017     Ph 5.0     Glucose Negative mg/dL      Ketones Trace (A) mg/dL      Protein 30 (A) mg/dL      Bilirubin Negative     Urobilinogen, Urine 0.2     Nitrite Negative     Leukocyte Esterase Negative     Occult Blood Negative     Micro Urine Req Microscopic    Narrative:       Collected By:[id number] TRUE NEGRON    Blood Culture [[id number]]     Order Status:  Sent Specimen:  Blood from Peripheral     Blood Culture [[id number]]     Order Status:  Sent Specimen:  Blood from Peripheral     Urinalysis [[id number]]     Order Status:  Canceled Specimen:  Urine from Urine, Cath     Culture Respiratory W/ GR STN [[id number]]     Order Status:  Completed Specimen:  Respirate from Sputum     INFLUENZA A/B BY PCR [[id number]]  (Abnormal) Collected:  12/29/17 0949    Order Status:  Completed Specimen:  Blood from Nasopharyngeal Updated:  12/29/17 1028     Influenza virus A RNA POSITIVE (A)     Influenza virus B, PCR Negative    URINALYSIS [[id number]]     Order Status:  Sent Specimen:  Urine     BLOOD CULTURE [[id number]] Collected:  12/29/17 0000    Order Status:  Canceled Specimen:  Other from Peripheral     BLOOD CULTURE " [[id number]] Collected:  12/29/17 0000    Order Status:  Canceled Specimen:  Other from Peripheral     Influenza Rapid [[id number]] Collected:  12/29/17 0000    Order Status:  Canceled Specimen:  Other from Respiratory           Assessment:  Active Hospital Problems    Diagnosis   • *Severe sepsis (CMS-[facility]) [A41.9, R65.20]   • High anion gap metabolic acidosis [E87.2]   • Acute on chronic renal failure (CMS-[facility]) [N17.9, N18.9]   • Protein-calorie malnutrition, severe (CMS-[facility]) [E43]   • 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]   • Influenza A [J10.1]   • Insulin dependent diabetes mellitus (CMS-[facility]) [E11.9, Z79.4]   • Diarrhea [R19.7]   • Atrial fibrillation (CMS-[facility]) [I48.91]   • CAP (community acquired pneumonia) [J18.9]   • Normocytic anemia [D64.9]       Plan:  MRSA bacteremia  Blood cultures are positive on 12/29/17  Repeat blood cultures  Echocardiogram negative on 12/30/17  Pacemaker and recent AV graft- worry about those being infected  He needs DORETHA  Continue ceftarolin    Influenza A  Finish 5 days of Tamiflu    Cryptogenic cirrhosis  Leading to ascites  He gets therapeutic paracentesis every 2 weeks  He is very uncomfortable right now and needs another paracentesis    Renal failure  Recent AV graft  Worry about it being infected and may need removal    Diabetes mellitus  Keep blood sugars under control    Prognosis  Guarded  Discussed with internal medicine.

## 2018-01-01 NOTE — DISCHARGE PLANNING
Medical SW    Referral: Sw attended AM IDT Rounds.    Intervention: admit 12/29 w/ lactic acidosis. [place] resident 75yo  w/ wife Ashleigh. Carries Medicare and [place] [employer].     Refusing medical interventions. Chronic diarrhea. ID following for MRSA.     Plan: Sw to assist w/ d/c planning as needed.

## 2018-01-01 NOTE — PROGRESS NOTES
[facility] Nephrology Progress Note               Author: Pio Finch Date & Time created: 1/1/2018  12:55 PM     Interval History:  77-year-old  male well known to our service who I have followed as an outpatient for years.  He has CKD stage V due to diabetes and hypertension.  We have anticipated eventual need for dialysis and already had placed an AV fistula in his right upper arm.  He routinely follows with this, was most recently seen 4 weeks ago.  At that time, he had creatinine of 4.8, hemoglobin of 10, normal electrolytes.  He was not uremic or fluid overloaded.  Hemodialysis has not yet been initiated.     The patient presented to the emergency room complaining of coughing for 2 or 3weeks.  He has been only productive over the last couple of days.  He became increasingly weak, unable to provide care for himself.  He could not get around.  He was not eating very well.     In the ER, he presented, he was normotensive with a blood pressure of 140/96, pulse 80.  He was mildly hypoxic on room air, was started on a little bit of oxygen.  Laboratory showed white count of 16 with a left shift.  He also has evidence potentially of fluid.  Chest x-ray suggests a possible pneumonia.     Laboratory results showed BUN 91, creatinine 5.3, potassium 5.2, was not fluid  overloaded on exam.  His lactic acid levels were elevated.  He is being admitted to the hospital under the hospitalist care.  He was given some  intravenous fluids.  We have been asked to follow him for CKD stage V + acidosis    Daily Nephrology Summary  12/29/17 - see in ER - IVF recommended.  Advanced CKD 5 - if no response to fluids will need to start dialysis  12/30/17 - placed on bicarb overnight.  SCreat down a bit.  K ok.  Bicarb improving.  Urine output 960cc  12/31/17 - Azotemia stable.  Not really expecting significant improvement.  On lots of O2 w Pnx.    BP low.    01/01/18 - Feels fine. Scr is the same. Non-oliguric. No  SOB.     Review of Systems   Constitutional: Positive for malaise/fatigue.   HENT: Negative.    Eyes: Negative.    Respiratory: Positive for cough and sputum production.    Cardiovascular: Negative.    Gastrointestinal: Negative.    Genitourinary: Negative.    Musculoskeletal: Negative.    Skin: Negative.        Physical Exam   Constitutional: No distress.   HENT:   Head: Atraumatic.   Eyes: No scleral icterus.   Neck: No JVD present.   Cardiovascular: Normal rate.  Exam reveals no friction rub.    Pulmonary/Chest: No respiratory distress.   Abdominal: Soft. There is no tenderness.   Musculoskeletal: He exhibits no edema.   Neurological: He is alert.   Skin: Skin is warm and dry.       Labs:  Recent Labs      12/29/17   1728   ISTATAPH  7.285*   ISTATAPCO2  15.8*   ISTATAPO2  57*   ISTATATCO2  8*   [id number]  87*   ISTATARTHCO3  7.5*   ISTATARTBE  -17*   ISTATTEMP  36.2 C   ISTATFIO2  25   ISTATSPEC  Arterial   ISTATAPHTC  7.296*   [id number]  54*     Recent Labs      12/29/17   2200   TROPONINI  0.05*     Recent Labs      12/30/17   0150   12/30/17   0950   12/31/17   0300  12/31/17   0550  01/01/18   0210   SODIUM  137   < >  137   < >  135  135  136   POTASSIUM  4.7   < >  4.0   < >  3.8  4.1  3.9   CHLORIDE  111   < >  109   < >  102  101  98   CO2  11*   < >  13*   < >  17*  17*  20   BUN  89*   < >  91*   < >  92*  91*  100*   CREATININE  4.80*   < >  5.16*   < >  4.98*  5.02*  5.01*   PHOSPHORUS  6.2*   --   5.6*   --    --    --    --    CALCIUM  7.4*   < >  7.0*   < >  6.8*  6.8*  7.0*    < > = values in this interval not displayed.     Recent Labs      12/30/17   0150   12/31/17   0300  12/31/17   0550  01/01/18   0210   ALTSGPT  9   --   11   --    --    ASTSGOT  28   --   31   --    --    ALKPHOSPHAT  75   --   79   --    --    TBILIRUBIN  0.3   --   0.3   --    --    GLUCOSE  101*   < >  90  94  145*    < > = values in this interval not displayed.     Recent Labs      12/30/17   0950  12/31/17   7614   17   0550  18   0210  18   1115   RBC  2.86*   --   3.01*  2.96*   --    HEMOGLOBIN  8.6*   --   8.9*  8.8*   --    HEMATOCRIT  26.1*   --   26.6*  25.8*   --    PLATELETCT  106*   --   122*  127*   --    PROTHROMBTM   --    --    --    --   14.4   APTT   --    --    --    --   33.1   INR   --    --    --    --   1.15*   IRON   --   <10*   --    --    --    FERRITIN   --   226.4   --    --    --    TOTIRONBC   --   157*   --    --    --      Recent Labs      17   0150  17   0950  17   0300  17   0550  18   0210   WBC   --   12.5*   --   14.4*  15.5*   ASTSGOT  28   --   31   --    --    ALTSGPT  9   --   11   --    --    ALKPHOSPHAT  75   --   79   --    --    TBILIRUBIN  0.3   --   0.3   --    --            Hemodynamics:  No data recorded.  Monitored Temp: 36.7 °C (98.1 °F)  Pulse  Av.8  Min: 69  Max: 130Heart Rate (Monitored): 80  NIBP: (!) 95/40     Respiratory:    Respiration: 20, Pulse Oximetry: 94 %, O2 Daily Delivery Respiratory : Highflow Nasal Cannula     PEP/CPT Method: Positive Airway Pressure Device, Work Of Breathing / Effort: Mild;Moderate  RUL Breath Sounds: Diminished, RML Breath Sounds: Diminished, RLL Breath Sounds: Diminished, EMERSON Breath Sounds: Diminished, LLL Breath Sounds: Diminished  Fluids:    Intake/Output Summary (Last 24 hours) at 18 1255  Last data filed at 18 1200   Gross per 24 hour   Intake             1275 ml   Output              770 ml   Net              505 ml     Weight: 77.5 kg (170 lb 13.7 oz)  GI/Nutrition:  Orders Placed This Encounter   Procedures   • DIET ORDER     Standing Status:   Standing     Number of Occurrences:   1     Order Specific Question:   Diet:     Answer:   Regular [1]     Order Specific Question:   Calorie modifications:     Answer:   High Calorie [1]     Comments:   please send vanilla milkshakes with each meal     Medical Decision Making, by Problem:  Active Hospital Problems    Diagnosis    • *Severe sepsis (CMS-HCC) [A41.9, R65.20]   • High anion gap metabolic acidosis [E87.2]   • Acute on chronic renal failure (CMS-HCC) [N17.9, N18.9]   • 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 No acute need for RRT for now.  -- monitor I/Os  -- avoid contrast    # Acidosis  -- metabolic  -- continue bicarb - change it to PO    # PNX  -- on O2 + ABX    # Anemia  -- chronic   -- no overt bleeding  -- presumed due to CKD 5  -- give EPOGEN while here  -- check iron levels    # Hypocalcemia  -- secondary HPTH likely  -- add Phoslo 667mg TID w meals    # BP   -- on low side with infection  -- no indication for fluid removal at this time despite increasing O2 needs            Quality-Core Measures

## 2018-01-01 NOTE — CARE PLAN
Problem: Infection  Goal: Will remain free from infection    Intervention: Assess signs and symptoms of infection  Pt remains on isolation for flu precautions, has not had a fever today, continues to have moist cough.       Problem: Bowel/Gastric:  Goal: Will not experience complications related to bowel motility    Intervention: Assess baseline bowel pattern  Pt with liquid stool, rectal trumpet in place as ordered.  Pt wiggles in bed to dislodge tube and incontinent of loose stool.      Problem: Respiratory:  Goal: Respiratory status will improve    Intervention: Assess and monitor pulmonary status  Pt on high flow 02 at 30 liters, maintaining 02 saturations within parameters when 02 in place, room air saturations down to 80%.  Pt encouraged to use IS, able to get between 750-1000.        Problem: Mobility  Goal: Risk for activity intolerance will decrease    Intervention: Assess and monitor signs of activity intolerance  Pt moving in bed today, turns and asks for repositioning.  Becomes short of breath with activity and does not tolerate sitting up for long.  Will continue to increase as tolerated.

## 2018-01-01 NOTE — PROGRESS NOTES
"Paracentesis performed at bedside by Dr. Epperson, pt tolerated procedure without issues and states \"I feel a little better but now I want to rest\".  Pt offered food and fluids, declined both at this time along with his medications.  Pt to receive albumin per protocol for >5,000ml removed.  Son called for update on father, discussed current condition and paracentesis.  Also talked with son at length about plans after discharge including rehab and possible assisted living or help at home considering patient's current debilitated state and wife also being in the hospital or rehab.  Son stated he had been concerned about this as well, asking for direction regarding  consultation.  Emotional support provided and questions answered over the phone.  "

## 2018-01-01 NOTE — PROGRESS NOTES
Ultrasound called, plan for paracentesis at 14:00 in room.  Pt informed of same, consent signed and coagulation screen sent per request.  Pt continues to refuse po intake, c/o discomfort and shortness of breath.  No urine output noted this shift thus far.

## 2018-01-01 NOTE — CARE PLAN
Problem: Oxygenation:  Goal: Maintain adequate oxygenation dependent on patient condition  Respiratory Therapy Update    Interdisciplinary Plan of Care-Goals (Indications)  Hyperinflation Protocol Indications: Restrictive Lung Disorder / Consolidation (12/31/17 1615)  Interdisciplinary Plan of Care-Outcomes   Hyperinflation Protocol Goals/Outcome: Stable Vital Capacity x24 hrs and Patient Understands / uses I.S. (12/31/17 1615)    #PEP/CPT (Manual) Initial: Initial (12/30/17 0752)          Cough: Congested;Moist;Productive;Swallowed;Weak (12/31/17 1615)  Sputum Amount: Unable to Evaluate (12/31/17 1615)  Sputum Color: Unable to Evaluate (12/31/17 1615)  Sputum Consistency: Unable to Evaluate (12/31/17 1615)    Heated Hi Flow Nasal Cannula (HHFNC): Yes (12/31/17 1615)  FiO2 (HHFNC): 70 (12/31/17 1615)  Flowrate (HHFNC): 30 (12/31/17 1615)    O2 (FiO2): 70 (12/31/17 1615)  O2 (LPM): 30 (12/31/17 1615)  O2 Daily Delivery Respiratory : Highflow Nasal Cannula (12/31/17 1615)    Breath Sounds  RUL Breath Sounds: Diminished (12/31/17 1615)  RML Breath Sounds: Diminished (12/31/17 1615)  RLL Breath Sounds: Diminished (12/31/17 1615)  EMERSON Breath Sounds: Diminished (12/31/17 1615)  LLL Breath Sounds: Diminished (12/31/17 1615)    Therapy changed to  Events/Summary/Plan: Pt remains on HHFNC 30lpm at 70% Fio2. Continue to titrate. PRp is performed pt getting 1250 Is

## 2018-01-02 NOTE — PROGRESS NOTES
[facility] Nephrology Progress Note               Author: Pio Duffymina Date & Time created: 1/2/2018  11:36 AM     Interval History:  77-year-old  male well known to our service who I have followed as an outpatient for years.  He has CKD stage V due to diabetes and hypertension.  We have anticipated eventual need for dialysis and already had placed an AV fistula in his right upper arm.  He routinely follows with this, was most recently seen 4 weeks ago.  At that time, he had creatinine of 4.8, hemoglobin of 10, normal electrolytes.  He was not uremic or fluid overloaded.  Hemodialysis has not yet been initiated.     The patient presented to the emergency room complaining of coughing for 2 or 3weeks.  He has been only productive over the last couple of days.  He became increasingly weak, unable to provide care for himself.  He could not get around.  He was not eating very well.     In the ER, he presented, he was normotensive with a blood pressure of 140/96, pulse 80.  He was mildly hypoxic on room air, was started on a little bit of oxygen.  Laboratory showed white count of 16 with a left shift.  He also has evidence potentially of fluid.  Chest x-ray suggests a possible pneumonia.     Laboratory results showed BUN 91, creatinine 5.3, potassium 5.2, was not fluid  overloaded on exam.  His lactic acid levels were elevated.  He is being admitted to the hospital under the hospitalist care.  He was given some  intravenous fluids.  We have been asked to follow him for CKD stage V + acidosis    Daily Nephrology Summary  12/29/17 - see in ER - IVF recommended.  Advanced CKD 5 - if no response to fluids will need to start dialysis  12/30/17 - placed on bicarb overnight.  SCreat down a bit.  K ok.  Bicarb improving.  Urine output 960cc  12/31/17 - Azotemia stable.  Not really expecting significant improvement.  On lots of O2 w Pnx.    BP low.    01/01/18 - Feels fine. Scr is the same. Non-oliguric. No  SOB.   01/02/18 - s/p  US guided paracentesis of RLQ. 4900 mL was removed and 1000 mL sent to lab. Scr is higher now. Did have some hypotension yesterday.      Review of Systems   Constitutional: Positive for malaise/fatigue.   HENT: Negative.    Eyes: Negative.    Respiratory: Positive for cough and sputum production.    Cardiovascular: Negative.    Gastrointestinal: Negative.    Genitourinary: Negative.    Musculoskeletal: Negative.    Skin: Negative.        Physical Exam   Constitutional: No distress.   HENT:   Head: Atraumatic.   Eyes: No scleral icterus.   Neck: No JVD present.   Cardiovascular: Normal rate.  Exam reveals no friction rub.    Pulmonary/Chest: No respiratory distress.   Abdominal: Soft. There is no tenderness.   Musculoskeletal: He exhibits no edema.   Neurological: He is alert.   Skin: Skin is warm and dry.       Labs:        Invalid input(s): [id number]      Recent Labs      12/31/17   0550  01/01/18   0210 01/02/18   0510   SODIUM  135  136  139   POTASSIUM  4.1  3.9  4.0   CHLORIDE  101  98  98   CO2  17*  20  18*   BUN  91*  100*  108*   CREATININE  5.02*  5.01*  6.25*   CALCIUM  6.8*  7.0*  7.2*     Recent Labs      12/31/17   0300  12/31/17   0550  01/01/18   0210 01/02/18   0510   ALTSGPT  11   --    --    --    ASTSGOT  31   --    --    --    ALKPHOSPHAT  79   --    --    --    TBILIRUBIN  0.3   --    --    --    GLUCOSE  90  94  145*  152*     Recent Labs      12/31/17   0300  12/31/17   0550  01/01/18   0210  01/01/18   1115  01/02/18   0510   RBC   --   3.01*  2.96*   --   2.73*   HEMOGLOBIN   --   8.9*  8.8*   --   8.2*   HEMATOCRIT   --   26.6*  25.8*   --   24.0*   PLATELETCT   --   122*  127*   --   106*   PROTHROMBTM   --    --    --   14.4   --    APTT   --    --    --   33.1   --    INR   --    --    --   1.15*   --    IRON  <10*   --    --    --    --    FERRITIN  226.4   --    --    --    --    [facility]  157*   --    --    --    --      Recent Labs      12/31/17   9015   17   0550  18   0210  18   0510   WBC   --   14.4*  15.5*  10.7   ASTSGOT  31   --    --    --    ALTSGPT  11   --    --    --    ALKPHOSPHAT  79   --    --    --    TBILIRUBIN  0.3   --    --    --            Hemodynamics:  No data recorded.  Monitored Temp: 36.8 °C (98.2 °F)  Pulse  Av.5  Min: 69  Max: 130Heart Rate (Monitored): 93  NIBP: 110/44     Respiratory:    Respiration: (!) 29, Pulse Oximetry: 94 %, O2 Daily Delivery Respiratory : Highflow Nasal Cannula     PEP/CPT Method: Positive Airway Pressure Device, Work Of Breathing / Effort: Moderate;Increased Work of Breathing  RUL Breath Sounds: Coarse Crackles, RML Breath Sounds: Coarse Crackles, RLL Breath Sounds: Diminished, EMERSON Breath Sounds: Coarse Crackles, LLL Breath Sounds: Diminished  Fluids:    Intake/Output Summary (Last 24 hours) at 18 1136  Last data filed at 18 0600   Gross per 24 hour   Intake             1065 ml   Output             5285 ml   Net            -4220 ml        GI/Nutrition:  Orders Placed This Encounter   Procedures   • DIET ORDER     Standing Status:   Standing     Number of Occurrences:   1     Order Specific Question:   Diet:     Answer:   Regular [1]     Order Specific Question:   Calorie modifications:     Answer:   High Calorie [1]     Comments:   please send vanilla milkshakes with each meal     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]   • Influenza A [J10.1]   • Insulin dependent diabetes mellitus (CMS-HCC) [E11.9, Z79.4]   • Diarrhea [R19.7]   • Atrial fibrillation (CMS-[facility]) [I48.91]   • CAP (community acquired pneumonia) [J18.9]   • Normocytic anemia [D64.9]       IMPRESSION/PLAN    # CKD 5   -- was at baseline creatinine as outpatient but now Acute rise likely  sec to hypoperfusion  -- no uremia at this time. No acute need for RRT for now.  -- monitor I/Os  -- avoid contrast    # Acidosis  -- metabolic  -- continue bicarb - change it to PO    # PNX  -- on O2 + ABX    # Anemia  -- chronic   -- no overt bleeding  -- presumed due to CKD 5  -- give EPOGEN while here  -- check iron levels    # Hypocalcemia  -- secondary HPTH likely  -- add Phoslo 667mg TID w meals    # BP   -- on low side with infection  -- Maintain MAP>65    May need to initiate HD in the next 24-48 hrs        Quality-Core Measures

## 2018-01-02 NOTE — PROGRESS NOTES
Monitor Summary: 100% Paced through dual chamber PPM. Primarily Pt was A-Paced rates 76-95 bpm. Few episodes of non-sustained V-pacing with tachycardia rates up to 100-123 bpm during times of agitation and restlessness.    12 hr chart check

## 2018-01-02 NOTE — CARE PLAN
Problem: Safety  Goal: Will remain free from falls  Outcome: PROGRESSING AS EXPECTED  Pt frequently moves around in bed. Bed alarm in use, pt educated on utilizing call light.     Problem: Skin Integrity  Goal: Risk for impaired skin integrity will decrease  Outcome: PROGRESSING AS EXPECTED  Caleb score of 13. Encouraging oral nutrition intake. Turning patient q2 hours. Barrier wipes in use. Waffle cushion on bed. Pillows in use for support.

## 2018-01-02 NOTE — PROGRESS NOTES
0115:Pt increasingly agitated and restless, trying to get out of bed, attempting to pull at lines. Pt remains restrained, attempted to reorient and calm patient without success. Pt trashing in bed, sats down to 84% with exertion, HiFlo settings adjusted and RT notified, states Pt had similar problems last night with agitation and anxiety. Hospitalist paged for orders/updates.  0200: Dr. Nguyen returned page and updated on Pt condition, including desaturation episodes with agitation, etc. Orders received for PRN Haldol, orders read back and implemented.

## 2018-01-02 NOTE — PROGRESS NOTES
[facility] Hospitalist Progress Note    Date of Service: 2018    Chief Complaint  77 y.o. male admitted 2017 with Pt noted to have influenza a.  Also likely with pneumonia.  Also with diarrhea, c-diff negative.  Initially had low BP. Very acidotic with worsening renal failure and elevated lactic acid initially.  Nephrology consulted as well as ID    Interval Problem Update  Confused last hs given one dose of haldol  Paced  AFebrile  Tolerating diet  UOP 55ml: Stg IV CKD  HFNC 60l/90%  Tapped 5 litres  D5 of oseltamivir  Ceftaroline D 5  ROS limited by mental status; rouses is appropriate however does not want to participate      Consultants/Specialty  Nephrology  Pulmonology    Disposition  Cont in ICU as respiratory status is tenuous        Review of Systems   Unable to perform ROS: Other   Neurological: Positive for weakness.      Physical Exam  Laboratory/Imaging   Hemodynamics  No data recorded.   Monitored Temp: 36.9 °C (98.4 °F)  Pulse  Av.6  Min: 69  Max: 130 Heart Rate (Monitored): 92  NIBP: 105/39      Respiratory      Respiration: (!) 26, Pulse Oximetry: 91 %, O2 Daily Delivery Respiratory : Highflow Nasal Cannula     Work Of Breathing / Effort: Increased Work of Breathing  RUL Breath Sounds: Diminished, RML Breath Sounds: Diminished, RLL Breath Sounds: Diminished, EMERSON Breath Sounds: Diminished, LLL Breath Sounds: Diminished    Fluids    Intake/Output Summary (Last 24 hours) at 18 0546  Last data filed at 18 0000   Gross per 24 hour   Intake              805 ml   Output             5455 ml   Net            -4650 ml       Nutrition  Orders Placed This Encounter   Procedures   • DIET ORDER     Standing Status:   Standing     Number of Occurrences:   1     Order Specific Question:   Diet:     Answer:   Regular [1]     Order Specific Question:   Calorie modifications:     Answer:   High Calorie [1]     Comments:   please send vanilla milkshakes with each meal     Physical Exam    Constitutional: He is oriented to person, place, and time. He appears well-developed. He appears cachectic. No distress.   HENT:   Head: Normocephalic and atraumatic.   Eyes: Conjunctivae are normal.   Neck: No JVD present.   Cardiovascular: Normal rate.  Exam reveals no gallop.    Murmur heard.  Pulmonary/Chest: Effort normal. No stridor. No respiratory distress. He has no wheezes. He has rales.   Abdominal: Soft. There is no tenderness. There is no rebound and no guarding.   Musculoskeletal: He exhibits no edema.   Neurological: He is oriented to person, place, and time.   Skin: Skin is warm and dry. No rash noted. He is not diaphoretic.   Nursing note and vitals reviewed.      Recent Labs      12/31/17   0550  01/01/18   0210  01/02/18   0510   WBC  14.4*  15.5*  10.7   RBC  3.01*  2.96*  2.73*   HEMOGLOBIN  8.9*  8.8*  8.2*   HEMATOCRIT  26.6*  25.8*  24.0*   MCV  88.4  87.2  87.9   MCH  29.6  29.7  30.0   MCHC  33.5*  34.1  34.2   RDW  54.0*  51.8*  52.0*   PLATELETCT  122*  127*  106*   MPV  12.4  12.5  12.9     Recent Labs      12/31/17   0300  12/31/17   0550  01/01/18   0210   SODIUM  135  135  136   POTASSIUM  3.8  4.1  3.9   CHLORIDE  102  101  98   CO2  17*  17*  20   GLUCOSE  90  94  145*   BUN  92*  91*  100*   CREATININE  4.98*  5.02*  5.01*   CALCIUM  6.8*  6.8*  7.0*     Recent Labs      01/01/18   1115   APTT  33.1   INR  1.15*                  Assessment/Plan     * Severe sepsis (CMS-[facility])   Assessment & Plan    - This is severe sepsis with the following associated acute organ dysfunction(s): acute kidney failure, acute respiratory failure, critical illness myopathy.   - d/t MRSA bacteremia, pneumonia and influenza  - continue Teflaro and tamiflu  - BP improved with fluids  ID following  TTE neg for vegetations        High anion gap metabolic acidosis   Assessment & Plan    - severe, due to sepsis/lactic acidosis and renal failure  - continue oral bicarbonate  -Acidosis resolved, gap still  present        Acute on chronic renal failure (CMS-HCC)- (present on admission)   Assessment & Plan    - significant worsening initially, now improved s/p bicarb gtts  - with severe acidosis resolved, gap still present  - continue oral bicarbonate  - renal function has stabilized.    -Nephrology following        Bacteremia due to Staphylococcus aureus   Assessment & Plan    - MRSA, discussed with Dr. Goetz, patient now on Teflaro        Protein-calorie malnutrition, severe (CMS-HCC)   Assessment & Plan    cont's with poor po intake  If it does not improve overnight will need to ree-address goals of care vs feeding tube        Atrial fibrillation (CMS-HCC)   Assessment & Plan    - initially rate controlled but then tachycardic  - likely d/t sepsis and dehydration  - improved with IVF, rate now controlled  CDiff neg        Diarrhea   Assessment & Plan    - c-diff negative        CAP (community acquired pneumonia)- (present on admission)   Assessment & Plan    MRSA  ID following   Ceftaroline          Insulin dependent diabetes mellitus (CMS-HCC)   Assessment & Plan    - diabetic diet  - continue lantus and ISS, adjust as needed  - glucose is only mildly elevated, no reason to suspect DKA is the cause of his acidosis          Influenza A   Assessment & Plan    - continue tamiflu        Cryptogenic cirrhosis (CMS-HCC)   Assessment & Plan    - due to alpha-1 antitrypsin  - With ascites, significant, causing abdominal pain  - s/p tap 1/1  -cont to follow          Normocytic anemia- (present on admission)   Assessment & Plan    Stable  Cont to follow daily  - no sign of gross bleeding            Reviewed items::  Radiology images reviewed, EKG reviewed, Labs reviewed and Medications reviewed  Ca catheter::  No Ca  DVT prophylaxis - mechanical:  SCDs  Ulcer Prophylaxis::  Not indicated  Antibiotics:  Treating active infection/contamination beyond 24 hours perioperative coverage

## 2018-01-02 NOTE — PROGRESS NOTES
Infectious Disease Progress Note    Author: Kailee Adams M.D. Date & Time of service: 2018  1:06 PM    Chief Complaint:  Follow-up for MRSA sepsis and influenza A    Interval History:  2018-MAXIMUM TEMPERATURE 99 remains on high flow oxygen . WBC 15.5 creatinine 5  1/2 AF WBC 10.7 encephalopathic; agitated in bed  Labs Reviewed, Medications Reviewed and Radiology Reviewed.    Review of Systems:  Review of Systems   Unable to perform ROS: Mental status change   Constitutional: Negative for fever.   Neurological: Negative for speech change.       Hemodynamics:  No data recorded.  Monitored Temp: 36.4 °C (97.5 °F)  Pulse  Av.3  Min: 69  Max: 130Heart Rate (Monitored): 70  NIBP: (!) 95/36       Physical Exam:  Physical Exam   Constitutional: He appears ill.   Thin  Chronically ill   HENT:   Head: Normocephalic and atraumatic.   Mouth/Throat: No oropharyngeal exudate.   Eyes: Pupils are equal, round, and reactive to light. No scleral icterus.   Neck: Neck supple.   Cardiovascular: Normal rate.    Murmur heard.  Irregularly Irregular   Pulmonary/Chest: Effort normal. No respiratory distress. He has no wheezes.   Course breath sounds  Left-sided pacemaker   Abdominal: Soft. He exhibits distension. There is no tenderness. There is no rebound.   Musculoskeletal: He exhibits no edema.   Right-sided AV graft   Neurological:   obtunded   Skin: No rash noted.   Nursing note and vitals reviewed.      Meds:    Current Facility-Administered Medications:   •  haloperidol lactate  •  calcium acetate  •  ceftaroline (TEFLARO) ivpb  •  sodium bicarbonate  •  epoetin dariana  •  rosuvastatin  •  NS  •  NS  •  insulin regular **AND** Accu-Chek ACHS **AND** NOTIFY MD and PharmD **AND** glucose 4 g **AND** dextrose 50%  •  Respiratory Care per Protocol  •  heparin    Labs:  Recent Labs      17   0550  18   0210  18   0510   WBC  14.4*  15.5*  10.7   RBC  3.01*  2.96*  2.73*   HEMOGLOBIN  8.9*  8.8*  8.2*    HEMATOCRIT  26.6*  25.8*  24.0*   MCV  88.4  87.2  87.9   MCH  29.6  29.7  30.0   RDW  54.0*  51.8*  52.0*   PLATELETCT  122*  127*  106*   MPV  12.4  12.5  12.9     Recent Labs      12/31/17   0550  01/01/18   0210  01/02/18   0510   SODIUM  135  136  139   POTASSIUM  4.1  3.9  4.0   CHLORIDE  101  98  98   CO2  17*  20  18*   GLUCOSE  94  145*  152*   BUN  91*  100*  108*     Recent Labs      12/31/17   0300  12/31/17   0550  01/01/18   0210  01/01/18   1415  01/02/18   0510   ALBUMIN  2.0*   --    --   <1.0   --    TBILIRUBIN  0.3   --    --    --    --    ALKPHOSPHAT  79   --    --    --    --    TOTPROTEIN  4.3*   --    --    --    --    ALTSGPT  11   --    --    --    --    ASTSGOT  31   --    --    --    --    CREATININE  4.98*  5.02*  5.01*   --   6.25*       Imaging:  Dx-chest-limited (1 View)    Result Date: 12/31/2017 12/31/2017 9:40 AM HISTORY/REASON FOR EXAM:  Shortness of Breath. TECHNIQUE/EXAM DESCRIPTION AND NUMBER OF VIEWS: Single AP view of the chest. COMPARISON:  1 view chest 12/30/2017 FINDINGS: There are bilateral upper lobe airspace process most consistent with edema. Cardiac Silhouette: normal in size. There is aortic atherosclerosis.There is a cardiac pacemaker. Pleura: There are no pleural effusion or pneumothoraces. Osseous structures: No significant bony abnormality is present.     Unchanged bilateral upper lobe airspace process that are most consistent with edema    Dx-chest-portable (1 View)    Result Date: 12/30/2017 12/30/2017 2:43 AM HISTORY/REASON FOR EXAM: Shortness of Breath TECHNIQUE/EXAM DESCRIPTION:  Single AP view of the chest. COMPARISON: Yesterday FINDINGS: Position of medical devices appears stable. The heart is in the upper limits of normal for size. Atherosclerotic calcification of the aorta is noted.  The central  pulmonary vasculature appears prominent and indistinct. The lungs appear well expanded bilaterally.  Diffuse scattered hazy pulmonary parenchymal  opacities are seen. No significant pleural effusions are identified. The bony structures appear age-appropriate.     1.  Pulmonary edema and/or infiltrates are identified, which appear increased since the prior exam. 2.  Atherosclerosis    Dx-chest-portable (1 View)    Result Date: 12/29/2017 12/29/2017 10:41 AM HISTORY/REASON FOR EXAM:  Possible sepsis. TECHNIQUE/EXAM DESCRIPTION AND NUMBER OF VIEWS: Single portable view of the chest. COMPARISON: 2/16/2017 FINDINGS: Left-sided pacemaker in place. Diffuse interstitial prominence and patchy multifocal opacities throughout both lungs. No pleural effusion. No pneumothorax. Stable cardiopericardial silhouette.     Diffuse interstitial prominence and patchy multifocal opacities throughout both lungs could be seen with multifocal infection or pulmonary edema.    Us-paracentesis, Abd With Imaging    Result Date: 12/27/2017 12/26/2017 4:54 PM HISTORY/REASON FOR EXAM:  Ascites TECHNIQUE/EXAM DESCRIPTION: Ultrasound-guided paracentesis. COMPARISON:  12/24/17 PROCEDURE:  Informed consent was obtained. A timeout was taken. Ascites was localized with real-time ultrasound. The right lower quadrant of the abdominal wall was prepared and draped in a sterile manner. Following local anesthesia with 1% lidocaine, a 5  [place] Yueh pigtail catheter was advanced into the peritoneal cavity with trocar technique. Ascites was drained. The patient tolerated the procedure well with no evidence of complication. FINDINGS: Ascites is present. Fluid was not sent to the laboratory. Fluid character: straw colored     1. Ultrasound-guided therapeutic paracentesis of the right lower quadrant of the abdominal wall. 2. 4700 mL of fluid withdrawn.    Echocardiogram Comp W/o Cont    Result Date: 12/30/2017  Transthoracic Echo Report Echocardiography Laboratory CONCLUSIONS Mild concentric left ventricular hypertrophy. Pacer/ICD wire seen in right ventricle. The mitral valve is not well visualized. The  tricuspid valve is not well visualized. Trace tricuspid regurgitation. Structurally normal aortic valve without significant stenosis or regurgitation. No vegetation seen LV EF:  75    % FINDINGS Left Ventricle Normal left ventricular chamber size. Mild concentric left ventricular hypertrophy. Normal left ventricular systolic function. Left ventricular ejection fraction is visually estimated to be greater than 75%. Right Ventricle The right ventricle was normal in size and function.  Pacer/ICD wire seen in right ventricle. Right Atrium The right atrium is normal in size.  Catheter/pacemaker wire present in the right atrial cavity. Left Atrium The left atrium is normal in size. Mitral Valve The mitral valve is not well visualized. Aortic Valve Structurally normal aortic valve without significant stenosis or regurgitation. Tricuspid Valve The tricuspid valve is not well visualized.  Trace tricuspid regurgitation. Right ventricular systolic pressure is estimated to be 25 mmHg. Pulmonic Valve Structurally normal pulmonic valve without significant stenosis or regurgitation. Pericardium Normal pericardium without effusion.  Ascites present. Aorta The aortic root is normal. Soham Gottlieb M.D. (Electronically Signed) Final Date:     30 December 2017                 17:19      Micro:  Results     Procedure Component Value Units Date/Time    BLOOD CULTURE [[id number]] Collected:  01/02/18 1135    Order Status:  Sent Specimen:  Blood from Peripheral     BLOOD CULTURE [[id number]] Collected:  01/02/18 1135    Order Status:  Sent Specimen:  Blood from Peripheral     GRAM STAIN [[id number]] Collected:  01/01/18 1415    Order Status:  Completed Specimen:  Body Fluid Updated:  01/01/18 2024     Significant Indicator .     Source BF     Site Peritoneal Fluid     Gram Stain Result --     Rare WBCs.  No organisms seen.      Narrative:       Peritoneal Fluid    FLUID CULTURE W/GRAM STAIN [[id number]] Collected:  01/01/18 1415     "Order Status:  Completed Updated:  01/01/18 1518    Narrative:       Peritoneal Fluid    BLOOD CULTURE [[id number]]  (Abnormal)  (Susceptibility) Collected:  12/29/17 1014    Order Status:  Completed Specimen:  Blood from Peripheral Updated:  01/01/18 1040     Significant Indicator POS (POS)     Source BLD     Site PERIPHERAL     Blood Culture -- (A)     Growth detected by Bactec instrument.  12/30/2017  05:43  Methicillin resistant Staphylococcus aureus (MRSA)  detected by PCR.       Blood Culture Methicillin Resistant Staphylococcus aureus (A)    Narrative:       CALL  [place]  161 tel. [phone],  CALLED  161 tel. [phone] 12/30/2017, 13:38, RB PERF. RESULTS CALLED TO:[phone]  Per Hospital Policy: Only change Specimen Src: to \"Line\" if  specified by physician order.    Culture & Susceptibility     METHICILLIN RESISTANT STAPHYLOCOCCUS AUREUS     Antibiotic Sensitivity Microscan Unit Status    Ampicillin/sulbactam Resistant 16/8 mcg/mL Final    Clindamycin Resistant >4 mcg/mL Final    Daptomycin Sensitive 1 mcg/mL Final    Erythromycin Resistant >4 mcg/mL Final    Moxifloxacin Resistant >4 mcg/mL Final    Oxacillin Resistant >2 mcg/mL Final    Penicillin Resistant >8 mcg/mL Final    Tetracycline Sensitive <=4 mcg/mL Final    Trimeth/Sulfa Sensitive <=0.5/9.5 mcg/mL Final    Vancomycin Sensitive 2 mcg/mL Final                       Fluid Culture with Gram Stain [[id number]]     Order Status:  No result Specimen:  Body Fluid from Peritoneal Fluid     URINE CULTURE(NEW) [[id number]] Collected:  12/30/17 1559    Order Status:  Completed Specimen:  Urine Updated:  01/01/18 0826     Significant Indicator NEG     Source UR     Site --     Urine Culture No growth at 48 hours    Narrative:       Indication for culture:->Emergency Room Patient  If not done within the last 24 hours    Influenza By PCR, A/B [[id number]] Collected:  12/29/17 0000    Order Status:  Canceled Specimen:  Respirate from Nasopharyngeal Updated:  12/30/17 " "2115    C Diff by PCR rflx Toxin [[id number]] Collected:  12/29/17 1530    Order Status:  Completed Specimen:  Stool from Stool Updated:  12/30/17 0913     C Diff by PCR Negative     Comment: C. difficile NOT detected by PCR.  Treatment not indicated per guidelines.  Repeat testing not indicated within 7 days.          027-NAP1-BI Presumptive Negative     Comment: Presumptive 027/NAP1/BI target DNA sequences are NOT DETECTED.       Narrative:       Collected By:[id number] TRUE NEGRON  Does this patient have risk factors for C-diff?->Yes    BLOOD CULTURE [[id number]] Collected:  12/29/17 0926    Order Status:  Completed Specimen:  Blood from Peripheral Updated:  12/30/17 0844     Significant Indicator NEG     Source BLD     Site PERIPHERAL     Blood Culture --     No Growth    Note: Blood cultures are incubated for 5 days and  are monitored continuously.Positive blood cultures  are called to the RN and reported as soon as  they are identified.      Narrative:       Per Hospital Policy: Only change Specimen Src: to \"Line\" if  specified by physician order.    CULTURE RESPIRATORY W/ GRM STN [[id number]]     Order Status:  Completed Specimen:  Respirate from Sputum     URINALYSIS [[id number]]  (Abnormal) Collected:  12/29/17 1559    Order Status:  Completed Specimen:  Urine from Urine, Ca Cath Updated:  12/29/17 1610     Color Yellow     Character Cloudy (A)     Specific Gravity 1.017     Ph 5.0     Glucose Negative mg/dL      Ketones Trace (A) mg/dL      Protein 30 (A) mg/dL      Bilirubin Negative     Urobilinogen, Urine 0.2     Nitrite Negative     Leukocyte Esterase Negative     Occult Blood Negative     Micro Urine Req Microscopic    Narrative:       Collected By:[id number] TRUE NEGRON    Urinalysis [[id number]]     Order Status:  Canceled Specimen:  Urine from Urine, Cath     INFLUENZA A/B BY PCR [[id number]]  (Abnormal) Collected:  12/29/17 0949    Order Status:  Completed Specimen:  Blood from Nasopharyngeal Updated:  " 12/29/17 1028     Influenza virus A RNA POSITIVE (A)     Influenza virus B, PCR Negative    URINALYSIS [[id number]]     Order Status:  Canceled Specimen:  Urine     BLOOD CULTURE [[id number]] Collected:  12/29/17 0000    Order Status:  Canceled Specimen:  Other from Peripheral     BLOOD CULTURE [[id number]] Collected:  12/29/17 0000    Order Status:  Canceled Specimen:  Other from Peripheral     Influenza Rapid [[id number]] Collected:  12/29/17 0000    Order Status:  Canceled Specimen:  Other from Respiratory     Blood Culture [[id number]] Collected:  12/29/17 0000    Order Status:  Canceled Specimen:  Other from Peripheral     Blood Culture [[id number]] Collected:  12/29/17 0000    Order Status:  Canceled Specimen:  Other from Peripheral     Culture Respiratory W/ GRM STN [[id number]] Collected:  12/29/17 0000    Order Status:  Canceled Specimen:  Sputum from Sputum           Assessment:  Active Hospital Problems    Diagnosis   • *Severe sepsis (CMS-[facility]) [A41.9, R65.20]   • High anion gap metabolic acidosis [E87.2]   • Acute on chronic renal failure (CMS-[facility]) [N17.9, N18.9]   • Protein-calorie malnutrition, severe (CMS-[facility]) [E43]   • Cryptogenic cirrhosis (CMS-[facility]) [K74.69]   • Influenza A [J10.1]   • Insulin dependent diabetes mellitus (CMS-[facility]) [E11.9, Z79.4]   • Diarrhea [R19.7]   • Atrial fibrillation (CMS-[facility]) [I48.91]   • CAP (community acquired pneumonia) [J18.9]   • Normocytic anemia [D64.9]       Plan:  MRSA sepsis  Afebrile  Resolved leukocytosis  Blood cultures +12/29/17  Repeat blood cultures pending  TTE negative on 12/30/17  Due to pacemaker and recent AV graft- needs DORETHA  Continue ceftaroline  Anticipate 6 week course from date neg blood cxs    Influenza A  Complete 5 days of Tamiflu    Cryptogenic cirrhosis  Leading to ascites  s/p paracentesis     Renal failure, worse  Recent AV graft  Dose adjust abx as needed    Diabetes mellitus  Keep BS under 150 to help control current infection      Discussed with  RN

## 2018-01-02 NOTE — CARE PLAN
"Problem: Infection  Goal: Will remain free from infection    Intervention: Implement standard precautions and perform hand washing before and after patient contact  Infectious disease RN called and stated pt has MRSA in blood culture 12/29 and contact isolation precautions should be in place.  Order entered in EPIC and supplies available at door for contact and droplet precautions.      Problem: Fluid Volume:  Goal: Will maintain balanced intake and output    Intervention: Monitor, educate, and encourage compliance with therapeutic intake of liquids  Pt with very poor intake of fluids or food today, states he is \"too uncomfortable\" before paracentesis and wanted to sleep after procedure.  Encouraged to drink at dinner, able to take ~250 ml of fluids.      Problem: Respiratory:  Goal: Respiratory status will improve    Intervention: Assess and monitor pulmonary status  Pt remains on high flow 02 with  40 liters, 80% fi02.  Removed 02 x 1 today with resulting room air saturations 72%, pt quite confused and agitated.  02 replaced and pt recovered saturations to >90% within 5 minutes.  Pt continues to have moist cough.      Problem: Mobility  Goal: Risk for activity intolerance will decrease    Intervention: Assess and monitor signs of activity intolerance  Pt trying to get out of bed and laying across bed with legs over rail.  Assisted to sit up briefly, pt very short of breath and confused and did not tolerate activity.        Problem: Urinary Elimination:  Goal: Ability to reestablish a normal urinary elimination pattern will improve    Intervention: Evaluate need to continue indwelling urinary catheter  Pt urine output today for 12 hours total 30 ml.         "

## 2018-01-02 NOTE — CARE PLAN
Problem: Respiratory:  Goal: Respiratory status will improve    Intervention: Administer and titrate oxygen therapy  Pt on HiFlo NC currently at 40L and 80%. SpO2 94% at this time, will continue to monitor and wean as tolerated      Problem: Pain Management  Goal: Pain level will decrease to patient's comfort goal    Intervention: Follow pain managment plan developed in collaboration with patient and Interdisciplinary Team  Pt repositioned for comfort, denies pain at this time

## 2018-01-02 NOTE — PROGRESS NOTES
"Pt attempting to get out of bed, pulled rectal tube out, cardiac leads off, and pulling at IV and monitoring cables. Pt stating \"I need to get up\", Pt reoriented however still trying to get out of bed, kicking legs and pulling oxygen off. Pt desats to 80s after O2 removal. Pt remains disoriented to situation and is posing safety hazard. Hospitalist Dr. Noni kelsey returned page and updated on Pt condition, orders for soft wrist restraints at this time. Orders implemented  "

## 2018-01-02 NOTE — DISCHARGE PLANNING
Medical SW    Referral: Sw attended AM IDT Rounds.    Intervention: Sundowns/agitated and pulls out tubes at night. Restrained over night not on now. [place] LPM high last night but will titrate down. Thoracentesis completed. Last day tamaflu of 5 days. Retest MRSA. ID is following.     Plan: Sw to assist w/ d/c planning as needed

## 2018-01-02 NOTE — CARE PLAN
Problem: Oxygenation:  Goal: Maintain adequate oxygenation dependent on patient condition  Respiratory Therapy Update    Interdisciplinary Plan of Care-Goals (Indications)  Hyperinflation Protocol Indications: Restrictive Lung Disorder / Consolidation (01/02/18 1221)  Interdisciplinary Plan of Care-Outcomes   Hyperinflation Protocol Goals/Outcome: Stable Vital Capacity x24 hrs and Patient Understands / uses I.S. (01/02/18 1221)    #PEP/CPT (Manual) Initial: Initial (12/30/17 0752)          Cough: Congested;Moist;Productive;Swallowed;Weak (01/02/18 1221)  Sputum Amount: Unable to Evaluate (01/02/18 1221)  Sputum Color: Unable to Evaluate (01/02/18 1221)  Sputum Consistency: Unable to Evaluate (01/02/18 1221)    Heated Hi Flow Nasal Cannula (HHFNC): Yes (01/02/18 1221)  FiO2 (HHFNC): 80 (01/02/18 1221)  Flowrate (HHFNC): 50 (01/02/18 1221)    O2 (FiO2): 80 (01/02/18 1221)  O2 (LPM): 50 (01/02/18 1221)  O2 Daily Delivery Respiratory : Highflow Nasal Cannula (01/02/18 1221)    Breath Sounds  RUL Breath Sounds: Coarse Crackles (01/02/18 1221)  RML Breath Sounds: Coarse Crackles (01/02/18 1221)  RLL Breath Sounds: Diminished (01/02/18 1221)  EMERSON Breath Sounds: Coarse Crackles (01/02/18 1221)  LLL Breath Sounds: Diminished (01/02/18 1221)      Events/Summary/Plan: HHFNC. PEP/IS (01/02/18 1221)

## 2018-01-02 NOTE — PROGRESS NOTES
Monitor summary-pt has paced rhythm intermittently with atrial fibrillation, ventricular tachycardia.

## 2018-01-02 NOTE — PROGRESS NOTES
12 hr chart check.       Received report and assumed Pt care. Pt sleeping in bed, no s/s distress. Vitals stable: A-paced at 80 bpm via PPM, SBP in 100s, SpO2 99% on HiFlo NC, RR 17. Orders reviewed, POC discussed

## 2018-01-03 NOTE — OR SURGEON
Immediate Post- Operative Note        PostOp Diagnosis: kidney disease      Procedure(s): NTDC      Estimated Blood Loss: Less than 5 ml        Complications: None            1/3/2018     3:19 PM     Volodymyr Epperson

## 2018-01-03 NOTE — PROGRESS NOTES
Pt lethargic. Awakens to voice. Unable to keep eyes open. Not taking pills/ following treatment plan.    RT collecting ABG's.     Pt V/s Stable.    HFNC 60/90.

## 2018-01-03 NOTE — PALLIATIVE CARE
Palliative Care follow-up  PC RN visited pt at bedside, discussed pt's understanding of clinical picture. Pt can verbalize he is at [facility] ICU but does not know why. PC RN discussed pt's clinical picture, discussed pt's GOC. Pt states he wants to remain Full code, discussed pt's wishes for life sustaining and prolonging treatments. PC RN discussed advanced directive in detail, explored pt's thoughts and feelings. Pt verbalized he wants his son to be his POA for healthcare, designated #3 & #5 for wishes. PC RN discussed helping pt complete form, pt declined to complete document at this time. Bedside RN states pt's son is supposed to come to bedside today, will call PC RN when son is present.       Updated:   Bedside RN    Plan:   Will return when son is present to complete AD and discuss GOC.     Thank you for allowing Palliative Care to participate in this patient's care. Please feel free to call [phone] with any questions or concerns.

## 2018-01-03 NOTE — DISCHARGE PLANNING
Medical SW    Referral: Sw met w/ pt's son.    Intervention: Sw provided resource information to son regarding his disabled sister who has lived in group homes most of her adult life. Sw gave numbers for [facility] and [facility].     Son indicates he has POA for his mother who is currently in pt in another tower at [facility]. He will be meeting w/ palliative RN to discuss AD for his father, CIC pt in room T612.     -Sw spoke to palliative RN. POA has been completed. Sw provided resources for personal care attendants to support the elderly couple at home after d/c and lists of Assisted Living Facilities if the son needs to place the couple in the future. Son intends to keep his parents at home as long as possible. Son is not interested in hospice at this time.     Plan: Sw to assist w/ d/c planning as needed.

## 2018-01-03 NOTE — PROGRESS NOTES
Cortrak Placement    Tube Team verified patient name and medical record number prior to tube placement.  Cortrak tube (43 inches, 10 Swazi) placed at 80 cm in left nare.  Per Cortrak picture, tube appears to be in the stomach.  Nursing Instructions: Awaiting KUB to confirm placement before use for medications or feeding. Once placement confirmed, flush tube with 30 ml of water, and then remove and save stylet, in patient medication drawer.

## 2018-01-03 NOTE — DISCHARGE PLANNING
Medical SW    Referral: Sw attended AM IDT Rounds.    Intervention: A/O x3, mobilizing him now, nasal canula 6 LPM.     Plan: Sw to assist w/ d/c planning as needed.

## 2018-01-03 NOTE — PROGRESS NOTES
IR Nursing Note:    Patient underwent a Temporary dialysis catheter placement by Dr. Epperson under a local anesthetic. Procedure site was marked by MD and verified by team prior to start of procedure.  Patient was placed in a supine position.  Vitals were taken every 5 minutes and remained stable during procedure (see doc flow sheet for results).   A biopatch guaze dressing was placed over surgical site, heparin flushed through dialysis catheter. Surgical site was CDI.   Patient was responsive to verbal stimuli.  Report called to Anna SHAH. Pt transported by bed and monitor by this RN to T612.     Bri Heart Acute triple lumen catheter 12 Fr x 16cm Right IJ Ref # [id number] Lot # [id number]

## 2018-01-03 NOTE — CARE PLAN
Problem: Oxygenation:  Goal: Maintain adequate oxygenation dependent on patient condition  Pt continues on HHFNC 60L, 80%.

## 2018-01-03 NOTE — PROGRESS NOTES
[facility] Hospitalist Progress Note    Date of Service: 1/3/2018    Chief Complaint  77 y.o. male admitted 2017 with Pt noted to have influenza a.  Also likely with pneumonia.  Also with diarrhea, c-diff negative.  Initially had low BP. Very acidotic with worsening renal failure and elevated lactic acid initially.  Nephrology consulted as well as ID    Interval Problem Update  Confused last hs given one dose of haldol  Paced  AFebrile  Tolerating diet  UOP 100ml: Stg IV CKD  HFNC 60l/90%  Tapped 5 litres   oseltamivir completed  Ceftaroline D 6        Consultants/Specialty  Nephrology  Pulmonology    Disposition  Cont in ICU as respiratory status is tenuous        Review of Systems   Unable to perform ROS: Other   Neurological: Positive for weakness.      Physical Exam  Laboratory/Imaging   Hemodynamics  No data recorded.   Monitored Temp: 36.7 °C (98.1 °F)  Pulse  Av.2  Min: 69  Max: 130 Heart Rate (Monitored): 90  NIBP: 126/53      Respiratory      Respiration: 18, Pulse Oximetry: 92 %, O2 Daily Delivery Respiratory : Highflow Nasal Cannula     PEP/CPT Method: Positive Airway Pressure Device, Work Of Breathing / Effort: Moderate;Increased Work of Breathing  RUL Breath Sounds: Diminished, RML Breath Sounds: Diminished, RLL Breath Sounds: Diminished, EMERSON Breath Sounds: Diminished, LLL Breath Sounds: Diminished    Fluids    Intake/Output Summary (Last 24 hours) at 18 0538  Last data filed at 18 0400   Gross per 24 hour   Intake             1690 ml   Output               90 ml   Net             1600 ml       Nutrition  Orders Placed This Encounter   Procedures   • DIET ORDER     Standing Status:   Standing     Number of Occurrences:   1     Order Specific Question:   Diet:     Answer:   Regular [1]     Order Specific Question:   Calorie modifications:     Answer:   High Calorie [1]     Comments:   please send vanilla milkshakes with each meal     Physical Exam   Constitutional: He is oriented to  person, place, and time. He appears well-developed. He appears cachectic. No distress.   HENT:   Head: Normocephalic and atraumatic.   Eyes: Conjunctivae are normal.   Neck: No JVD present.   Cardiovascular: Normal rate.  Exam reveals no gallop.    Murmur heard.  Pulmonary/Chest: Effort normal. No stridor. No respiratory distress. He has no wheezes. He has rales.   Abdominal: Soft. There is no tenderness. There is no rebound and no guarding.   Musculoskeletal: He exhibits no edema.   Neurological: He is oriented to person, place, and time.   Skin: Skin is warm and dry. No rash noted. He is not diaphoretic.   Nursing note and vitals reviewed.      Recent Labs      01/01/18   0210  01/02/18   0510  01/03/18   0413   WBC  15.5*  10.7  9.5   RBC  2.96*  2.73*  2.53*   HEMOGLOBIN  8.8*  8.2*  7.6*   HEMATOCRIT  25.8*  24.0*  21.9*   MCV  87.2  87.9  86.6   MCH  29.7  30.0  30.0   MCHC  34.1  34.2  34.7   RDW  51.8*  52.0*  49.5   PLATELETCT  127*  106*  91*   MPV  12.5  12.9  12.6     Recent Labs      01/01/18   0210  01/02/18   0510  01/03/18   0413   SODIUM  136  139  135   POTASSIUM  3.9  4.0  3.4*   CHLORIDE  98  98  102   CO2  20  18*  18*   GLUCOSE  145*  152*  114*   BUN  100*  108*  101*   CREATININE  5.01*  6.25*  6.13*   CALCIUM  7.0*  7.2*  6.6*     Recent Labs      01/01/18   1115   APTT  33.1   INR  1.15*                  Assessment/Plan     * Severe sepsis (CMS-[facility])   Assessment & Plan    - This is severe sepsis with the following associated acute organ dysfunction(s): acute kidney failure, acute respiratory failure, critical illness myopathy.   - d/t MRSA bacteremia, pneumonia and influenza  - continue Teflaro total 6 wks from neg cultures  Complete 5 days tamiflu  - BP improved with fluids  ID following  TTE neg for vegetations: consult Cards for DORETHA        High anion gap metabolic acidosis   Assessment & Plan    - severe, due to sepsis/lactic acidosis and renal failure  - continue oral  bicarbonate  -Acidosis resolved, gap still present        Acute on chronic renal failure (CMS-HCC)- (present on admission)   Assessment & Plan    Nephrology following  Start HD as function worsening        Bacteremia due to Staphylococcus aureus   Assessment & Plan    Plan 6 weeks Teflaro        Protein-calorie malnutrition, severe (CMS-HCC)   Assessment & Plan    cont's with poor po intake  If it does not improve overnight will need to ree-address goals of care vs feeding tube        Atrial fibrillation (CMS-HCC)   Assessment & Plan    - initially rate controlled but then tachycardic  - likely d/t sepsis and dehydration  - improved with IVF, rate now controlled  CDiff neg        Diarrhea   Assessment & Plan    - c-diff negative        CAP (community acquired pneumonia)- (present on admission)   Assessment & Plan    MRSA  ID following   Ceftaroline          Insulin dependent diabetes mellitus (CMS-HCC)   Assessment & Plan    - diabetic diet  - continue lantus and ISS, adjust as needed  - glucose is only mildly elevated, no reason to suspect DKA is the cause of his acidosis          Influenza A   Assessment & Plan    - continue tamiflu        Cryptogenic cirrhosis (CMS-HCC)   Assessment & Plan    - due to alpha-1 antitrypsin  - With ascites, significant, causing abdominal pain  - s/p tap 1/1  -cont to follow          Normocytic anemia- (present on admission)   Assessment & Plan    Stable  Cont to follow daily  - no sign of gross bleeding            Reviewed items::  Radiology images reviewed, EKG reviewed, Labs reviewed and Medications reviewed  Ca catheter::  No Ca  DVT prophylaxis - mechanical:  SCDs  Ulcer Prophylaxis::  Not indicated  Antibiotics:  Treating active infection/contamination beyond 24 hours perioperative coverage

## 2018-01-03 NOTE — CARE PLAN
Problem: Nutritional:  Goal: Nutrition support tolerated and meeting greater than 85% of estimated needs  Outcome: NOT MET  TF to start.

## 2018-01-03 NOTE — DIETARY
Nutrition Support Assessment    Tico Solorzano is a 77 y.o. 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 Admit day 5.  Pt doing poorly with PO intake. RD following since .  Per discussion in Rounds, pt is now unable to swallow pills.  HD to start per Nephrology.  Cortrak placed today (in duodenum). TF to start. Renal TF formula appropriate.  Pt is +7.8 L fluid per I/O; -5 L from paracentesis .  Palliative Care following.     Height: 182.9 cm (6')  Weight: 73.9 kg (162 lb 14.7 oz)  Ideal Body Weight: 80.7 kg (178 lb)  Percent Ideal Body Weight: 91.5  Body mass index is 22.1 kg/m².     Pertinent labs and meds reveiwed.    Skin: IAD sacrum  GI: Last BM 18    Estimated Needs: REE per MSJ x1.2 = 1792 kcal/day  Total Calories / day: 1850 - 2215 (Calories / k - 30)  Total Grams Protein / day: 89 - 111 (Grams Protein / k.2 - 1.5)  Total Fluids ml / day: 1850 ml        Plan / Recommendation:  Start Novasource Renal @ 25 ml/hr and advance per protocol to goal rate 40 ml/hr to provide 1920 kcal, 87 grams protein, and 691 ml free water per day.  PO diet as safe and appropriate.    RD following.

## 2018-01-03 NOTE — PALLIATIVE CARE
Palliative Care follow-up  Received call from bedside RN that son is at bedside.     Met with Lui and pt at bedside, introduced self and role of PC to son Lui. Discussed prior discussions with pt regarding health care wishes and goals. Discussed advanced directive and pt's expressed wishes. Lui states that pt had previously expressed that he wants to be resuscitated if need be. PC RN confirmed that pt has expressed the same. Lui states he is going to return to [place] today but will return next week. Lui is working on preparing pt's home for pt and spouse's return, he is looking into private care attendants and other support for pt and spouse. PC RN discussed assisted living facilities and group homes, answered questions. Lui would like a copy of that list as well as pt's completed AD once notarized. Lui provided two emails ([email or web address]) to sent list and AD to.    AD complete, awaiting notary. Dr. Szymanski sign cert. Of competency.      Updated:   Dr. Szymanski    Plan:   AD awaiting notary, continue to support pt and family.     Thank you for allowing Palliative Care to participate in this patient's care. Please feel free to call [phone] with any questions or concerns.

## 2018-01-03 NOTE — PROGRESS NOTES
Infectious Disease Progress Note    Author: Kailee Adams M.D. Date & Time of service: 1/3/2018  12:59 PM    Chief Complaint:  Follow-up for MRSA sepsis and influenza A    Interval History:  2018-MAXIMUM TEMPERATURE 99 remains on high flow oxygen . WBC 15.5 creatinine 5  1/2 AF WBC 10.7 encephalopathic; agitated in bed  1/3 AF WBC 9.5 less agitated-somnolent today  Labs Reviewed, Medications Reviewed and Radiology Reviewed.    Review of Systems:  Review of Systems   Unable to perform ROS: Mental status change   Constitutional: Negative for fever.       Hemodynamics:  No data recorded.  Monitored Temp: 36.2 °C (97.2 °F)  Pulse  Av.2  Min: 69  Max: 130Heart Rate (Monitored): 77  NIBP: 131/61       Physical Exam:  Physical Exam   Constitutional:   Thin  Chronically ill   HENT:   Head: Normocephalic and atraumatic.   Mouth/Throat: No oropharyngeal exudate.   Eyes: Pupils are equal, round, and reactive to light. No scleral icterus.   Neck: Neck supple.   Cardiovascular: Normal rate.    Murmur heard.  Irregularly Irregular   Pulmonary/Chest: Effort normal. No respiratory distress. He has no wheezes. He has rales.   Left-sided pacemaker-no erythema   Abdominal: Soft. He exhibits distension. There is no tenderness. There is no rebound.   Musculoskeletal: He exhibits no edema.   Right-sided AV graft   Neurological:   obtunded   Skin: No rash noted.   Nursing note and vitals reviewed.      Meds:    Current Facility-Administered Medications:   •  ceftaroline (TEFLARO) ivpb  •  haloperidol lactate  •  calcium acetate  •  sodium bicarbonate  •  epoetin dariana  •  rosuvastatin  •  NS  •  NS  •  insulin regular **AND** Accu-Chek ACHS **AND** NOTIFY MD and PharmD **AND** glucose 4 g **AND** dextrose 50%  •  Respiratory Care per Protocol  •  heparin    Labs:  Recent Labs      18   0210  18   0510  18   0413   WBC  15.5*  10.7  9.5   RBC  2.96*  2.73*  2.53*   HEMOGLOBIN  8.8*  8.2*  7.6*   HEMATOCRIT   25.8*  24.0*  21.9*   MCV  87.2  87.9  86.6   MCH  29.7  30.0  30.0   RDW  51.8*  52.0*  49.5   PLATELETCT  127*  106*  91*   MPV  12.5  12.9  12.6     Recent Labs      01/01/18   0210  01/02/18   0510  01/03/18   0413   SODIUM  136  139  135   POTASSIUM  3.9  4.0  3.4*   CHLORIDE  98  98  102   CO2  20  18*  18*   GLUCOSE  145*  152*  114*   BUN  100*  108*  101*     Recent Labs      01/01/18   0210  01/01/18   1415  01/02/18   0510  01/03/18   0413   ALBUMIN   --   <1.0   --    --    CREATININE  5.01*   --   6.25*  6.13*       Imaging:  Dx-chest-limited (1 View)    Result Date: 12/31/2017 12/31/2017 9:40 AM HISTORY/REASON FOR EXAM:  Shortness of Breath. TECHNIQUE/EXAM DESCRIPTION AND NUMBER OF VIEWS: Single AP view of the chest. COMPARISON:  1 view chest 12/30/2017 FINDINGS: There are bilateral upper lobe airspace process most consistent with edema. Cardiac Silhouette: normal in size. There is aortic atherosclerosis.There is a cardiac pacemaker. Pleura: There are no pleural effusion or pneumothoraces. Osseous structures: No significant bony abnormality is present.     Unchanged bilateral upper lobe airspace process that are most consistent with edema    Dx-chest-portable (1 View)    Result Date: 12/30/2017 12/30/2017 2:43 AM HISTORY/REASON FOR EXAM: Shortness of Breath TECHNIQUE/EXAM DESCRIPTION:  Single AP view of the chest. COMPARISON: Yesterday FINDINGS: Position of medical devices appears stable. The heart is in the upper limits of normal for size. Atherosclerotic calcification of the aorta is noted.  The central  pulmonary vasculature appears prominent and indistinct. The lungs appear well expanded bilaterally.  Diffuse scattered hazy pulmonary parenchymal opacities are seen. No significant pleural effusions are identified. The bony structures appear age-appropriate.     1.  Pulmonary edema and/or infiltrates are identified, which appear increased since the prior exam. 2.   Atherosclerosis    Dx-chest-portable (1 View)    Result Date: 12/29/2017 12/29/2017 10:41 AM HISTORY/REASON FOR EXAM:  Possible sepsis. TECHNIQUE/EXAM DESCRIPTION AND NUMBER OF VIEWS: Single portable view of the chest. COMPARISON: 2/16/2017 FINDINGS: Left-sided pacemaker in place. Diffuse interstitial prominence and patchy multifocal opacities throughout both lungs. No pleural effusion. No pneumothorax. Stable cardiopericardial silhouette.     Diffuse interstitial prominence and patchy multifocal opacities throughout both lungs could be seen with multifocal infection or pulmonary edema.    Us-paracentesis, Abd With Imaging    Result Date: 12/27/2017 12/26/2017 4:54 PM HISTORY/REASON FOR EXAM:  Ascites TECHNIQUE/EXAM DESCRIPTION: Ultrasound-guided paracentesis. COMPARISON:  12/24/17 PROCEDURE:  Informed consent was obtained. A timeout was taken. Ascites was localized with real-time ultrasound. The right lower quadrant of the abdominal wall was prepared and draped in a sterile manner. Following local anesthesia with 1% lidocaine, a 5  Macedonian Yueh pigtail catheter was advanced into the peritoneal cavity with trocar technique. Ascites was drained. The patient tolerated the procedure well with no evidence of complication. FINDINGS: Ascites is present. Fluid was not sent to the laboratory. Fluid character: straw colored     1. Ultrasound-guided therapeutic paracentesis of the right lower quadrant of the abdominal wall. 2. 4700 mL of fluid withdrawn.    Echocardiogram Comp W/o Cont    Result Date: 12/30/2017  Transthoracic Echo Report Echocardiography Laboratory CONCLUSIONS Mild concentric left ventricular hypertrophy. Pacer/ICD wire seen in right ventricle. The mitral valve is not well visualized. The tricuspid valve is not well visualized. Trace tricuspid regurgitation. Structurally normal aortic valve without significant stenosis or regurgitation. No vegetation seen LV EF:  75    % FINDINGS Left Ventricle Normal left  "ventricular chamber size. Mild concentric left ventricular hypertrophy. Normal left ventricular systolic function. Left ventricular ejection fraction is visually estimated to be greater than 75%. Right Ventricle The right ventricle was normal in size and function.  Pacer/ICD wire seen in right ventricle. Right Atrium The right atrium is normal in size.  Catheter/pacemaker wire present in the right atrial cavity. Left Atrium The left atrium is normal in size. Mitral Valve The mitral valve is not well visualized. Aortic Valve Structurally normal aortic valve without significant stenosis or regurgitation. Tricuspid Valve The tricuspid valve is not well visualized.  Trace tricuspid regurgitation. Right ventricular systolic pressure is estimated to be 25 mmHg. Pulmonic Valve Structurally normal pulmonic valve without significant stenosis or regurgitation. Pericardium Normal pericardium without effusion.  Ascites present. Aorta The aortic root is normal. Soham Gottlieb M.D. (Electronically Signed) Final Date:     30 December 2017                 17:19      Micro:  Results     Procedure Component Value Units Date/Time    BLOOD CULTURE [[id number]] Collected:  12/29/17 0926    Order Status:  Completed Specimen:  Blood from Peripheral Updated:  01/03/18 1100     Significant Indicator NEG     Source BLD     Site PERIPHERAL     Blood Culture No growth after 5 days of incubation.    Narrative:       Per Hospital Policy: Only change Specimen Src: to \"Line\" if  specified by physician order.    FLUID CULTURE W/GRAM STAIN [[id number]] Collected:  01/01/18 1415    Order Status:  Completed Specimen:  Body Fluid Updated:  01/03/18 0848     Significant Indicator NEG     Source BF     Site Peritoneal Fluid     Culture Result Bdf No growth at 48 hours.     Gram Stain Result --     Rare WBCs.  No organisms seen.      Narrative:       Peritoneal Fluid    BLOOD CULTURE [[id number]] Collected:  01/02/18 1135    Order Status:  Completed " "Specimen:  Blood from Peripheral Updated:  01/03/18 0834     Significant Indicator NEG     Source BLD     Site PERIPHERAL     Blood Culture --     No Growth    Note: Blood cultures are incubated for 5 days and  are monitored continuously.Positive blood cultures  are called to the RN and reported as soon as  they are identified.      Narrative:       Droplet,[id number] BARBRA MINER  Per Hospital Policy: Only change Specimen Src: to \"Line\" if  specified by physician order.    BLOOD CULTURE [[id number]] Collected:  01/02/18 1135    Order Status:  Completed Specimen:  Blood from Peripheral Updated:  01/03/18 0834     Significant Indicator NEG     Source BLD     Site PERIPHERAL     Blood Culture --     No Growth    Note: Blood cultures are incubated for 5 days and  are monitored continuously.Positive blood cultures  are called to the RN and reported as soon as  they are identified.      Narrative:       Droplet,[id number] BARBRA MINER  Per Hospital Policy: Only change Specimen Src: to \"Line\" if  specified by physician order.    GRAM STAIN [[id number]] Collected:  01/01/18 1415    Order Status:  Completed Specimen:  Body Fluid Updated:  01/01/18 2024     Significant Indicator .     Source BF     Site Peritoneal Fluid     Gram Stain Result --     Rare WBCs.  No organisms seen.      Narrative:       Peritoneal Fluid    BLOOD CULTURE [[id number]]  (Abnormal)  (Susceptibility) Collected:  12/29/17 1014    Order Status:  Completed Specimen:  Blood from Peripheral Updated:  01/01/18 1040     Significant Indicator POS (POS)     Source BLD     Site PERIPHERAL     Blood Culture -- (A)     Growth detected by Bactec instrument.  12/30/2017  05:43  Methicillin resistant Staphylococcus aureus (MRSA)  detected by PCR.       Blood Culture Methicillin Resistant Staphylococcus aureus (A)    Narrative:       CALL  [facility]  161 tel. [phone],  CALLED  161 tel. [phone] 12/30/2017, 13:38, RB PERF. RESULTS CALLED TO:[phone]  Per " "Hospital Policy: Only change Specimen Src: to \"Line\" if  specified by physician order.    Culture & Susceptibility     METHICILLIN RESISTANT STAPHYLOCOCCUS AUREUS     Antibiotic Sensitivity Microscan Unit Status    Ampicillin/sulbactam Resistant 16/8 mcg/mL Final    Clindamycin Resistant >4 mcg/mL Final    Daptomycin Sensitive 1 mcg/mL Final    Erythromycin Resistant >4 mcg/mL Final    Moxifloxacin Resistant >4 mcg/mL Final    Oxacillin Resistant >2 mcg/mL Final    Penicillin Resistant >8 mcg/mL Final    Tetracycline Sensitive <=4 mcg/mL Final    Trimeth/Sulfa Sensitive <=0.5/9.5 mcg/mL Final    Vancomycin Sensitive 2 mcg/mL Final                       Fluid Culture with Gram Stain [[id number]]     Order Status:  No result Specimen:  Body Fluid from Peritoneal Fluid     URINE CULTURE(NEW) [[id number]] Collected:  12/30/17 1559    Order Status:  Completed Specimen:  Urine Updated:  01/01/18 0826     Significant Indicator NEG     Source UR     Site --     Urine Culture No growth at 48 hours    Narrative:       Indication for culture:->Emergency Room Patient  If not done within the last 24 hours    Influenza By PCR, A/B [[id number]] Collected:  12/29/17 0000    Order Status:  Canceled Specimen:  Respirate from Nasopharyngeal Updated:  12/30/17 2115    C Diff by PCR rflx Toxin [[id number]] Collected:  12/29/17 1530    Order Status:  Completed Specimen:  Stool from Stool Updated:  12/30/17 0913     C Diff by PCR Negative     Comment: C. difficile NOT detected by PCR.  Treatment not indicated per guidelines.  Repeat testing not indicated within 7 days.          027-NAP1-BI Presumptive Negative     Comment: Presumptive 027/NAP1/BI target DNA sequences are NOT DETECTED.       Narrative:       Collected By:[id number] TRUE NEGRON  Does this patient have risk factors for C-diff?->Yes    CULTURE RESPIRATORY W/ GRM STN [[id number]]     Order Status:  Completed Specimen:  Respirate from Sputum     URINALYSIS [[id number]]  (Abnormal) " Collected:  12/29/17 1559    Order Status:  Completed Specimen:  Urine from Urine, Ca Cath Updated:  12/29/17 1610     Color Yellow     Character Cloudy (A)     Specific Gravity 1.017     Ph 5.0     Glucose Negative mg/dL      Ketones Trace (A) mg/dL      Protein 30 (A) mg/dL      Bilirubin Negative     Urobilinogen, Urine 0.2     Nitrite Negative     Leukocyte Esterase Negative     Occult Blood Negative     Micro Urine Req Microscopic    Narrative:       Collected By:[id number] TRUE NEGRON    Urinalysis [[id number]]     Order Status:  Canceled Specimen:  Urine from Urine, Cath     INFLUENZA A/B BY PCR [[id number]]  (Abnormal) Collected:  12/29/17 0949    Order Status:  Completed Specimen:  Blood from Nasopharyngeal Updated:  12/29/17 1028     Influenza virus A RNA POSITIVE (A)     Influenza virus B, PCR Negative    URINALYSIS [[id number]]     Order Status:  Canceled Specimen:  Urine     BLOOD CULTURE [[id number]] Collected:  12/29/17 0000    Order Status:  Canceled Specimen:  Other from Peripheral     BLOOD CULTURE [[id number]] Collected:  12/29/17 0000    Order Status:  Canceled Specimen:  Other from Peripheral     Influenza Rapid [[id number]] Collected:  12/29/17 0000    Order Status:  Canceled Specimen:  Other from Respiratory     Blood Culture [[id number]] Collected:  12/29/17 0000    Order Status:  Canceled Specimen:  Other from Peripheral     Blood Culture [[id number]] Collected:  12/29/17 0000    Order Status:  Canceled Specimen:  Other from Peripheral     Culture Respiratory W/ GRM STN [[id number]] Collected:  12/29/17 0000    Order Status:  Canceled Specimen:  Sputum from Sputum           Assessment:  Active Hospital Problems    Diagnosis   • *Severe sepsis (CMS-HCC) [A41.9, R65.20]   • High anion gap metabolic acidosis [E87.2]   • Acute on chronic renal failure (CMS-HCC) [N17.9, N18.9]   • Protein-calorie malnutrition, severe (CMS-HCC) [E43]   • 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]   • Influenza A [J10.1]   •  Insulin dependent diabetes mellitus (CMS-HCC) [E11.9, Z79.4]   • Diarrhea [R19.7]   • Atrial fibrillation (CMS-HCC) [I48.91]   • CAP (community acquired pneumonia) [J18.9]   • Normocytic anemia [D64.9]       Plan:  MRSA sepsis  Afebrile  Resolved leukocytosis  Blood cultures +12/29/17  Repeat blood cultures 1/2 no growth so far  TTE negative on 12/30/17  Due to pacemaker and recent AV graft- needs DORETHA  Continue ceftaroline  Anticipate 6 week course from date neg blood cxs    Influenza A  Complete 5 days of Tamiflu    Cryptogenic cirrhosis  Leading to ascites  s/p paracentesis     Renal failure, worse  Recent AV graft  Dose adjust abx as needed    Diabetes mellitus  Keep BS under 150 to help control current infection      Discussed with RN

## 2018-01-03 NOTE — PROGRESS NOTES
Monitor summary    100%  dual chamber PPM paced. Mostly A paced 70s-90s with episodes of V paced tachycardia rates up to 100-123 bpm with  Movement and agitation.      12 hr chart check

## 2018-01-03 NOTE — PROGRESS NOTES
[facility] Nephrology Progress Note               Author: Pio Duffymina Date & Time created: 1/3/2018  9:40 AM     Interval History:  77-year-old  male well known to our service who I have followed as an outpatient for years.  He has CKD stage V due to diabetes and hypertension.  We have anticipated eventual need for dialysis and already had placed an AV fistula in his right upper arm.  He routinely follows with this, was most recently seen 4 weeks ago.  At that time, he had creatinine of 4.8, hemoglobin of 10, normal electrolytes.  He was not uremic or fluid overloaded.  Hemodialysis has not yet been initiated.     The patient presented to the emergency room complaining of coughing for 2 or 3weeks.  He has been only productive over the last couple of days.  He became increasingly weak, unable to provide care for himself.  He could not get around.  He was not eating very well.     In the ER, he presented, he was normotensive with a blood pressure of 140/96, pulse 80.  He was mildly hypoxic on room air, was started on a little bit of oxygen.  Laboratory showed white count of 16 with a left shift.  He also has evidence potentially of fluid.  Chest x-ray suggests a possible pneumonia.     Laboratory results showed BUN 91, creatinine 5.3, potassium 5.2, was not fluid  overloaded on exam.  His lactic acid levels were elevated.  He is being admitted to the hospital under the hospitalist care.  He was given some  intravenous fluids.  We have been asked to follow him for CKD stage V + acidosis    Daily Nephrology Summary  12/29/17 - see in ER - IVF recommended.  Advanced CKD 5 - if no response to fluids will need to start dialysis  12/30/17 - placed on bicarb overnight.  SCreat down a bit.  K ok.  Bicarb improving.  Urine output 960cc  12/31/17 - Azotemia stable.  Not really expecting significant improvement.  On lots of O2 w Pnx.    BP low.    01/01/18 - Feels fine. Scr is the same. Non-oliguric. No  SOB.   01/02/18 - s/p  US guided paracentesis of RLQ. 4900 mL was removed and 1000 mL sent to lab. Scr is higher now. Did have some hypotension yesterday.  01/03/18 - Feels weak. No other renal events in the past 24 hrs.    Review of Systems   Constitutional: Positive for malaise/fatigue.   HENT: Negative.    Eyes: Negative.    Respiratory: Positive for cough and sputum production.    Cardiovascular: Negative.    Gastrointestinal: Negative.    Genitourinary: Negative.    Musculoskeletal: Negative.    Skin: Negative.        Physical Exam   Constitutional: No distress.   HENT:   Head: Atraumatic.   Eyes: No scleral icterus.   Neck: No JVD present.   Cardiovascular: Normal rate.  Exam reveals no friction rub.    Pulmonary/Chest: No respiratory distress.   Abdominal: Soft. There is no tenderness.   Musculoskeletal: He exhibits no edema.   Neurological: He is alert.   Skin: Skin is warm and dry.       Labs:  Recent Labs      01/02/18   2130   ISTATAPH  7.498   ISTATAPCO2  24.8*   ISTATAPO2  48*   ISTATATCO2  20   [id number]  88*   ISTATARTHCO3  19.2   ISTATARTBE  -3   ISTATTEMP  36.5 C   ISTATFIO2  50   ISTATSPEC  Arterial   ISTATAPHTC  7.505*   [id number]  46*         Recent Labs      01/01/18   0210 01/02/18   0510  01/03/18   0413   SODIUM  136  139  135   POTASSIUM  3.9  4.0  3.4*   CHLORIDE  98  98  102   CO2  20  18*  18*   BUN  100*  108*  101*   CREATININE  5.01*  6.25*  6.13*   CALCIUM  7.0*  7.2*  6.6*     Recent Labs      01/01/18   0210  01/02/18   0510  01/03/18   0413   GLUCOSE  145*  152*  114*     Recent Labs      01/01/18   0210  01/01/18   1115  01/02/18   0510  01/03/18   0413   RBC  2.96*   --   2.73*  2.53*   HEMOGLOBIN  8.8*   --   8.2*  7.6*   HEMATOCRIT  25.8*   --   24.0*  21.9*   PLATELETCT  127*   --   106*  91*   PROTHROMBTM   --   14.4   --    --    APTT   --   33.1   --    --    INR   --   1.15*   --    --      Recent Labs      01/01/18 0210 01/02/18   0510  01/03/18   0413   WBC   15.5*  10.7  9.5           Hemodynamics:  No data recorded.  Monitored Temp: 36.7 °C (98.1 °F)  Pulse  Av.5  Min: 69  Max: 130Heart Rate (Monitored): (!) 115  NIBP: 120/56     Respiratory:    Respiration: (!) 24, Pulse Oximetry: 93 %, O2 Daily Delivery Respiratory : Highflow Nasal Cannula     PEP/CPT Method: Positive Airway Pressure Device, Work Of Breathing / Effort: Moderate;Increased Work of Breathing  RUL Breath Sounds: Diminished, RML Breath Sounds: Diminished, RLL Breath Sounds: Diminished, EMERSON Breath Sounds: Diminished, LLL Breath Sounds: Diminished  Fluids:    Intake/Output Summary (Last 24 hours) at 18 0940  Last data filed at 18 0400   Gross per 24 hour   Intake             1370 ml   Output              165 ml   Net             1205 ml     Weight: 73.9 kg (162 lb 14.7 oz)  GI/Nutrition:  Orders Placed This Encounter   Procedures   • DIET ORDER     Standing Status:   Standing     Number of Occurrences:   1     Order Specific Question:   Diet:     Answer:   Regular [1]     Order Specific Question:   Calorie modifications:     Answer:   High Calorie [1]     Comments:   please send vanilla milkshakes with each meal     Medical Decision Making, by Problem:  Active Hospital Problems    Diagnosis   • *Severe sepsis (CMS-HCC) [A41.9, R65.20]   • High anion gap metabolic acidosis [E87.2]   • Acute on chronic renal failure (CMS-HCC) [N17.9, N18.9]   • Cryptogenic cirrhosis (CMS-[facility]) [K74.69]   • Influenza A [J10.1]   • Insulin dependent diabetes mellitus (CMS-[facility]) [E11.9, Z79.4]   • Diarrhea [R19.7]   • Atrial fibrillation (CMS-[facility]) [I48.91]   • CAP (community acquired pneumonia) [J18.9]   • Normocytic anemia [D64.9]       IMPRESSION/PLAN    # CKD 5   -- was at baseline creatinine as outpatient but now Acute rise likely sec to hypoperfusion  -- he feels very weak. No acute need for RRT for now.  -- monitor I/Os  -- avoid contrast    # Acidosis  -- metabolic  -- continue bicarb - change it to PO    #  PNX  -- on O2 + ABX    # Anemia  -- chronic   -- no overt bleeding  -- presumed due to CKD 5  -- give EPOGEN while here  -- check iron levels    # Hypocalcemia  -- secondary HPTH likely  -- add Phoslo 667mg TID w meals    # BP   -- on low side with infection  -- Maintain MAP>65    We will need to initiate HD today  IR to place temp dialysis catheter         Quality-Core Measures

## 2018-01-03 NOTE — DISCHARGE PLANNING
Courtesy Note- Outpatient Dialysis    On standby for outpatient dialysis placement needs should they arise. Please call if needed.    Lisy Matamoros- Dialysis Coordinator  [facility] ph# [phone]

## 2018-01-04 PROBLEM — J96.01 ACUTE RESPIRATORY FAILURE WITH HYPOXIA (HCC): Status: ACTIVE | Noted: 2018-01-01

## 2018-01-04 NOTE — FLOWSHEET NOTE
This note also relates to the following rows which could not be included:  Respiration - Cannot attach notes to unvalidated device data  Pulse - Cannot attach notes to unvalidated device data  Heart Rate (Monitored) - Cannot attach notes to unvalidated device data  Pulse Oximetry - Cannot attach notes to unvalidated device data       01/04/18 1408   Events/Summary/Plan   Events/Summary/Plan called to pts room for desaturation. pt increased on HF settings. spo2 to 92%.    Therapy Not Performed   Type of Therapy Not Performed PEP   Reason Therapy Not Performed (confused)   Heated Hi Flow Nasal Cannula   Heated Hi Flow Nasal Cannula (HHFNC) Yes   FiO2 (HHFNC) 90   Flowrate (HHFNC) 70   Humidifier Temperature (HHFNC) 32   Respiratory WDL   Respiratory (WDL) X   Chest Exam   Work Of Breathing / Effort Moderate   Breath Sounds   Pre/Post Intervention Pre Intervention Assessment   RUL Breath Sounds Crackles   RML Breath Sounds Diminished   RLL Breath Sounds Diminished   EMERSON Breath Sounds Crackles   LLL Breath Sounds Diminished   Secretions   Sputum Color Unable to Evaluate   Oximetry   Continuous Oximetry Yes   Oxygen   O2 (LPM) 70   O2 (FiO2) 90   O2 Daily Delivery Respiratory  Highflow Nasal Cannula

## 2018-01-04 NOTE — PROGRESS NOTES
Pt having multiple runs of vtach this afternoon, up to 15 seconds at a time. Pt remains lethargic but arousable, current baseline, during these episodes. Dr Szymanski notified and comes to bedside. Pt also desaturating at this time, spo2 88%. Order received for BMP and mag, stat EKG and chest xray. HFNC increased to 70L and 90%, spo2 increases to 98%.

## 2018-01-04 NOTE — PALLIATIVE CARE
Palliative Care follow-up  PC RN with [facility] Varg[facility]se visited pt at bedside, Completed AD with signatures. Left original on pt's hard chart. Scanned AD into EMR, Emailed copy of AD, private care attendants list and assisted living facilities list to son to     [email or web address]  [email or web address]      Updated:   Bedside RN    Plan:   AD complete, continue to support pt and family.     Thank you for allowing Palliative Care to participate in this patient's care. Please feel free to call [phone] with any questions or concerns.

## 2018-01-04 NOTE — PROGRESS NOTES
Infectious Disease Progress Note    Author: Kailee Adams M.D. Date & Time of service: 2018  2:40 PM    Chief Complaint:  Follow-up for MRSA sepsis and influenza A    Interval History:  2018-MAXIMUM TEMPERATURE 99 remains on high flow oxygen . WBC 15.5 creatinine 5  1/2 AF WBC 10.7 encephalopathic; agitated in bed  1/3 AF WBC 9.5 less agitated-somnolent today  / AF WBC not done getting HD-tolerating so far  Labs Reviewed, Medications Reviewed and Radiology Reviewed.    Review of Systems:  Review of Systems   Unable to perform ROS: Mental status change   Constitutional: Negative for fever.       Hemodynamics:  Temp (24hrs), Av.3 °C (97.4 °F), Min:36.2 °C (97.2 °F), Max:36.4 °C (97.5 °F)  Temperature: 36.4 °C (97.5 °F), Monitored Temp: 36.3 °C (97.3 °F)  Pulse  Av.9  Min: 69  Max: 130Heart Rate (Monitored): 82  NIBP: 123/47       Physical Exam:  Physical Exam   Constitutional:   Thin  Chronically ill   HENT:   Head: Normocephalic and atraumatic.   Mouth/Throat: No oropharyngeal exudate.   Eyes: Pupils are equal, round, and reactive to light. No scleral icterus.   Neck: Neck supple.   Cardiovascular: Normal rate.    Murmur heard.  Irregularly Irregular   Pulmonary/Chest: Effort normal. No respiratory distress. He has no wheezes. He has rales.   Left-sided pacemaker-no erythema   Abdominal: Soft. He exhibits distension. There is no tenderness. There is no rebound.   Musculoskeletal: He exhibits no edema.   Right-sided AV graft   Neurological:   obtunded   Skin: No rash noted.   Nursing note and vitals reviewed.      Meds:    Current Facility-Administered Medications:   •  propofol  •  ceftaroline (TEFLARO) ivpb  •  heparin  •  insulin regular **AND** Accu-Chek ACHS **AND** NOTIFY MD and PharmD **AND** glucose 4 g **AND** dextrose 50%  •  albumin human 25%  •  haloperidol lactate  •  calcium acetate  •  sodium bicarbonate  •  epoetin dariana  •  rosuvastatin  •  NS  •  NS  •  Respiratory Care per  Protocol  •  heparin    Labs:  Recent Labs      01/02/18   0510  01/03/18   0413  01/04/18   0405   WBC  10.7  9.5  13.9*   RBC  2.73*  2.53*  2.61*   HEMOGLOBIN  8.2*  7.6*  7.8*   HEMATOCRIT  24.0*  21.9*  23.3*   MCV  87.9  86.6  89.3   MCH  30.0  30.0  29.9   RDW  52.0*  49.5  53.3*   PLATELETCT  106*  91*  103*   MPV  12.9  12.6  12.7     Recent Labs      01/02/18   0510  01/03/18   0413  01/04/18   0405   SODIUM  139  135  135   POTASSIUM  4.0  3.4*  4.0   CHLORIDE  98  102  102   CO2  18*  18*  18*   GLUCOSE  152*  114*  236*   BUN  108*  101*  68*     Recent Labs      01/02/18   0510  01/03/18   0413  01/04/18   0405   CREATININE  6.25*  6.13*  4.65*       Imaging:  Dx-chest-limited (1 View)    Result Date: 12/31/2017 12/31/2017 9:40 AM HISTORY/REASON FOR EXAM:  Shortness of Breath. TECHNIQUE/EXAM DESCRIPTION AND NUMBER OF VIEWS: Single AP view of the chest. COMPARISON:  1 view chest 12/30/2017 FINDINGS: There are bilateral upper lobe airspace process most consistent with edema. Cardiac Silhouette: normal in size. There is aortic atherosclerosis.There is a cardiac pacemaker. Pleura: There are no pleural effusion or pneumothoraces. Osseous structures: No significant bony abnormality is present.     Unchanged bilateral upper lobe airspace process that are most consistent with edema    Dx-chest-portable (1 View)    Result Date: 12/30/2017 12/30/2017 2:43 AM HISTORY/REASON FOR EXAM: Shortness of Breath TECHNIQUE/EXAM DESCRIPTION:  Single AP view of the chest. COMPARISON: Yesterday FINDINGS: Position of medical devices appears stable. The heart is in the upper limits of normal for size. Atherosclerotic calcification of the aorta is noted.  The central  pulmonary vasculature appears prominent and indistinct. The lungs appear well expanded bilaterally.  Diffuse scattered hazy pulmonary parenchymal opacities are seen. No significant pleural effusions are identified. The bony structures appear age-appropriate.      1.  Pulmonary edema and/or infiltrates are identified, which appear increased since the prior exam. 2.  Atherosclerosis    Dx-chest-portable (1 View)    Result Date: 12/29/2017 12/29/2017 10:41 AM HISTORY/REASON FOR EXAM:  Possible sepsis. TECHNIQUE/EXAM DESCRIPTION AND NUMBER OF VIEWS: Single portable view of the chest. COMPARISON: 2/16/2017 FINDINGS: Left-sided pacemaker in place. Diffuse interstitial prominence and patchy multifocal opacities throughout both lungs. No pleural effusion. No pneumothorax. Stable cardiopericardial silhouette.     Diffuse interstitial prominence and patchy multifocal opacities throughout both lungs could be seen with multifocal infection or pulmonary edema.    Us-paracentesis, Abd With Imaging    Result Date: 12/27/2017 12/26/2017 4:54 PM HISTORY/REASON FOR EXAM:  Ascites TECHNIQUE/EXAM DESCRIPTION: Ultrasound-guided paracentesis. COMPARISON:  12/24/17 PROCEDURE:  Informed consent was obtained. A timeout was taken. Ascites was localized with real-time ultrasound. The right lower quadrant of the abdominal wall was prepared and draped in a sterile manner. Following local anesthesia with 1% lidocaine, a 5  Sami Yueh pigtail catheter was advanced into the peritoneal cavity with trocar technique. Ascites was drained. The patient tolerated the procedure well with no evidence of complication. FINDINGS: Ascites is present. Fluid was not sent to the laboratory. Fluid character: straw colored     1. Ultrasound-guided therapeutic paracentesis of the right lower quadrant of the abdominal wall. 2. 4700 mL of fluid withdrawn.    Echocardiogram Comp W/o Cont    Result Date: 12/30/2017  Transthoracic Echo Report Echocardiography Laboratory CONCLUSIONS Mild concentric left ventricular hypertrophy. Pacer/ICD wire seen in right ventricle. The mitral valve is not well visualized. The tricuspid valve is not well visualized. Trace tricuspid regurgitation. Structurally normal aortic valve without  "significant stenosis or regurgitation. No vegetation seen LV EF:  75    % FINDINGS Left Ventricle Normal left ventricular chamber size. Mild concentric left ventricular hypertrophy. Normal left ventricular systolic function. Left ventricular ejection fraction is visually estimated to be greater than 75%. Right Ventricle The right ventricle was normal in size and function.  Pacer/ICD wire seen in right ventricle. Right Atrium The right atrium is normal in size.  Catheter/pacemaker wire present in the right atrial cavity. Left Atrium The left atrium is normal in size. Mitral Valve The mitral valve is not well visualized. Aortic Valve Structurally normal aortic valve without significant stenosis or regurgitation. Tricuspid Valve The tricuspid valve is not well visualized.  Trace tricuspid regurgitation. Right ventricular systolic pressure is estimated to be 25 mmHg. Pulmonic Valve Structurally normal pulmonic valve without significant stenosis or regurgitation. Pericardium Normal pericardium without effusion.  Ascites present. Aorta The aortic root is normal. Soham Gottlieb M.D. (Electronically Signed) Final Date:     30 December 2017                 17:19      Micro:  Results     Procedure Component Value Units Date/Time    FLUID CULTURE W/GRAM STAIN [[id number]] Collected:  01/01/18 1415    Order Status:  Completed Specimen:  Body Fluid Updated:  01/04/18 0836     Gram Stain Result --     Rare WBCs.  No organisms seen.       Significant Indicator NEG     Source BF     Site Peritoneal Fluid     Culture Result Bdf No growth at 72 hours.    Narrative:       Peritoneal Fluid    BLOOD CULTURE [[id number]] Collected:  12/29/17 0926    Order Status:  Completed Specimen:  Blood from Peripheral Updated:  01/03/18 1100     Significant Indicator NEG     Source BLD     Site PERIPHERAL     Blood Culture No growth after 5 days of incubation.    Narrative:       Per Hospital Policy: Only change Specimen Src: to \"Line\" " "if  specified by physician order.    BLOOD CULTURE [[id number]] Collected:  01/02/18 1135    Order Status:  Completed Specimen:  Blood from Peripheral Updated:  01/03/18 0834     Significant Indicator NEG     Source BLD     Site PERIPHERAL     Blood Culture --     No Growth    Note: Blood cultures are incubated for 5 days and  are monitored continuously.Positive blood cultures  are called to the RN and reported as soon as  they are identified.      Narrative:       Droplet,[id number] BARBRA MINER  Per Hospital Policy: Only change Specimen Src: to \"Line\" if  specified by physician order.    BLOOD CULTURE [[id number]] Collected:  01/02/18 1135    Order Status:  Completed Specimen:  Blood from Peripheral Updated:  01/03/18 0834     Significant Indicator NEG     Source BLD     Site PERIPHERAL     Blood Culture --     No Growth    Note: Blood cultures are incubated for 5 days and  are monitored continuously.Positive blood cultures  are called to the RN and reported as soon as  they are identified.      Narrative:       Droplet,[id number] BARBRA MINER  Per Hospital Policy: Only change Specimen Src: to \"Line\" if  specified by physician order.    GRAM STAIN [[id number]] Collected:  01/01/18 1415    Order Status:  Completed Specimen:  Body Fluid Updated:  01/01/18 2024     Significant Indicator .     Source BF     Site Peritoneal Fluid     Gram Stain Result --     Rare WBCs.  No organisms seen.      Narrative:       Peritoneal Fluid    BLOOD CULTURE [[id number]]  (Abnormal)  (Susceptibility) Collected:  12/29/17 1014    Order Status:  Completed Specimen:  Blood from Peripheral Updated:  01/01/18 1040     Significant Indicator POS (POS)     Source BLD     Site PERIPHERAL     Blood Culture -- (A)     Growth detected by Bactec instrument.  12/30/2017  05:43  Methicillin resistant Staphylococcus aureus (MRSA)  detected by PCR.       Blood Culture Methicillin Resistant Staphylococcus aureus (A)    Narrative:       " "CALL  Villator[facility]  161 tel. [phone],  CALLED  161 tel. [phone] 12/30/2017, 13:38, RB PERF. RESULTS CALLED TO:[phone]  Per Hospital Policy: Only change Specimen Src: to \"Line\" if  specified by physician order.    Culture & Susceptibility     METHICILLIN RESISTANT STAPHYLOCOCCUS AUREUS     Antibiotic Sensitivity Microscan Unit Status    Ampicillin/sulbactam Resistant 16/8 mcg/mL Final    Clindamycin Resistant >4 mcg/mL Final    Daptomycin Sensitive 1 mcg/mL Final    Erythromycin Resistant >4 mcg/mL Final    Moxifloxacin Resistant >4 mcg/mL Final    Oxacillin Resistant >2 mcg/mL Final    Penicillin Resistant >8 mcg/mL Final    Tetracycline Sensitive <=4 mcg/mL Final    Trimeth/Sulfa Sensitive <=0.5/9.5 mcg/mL Final    Vancomycin Sensitive 2 mcg/mL Final                       Fluid Culture with Gram Stain [[id number]]     Order Status:  No result Specimen:  Body Fluid from Peritoneal Fluid     URINE CULTURE(NEW) [[id number]] Collected:  12/30/17 1559    Order Status:  Completed Specimen:  Urine Updated:  01/01/18 0826     Significant Indicator NEG     Source UR     Site --     Urine Culture No growth at 48 hours    Narrative:       Indication for culture:->Emergency Room Patient  If not done within the last 24 hours    Influenza By PCR, A/B [[id number]] Collected:  12/29/17 0000    Order Status:  Canceled Specimen:  Respirate from Nasopharyngeal Updated:  12/30/17 2115    C Diff by PCR rflx Toxin [[id number]] Collected:  12/29/17 1530    Order Status:  Completed Specimen:  Stool from Stool Updated:  12/30/17 0913     C Diff by PCR Negative     Comment: C. difficile NOT detected by PCR.  Treatment not indicated per guidelines.  Repeat testing not indicated within 7 days.          027-NAP1-BI Presumptive Negative     Comment: Presumptive 027/NAP1/BI target DNA sequences are NOT DETECTED.       Narrative:       Collected By:[id number] TRUE NEGRON  Does this patient have risk factors for C-diff?->Yes    CULTURE RESPIRATORY W/ GRM STN " [[id number]]     Order Status:  Completed Specimen:  Respirate from Sputum     URINALYSIS [[id number]]  (Abnormal) Collected:  12/29/17 1559    Order Status:  Completed Specimen:  Urine from Urine, Ca Cath Updated:  12/29/17 1610     Color Yellow     Character Cloudy (A)     Specific Gravity 1.017     Ph 5.0     Glucose Negative mg/dL      Ketones Trace (A) mg/dL      Protein 30 (A) mg/dL      Bilirubin Negative     Urobilinogen, Urine 0.2     Nitrite Negative     Leukocyte Esterase Negative     Occult Blood Negative     Micro Urine Req Microscopic    Narrative:       Collected By:[id number] TRUE NEGRON    Urinalysis [[id number]]     Order Status:  Canceled Specimen:  Urine from Urine, Cath     INFLUENZA A/B BY PCR [[id number]]  (Abnormal) Collected:  12/29/17 0949    Order Status:  Completed Specimen:  Blood from Nasopharyngeal Updated:  12/29/17 1028     Influenza virus A RNA POSITIVE (A)     Influenza virus B, PCR Negative    URINALYSIS [[id number]]     Order Status:  Canceled Specimen:  Urine     BLOOD CULTURE [[id number]] Collected:  12/29/17 0000    Order Status:  Canceled Specimen:  Other from Peripheral     BLOOD CULTURE [[id number]] Collected:  12/29/17 0000    Order Status:  Canceled Specimen:  Other from Peripheral     Influenza Rapid [[id number]] Collected:  12/29/17 0000    Order Status:  Canceled Specimen:  Other from Respiratory     Blood Culture [[id number]] Collected:  12/29/17 0000    Order Status:  Canceled Specimen:  Other from Peripheral     Blood Culture [[id number]] Collected:  12/29/17 0000    Order Status:  Canceled Specimen:  Other from Peripheral     Culture Respiratory W/ GRM STN [[id number]] Collected:  12/29/17 0000    Order Status:  Canceled Specimen:  Sputum from Sputum           Assessment:  Active Hospital Problems    Diagnosis   • *Severe sepsis (CMS-HCC) [A41.9, R65.20]   • High anion gap metabolic acidosis [E87.2]   • Acute on chronic renal failure (CMS-HCC) [N17.9, N18.9]   •  Protein-calorie malnutrition, severe (CMS-HCC) [E43]   • 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]   • Influenza A [J10.1]   • Insulin dependent diabetes mellitus (CMS-HCC) [E11.9, Z79.4]   • Diarrhea [R19.7]   • Atrial fibrillation (CMS-HCC) [I48.91]   • CAP (community acquired pneumonia) [J18.9]   • Normocytic anemia [D64.9]       Plan:  MRSA sepsis  Afebrile  Resolved leukocytosis at last check  Blood cultures +12/29/17  Repeat blood cultures 1/2 no growth so far  TTE negative on 12/30/17  Due to pacemaker and recent AV graft- needs DORETHA if feasible  CXR no sig change 1/4  Continue ceftaroline for now (vanco BRYANT 2)  Anticipate 6 week course from date neg blood cxs    Influenza A  Complete 5 days of Tamiflu    Cryptogenic cirrhosis  Leading to ascites  s/p paracentesis     Renal failure, not improving  Recent AV graft  Dose adjust abx as needed  Getting HD    Diabetes mellitus  Keep BS under 150 to help control current infection

## 2018-01-04 NOTE — CARE PLAN
Problem: Nutritional:  Goal: Nutrition support tolerated and meeting greater than 85% of estimated needs  Outcome: MET Date Met: 01/04/18  TF tolerated @ goal rate, currently held for procedure today.

## 2018-01-04 NOTE — PROGRESS NOTES
[facility] Nephrology Progress Note               Author: Pio Duffymina Date & Time created: 1/4/2018  10:53 AM     Interval History:  77-year-old  male well known to our service who I have followed as an outpatient for years.  He has CKD stage V due to diabetes and hypertension.  We have anticipated eventual need for dialysis and already had placed an AV fistula in his right upper arm.  He routinely follows with this, was most recently seen 4 weeks ago.  At that time, he had creatinine of 4.8, hemoglobin of 10, normal electrolytes.  He was not uremic or fluid overloaded.  Hemodialysis has not yet been initiated.     The patient presented to the emergency room complaining of coughing for 2 or 3weeks.  He has been only productive over the last couple of days.  He became increasingly weak, unable to provide care for himself.  He could not get around.  He was not eating very well.     In the ER, he presented, he was normotensive with a blood pressure of 140/96, pulse 80.  He was mildly hypoxic on room air, was started on a little bit of oxygen.  Laboratory showed white count of 16 with a left shift.  He also has evidence potentially of fluid.  Chest x-ray suggests a possible pneumonia.     Laboratory results showed BUN 91, creatinine 5.3, potassium 5.2, was not fluid  overloaded on exam.  His lactic acid levels were elevated.  He is being admitted to the hospital under the hospitalist care.  He was given some  intravenous fluids.  We have been asked to follow him for CKD stage V + acidosis    Daily Nephrology Summary  12/29/17 - see in ER - IVF recommended.  Advanced CKD 5 - if no response to fluids will need to start dialysis  12/30/17 - placed on bicarb overnight.  SCreat down a bit.  K ok.  Bicarb improving.  Urine output 960cc  12/31/17 - Azotemia stable.  Not really expecting significant improvement.  On lots of O2 w Pnx.    BP low.    01/01/18 - Feels fine. Scr is the same. Non-oliguric. No  SOB.   01/02/18 - s/p  US guided paracentesis of RLQ. 4900 mL was removed and 1000 mL sent to lab. Scr is higher now. Did have some hypotension yesterday.  01/03/18 - Feels weak. No other renal events in the past 24 hrs.  01/04/18 - s/p HD 01/03/18 # 1. Tolerated well but low BP. No UF    Review of Systems   Constitutional: Positive for malaise/fatigue.   HENT: Negative.    Eyes: Negative.    Respiratory: Positive for cough and sputum production.    Cardiovascular: Negative.    Gastrointestinal: Negative.    Genitourinary: Negative.    Musculoskeletal: Negative.    Skin: Negative.        Physical Exam   Constitutional: No distress.   HENT:   Head: Atraumatic.   Eyes: No scleral icterus.   Neck: No JVD present.   Cardiovascular: Normal rate.  Exam reveals no friction rub.    Pulmonary/Chest: No respiratory distress.   Abdominal: Soft. There is no tenderness.   Musculoskeletal: He exhibits no edema.   Neurological: He is alert.   Skin: Skin is warm and dry.       Labs:  Recent Labs      01/02/18   2130   ISTATAPH  7.498   ISTATAPCO2  24.8*   ISTATAPO2  48*   ISTATATCO2  20   [id number]  88*   ISTATARTHCO3  19.2   ISTATARTBE  -3   ISTATTEMP  36.5 C   ISTATFIO2  50   ISTATSPEC  Arterial   ISTATAPHTC  7.505*   [id number]  46*         Recent Labs      01/02/18   0510  01/03/18   0413  01/04/18   0405   SODIUM  139  135  135   POTASSIUM  4.0  3.4*  4.0   CHLORIDE  98  102  102   CO2  18*  18*  18*   BUN  108*  101*  68*   CREATININE  6.25*  6.13*  4.65*   CALCIUM  7.2*  6.6*  7.5*     Recent Labs      01/02/18   0510  01/03/18   0413  01/04/18   0405   GLUCOSE  152*  114*  236*     Recent Labs      01/01/18   1115  01/02/18   0510  01/03/18   0413  01/04/18   0405   RBC   --   2.73*  2.53*  2.61*   HEMOGLOBIN   --   8.2*  7.6*  7.8*   HEMATOCRIT   --   24.0*  21.9*  23.3*   PLATELETCT   --   106*  91*  103*   PROTHROMBTM  14.4   --    --    --    APTT  33.1   --    --    --    INR  1.15*   --    --    --      Recent Labs       18   0510  18   0413  18   0405   WBC  10.7  9.5  13.9*           Hemodynamics:  Temp (24hrs), Av.3 °C (97.4 °F), Min:36.2 °C (97.2 °F), Max:36.4 °C (97.5 °F)  Temperature: 36.2 °C (97.2 °F), Monitored Temp: 36.2 °C (97.2 °F)  Pulse  Av.1  Min: 69  Max: 130Heart Rate (Monitored): 91  NIBP: 116/48     Respiratory:    Respiration: 19, Pulse Oximetry: 92 %, O2 Daily Delivery Respiratory : Highflow Nasal Cannula     PEP/CPT Method: Positive Airway Pressure Device, Work Of Breathing / Effort: Moderate  RUL Breath Sounds: Coarse Crackles, RML Breath Sounds: Coarse Crackles;Diminished, RLL Breath Sounds: Diminished, [place] Breath Sounds: Coarse Crackles, LLL Breath Sounds: Diminished  Fluids:    Intake/Output Summary (Last 24 hours) at 18 1053  Last data filed at 18 1000   Gross per 24 hour   Intake             1750 ml   Output              731 ml   Net             1019 ml     Weight: 74.6 kg (164 lb 7.4 oz)  GI/Nutrition:  No orders of the defined types were placed in this encounter.    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]   • Influenza A [J10.1]   • Insulin dependent diabetes mellitus (CMS-HCC) [E11.9, Z79.4]   • Diarrhea [R19.7]   • Atrial fibrillation (CMS-HCC) [I48.91]   • CAP (community acquired pneumonia) [J18.9]   • Normocytic anemia [D64.9]       IMPRESSION/PLAN    # CKD 5   -- was at baseline creatinine as outpatient but now Acute rise likely sec to hypoperfusion  -- Initiated HD 18 via temp dialysis catheter  -- monitor I/Os  -- avoid contrast    # Acidosis  -- metabolic  -- should correct with HD    # PNX  -- on O2 + ABX    # Anemia  -- chronic   -- no overt bleeding  -- presumed due to CKD 5  -- give EPOGEN while here  -- check iron  levels    # Hypocalcemia  -- secondary HPTH likely  -- add Phoslo 667mg TID w meals    # BP   -- on low side with infection  -- Maintain MAP>65    HD today # 2  Will use 1:1 with AVF:Catheter        Quality-Core Measures

## 2018-01-04 NOTE — PROGRESS NOTES
[facility] Hospitalist Progress Note    Date of Service: 2018    Chief Complaint  77 y.o. male admitted 2017 with Pt noted to have influenza a.  Also likely with pneumonia.  Also with diarrhea, c-diff negative.  Initially had low BP. Very acidotic with worsening renal failure and elevated lactic acid initially.  Nephrology consulted as well as ID    Interval Problem Update  Confused last hs given one dose of haldol  Paced  AFebrile  Tolerating diet  UOP 30ml: Stg IV CKD  HFNC 60L/50%  Tapped 5 litres   oseltamivir completed  Ceftaroline D 7        Consultants/Specialty  Nephrology  Pulmonology    Disposition  Cont in ICU as respiratory status is tenuous        Review of Systems   Unable to perform ROS: Other   Neurological: Positive for weakness.      Physical Exam  Laboratory/Imaging   Hemodynamics  Temp (24hrs), Av.3 °C (97.4 °F), Min:36.3 °C (97.3 °F), Max:36.4 °C (97.5 °F)   Temperature: 36.3 °C (97.4 °F), Monitored Temp: 36.1 °C (97 °F)  Pulse  Av.2  Min: 69  Max: 130 Heart Rate (Monitored): 77  NIBP: (!) 98/38      Respiratory      Respiration: 16, Pulse Oximetry: 98 %, O2 Daily Delivery Respiratory : Highflow Nasal Cannula     PEP/CPT Method: Positive Airway Pressure Device, Work Of Breathing / Effort: Moderate;Increased Work of Breathing  RUL Breath Sounds: Diminished, RML Breath Sounds: Diminished, RLL Breath Sounds: Diminished, EMERSON Breath Sounds: Diminished, LLL Breath Sounds: Diminished    Fluids    Intake/Output Summary (Last 24 hours) at 18 0551  Last data filed at 18 0400   Gross per 24 hour   Intake             1620 ml   Output              691 ml   Net              929 ml       Nutrition  No orders of the defined types were placed in this encounter.    Physical Exam   Constitutional: He is oriented to person, place, and time. He appears well-developed. He appears cachectic. No distress.   HENT:   Head: Normocephalic and atraumatic.   Eyes: Conjunctivae are normal.    Neck: No JVD present.   Cardiovascular: Normal rate.  Exam reveals no gallop.    Murmur heard.  Pulmonary/Chest: Effort normal. No stridor. No respiratory distress. He has no wheezes. He has rales.   Abdominal: Soft. There is no tenderness. There is no rebound and no guarding.   Musculoskeletal: He exhibits no edema.   Neurological: He is oriented to person, place, and time.   Skin: Skin is warm and dry. No rash noted. He is not diaphoretic.   Nursing note and vitals reviewed.      Recent Labs      01/02/18   0510  01/03/18   0413  01/04/18   0405   WBC  10.7  9.5  13.9*   RBC  2.73*  2.53*  2.61*   HEMOGLOBIN  8.2*  7.6*  7.8*   HEMATOCRIT  24.0*  21.9*  23.3*   MCV  87.9  86.6  89.3   MCH  30.0  30.0  29.9   MCHC  34.2  34.7  33.5*   RDW  52.0*  49.5  53.3*   PLATELETCT  106*  91*  103*   MPV  12.9  12.6  12.7     Recent Labs      01/02/18   0510  01/03/18   0413  01/04/18   0405   SODIUM  139  135  135   POTASSIUM  4.0  3.4*  4.0   CHLORIDE  98  102  102   CO2  18*  18*  18*   GLUCOSE  152*  114*  236*   BUN  108*  101*  68*   CREATININE  6.25*  6.13*  4.65*   CALCIUM  7.2*  6.6*  7.5*     Recent Labs      01/01/18   1115   APTT  33.1   INR  1.15*                  Assessment/Plan     * Severe sepsis (CMS-HCC)   Assessment & Plan    - This is severe sepsis with the following associated acute organ dysfunction(s): acute kidney failure, acute respiratory failure, critical illness myopathy.   - d/t MRSA bacteremia, pneumonia and influenza  - continue Teflaro total 6 wks from neg cultures  Completed 5 days tamiflu  - BP improved with fluids  ID following  TTE neg for vegetations: plan DORETHA once pt respiratory status has improved        High anion gap metabolic acidosis   Assessment & Plan    - severe, due to sepsis/lactic acidosis and renal failure  - continue oral bicarbonate  -Acidosis resolved, gap still present        Acute on chronic renal failure (CMS-HCC)- (present on admission)   Assessment & Plan     Nephrology following  Start HD as function worsening        Acute respiratory failure with hypoxia (CMS-HCC)   Assessment & Plan    Treating pneumonia  Influenza B  Volume is an issue: Nephrology managing volume with HD        Bacteremia due to Staphylococcus aureus   Assessment & Plan    Plan 6 weeks Teflaro        Protein-calorie malnutrition, severe (CMS-HCC)   Assessment & Plan    cont's with poor po intake  If it does not improve overnight will need to ree-address goals of care vs feeding tube        Atrial fibrillation (CMS-HCC)   Assessment & Plan    - initially rate controlled but then tachycardic  - likely d/t sepsis and dehydration  - improved with IVF, rate now controlled          Diarrhea   Assessment & Plan    - c-diff negative        CAP (community acquired pneumonia)- (present on admission)   Assessment & Plan    MRSA  ID following   Ceftaroline          Insulin dependent diabetes mellitus (CMS-HCC)   Assessment & Plan    - diabetic diet  - continue lantus and ISS, adjust as needed  - glucose is only mildly elevated, no reason to suspect DKA is the cause of his acidosis          Influenza A   Assessment & Plan    Completed oseltamivir        Cryptogenic cirrhosis (CMS-HCC)   Assessment & Plan    - due to alpha-1 antitrypsin  - With ascites, significant, causing abdominal pain  - s/p tap 1/1  -cont to follow          Normocytic anemia- (present on admission)   Assessment & Plan    Stable  Cont to follow daily  - no sign of gross bleeding            Reviewed items::  Radiology images reviewed, EKG reviewed, Labs reviewed and Medications reviewed  Ca catheter::  No Ca  DVT prophylaxis - mechanical:  SCDs  Ulcer Prophylaxis::  Not indicated  Antibiotics:  Treating active infection/contamination beyond 24 hours perioperative coverage

## 2018-01-04 NOTE — FLOWSHEET NOTE
This note also relates to the following rows which could not be included:  Respiration - Cannot attach notes to unvalidated device data  Pulse - Cannot attach notes to unvalidated device data  Heart Rate (Monitored) - Cannot attach notes to unvalidated device data  Pulse Oximetry - Cannot attach notes to unvalidated device data       01/04/18 0713   Events/Summary/Plan   Events/Summary/Plan pt on HHFNC, fio2 weaned to 50%   Interdisciplinary Plan of Care-Goals (Indications)   Hyperinflation Protocol Indications Restrictive Lung Disorder / Consolidation   Interdisciplinary Plan of Care-Outcomes    Hyperinflation Protocol Goals/Outcome Stable Vital Capacity x24 hrs and Patient Understands / uses I.S.   Education   Education Yes - Pt. / Family has been Instructed in use of Respiratory Equipment   Therapy Not Performed   Type of Therapy Not Performed PEP   Reason Therapy Not Performed (pt not following)   Heated Hi Flow Nasal Cannula   Heated Hi Flow Nasal Cannula (HHFNC) Yes   FiO2 (HHFNC) 50   Flowrate (HHFNC) 60   Humidifier Temperature (HHFNC) 32   Date of Last Circuit Change 12/30/18   Circuit Change Next Due Date (Q 7 Days) 01/06/18   Respiratory WDL   Respiratory (WDL) X   Chest Exam   Work Of Breathing / Effort Moderate   Breath Sounds   Pre/Post Intervention Pre Intervention Assessment   RUL Breath Sounds Diminished   RML Breath Sounds Diminished   RLL Breath Sounds Diminished   EMERSON Breath Sounds Diminished   LLL Breath Sounds Diminished   Secretions   Cough Non Productive   Sputum Amount Unable to Evaluate   Oximetry   Continuous Oximetry Yes   Oxygen   O2 (LPM) 60   O2 (FiO2) 50   O2 Daily Delivery Respiratory  Highflow Nasal Cannula

## 2018-01-04 NOTE — PROGRESS NOTES
HD treatment # 2 today.SBP dropped during treatment,albumin given for bp support.Net UF removed 1000 ml.Per Dr Finch use 1 avf/1catheter with tomorrow's treatment.Report given to primary Rn.

## 2018-01-04 NOTE — CARE PLAN
Problem: Hyperinflation:  Goal: Prevent or improve atelectasis  Respiratory Therapy Update    Interdisciplinary Plan of Care-Goals (Indications)  Hyperinflation Protocol Indications: Restrictive Lung Disorder / Consolidation (01/03/18 1056)  Interdisciplinary Plan of Care-Outcomes   Hyperinflation Protocol Goals/Outcome: Stable Vital Capacity x24 hrs and Patient Understands / uses I.S. (01/03/18 1056)    #PEP/CPT (Manual) Initial: Initial (12/30/17 0752)          Cough: Non Productive (01/03/18 1056)  Sputum Amount: Unable to Evaluate (01/02/18 1221)  Sputum Color: Unable to Evaluate (01/02/18 1221)  Sputum Consistency: Unable to Evaluate (01/02/18 1221)    Heated Hi Flow Nasal Cannula (HHFNC): Yes (01/03/18 1056)  FiO2 (HHFNC): 70 (01/03/18 1056)  Flowrate (HHFNC): 50 (01/03/18 1056)    O2 (FiO2): 70 (01/03/18 1056)  O2 (LPM): 25 (01/03/18 1557)  O2 Daily Delivery Respiratory : Highflow Nasal Cannula (01/03/18 1056)    Breath Sounds  RUL Breath Sounds: Diminished (01/03/18 1200)  RML Breath Sounds: Diminished (01/03/18 1200)  RLL Breath Sounds: Diminished (01/03/18 1200)  EMERSON Breath Sounds: Diminished (01/03/18 1200)  LLL Breath Sounds: Diminished (01/03/18 1200)    Events/Summary/Plan: pt on HHFNC at 50 lpm on 80% Fio2. PEP is performed. 750 on IS

## 2018-01-04 NOTE — DISCHARGE PLANNING
Medical SW    Referral: Sw attended AM IDT Rounds.    Intervention:  followed by ID on ABX, started tu be feeding, procedure today,     Plan: Sw to assist w/ d/c planning as needed.

## 2018-01-04 NOTE — PROGRESS NOTES
Nephrologist ordered  2.5 hours hd tx. Initiated at 1713 and completed at 1943.  Tolerated  HD . Unable to remove fluids , low BP during treatment . Vital sign stable post HD.See flow sheet  for details.  Report given to Siomara SHAH.

## 2018-01-04 NOTE — FLOWSHEET NOTE
This note also relates to the following rows which could not be included:  Respiration - Cannot attach notes to unvalidated device data  Pulse - Cannot attach notes to unvalidated device data  Heart Rate (Monitored) - Cannot attach notes to unvalidated device data  Pulse Oximetry - Cannot attach notes to unvalidated device data       01/04/18 1121   Events/Summary/Plan   Events/Summary/Plan HHFNC check   Therapy Not Performed   Type of Therapy Not Performed PEP   Heated Hi Flow Nasal Cannula   Heated Hi Flow Nasal Cannula (HHFNC) Yes   FiO2 (HHFNC) 60   Flowrate (HHFNC) 60   Humidifier Temperature (HHFNC) 32   Respiratory WDL   Respiratory (WDL) X   Chest Exam   Work Of Breathing / Effort Mild   Breath Sounds   Pre/Post Intervention Pre Intervention Assessment   RUL Breath Sounds Coarse Crackles   RML Breath Sounds Diminished   RLL Breath Sounds Diminished   EMERSON Breath Sounds Coarse Crackles   LLL Breath Sounds Diminished   Secretions   Sputum Amount Unable to Evaluate   Oximetry   Continuous Oximetry Yes   Oxygen   O2 (LPM) 60   O2 (FiO2) 60   O2 Daily Delivery Respiratory  Highflow Nasal Cannula

## 2018-01-04 NOTE — CARE PLAN
Problem: Oxygenation:  Goal: Maintain adequate oxygenation dependent on patient condition    Intervention: Manage oxygen therapy by monitoring pulse oximetry and/or ABG values  Pt remains on HHFNC  70LPM  70%

## 2018-01-05 NOTE — ASSESSMENT & PLAN NOTE
Intubated 1/8  Pulmonology following  Treating pneumonia  Has completed oseltamivir for Influenza B  Volume is an issue: Nephrology managing volume with HD  Neg 1.8 litres/24hrs  Residual ARDS from infection

## 2018-01-05 NOTE — CARE PLAN
Problem: Communication  Goal: The ability to communicate needs accurately and effectively will improve  Outcome: PROGRESSING SLOWER THAN EXPECTED  Patient is minimally verbal at this time and speaks in a whisper.    Problem: Safety  Goal: Will remain free from falls  Outcome: PROGRESSING AS EXPECTED  Call light in reach, bed alarm in use.

## 2018-01-05 NOTE — CARE PLAN
Problem: Oxygenation:  Goal: Maintain adequate oxygenation dependent on patient condition    Intervention: Manage oxygen therapy by monitoring pulse oximetry and/or ABG values  [facility]

## 2018-01-05 NOTE — PROGRESS NOTES
HD Tx per Dr. MARK Finch x 3 hrs., initiated at 1037, ended at 1337, net  ml. See paper flowsheet for details.    Report given to KRISTOFER Ferraro RN.

## 2018-01-05 NOTE — PROGRESS NOTES
[facility] Nephrology Progress Note               Author: Pio Duffymina Date & Time created: 1/5/2018  10:33 AM     Interval History:  77-year-old  male well known to our service who I have followed as an outpatient for years.  He has CKD stage V due to diabetes and hypertension.  We have anticipated eventual need for dialysis and already had placed an AV fistula in his right upper arm.  He routinely follows with this, was most recently seen 4 weeks ago.  At that time, he had creatinine of 4.8, hemoglobin of 10, normal electrolytes.  He was not uremic or fluid overloaded.  Hemodialysis has not yet been initiated.     The patient presented to the emergency room complaining of coughing for 2 or 3weeks.  He has been only productive over the last couple of days.  He became increasingly weak, unable to provide care for himself.  He could not get around.  He was not eating very well.     In the ER, he presented, he was normotensive with a blood pressure of 140/96, pulse 80.  He was mildly hypoxic on room air, was started on a little bit of oxygen.  Laboratory showed white count of 16 with a left shift.  He also has evidence potentially of fluid.  Chest x-ray suggests a possible pneumonia.     Laboratory results showed BUN 91, creatinine 5.3, potassium 5.2, was not fluid  overloaded on exam.  His lactic acid levels were elevated.  He is being admitted to the hospital under the hospitalist care.  He was given some  intravenous fluids.  We have been asked to follow him for CKD stage V + acidosis    Daily Nephrology Summary  12/29/17 - see in ER - IVF recommended.  Advanced CKD 5 - if no response to fluids will need to start dialysis  12/30/17 - placed on bicarb overnight.  SCreat down a bit.  K ok.  Bicarb improving.  Urine output 960cc  12/31/17 - Azotemia stable.  Not really expecting significant improvement.  On lots of O2 w Pnx.    BP low.    01/01/18 - Feels fine. Scr is the same. Non-oliguric. No  SOB.   01/02/18 - s/p  US guided paracentesis of RLQ. 4900 mL was removed and 1000 mL sent to lab. Scr is higher now. Did have some hypotension yesterday.  01/03/18 - Feels weak. No other renal events in the past 24 hrs.  01/04/18 - s/p HD 01/03/18 # 1. Tolerated well but low BP. No UF  01/05/18 - s/p HD yesterday #2 with Net UF removed 1000 ml. Pt is confused but able to follow simple commands    Review of Systems   Constitutional: Positive for malaise/fatigue.   HENT: Negative.    Eyes: Negative.    Respiratory: Positive for cough and sputum production.    Cardiovascular: Negative.    Gastrointestinal: Negative.    Genitourinary: Negative.    Musculoskeletal: Negative.    Skin: Negative.        Physical Exam   Constitutional: No distress.   HENT:   Head: Atraumatic.   Eyes: No scleral icterus.   Neck: No JVD present.   Cardiovascular: Normal rate.  Exam reveals no friction rub.    Pulmonary/Chest: No respiratory distress.   Abdominal: Soft. There is no tenderness.   Musculoskeletal: He exhibits no edema.   Neurological: He is alert.   Skin: Skin is warm and dry.       Labs:  Recent Labs      01/02/18   2130   ISTATAPH  7.498   ISTATAPCO2  24.8*   ISTATAPO2  48*   ISTATATCO2  20   [id number]  88*   ISTATARTHCO3  19.2   ISTATARTBE  -3   ISTATTEMP  36.5 C   ISTATFIO2  50   ISTATSPEC  Arterial   ISTATAPHTC  7.505*   [id number]  46*         Recent Labs      01/04/18   0405  01/04/18   1400  01/05/18   0410   SODIUM  135  137  137   POTASSIUM  4.0  3.6  3.5*   CHLORIDE  102  103  104   CO2  18*  22  20   BUN  68*  32*  44*   CREATININE  4.65*  2.61*  3.63*   MAGNESIUM   --   2.0   --    CALCIUM  7.5*  8.3*  8.0*     Recent Labs      01/04/18   0405  01/04/18   1400  01/05/18   0410   GLUCOSE  236*  140*  331*     Recent Labs      01/03/18   0413  01/04/18   0405  01/05/18   0410   RBC  2.53*  2.61*  2.45*   HEMOGLOBIN  7.6*  7.8*  7.4*   HEMATOCRIT  21.9*  23.3*  22.2*   PLATELETCT  91*  103*  82*     Recent Labs       18   0413  18   0405  18   0410   WBC  9.5  13.9*  14.3*           Hemodynamics:  Temp (24hrs), Av.7 °C (98.1 °F), Min:36.4 °C (97.5 °F), Max:37 °C (98.6 °F)  Temperature: 37 °C (98.6 °F), Monitored Temp: 37 °C (98.6 °F)  Pulse  Av.1  Min: 69  Max: 130Heart Rate (Monitored): 95  NIBP: 124/55     Respiratory:    Respiration: (!) 21, Pulse Oximetry: 91 %, O2 Daily Delivery Respiratory : Highflow Nasal Cannula     Work Of Breathing / Effort: Mild  RUL Breath Sounds: Crackles, RML Breath Sounds: Diminished, RLL Breath Sounds: Diminished, EMERSON Breath Sounds: Crackles, LLL Breath Sounds: Diminished  Fluids:    Intake/Output Summary (Last 24 hours) at 18 1033  Last data filed at 18 1000   Gross per 24 hour   Intake             1610 ml   Output             1005 ml   Net              605 ml     Weight: 73.3 kg (161 lb 9.6 oz)  GI/Nutrition:  No orders of the defined types were placed in this encounter.    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]   • Influenza A [J10.1]   • Insulin dependent diabetes mellitus (CMS-HCC) [E11.9, Z79.4]   • Diarrhea [R19.7]   • Atrial fibrillation (CMS-[facility]) [I48.91]   • CAP (community acquired pneumonia) [J18.9]   • Normocytic anemia [D64.9]       IMPRESSION/PLAN    # CKD 5   -- was at baseline creatinine as outpatient but now Acute rise likely sec to hypoperfusion  -- Initiated HD 18 via temp dialysis catheter  -- monitor I/Os  -- avoid contrast    # Acidosis  -- metabolic  -- should correct with HD    # PNX  -- on O2 + ABX    # Anemia  -- chronic   -- no overt bleeding  -- presumed due to CKD 5  -- give EPOGEN while here  -- check iron levels    # Hypocalcemia  -- secondary HPTH likely  -- add Phoslo 667mg TID w meals    #  BP   -- on low side with infection  -- Maintain MAP>65    HD today # 3  Will use 1:1 with AVF:Catheter        Quality-Core Measures

## 2018-01-05 NOTE — PROGRESS NOTES
Infectious Disease Progress Note    Author: Kailee Adams M.D. Date & Time of service: 2018  3:24 PM    Chief Complaint:  Follow-up for MRSA sepsis and influenza A    Interval History:  2018-MAXIMUM TEMPERATURE 99 remains on high flow oxygen . WBC 15.5 creatinine 5  1/2 AF WBC 10.7 encephalopathic; agitated in bed  1/3 AF WBC 9.5 less agitated-somnolent today   AF WBC not done getting HD-tolerating so far   AF WBC 14.3 getting HD remains obtunded  Labs Reviewed, Medications Reviewed and Radiology Reviewed.    Review of Systems:  Review of Systems   Unable to perform ROS: Mental status change   Constitutional: Negative for fever.       Hemodynamics:  Temp (24hrs), Av.5 °C (97.7 °F), Min:35.8 °C (96.5 °F), Max:37 °C (98.6 °F)  Temperature: 35.8 °C (96.5 °F), Monitored Temp: 36.4 °C (97.5 °F)  Pulse  Av.4  Min: 69  Max: 130Heart Rate (Monitored): 97  Blood Pressure : 122/50, NIBP: 105/45       Physical Exam:  Physical Exam   Constitutional:   Thin  Chronically ill   HENT:   Head: Normocephalic and atraumatic.   Mouth/Throat: No oropharyngeal exudate.   Eyes: Pupils are equal, round, and reactive to light. No scleral icterus.   Neck: Neck supple.   Right HD cath   Cardiovascular: Normal rate.    Murmur heard.  Irregularly Irregular   Pulmonary/Chest: Effort normal. No respiratory distress. He has no wheezes. He has rales.   Left-sided pacemaker-no erythema   Abdominal: Soft. He exhibits distension. There is no tenderness. There is no rebound.   Musculoskeletal: He exhibits no edema.   Right-sided AV graft   Neurological:   obtunded   Skin: No rash noted.   Nursing note and vitals reviewed.      Meds:    Current Facility-Administered Medications:   •  insulin NPH  •  ceftaroline (TEFLARO) ivpb  •  heparin  •  insulin regular **AND** Accu-Chek ACHS **AND** NOTIFY MD and PharmD **AND** glucose 4 g **AND** dextrose 50%  •  albumin human 25%  •  haloperidol lactate  •  calcium acetate  •  sodium  bicarbonate  •  epoetin dariana  •  rosuvastatin  •  Respiratory Care per Protocol  •  heparin    Labs:  Recent Labs      01/03/18   0413  01/04/18   0405  01/05/18   0410   WBC  9.5  13.9*  14.3*   RBC  2.53*  2.61*  2.45*   HEMOGLOBIN  7.6*  7.8*  7.4*   HEMATOCRIT  21.9*  23.3*  22.2*   MCV  86.6  89.3  90.6   MCH  30.0  29.9  30.2   RDW  49.5  53.3*  54.3*   PLATELETCT  91*  103*  82*   MPV  12.6  12.7  12.6     Recent Labs      01/04/18   0405  01/04/18   1400  01/05/18   0410   SODIUM  135  137  137   POTASSIUM  4.0  3.6  3.5*   CHLORIDE  102  103  104   CO2  18*  22  20   GLUCOSE  236*  140*  331*   BUN  68*  32*  44*     Recent Labs      01/04/18   0405  01/04/18   1400  01/05/18   0410   CREATININE  4.65*  2.61*  3.63*       Imaging:  Dx-chest-limited (1 View)    Result Date: 12/31/2017 12/31/2017 9:40 AM HISTORY/REASON FOR EXAM:  Shortness of Breath. TECHNIQUE/EXAM DESCRIPTION AND NUMBER OF VIEWS: Single AP view of the chest. COMPARISON:  1 view chest 12/30/2017 FINDINGS: There are bilateral upper lobe airspace process most consistent with edema. Cardiac Silhouette: normal in size. There is aortic atherosclerosis.There is a cardiac pacemaker. Pleura: There are no pleural effusion or pneumothoraces. Osseous structures: No significant bony abnormality is present.     Unchanged bilateral upper lobe airspace process that are most consistent with edema    Dx-chest-portable (1 View)    Result Date: 12/30/2017 12/30/2017 2:43 AM HISTORY/REASON FOR EXAM: Shortness of Breath TECHNIQUE/EXAM DESCRIPTION:  Single AP view of the chest. COMPARISON: Yesterday FINDINGS: Position of medical devices appears stable. The heart is in the upper limits of normal for size. Atherosclerotic calcification of the aorta is noted.  The central  pulmonary vasculature appears prominent and indistinct. The lungs appear well expanded bilaterally.  Diffuse scattered hazy pulmonary parenchymal opacities are seen. No significant pleural  effusions are identified. The bony structures appear age-appropriate.     1.  Pulmonary edema and/or infiltrates are identified, which appear increased since the prior exam. 2.  Atherosclerosis    Dx-chest-portable (1 View)    Result Date: 12/29/2017 12/29/2017 10:41 AM HISTORY/REASON FOR EXAM:  Possible sepsis. TECHNIQUE/EXAM DESCRIPTION AND NUMBER OF VIEWS: Single portable view of the chest. COMPARISON: 2/16/2017 FINDINGS: Left-sided pacemaker in place. Diffuse interstitial prominence and patchy multifocal opacities throughout both lungs. No pleural effusion. No pneumothorax. Stable cardiopericardial silhouette.     Diffuse interstitial prominence and patchy multifocal opacities throughout both lungs could be seen with multifocal infection or pulmonary edema.    Us-paracentesis, Abd With Imaging    Result Date: 12/27/2017 12/26/2017 4:54 PM HISTORY/REASON FOR EXAM:  Ascites TECHNIQUE/EXAM DESCRIPTION: Ultrasound-guided paracentesis. COMPARISON:  12/24/17 PROCEDURE:  Informed consent was obtained. A timeout was taken. Ascites was localized with real-time ultrasound. The right lower quadrant of the abdominal wall was prepared and draped in a sterile manner. Following local anesthesia with 1% lidocaine, a 5  K[place] Yueh pigtail catheter was advanced into the peritoneal cavity with trocar technique. Ascites was drained. The patient tolerated the procedure well with no evidence of complication. FINDINGS: Ascites is present. Fluid was not sent to the laboratory. Fluid character: straw colored     1. Ultrasound-guided therapeutic paracentesis of the right lower quadrant of the abdominal wall. 2. 4700 mL of fluid withdrawn.    Echocardiogram Comp W/o Cont    Result Date: 12/30/2017  Transthoracic Echo Report Echocardiography Laboratory CONCLUSIONS Mild concentric left ventricular hypertrophy. Pacer/ICD wire seen in right ventricle. The mitral valve is not well visualized. The tricuspid valve is not well visualized.  Trace tricuspid regurgitation. Structurally normal aortic valve without significant stenosis or regurgitation. No vegetation seen LV EF:  75    % FINDINGS Left Ventricle Normal left ventricular chamber size. Mild concentric left ventricular hypertrophy. Normal left ventricular systolic function. Left ventricular ejection fraction is visually estimated to be greater than 75%. Right Ventricle The right ventricle was normal in size and function.  Pacer/ICD wire seen in right ventricle. Right Atrium The right atrium is normal in size.  Catheter/pacemaker wire present in the right atrial cavity. Left Atrium The left atrium is normal in size. Mitral Valve The mitral valve is not well visualized. Aortic Valve Structurally normal aortic valve without significant stenosis or regurgitation. Tricuspid Valve The tricuspid valve is not well visualized.  Trace tricuspid regurgitation. Right ventricular systolic pressure is estimated to be 25 mmHg. Pulmonic Valve Structurally normal pulmonic valve without significant stenosis or regurgitation. Pericardium Normal pericardium without effusion.  Ascites present. Aorta The aortic root is normal. Soham Gottlieb M.D. (Electronically Signed) Final Date:     30 December 2017                 17:19      Micro:  Results     Procedure Component Value Units Date/Time    FLUID CULTURE W/GRAM STAIN [[id number]] Collected:  01/01/18 1415    Order Status:  Completed Specimen:  Body Fluid Updated:  01/04/18 0836     Gram Stain Result --     Rare WBCs.  No organisms seen.       Significant Indicator NEG     Source BF     Site Peritoneal Fluid     Culture Result Bdf No growth at 72 hours.    Narrative:       Peritoneal Fluid    BLOOD CULTURE [[id number]] Collected:  12/29/17 0926    Order Status:  Completed Specimen:  Blood from Peripheral Updated:  01/03/18 1100     Significant Indicator NEG     Source BLD     Site PERIPHERAL     Blood Culture No growth after 5 days of incubation.    Narrative:   "     Per Hospital Policy: Only change Specimen Src: to \"Line\" if  specified by physician order.    BLOOD CULTURE [[id number]] Collected:  01/02/18 1135    Order Status:  Completed Specimen:  Blood from Peripheral Updated:  01/03/18 0834     Significant Indicator NEG     Source BLD     Site PERIPHERAL     Blood Culture --     No Growth    Note: Blood cultures are incubated for 5 days and  are monitored continuously.Positive blood cultures  are called to the RN and reported as soon as  they are identified.      Narrative:       Droplet,[id number] BARBRA IMNER  Per Hospital Policy: Only change Specimen Src: to \"Line\" if  specified by physician order.    BLOOD CULTURE [[id number]] Collected:  01/02/18 1135    Order Status:  Completed Specimen:  Blood from Peripheral Updated:  01/03/18 0834     Significant Indicator NEG     Source BLD     Site PERIPHERAL     Blood Culture --     No Growth    Note: Blood cultures are incubated for 5 days and  are monitored continuously.Positive blood cultures  are called to the RN and reported as soon as  they are identified.      Narrative:       Droplet,[id number] BARBRA MINER  Per Hospital Policy: Only change Specimen Src: to \"Line\" if  specified by physician order.    GRAM STAIN [[id number]] Collected:  01/01/18 1415    Order Status:  Completed Specimen:  Body Fluid Updated:  01/01/18 2024     Significant Indicator .     Source BF     Site Peritoneal Fluid     Gram Stain Result --     Rare WBCs.  No organisms seen.      Narrative:       Peritoneal Fluid    BLOOD CULTURE [[id number]]  (Abnormal)  (Susceptibility) Collected:  12/29/17 1014    Order Status:  Completed Specimen:  Blood from Peripheral Updated:  01/01/18 1040     Significant Indicator POS (POS)     Source BLD     Site PERIPHERAL     Blood Culture -- (A)     Growth detected by Bactec instrument.  12/30/2017  05:43  Methicillin resistant Staphylococcus aureus (MRSA)  detected by PCR.       Blood Culture " "Methicillin Resistant Staphylococcus aureus (A)    Narrative:       CALL  [facility]  161 tel. [phone],  CALLED  161 tel. [phone] 12/30/2017, 13:38, RB PERF. RESULTS CALLED TO:[phone]  Per Hospital Policy: Only change Specimen Src: to \"Line\" if  specified by physician order.    Culture & Susceptibility     METHICILLIN RESISTANT STAPHYLOCOCCUS AUREUS     Antibiotic Sensitivity Microscan Unit Status    Ampicillin/sulbactam Resistant 16/8 mcg/mL Final    Clindamycin Resistant >4 mcg/mL Final    Daptomycin Sensitive 1 mcg/mL Final    Erythromycin Resistant >4 mcg/mL Final    Moxifloxacin Resistant >4 mcg/mL Final    Oxacillin Resistant >2 mcg/mL Final    Penicillin Resistant >8 mcg/mL Final    Tetracycline Sensitive <=4 mcg/mL Final    Trimeth/Sulfa Sensitive <=0.5/9.5 mcg/mL Final    Vancomycin Sensitive 2 mcg/mL Final                       Fluid Culture with Gram Stain [[id number]]     Order Status:  No result Specimen:  Body Fluid from Peritoneal Fluid     URINE CULTURE(NEW) [[id number]] Collected:  12/30/17 1559    Order Status:  Completed Specimen:  Urine Updated:  01/01/18 0826     Significant Indicator NEG     Source UR     Site --     Urine Culture No growth at 48 hours    Narrative:       Indication for culture:->Emergency Room Patient  If not done within the last 24 hours    Influenza By PCR, A/B [[id number]] Collected:  12/29/17 0000    Order Status:  Canceled Specimen:  Respirate from Nasopharyngeal Updated:  12/30/17 2115    C Diff by PCR rflx Toxin [[id number]] Collected:  12/29/17 1530    Order Status:  Completed Specimen:  Stool from Stool Updated:  12/30/17 0913     C Diff by PCR Negative     Comment: C. difficile NOT detected by PCR.  Treatment not indicated per guidelines.  Repeat testing not indicated within 7 days.          027-NAP1-BI Presumptive Negative     Comment: Presumptive 027/NAP1/BI target DNA sequences are NOT DETECTED.       Narrative:       Collected By:[id number] TRUE NEGRON  Does this patient " have risk factors for C-diff?->Yes    CULTURE RESPIRATORY W/ GRM STN [[id number]]     Order Status:  Completed Specimen:  Respirate from Sputum     URINALYSIS [[id number]]  (Abnormal) Collected:  12/29/17 155    Order Status:  Completed Specimen:  Urine from Urine, Ca Cath Updated:  12/29/17 1610     Color Yellow     Character Cloudy (A)     Specific Gravity 1.017     Ph 5.0     Glucose Negative mg/dL      Ketones Trace (A) mg/dL      Protein 30 (A) mg/dL      Bilirubin Negative     Urobilinogen, Urine 0.2     Nitrite Negative     Leukocyte Esterase Negative     Occult Blood Negative     Micro Urine Req Microscopic    Narrative:       Collected By:[id number] TRUE NEGRON          Assessment:  Active Hospital Problems    Diagnosis   • *Severe sepsis (CMS-HCC) [A41.9, R65.20]   • High anion gap metabolic acidosis [E87.2]   • Acute on chronic renal failure (CMS-HCC) [N17.9, N18.9]   • Protein-calorie malnutrition, severe (CMS-[facility]) [E43]   • 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]   • Influenza A [J10.1]   • Insulin dependent diabetes mellitus (CMS-[facility]) [E11.9, Z79.4]   • Diarrhea [R19.7]   • Atrial fibrillation (CMS-[facility]) [I48.91]   • CAP (community acquired pneumonia) [J18.9]   • Normocytic anemia [D64.9]       Plan:  MRSA sepsis  Afebrile  Increased leukocytosis  Blood cultures +12/29/17  Repeat blood cultures 1/2 no growth so far  TTE negative on 12/30/17  Due to pacemaker and recent AV graft- needs DORETHA if feasible  CXR no sig change 1/4  Continue ceftaroline for now (vanco BRYANT 2)  Anticipate 6 week course from date neg blood cxs  Stop date 2/14/2018    Influenza A  Complete 5 days of Tamiflu    Cryptogenic cirrhosis  Leading to ascites  s/p paracentesis     Renal failure, not improving  Recent AV graft  Dose adjust abx as needed  Getting HD    Diabetes mellitus  Keep BS under 150 to help control current infection

## 2018-01-05 NOTE — PROGRESS NOTES
PPM interrogated this afternoon. Determined pt most likely has not been having episodes of vtach, but instead episodes may possibly be d/t pacemaker setting. Some changes made. No subsequent episodes since.

## 2018-01-05 NOTE — PROGRESS NOTES
Monitor summary:  qrs 0.12, HR 80-90s  Pt fluctuates between SR 70-80s with paced beats and Vpaced    12 hr chart check

## 2018-01-05 NOTE — FLOWSHEET NOTE
This note also relates to the following rows which could not be included:  Respiration - Cannot attach notes to unvalidated device data  Pulse - Cannot attach notes to unvalidated device data  Heart Rate (Monitored) - Cannot attach notes to unvalidated device data  Pulse Oximetry - Cannot attach notes to unvalidated device data       01/05/18 1038   Events/Summary/Plan   Events/Summary/Plan HHFNC, no changes. pt recieving diaylsis   Heated Hi Flow Nasal Cannula   Heated Hi Flow Nasal Cannula (HHFNC) Yes   FiO2 (HHFNC) 80   Flowrate (HHFNC) 60   Humidifier Temperature (HHFNC) 32   Respiratory WDL   Respiratory (WDL) X   Chest Exam   Work Of Breathing / Effort Mild   Breath Sounds   Pre/Post Intervention Pre Intervention Assessment   RUL Breath Sounds Coarse Crackles   RML Breath Sounds Coarse Crackles   RLL Breath Sounds Diminished   EMERSON Breath Sounds Coarse Crackles   LLL Breath Sounds Diminished   Secretions   Cough Non Productive   Sputum Amount Unable to Evaluate   Oximetry   Continuous Oximetry Yes   Oxygen   O2 (LPM) 60   O2 (FiO2) 80   O2 Daily Delivery Respiratory  Highflow Nasal Cannula

## 2018-01-05 NOTE — PROGRESS NOTES
"[facility] Hospitalist Progress Note    Date of Service: 2018    Chief Complaint  77 y.o. male admitted 2017 with Pt noted to have influenza a.  Also likely with pneumonia.  Also with diarrhea, c-diff negative.  Initially had low BP. Very acidotic with worsening renal failure and elevated lactic acid initially.  Nephrology consulted as well as ID    Interval Problem Update  Confused last hs given one dose of haldol  Paced, sinus  AFebrile  Tolerating diet  UOP 30ml: Stg IV CKD  HFNC 60L/60%  Tapped 5 litres   oseltamivir completed  Ceftaroline D 7    Long dw pt's Son at bedside.  Reviewed all current issues and prognosis.  He states his Dad had always told him that \"as long as there's hope\" he would want everything done: pt remains FULL CODE        Consultants/Specialty  Nephrology  Pulmonology    Disposition  Cont in ICU as respiratory status is tenuous        Review of Systems   Unable to perform ROS: Other   Neurological: Positive for weakness.      Physical Exam  Laboratory/Imaging   Hemodynamics  Temp (24hrs), Av.5 °C (97.7 °F), Min:36.2 °C (97.2 °F), Max:36.9 °C (98.4 °F)   Temperature: 36.9 °C (98.4 °F), Monitored Temp: 36.9 °C (98.4 °F)  Pulse  Av  Min: 69  Max: 130 Heart Rate (Monitored): 92  NIBP: 121/44      Respiratory      Respiration: 19, Pulse Oximetry: 93 %, O2 Daily Delivery Respiratory : Highflow Nasal Cannula     Work Of Breathing / Effort: Moderate  RUL Breath Sounds: Coarse Crackles, RML Breath Sounds: Coarse Crackles, RLL Breath Sounds: Diminished, EMERSON Breath Sounds: Coarse Crackles, LLL Breath Sounds: Diminished    Fluids    Intake/Output Summary (Last 24 hours) at 18 0548  Last data filed at 18 0400   Gross per 24 hour   Intake             1500 ml   Output             1045 ml   Net              455 ml       Nutrition  No orders of the defined types were placed in this encounter.    Physical Exam   Constitutional: He appears well-developed. He appears cachectic. " No distress.   HENT:   Head: Normocephalic and atraumatic.   Eyes: Conjunctivae are normal.   Neck: No JVD present.   Cardiovascular: Normal rate.  Exam reveals no gallop.    Murmur heard.  Pulmonary/Chest: Effort normal. No stridor. No respiratory distress. He has no wheezes. He has rales.   Abdominal: Soft. There is no tenderness. There is no rebound and no guarding.   Musculoskeletal: He exhibits no edema.   Neurological: He is alert.   Somnolent, rouses to physical stim.  Oriented x 2   Skin: Skin is warm and dry. No rash noted. He is not diaphoretic.   Nursing note and vitals reviewed.      Recent Labs      01/03/18   0413  01/04/18   0405   WBC  9.5  13.9*   RBC  2.53*  2.61*   HEMOGLOBIN  7.6*  7.8*   HEMATOCRIT  21.9*  23.3*   MCV  86.6  89.3   MCH  30.0  29.9   MCHC  34.7  33.5*   RDW  49.5  53.3*   PLATELETCT  91*  103*   MPV  12.6  12.7     Recent Labs      01/04/18   0405  01/04/18   1400  01/05/18   0410   SODIUM  135  137  137   POTASSIUM  4.0  3.6  3.5*   CHLORIDE  102  103  104   CO2  18*  22  20   GLUCOSE  236*  140*  331*   BUN  68*  32*  44*   CREATININE  4.65*  2.61*  3.63*   CALCIUM  7.5*  8.3*  8.0*                      Assessment/Plan     * Severe sepsis (CMS-HCC)   Assessment & Plan    - This is severe sepsis with the following associated acute organ dysfunction(s): acute kidney failure, acute respiratory failure, critical illness myopathy.   - d/t MRSA bacteremia, pneumonia and influenza  - continue Teflaro total 6 wks from neg cultures  Completed 5 days tamiflu  - BP improved with fluids  ID following  TTE neg for vegetations: plan DORETHA once pt respiratory status has improved        High anion gap metabolic acidosis   Assessment & Plan    - severe, due to sepsis/lactic acidosis and renal failure  - continue oral bicarbonate  -Acidosis resolved, gap still present        Acute on chronic renal failure (CMS-HCC)- (present on admission)   Assessment & Plan    Nephrology following  On HD per  Nephrology        Acute respiratory failure with hypoxia (CMS-HCC)   Assessment & Plan    Treating pneumonia  Has completed oseltamivir for Influenza B  Volume is an issue: Nephrology managing volume with HD        Bacteremia due to Staphylococcus aureus   Assessment & Plan    Plan 6 weeks Teflaro        Protein-calorie malnutrition, severe (CMS-HCC)   Assessment & Plan    cont's with poor po intake  If it does not improve overnight will need to ree-address goals of care vs feeding tube        Atrial fibrillation (CMS-HCC)   Assessment & Plan    - initially rate controlled but then tachycardic  - likely d/t sepsis and dehydration  - improved with IVF, rate now controlled          Diarrhea   Assessment & Plan    - c-diff negative        CAP (community acquired pneumonia)- (present on admission)   Assessment & Plan    MRSA  ID following   Ceftaroline          Insulin dependent diabetes mellitus (CMS-HCC)   Assessment & Plan    - diabetic diet  - continue lantus and ISS, adjust as needed  - glucose is only mildly elevated, no reason to suspect DKA is the cause of his acidosis          Influenza A   Assessment & Plan    Completed oseltamivir        Cryptogenic cirrhosis (CMS-HCC)   Assessment & Plan    - due to alpha-1 antitrypsin  - With ascites, significant, causing abdominal pain  - s/p tap 1/1  -cont to follow          Normocytic anemia- (present on admission)   Assessment & Plan    Stable  Cont to follow daily  - no sign of gross bleeding            Reviewed items::  Radiology images reviewed, EKG reviewed, Labs reviewed and Medications reviewed  Ca catheter::  No Ca  DVT prophylaxis - mechanical:  SCDs  Ulcer Prophylaxis::  Not indicated  Antibiotics:  Treating active infection/contamination beyond 24 hours perioperative coverage

## 2018-01-05 NOTE — DISCHARGE PLANNING
Care Transition Team Assessment    Information Source  Orientation : Unable to Assess  Information Given By: Relative  Informant's Name: son, Lui BELL  Who is responsible for making decisions for patient? : Adult child  Name(s) of Primary Decision Maker: see above    Readmission Evaluation  Is this a readmission?: No    Elopement Risk  Legal Hold: No  Ambulatory or Self Mobile in Wheelchair: No-Not an Elopement Risk  Elopement Risk: Not at Risk for Elopement    Interdisciplinary Discharge Planning  Does Admitting Nurse Feel This Could be a Complex Discharge?: No  Primary Care Physician: Shamika  Lives with - Patient's Self Care Capacity: Spouse  Patient or legal guardian wants to designate a caregiver (see row info): No  Support Systems: Family Member(s), Friends / Neighbors  Housing / Facility: [address]  Do You Take your Prescribed Medications Regularly: Yes  Able to Return to Previous ADL's: Future Time w/Therapy  Mobility Issues: No  Patient Expects to be Discharged to::  (possibly SNF [facility]- where is wife is currently)  Assistance Needed: Unknown at this Time  Durable Medical Equipment: Not Applicable              Finances  Prescription Coverage: Yes ([facility]?)    Vision / Hearing Impairment  Vision Impairment : Yes  Right Eye Vision: Impaired, Wears Glasses  Left Eye Vision: Impaired, Wears Glasses  Hearing Impairment : Yes  Hearing Impairment: Both Ears, Hearing Device Not Available         Advance Directive  Advance Directive?: DPOA for Health Care  Durable Power of  Name and Contact : see above, son                   Anticipated Discharge Information  Anticipated discharge disposition: Discharge needs currently unknown  Discharge Address: home address verified on face sheet

## 2018-01-05 NOTE — FLOWSHEET NOTE
This note also relates to the following rows which could not be included:  Respiration - Cannot attach notes to unvalidated device data  Pulse - Cannot attach notes to unvalidated device data  Heart Rate (Monitored) - Cannot attach notes to unvalidated device data  Pulse Oximetry - Cannot attach notes to unvalidated device data       01/05/18 1437   Events/Summary/Plan   Events/Summary/Plan HHFNC   Therapy Not Performed   Type of Therapy Not Performed PEP   Reason Therapy Not Performed (confused)   Heated Hi Flow Nasal Cannula   Heated Hi Flow Nasal Cannula (HHFNC) Yes   FiO2 (HHFNC) 70   Flowrate (HHFNC) 60   Humidifier Temperature (HHFNC) 32   Respiratory WDL   Respiratory (WDL) X   Chest Exam   Work Of Breathing / Effort Mild   Breath Sounds   RUL Breath Sounds Diminished   RML Breath Sounds Diminished   RLL Breath Sounds Diminished   EMERSON Breath Sounds Diminished   LLL Breath Sounds Diminished   Secretions   Sputum Amount Unable to Evaluate   Oximetry   Continuous Oximetry Yes   Oxygen   O2 (LPM) 60   O2 (FiO2) 70   O2 Daily Delivery Respiratory  Highflow Nasal Cannula

## 2018-01-05 NOTE — FLOWSHEET NOTE
This note also relates to the following rows which could not be included:  Respiration - Cannot attach notes to unvalidated device data  Pulse - Cannot attach notes to unvalidated device data  Heart Rate (Monitored) - Cannot attach notes to unvalidated device data  Pulse Oximetry - Cannot attach notes to unvalidated device data       01/05/18 0726   Events/Summary/Plan   Events/Summary/Plan HHFNC check, pt does not participate for PEP   Therapy Not Performed   Type of Therapy Not Performed PEP   Reason Therapy Not Performed (confused)   Heated Hi Flow Nasal Cannula   Heated Hi Flow Nasal Cannula (HHFNC) Yes   FiO2 (HHFNC) 60   Flowrate (HHFNC) 60   Humidifier Temperature (FNC) 32   Date of Last Circuit Change 12/30/18   Circuit Change Next Due Date (Q 7 Days) 01/06/18   Respiratory WDL   Respiratory (WDL) X   Chest Exam   Work Of Breathing / Effort Mild   Breath Sounds   RUL Breath Sounds Crackles   RML Breath Sounds Diminished   RLL Breath Sounds Diminished   EMERSON Breath Sounds Crackles   LLL Breath Sounds Diminished   Secretions   Sputum Amount Unable to Evaluate   Oximetry   Continuous Oximetry Yes   Oxygen   O2 (LPM) 60   O2 (FiO2) 60   O2 Daily Delivery Respiratory  Highflow Nasal Cannula

## 2018-01-06 NOTE — PROGRESS NOTES
Pt upon first assessment alert and oriented x 2, VSS, on HIFLOW O2 at 60L 100% FIO2.  Pt doesn't have any c/o pain at this time, BMS is in place 200cc out this morning, pt has 15cc of urine output.  Pt turned every 2 hours.  Pt given full bath, shampoo, oral care, benavidez care, and nails cleaned. BMS flushed 120cc.  Pt sleeping well.  Very comfortable.      Dialysis started at 1330.  Pt had visitors today, his son and daughter in law.  Pt turned at 1700 and cleaned due to a small leakage around the BMS.  Pt given tylenol at 1659 due to a headache his son said he stated to him.  Pt just nodded when I asked him.      Pt had 400 out of BMS with 120cc flush    Pt turned every 2 hours except when on dialysis.

## 2018-01-06 NOTE — PROGRESS NOTES
Infectious Disease Progress Note    Author: Kailee Adams M.D. Date & Time of service: 2018  2:13 PM    Chief Complaint:  Follow-up for MRSA sepsis and influenza A    Interval History:  2018-MAXIMUM TEMPERATURE 99 remains on high flow oxygen . WBC 15.5 creatinine 5  1/2 AF WBC 10.7 encephalopathic; agitated in bed  1/3 AF WBC 9.5 less agitated-somnolent today  / AF WBC not done getting HD-tolerating so far   AF WBC 14.3 getting HD remains obtunded  1/ AF (episodes hypothermia) WBC 20 more awake today-  Labs Reviewed, Medications Reviewed and Radiology Reviewed.    Review of Systems:  Review of Systems   Unable to perform ROS: Mental status change   Constitutional: Negative for fever.       Hemodynamics:  Temp (24hrs), Av.8 °C (98.3 °F), Min:36.4 °C (97.5 °F), Max:37.3 °C (99.1 °F)  Temperature: 36.7 °C (98.1 °F), Monitored Temp: 35.8 °C (96.4 °F)  Pulse  Av.1  Min: 69  Max: 130Heart Rate (Monitored): 91  NIBP: 104/44       Physical Exam:  Physical Exam   Constitutional:   Thin  Chronically ill   HENT:   Head: Normocephalic and atraumatic.   Mouth/Throat: No oropharyngeal exudate.   Eyes: Pupils are equal, round, and reactive to light. No scleral icterus.   Neck: Neck supple.   Right HD cath   Cardiovascular: Normal rate.    Murmur heard.  Irregularly Irregular   Pulmonary/Chest: Effort normal. No respiratory distress. He has no wheezes. He has rales.   Left-sided pacemaker-no erythema   Abdominal: Soft. He exhibits distension. There is no tenderness. There is no rebound.   Musculoskeletal: He exhibits no edema.   Right-sided AV graft   Neurological:   awake   Skin: No rash noted.   Nursing note and vitals reviewed.      Meds:    Current Facility-Administered Medications:   •  insulin NPH  •  miconazole 2%-zinc oxide  •  ceftaroline (TEFLARO) ivpb  •  heparin  •  insulin regular **AND** Accu-Chek ACHS **AND** NOTIFY MD and PharmD **AND** glucose 4 g **AND** dextrose 50%  •  albumin human  25%  •  haloperidol lactate  •  calcium acetate  •  sodium bicarbonate  •  epoetin dariana  •  rosuvastatin  •  Respiratory Care per Protocol  •  heparin    Labs:  Recent Labs      01/04/18   0405  01/05/18   0410  01/06/18   0350   WBC  13.9*  14.3*  20.1*   RBC  2.61*  2.45*  2.51*   HEMOGLOBIN  7.8*  7.4*  7.6*   HEMATOCRIT  23.3*  22.2*  22.7*   MCV  89.3  90.6  90.4   MCH  29.9  30.2  30.3   RDW  53.3*  54.3*  53.8*   PLATELETCT  103*  82*  75*   MPV  12.7  12.6  12.7     Recent Labs      01/04/18   1400  01/05/18   0410  01/06/18   0350   SODIUM  137  137  139   POTASSIUM  3.6  3.5*  3.7   CHLORIDE  103  104  106   CO2  22  20  21   GLUCOSE  140*  331*  205*   BUN  32*  44*  35*     Recent Labs      01/04/18   1400  01/05/18   0410  01/06/18   0350   CREATININE  2.61*  3.63*  2.90*       Imaging:  Dx-chest-limited (1 View)    Result Date: 12/31/2017 12/31/2017 9:40 AM HISTORY/REASON FOR EXAM:  Shortness of Breath. TECHNIQUE/EXAM DESCRIPTION AND NUMBER OF VIEWS: Single AP view of the chest. COMPARISON:  1 view chest 12/30/2017 FINDINGS: There are bilateral upper lobe airspace process most consistent with edema. Cardiac Silhouette: normal in size. There is aortic atherosclerosis.There is a cardiac pacemaker. Pleura: There are no pleural effusion or pneumothoraces. Osseous structures: No significant bony abnormality is present.     Unchanged bilateral upper lobe airspace process that are most consistent with edema    Dx-chest-portable (1 View)    Result Date: 12/30/2017 12/30/2017 2:43 AM HISTORY/REASON FOR EXAM: Shortness of Breath TECHNIQUE/EXAM DESCRIPTION:  Single AP view of the chest. COMPARISON: Yesterday FINDINGS: Position of medical devices appears stable. The heart is in the upper limits of normal for size. Atherosclerotic calcification of the aorta is noted.  The central  pulmonary vasculature appears prominent and indistinct. The lungs appear well expanded bilaterally.  Diffuse scattered hazy  pulmonary parenchymal opacities are seen. No significant pleural effusions are identified. The bony structures appear age-appropriate.     1.  Pulmonary edema and/or infiltrates are identified, which appear increased since the prior exam. 2.  Atherosclerosis    Dx-chest-portable (1 View)    Result Date: 12/29/2017 12/29/2017 10:41 AM HISTORY/REASON FOR EXAM:  Possible sepsis. TECHNIQUE/EXAM DESCRIPTION AND NUMBER OF VIEWS: Single portable view of the chest. COMPARISON: 2/16/2017 FINDINGS: Left-sided pacemaker in place. Diffuse interstitial prominence and patchy multifocal opacities throughout both lungs. No pleural effusion. No pneumothorax. Stable cardiopericardial silhouette.     Diffuse interstitial prominence and patchy multifocal opacities throughout both lungs could be seen with multifocal infection or pulmonary edema.    Us-paracentesis, Abd With Imaging    Result Date: 12/27/2017 12/26/2017 4:54 PM HISTORY/REASON FOR EXAM:  Ascites TECHNIQUE/EXAM DESCRIPTION: Ultrasound-guided paracentesis. COMPARISON:  12/24/17 PROCEDURE:  Informed consent was obtained. A timeout was taken. Ascites was localized with real-time ultrasound. The right lower quadrant of the abdominal wall was prepared and draped in a sterile manner. Following local anesthesia with 1% lidocaine, a 5  Frisian Yueh pigtail catheter was advanced into the peritoneal cavity with trocar technique. Ascites was drained. The patient tolerated the procedure well with no evidence of complication. FINDINGS: Ascites is present. Fluid was not sent to the laboratory. Fluid character: straw colored     1. Ultrasound-guided therapeutic paracentesis of the right lower quadrant of the abdominal wall. 2. 4700 mL of fluid withdrawn.    Echocardiogram Comp W/o Cont    Result Date: 12/30/2017  Transthoracic Echo Report Echocardiography Laboratory CONCLUSIONS Mild concentric left ventricular hypertrophy. Pacer/ICD wire seen in right ventricle. The mitral valve is not  well visualized. The tricuspid valve is not well visualized. Trace tricuspid regurgitation. Structurally normal aortic valve without significant stenosis or regurgitation. No vegetation seen LV EF:  75    % FINDINGS Left Ventricle Normal left ventricular chamber size. Mild concentric left ventricular hypertrophy. Normal left ventricular systolic function. Left ventricular ejection fraction is visually estimated to be greater than 75%. Right Ventricle The right ventricle was normal in size and function.  Pacer/ICD wire seen in right ventricle. Right Atrium The right atrium is normal in size.  Catheter/pacemaker wire present in the right atrial cavity. Left Atrium The left atrium is normal in size. Mitral Valve The mitral valve is not well visualized. Aortic Valve Structurally normal aortic valve without significant stenosis or regurgitation. Tricuspid Valve The tricuspid valve is not well visualized.  Trace tricuspid regurgitation. Right ventricular systolic pressure is estimated to be 25 mmHg. Pulmonic Valve Structurally normal pulmonic valve without significant stenosis or regurgitation. Pericardium Normal pericardium without effusion.  Ascites present. Aorta The aortic root is normal. Soham Gottlieb M.D. (Electronically Signed) Final Date:     30 December 2017                 17:19      Micro:  Results     Procedure Component Value Units Date/Time    FLUID CULTURE W/GRAM STAIN [[id number]] Collected:  01/01/18 1415    Order Status:  Completed Specimen:  Body Fluid Updated:  01/04/18 0836     Gram Stain Result --     Rare WBCs.  No organisms seen.       Significant Indicator NEG     Source BF     Site Peritoneal Fluid     Culture Result Bdf No growth at 72 hours.    Narrative:       Peritoneal Fluid    BLOOD CULTURE [[id number]] Collected:  12/29/17 0926    Order Status:  Completed Specimen:  Blood from Peripheral Updated:  01/03/18 1100     Significant Indicator NEG     Source BLD     Site PERIPHERAL     Blood  "Culture No growth after 5 days of incubation.    Narrative:       Per Hospital Policy: Only change Specimen Src: to \"Line\" if  specified by physician order.    BLOOD CULTURE [[id number]] Collected:  01/02/18 1135    Order Status:  Completed Specimen:  Blood from Peripheral Updated:  01/03/18 0834     Significant Indicator NEG     Source BLD     Site PERIPHERAL     Blood Culture --     No Growth    Note: Blood cultures are incubated for 5 days and  are monitored continuously.Positive blood cultures  are called to the RN and reported as soon as  they are identified.      Narrative:       Droplet,[id number] BARBRA MINER  Per Hospital Policy: Only change Specimen Src: to \"Line\" if  specified by physician order.    BLOOD CULTURE [[id number]] Collected:  01/02/18 1135    Order Status:  Completed Specimen:  Blood from Peripheral Updated:  01/03/18 0834     Significant Indicator NEG     Source BLD     Site PERIPHERAL     Blood Culture --     No Growth    Note: Blood cultures are incubated for 5 days and  are monitored continuously.Positive blood cultures  are called to the RN and reported as soon as  they are identified.      Narrative:       Droplet,[id number] BARBRA MINER  Per Hospital Policy: Only change Specimen Src: to \"Line\" if  specified by physician order.    GRAM STAIN [[id number]] Collected:  01/01/18 1415    Order Status:  Completed Specimen:  Body Fluid Updated:  01/01/18 2024     Significant Indicator .     Source BF     Site Peritoneal Fluid     Gram Stain Result --     Rare WBCs.  No organisms seen.      Narrative:       Peritoneal Fluid    BLOOD CULTURE [[id number]]  (Abnormal)  (Susceptibility) Collected:  12/29/17 1014    Order Status:  Completed Specimen:  Blood from Peripheral Updated:  01/01/18 1040     Significant Indicator POS (POS)     Source BLD     Site PERIPHERAL     Blood Culture -- (A)     Growth detected by Bactec instrument.  12/30/2017  05:43  Methicillin resistant " "Staphylococcus aureus (MRSA)  detected by PCR.       Blood Culture Methicillin Resistant Staphylococcus aureus (A)    Narrative:       CALL  [place]  161 tel. [phone],  CALLED  161 tel. [phone] 12/30/2017, 13:38, RB PERF. RESULTS CALLED TO:[phone]  Per Hospital Policy: Only change Specimen Src: to \"Line\" if  specified by physician order.    Culture & Susceptibility     METHICILLIN RESISTANT STAPHYLOCOCCUS AUREUS     Antibiotic Sensitivity Microscan Unit Status    Ampicillin/sulbactam Resistant 16/8 mcg/mL Final    Clindamycin Resistant >4 mcg/mL Final    Daptomycin Sensitive 1 mcg/mL Final    Erythromycin Resistant >4 mcg/mL Final    Moxifloxacin Resistant >4 mcg/mL Final    Oxacillin Resistant >2 mcg/mL Final    Penicillin Resistant >8 mcg/mL Final    Tetracycline Sensitive <=4 mcg/mL Final    Trimeth/Sulfa Sensitive <=0.5/9.5 mcg/mL Final    Vancomycin Sensitive 2 mcg/mL Final                       Fluid Culture with Gram Stain [[id number]]     Order Status:  No result Specimen:  Body Fluid from Peritoneal Fluid     URINE CULTURE(NEW) [[id number]] Collected:  12/30/17 1559    Order Status:  Completed Specimen:  Urine Updated:  01/01/18 0826     Significant Indicator NEG     Source UR     Site --     Urine Culture No growth at 48 hours    Narrative:       Indication for culture:->Emergency Room Patient  If not done within the last 24 hours    Influenza By PCR, A/B [[id number]] Collected:  12/29/17 0000    Order Status:  Canceled Specimen:  Respirate from Nasopharyngeal Updated:  12/30/17 2115          Assessment:  Active Hospital Problems    Diagnosis   • *Severe sepsis (CMS-HCC) [A41.9, R65.20]   • High anion gap metabolic acidosis [E87.2]   • Acute on chronic renal failure (CMS-HCC) [N17.9, N18.9]   • Protein-calorie malnutrition, severe (CMS-HCC) [E43]   • 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]   • Influenza A [J10.1]   • Insulin dependent diabetes mellitus (CMS-[facility]) [E11.9, Z79.4]   • Diarrhea [R19.7]   • Atrial " fibrillation (CMS-HCC) [I48.91]   • CAP (community acquired pneumonia) [J18.9]   • Normocytic anemia [D64.9]       Plan:  MRSA sepsis  Afebrile  Blood cultures +12/29/17  Repeat blood cultures 1/2 no growth   TTE negative on 12/30/17  Due to pacemaker and recent AV graft- needs DORETHA if feasible  Continue ceftaroline for now (vanco BRYANT 2)  Anticipate 6 week course from date neg blood cxs  Stop date 2/14/2018    Leukocytosis, worsening  Repeat blood cxs  DORETHA recommended as above  CXR no sig change 1/6    Influenza A  Complete 5 days of Tamiflu    Cryptogenic cirrhosis  Leading to ascites  s/p paracentesis     Renal failure, not improving  Recent AV graft  Dose adjust abx as needed  Getting HD    Diabetes mellitus  Keep BS under 150 to help control current infection      RASHAWN SHAH

## 2018-01-06 NOTE — WOUND TEAM
Wound team note:   Pt seen for placement of BMS. MD order verified. Pt assessed, noted to be incontinent of loose brown stool. Sacrum/buttocks pink and intact. Sphincter tone determined to be adequate. BMS placed without difficulty, 35 mL of tap water placed in retention balloon, irrigated with 60 mL warm tap water. Nursing to irrigate q shift with 60 mL-120 mL warm tap water or until patent.   Patient noted to have IAD with yeast infection.  Will order MOHSEN for nursing to apply every 6 hours.

## 2018-01-06 NOTE — PROGRESS NOTES
[facility] Nephrology Progress Note               Author: Pio Duffymina Date & Time created: 1/6/2018  9:46 AM     Interval History:  77-year-old  male well known to our service who I have followed as an outpatient for years.  He has CKD stage V due to diabetes and hypertension.  We have anticipated eventual need for dialysis and already had placed an AV fistula in his right upper arm.  He routinely follows with this, was most recently seen 4 weeks ago.  At that time, he had creatinine of 4.8, hemoglobin of 10, normal electrolytes.  He was not uremic or fluid overloaded.  Hemodialysis has not yet been initiated.     The patient presented to the emergency room complaining of coughing for 2 or 3weeks.  He has been only productive over the last couple of days.  He became increasingly weak, unable to provide care for himself.  He could not get around.  He was not eating very well.     In the ER, he presented, he was normotensive with a blood pressure of 140/96, pulse 80.  He was mildly hypoxic on room air, was started on a little bit of oxygen.  Laboratory showed white count of 16 with a left shift.  He also has evidence potentially of fluid.  Chest x-ray suggests a possible pneumonia.     Laboratory results showed BUN 91, creatinine 5.3, potassium 5.2, was not fluid  overloaded on exam.  His lactic acid levels were elevated.  He is being admitted to the hospital under the hospitalist care.  He was given some  intravenous fluids.  We have been asked to follow him for CKD stage V + acidosis    Daily Nephrology Summary  12/29/17 - see in ER - IVF recommended.  Advanced CKD 5 - if no response to fluids will need to start dialysis  12/30/17 - placed on bicarb overnight.  SCreat down a bit.  K ok.  Bicarb improving.  Urine output 960cc  12/31/17 - Azotemia stable.  Not really expecting significant improvement.  On lots of O2 w Pnx.    BP low.    01/01/18 - Feels fine. Scr is the same. Non-oliguric. No  SOB.   01/02/18 - s/p  US guided paracentesis of RLQ. 4900 mL was removed and 1000 mL sent to lab. Scr is higher now. Did have some hypotension yesterday.  01/03/18 - Feels weak. No other renal events in the past 24 hrs.  01/04/18 - s/p HD 01/03/18 # 1. Tolerated well but low BP. No UF  01/05/18 - s/p HD yesterday #2 with Net UF removed 1000 ml. Pt is confused but able to follow simple commands  01/06/18 - S/p HD yesterday with net UF of 600 mL only given hypotension. O2 requirements still high.     Review of Systems   Constitutional: Positive for malaise/fatigue.   HENT: Negative.    Eyes: Negative.    Respiratory: Positive for cough and sputum production.    Cardiovascular: Negative.    Gastrointestinal: Negative.    Genitourinary: Negative.    Musculoskeletal: Negative.    Skin: Negative.        Physical Exam   Constitutional: No distress.   HENT:   Head: Atraumatic.   Eyes: No scleral icterus.   Neck: No JVD present.   Cardiovascular: Normal rate.  Exam reveals no friction rub.    Pulmonary/Chest: No respiratory distress.   Coarse BS bilateral.   Abdominal: Soft. There is no tenderness.   Musculoskeletal: He exhibits no edema.   Neurological: He is alert.   Skin: Skin is warm and dry.       Labs:        Invalid input(s): [id number]      Recent Labs      01/04/18   1400  01/05/18   0410  01/06/18   0350   SODIUM  137  137  139   POTASSIUM  3.6  3.5*  3.7   CHLORIDE  103  104  106   CO2  22  20  21   BUN  32*  44*  35*   CREATININE  2.61*  3.63*  2.90*   MAGNESIUM  2.0   --    --    CALCIUM  8.3*  8.0*  8.1*     Recent Labs      01/04/18   1400  01/05/18   0410  01/06/18   0350   GLUCOSE  140*  331*  205*     Recent Labs      01/04/18   0405  01/05/18   0410  01/06/18   0350   RBC  2.61*  2.45*  2.51*   HEMOGLOBIN  7.8*  7.4*  7.6*   HEMATOCRIT  23.3*  22.2*  22.7*   PLATELETCT  103*  82*  75*     Recent Labs      01/04/18   0405  01/05/18   0410  01/06/18   0350   WBC  13.9*  14.3*  20.1*            Hemodynamics:  Temp (24hrs), Av.6 °C (97.9 °F), Min:35.8 °C (96.5 °F), Max:37.3 °C (99.1 °F)  Temperature: 36.7 °C (98.1 °F), Monitored Temp: 36.7 °C (98.06 °F)  Pulse  Av  Min: 69  Max: 130Heart Rate (Monitored): 88  Blood Pressure : 122/50, NIBP: 123/52     Respiratory:    Respiration: 18, Pulse Oximetry: 91 %, O2 Daily Delivery Respiratory : Highflow Nasal Cannula     Work Of Breathing / Effort: Mild  RUL Breath Sounds: Crackles, RML Breath Sounds: Diminished, RLL Breath Sounds: Diminished, EMERSON Breath Sounds: Crackles, LLL Breath Sounds: Diminished  Fluids:    Intake/Output Summary (Last 24 hours) at 18 0946  Last data filed at 18 0600   Gross per 24 hour   Intake             1965 ml   Output             1200 ml   Net              765 ml     Weight: 69.7 kg (153 lb 10.6 oz) (after deducting 2 kg for waffle mattress)  GI/Nutrition:  No orders of the defined types were placed in this encounter.    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 Left AVF immature for use.  -- monitor I/Os  -- avoid contrast    # Acidosis  -- metabolic  -- corrected with HD    # PNX  -- on O2 + ABX    # Anemia  -- chronic   -- no overt bleeding  -- presumed due to CKD 5  -- give EPOGEN while here  -- check iron levels    # Hypocalcemia  -- secondary HPTH likely  -- add Phoslo 667mg  TID w meals    # BP   -- on low side with infection  -- Maintain MAP>65    PUF today with IV albumin for BP support  Suspect respiratory issues more due to PNA than volume.          Quality-Core Measures

## 2018-01-06 NOTE — CARE PLAN
Problem: Knowledge Deficit  Goal: Knowledge of disease process/condition, treatment plan, diagnostic tests, and medications will improve  Outcome: PROGRESSING SLOWER THAN EXPECTED  Unable to educate patient due to altered mentation.    Goal: Knowledge of the prescribed therapeutic regimen will improve  Outcome: PROGRESSING SLOWER THAN EXPECTED  Unable to educate patient due to altered mentation.

## 2018-01-06 NOTE — PROGRESS NOTES
"[facility] Hospitalist Progress Note    Date of Service: 2018    Chief Complaint  77 y.o. male admitted 2017 with Pt noted to have influenza a.  Also likely with pneumonia.  Also with diarrhea, c-diff negative.  Initially had low BP. Very acidotic with worsening renal failure and elevated lactic acid initially.  Nephrology consulted as well as ID    Interval Problem Update  Confused dose follow commands   Paced, sinus  AFebrile  On TF's  UOP 15ml: Stg IV CKD  HFNC 70L/100%  Tapped 5 litres   oseltamivir completed  Ceftaroline D 8    Long dw pt's Son at bedside.  Reviewed all current issues and prognosis.  He states his Dad had always told him that \"as long as there's hope\" he would want everything done: pt remains FULL CODE        Consultants/Specialty  Nephrology  Pulmonology    Disposition  Cont in ICU as respiratory status is tenuous        Review of Systems   Unable to perform ROS: Other   Neurological: Positive for weakness.      Physical Exam  Laboratory/Imaging   Hemodynamics  Temp (24hrs), Av.7 °C (98 °F), Min:35.8 °C (96.5 °F), Max:37.3 °C (99.1 °F)   Temperature: 36.7 °C (98.1 °F), Monitored Temp: 36.6 °C (97.88 °F)  Pulse  Av  Min: 69  Max: 130 Heart Rate (Monitored): 87  Blood Pressure : 122/50, NIBP: 107/52      Respiratory      Respiration: 15, Pulse Oximetry: 98 %, O2 Daily Delivery Respiratory : Highflow Nasal Cannula     Work Of Breathing / Effort: Mild  RUL Breath Sounds: Crackles, RML Breath Sounds: Diminished, RLL Breath Sounds: Diminished, EMERSON Breath Sounds: Crackles, LLL Breath Sounds: Diminished    Fluids    Intake/Output Summary (Last 24 hours) at 18 0552  Last data filed at 18 0200   Gross per 24 hour   Intake             1705 ml   Output             1200 ml   Net              505 ml       Nutrition  No orders of the defined types were placed in this encounter.    Physical Exam   Constitutional: He appears well-developed. He appears cachectic. No distress. "   HENT:   Head: Normocephalic and atraumatic.   Eyes: Conjunctivae are normal. No scleral icterus.   Neck: No JVD present.   Cardiovascular: Normal rate.  Exam reveals no gallop.    Murmur heard.  Pulmonary/Chest: Effort normal. No stridor. No respiratory distress. He has no wheezes. He has rales.   Abdominal: Soft. There is no tenderness. There is no rebound and no guarding.   Musculoskeletal: He exhibits no edema.   Neurological: He is alert.   Somnolent, rouses to physical stim.  Oriented x 2   Skin: Skin is warm and dry. No rash noted. He is not diaphoretic.   Nursing note and vitals reviewed.      Recent Labs      01/04/18   0405  01/05/18   0410  01/06/18   0350   WBC  13.9*  14.3*  20.1*   RBC  2.61*  2.45*  2.51*   HEMOGLOBIN  7.8*  7.4*  7.6*   HEMATOCRIT  23.3*  22.2*  22.7*   MCV  89.3  90.6  90.4   MCH  29.9  30.2  30.3   MCHC  33.5*  33.3*  33.5*   RDW  53.3*  54.3*  53.8*   PLATELETCT  103*  82*  75*   MPV  12.7  12.6  12.7     Recent Labs      01/04/18   1400  01/05/18   0410  01/06/18   0350   SODIUM  137  137  139   POTASSIUM  3.6  3.5*  3.7   CHLORIDE  103  104  106   CO2  22  20  21   GLUCOSE  140*  331*  205*   BUN  32*  44*  35*   CREATININE  2.61*  3.63*  2.90*   CALCIUM  8.3*  8.0*  8.1*                      Assessment/Plan     * Severe sepsis (CMS-[facility])   Assessment & Plan    - This is severe sepsis with the following associated acute organ dysfunction(s): acute kidney failure, acute respiratory failure, critical illness myopathy.   - d/t MRSA bacteremia, pneumonia and influenza  - continue Teflaro total 6 wks from neg cultures  Completed 5 days tamiflu  - BP improved with fluids  ID following  TTE neg for vegetations: plan DORETHA once pt respiratory status has improved        High anion gap metabolic acidosis   Assessment & Plan    - severe, due to sepsis/lactic acidosis and renal failure  - continue oral bicarbonate  -Acidosis resolved, gap still present        Acute on chronic renal failure  (CMS-HCC)- (present on admission)   Assessment & Plan    Nephrology following  On HD per Nephrology        Acute respiratory failure with hypoxia (CMS-HCC)   Assessment & Plan    Treating pneumonia  Has completed oseltamivir for Influenza B  Volume is an issue: Nephrology managing volume with HD  Residual ARDS from infection        Bacteremia due to Staphylococcus aureus   Assessment & Plan    Plan 6 weeks Teflaro        Protein-calorie malnutrition, severe (CMS-HCC)   Assessment & Plan    cont's with poor po intake  Now with cortrak and tolerating well        Atrial fibrillation (CMS-HCC)   Assessment & Plan    - initially rate controlled but then tachycardic  - likely d/t sepsis and dehydration  - improved with IVF, rate now controlled          Diarrhea   Assessment & Plan    - c-diff negative        CAP (community acquired pneumonia)- (present on admission)   Assessment & Plan    MRSA  ID following   Ceftaroline          Insulin dependent diabetes mellitus (CMS-HCC)   Assessment & Plan    - diabetic diet  - continue lantus and ISS, adjust as needed  - glucose is only mildly elevated, no reason to suspect DKA is the cause of his acidosis          Influenza A   Assessment & Plan    Completed oseltamivir        Cryptogenic cirrhosis (CMS-HCC)   Assessment & Plan    - due to alpha-1 antitrypsin  - With ascites, significant, causing abdominal pain  - s/p tap 1/1  -cont to follow          Normocytic anemia- (present on admission)   Assessment & Plan    Stable  Cont to follow daily  - no sign of gross bleeding            Reviewed items::  Radiology images reviewed, EKG reviewed, Labs reviewed and Medications reviewed  Ca catheter::  No Ca  DVT prophylaxis - mechanical:  SCDs  Ulcer Prophylaxis::  Not indicated  Antibiotics:  Treating active infection/contamination beyond 24 hours perioperative coverage

## 2018-01-06 NOTE — CARE PLAN
Problem: Safety  Goal: Will remain free from injury  Outcome: PROGRESSING AS EXPECTED  Bed alarm on  Goal: Will remain free from falls  Outcome: PROGRESSING AS EXPECTED  Bed alarm on    Problem: Bowel/Gastric:  Goal: Normal bowel function is maintained or improved  Outcome: PROGRESSING AS EXPECTED  BMS in place, flushed with 200cc of water at 1130    Problem: Respiratory:  Goal: Respiratory status will improve  Outcome: PROGRESSING SLOWER THAN EXPECTED      Problem: Skin Integrity  Goal: Risk for impaired skin integrity will decrease  Outcome: PROGRESSING AS EXPECTED  Turned every 2 hours, mepilex in place    Problem: Mobility  Goal: Risk for activity intolerance will decrease  Outcome: PROGRESSING AS EXPECTED

## 2018-01-07 NOTE — PROGRESS NOTES
[facility] Hospitalist Progress Note    Date of Service: 2018    Chief Complaint  77 y.o. male admitted 2017 with Pt noted to have influenza a.  Also likely with pneumonia.  Also with diarrhea, c-diff negative.  Initially had low BP. Very acidotic with worsening renal failure and elevated lactic acid initially.  Nephrology consulted as well as ID    Interval Problem Update  Responses are delayed but appropriate  Paced, sinus  AFebrile  On TF's  HD 2500ml   HFNC 70L/100%  Tapped 5 litres   oseltamivir completed  Ceftaroline D 10          Consultants/Specialty  Nephrology  Pulmonology    Disposition  Cont in ICU as respiratory status is tenuous        Review of Systems   Unable to perform ROS: Other   Neurological: Positive for weakness.      Physical Exam  Laboratory/Imaging   Hemodynamics  Temp (24hrs), Av.1 °C (97 °F), Min:36.1 °C (97 °F), Max:36.1 °C (97 °F)   Temperature: 36.1 °C (97 °F), Monitored Temp: 35.8 °C (96.44 °F)  Pulse  Av.9  Min: 69  Max: 130 Heart Rate (Monitored): 100  NIBP: 103/44      Respiratory      Respiration: 20, Pulse Oximetry: 92 %, O2 Daily Delivery Respiratory : Highflow Nasal Cannula     Work Of Breathing / Effort: Moderate  RUL Breath Sounds: Coarse Crackles, RML Breath Sounds: Coarse Crackles;Diminished, RLL Breath Sounds: Diminished, EMERSON Breath Sounds: Coarse Crackles, LLL Breath Sounds: Diminished    Fluids    Intake/Output Summary (Last 24 hours) at 18 0556  Last data filed at 18 0400   Gross per 24 hour   Intake             1395 ml   Output             2800 ml   Net            -1405 ml       Nutrition  No orders of the defined types were placed in this encounter.    Physical Exam   Constitutional: He appears well-developed. He appears cachectic. No distress.   HENT:   Head: Normocephalic and atraumatic.   Eyes: Conjunctivae are normal. No scleral icterus.   Neck: No JVD present.   Cardiovascular: Normal rate.  Exam reveals no gallop.    Murmur  heard.  Pulmonary/Chest: Effort normal. No stridor. No respiratory distress. He has no wheezes. He has rales.   Abdominal: Soft. There is no tenderness. There is no rebound and no guarding.   Musculoskeletal: He exhibits no edema.   Neurological: He is alert.   Somnolent, rouses to physical stim.  Oriented x 2   Skin: Skin is warm and dry. No rash noted. He is not diaphoretic.   Nursing note and vitals reviewed.      Recent Labs      01/05/18 0410 01/06/18   0350  01/07/18   0435   WBC  14.3*  20.1*  21.3*   RBC  2.45*  2.51*  2.46*   HEMOGLOBIN  7.4*  7.6*  7.3*   HEMATOCRIT  22.2*  22.7*  22.7*   MCV  90.6  90.4  92.3   MCH  30.2  30.3  29.7   MCHC  33.3*  33.5*  32.2*   RDW  54.3*  53.8*  56.0*   PLATELETCT  82*  75*  83*   MPV  12.6  12.7  13.5*     Recent Labs      01/05/18 0410  01/06/18   0350  01/07/18   0435   SODIUM  137  139  140   POTASSIUM  3.5*  3.7  3.4*   CHLORIDE  104  106  106   CO2  20  21  19*   GLUCOSE  331*  205*  252*   BUN  44*  35*  49*   CREATININE  3.63*  2.90*  3.44*   CALCIUM  8.0*  8.1*  8.3*                      Assessment/Plan     * Severe sepsis (CMS-HCC)   Assessment & Plan    - This is severe sepsis with the following associated acute organ dysfunction(s): acute kidney failure, acute respiratory failure, critical illness myopathy.   - d/t MRSA bacteremia, pneumonia and influenza  - continue Teflaro total 6 wks from neg cultures  Completed 5 days tamiflu  - BP improved with fluids  ID following  TTE neg for vegetations: plan DORETHA once pt respiratory status has improved        High anion gap metabolic acidosis   Assessment & Plan    - severe, due to sepsis/lactic acidosis and renal failure  - continue oral bicarbonate  -Acidosis resolved, gap still present        Acute on chronic renal failure (CMS-HCC)- (present on admission)   Assessment & Plan    Nephrology following  On HD per Nephrology        Acute respiratory failure with hypoxia (CMS-HCC)   Assessment & Plan    Treating  pneumonia  Has completed oseltamivir for Influenza B  Volume is an issue: Nephrology managing volume with HD  Residual ARDS from infection        Bacteremia due to Staphylococcus aureus   Assessment & Plan    Plan 6 weeks Teflaro        Protein-calorie malnutrition, severe (CMS-HCC)   Assessment & Plan    cont's with poor po intake  Now with cortrak and tolerating well        Atrial fibrillation (CMS-HCC)   Assessment & Plan    - initially rate controlled but then tachycardic  - likely d/t sepsis and dehydration  - improved with IVF, rate now controlled          Diarrhea   Assessment & Plan    - c-diff negative        CAP (community acquired pneumonia)- (present on admission)   Assessment & Plan    MRSA  ID following   Ceftaroline          Insulin dependent diabetes mellitus (CMS-HCC)   Assessment & Plan    - diabetic diet  - continue lantus and ISS, adjust as needed  - glucose is only mildly elevated, no reason to suspect DKA is the cause of his acidosis          Influenza A   Assessment & Plan    Completed oseltamivir        Cryptogenic cirrhosis (CMS-HCC)   Assessment & Plan    - due to alpha-1 antitrypsin  - With ascites, significant, causing abdominal pain  - s/p tap 1/1  -cont to follow          Normocytic anemia- (present on admission)   Assessment & Plan    Stable  Cont to follow daily  - no sign of gross bleeding            Reviewed items::  Radiology images reviewed, EKG reviewed, Labs reviewed and Medications reviewed  Ca catheter::  No Ca  DVT prophylaxis - mechanical:  SCDs  Ulcer Prophylaxis::  Not indicated  Antibiotics:  Treating active infection/contamination beyond 24 hours perioperative coverage

## 2018-01-07 NOTE — PROGRESS NOTES
Hemodialysis on PUF mode done today, started @   1330 and ended @1602 with net UF 2000 ml obtained, BP supported with preload albumin 25 grams with better result, report given to PABLO Jones, see flow sheet for details.

## 2018-01-07 NOTE — PROGRESS NOTES
Pt upon first assessment alert and oriented x 2, self and place.VSS on [place] 60L 100%FIO2.  Pt follow commands placed in the chair position and ROM done, teeth brushed.   Pt needs help with some movements but reacts strongly with pushes on his legs.  Pt stated his abdominal area tender today.      1200 Pt given full bath, shampoo, foot care, benavidez care, rectal tube flushed with 210cc of water as he leaked around the tube.      1300 Spoke With Dr. Stephon Rowe and obtained pain meds for patient as his abdominal pain seem more then at 0800 and 1000.    1700 Pt had leading around rectal tube, pt was very restless.. Balloon checked only at 32cc.. Increased to 40cc

## 2018-01-07 NOTE — PROGRESS NOTES
Infectious Disease Progress Note    Author: Kailee Adams M.D. Date & Time of service: 2018  12:31 PM    Chief Complaint:  Follow-up for MRSA sepsis and influenza A    Interval History:  2018-MAXIMUM TEMPERATURE 99 remains on high flow oxygen . WBC 15.5 creatinine 5  1/2 AF WBC 10.7 encephalopathic; agitated in bed  1/3 AF WBC 9.5 less agitated-somnolent today  / AF WBC not done getting HD-tolerating so far   AF WBC 14.3 getting HD remains obtunded  1/ AF (episodes hypothermia) WBC 20 more awake today-   AF WBC 21.3 more obtunded-pulling and catheters/lines  Labs Reviewed, Medications Reviewed and Radiology Reviewed.    Review of Systems:  Review of Systems   Unable to perform ROS: Mental status change   Constitutional: Negative for fever.       Hemodynamics:  Temp (24hrs), Av.1 °C (97 °F), Min:36.1 °C (97 °F), Max:36.1 °C (97 °F)  Temperature: 36.1 °C (97 °F), Monitored Temp: 36.4 °C (97.5 °F)  Pulse  Av  Min: 69  Max: 130Heart Rate (Monitored): 95  NIBP: (!) 96/47       Physical Exam:  Physical Exam   Constitutional:   Thin  Chronically ill   HENT:   Head: Normocephalic and atraumatic.   Mouth/Throat: No oropharyngeal exudate.   Eyes: Pupils are equal, round, and reactive to light. No scleral icterus.   Neck: Neck supple.   Right HD cath   Cardiovascular: Normal rate.    Murmur heard.  Irregularly Irregular   Pulmonary/Chest: Effort normal. No respiratory distress. He has no wheezes. He has rales.   Left-sided pacemaker-no erythema   Abdominal: Soft. He exhibits distension. There is no tenderness. There is no rebound.   Musculoskeletal: He exhibits no edema.   RUE AVF   Neurological:   awake   Skin: No rash noted.   Nursing note and vitals reviewed.      Meds:    Current Facility-Administered Medications:   •  insulin regular **AND** Accu-Chek ACHS **AND** NOTIFY MD and PharmD **AND** glucose 4 g **AND** dextrose 50%  •  insulin NPH  •  miconazole 2%-zinc oxide  •  ceftaroline  (TEFLARO) ivpb  •  heparin  •  albumin human 25%  •  haloperidol lactate  •  calcium acetate  •  sodium bicarbonate  •  epoetin dariana  •  rosuvastatin  •  Respiratory Care per Protocol  •  heparin    Labs:  Recent Labs      01/05/18 0410 01/06/18 0350 01/07/18   0435   WBC  14.3*  20.1*  21.3*   RBC  2.45*  2.51*  2.46*   HEMOGLOBIN  7.4*  7.6*  7.3*   HEMATOCRIT  22.2*  22.7*  22.7*   MCV  90.6  90.4  92.3   MCH  30.2  30.3  29.7   RDW  54.3*  53.8*  56.0*   PLATELETCT  82*  75*  83*   MPV  12.6  12.7  13.5*     Recent Labs      01/05/18 0410 01/06/18 0350 01/07/18   0435   SODIUM  137  139  140   POTASSIUM  3.5*  3.7  3.4*   CHLORIDE  104  106  106   CO2  20  21  19*   GLUCOSE  331*  205*  252*   BUN  44*  35*  49*     Recent Labs      01/05/18 0410 01/06/18 0350 01/07/18   0435   CREATININE  3.63*  2.90*  3.44*       Imaging:  Dx-chest-limited (1 View)    Result Date: 12/31/2017 12/31/2017 9:40 AM HISTORY/REASON FOR EXAM:  Shortness of Breath. TECHNIQUE/EXAM DESCRIPTION AND NUMBER OF VIEWS: Single AP view of the chest. COMPARISON:  1 view chest 12/30/2017 FINDINGS: There are bilateral upper lobe airspace process most consistent with edema. Cardiac Silhouette: normal in size. There is aortic atherosclerosis.There is a cardiac pacemaker. Pleura: There are no pleural effusion or pneumothoraces. Osseous structures: No significant bony abnormality is present.     Unchanged bilateral upper lobe airspace process that are most consistent with edema    Dx-chest-portable (1 View)    Result Date: 12/30/2017 12/30/2017 2:43 AM HISTORY/REASON FOR EXAM: Shortness of Breath TECHNIQUE/EXAM DESCRIPTION:  Single AP view of the chest. COMPARISON: Yesterday FINDINGS: Position of medical devices appears stable. The heart is in the upper limits of normal for size. Atherosclerotic calcification of the aorta is noted.  The central  pulmonary vasculature appears prominent and indistinct. The lungs appear well  expanded bilaterally.  Diffuse scattered hazy pulmonary parenchymal opacities are seen. No significant pleural effusions are identified. The bony structures appear age-appropriate.     1.  Pulmonary edema and/or infiltrates are identified, which appear increased since the prior exam. 2.  Atherosclerosis    Dx-chest-portable (1 View)    Result Date: 12/29/2017 12/29/2017 10:41 AM HISTORY/REASON FOR EXAM:  Possible sepsis. TECHNIQUE/EXAM DESCRIPTION AND NUMBER OF VIEWS: Single portable view of the chest. COMPARISON: 2/16/2017 FINDINGS: Left-sided pacemaker in place. Diffuse interstitial prominence and patchy multifocal opacities throughout both lungs. No pleural effusion. No pneumothorax. Stable cardiopericardial silhouette.     Diffuse interstitial prominence and patchy multifocal opacities throughout both lungs could be seen with multifocal infection or pulmonary edema.    Us-paracentesis, Abd With Imaging    Result Date: 12/27/2017 12/26/2017 4:54 PM HISTORY/REASON FOR EXAM:  Ascites TECHNIQUE/EXAM DESCRIPTION: Ultrasound-guided paracentesis. COMPARISON:  12/24/17 PROCEDURE:  Informed consent was obtained. A timeout was taken. Ascites was localized with real-time ultrasound. The right lower quadrant of the abdominal wall was prepared and draped in a sterile manner. Following local anesthesia with 1% lidocaine, a 5  Turkmen Yueh pigtail catheter was advanced into the peritoneal cavity with trocar technique. Ascites was drained. The patient tolerated the procedure well with no evidence of complication. FINDINGS: Ascites is present. Fluid was not sent to the laboratory. Fluid character: straw colored     1. Ultrasound-guided therapeutic paracentesis of the right lower quadrant of the abdominal wall. 2. 4700 mL of fluid withdrawn.    Echocardiogram Comp W/o Cont    Result Date: 12/30/2017  Transthoracic Echo Report Echocardiography Laboratory CONCLUSIONS Mild concentric left ventricular hypertrophy. Pacer/ICD wire  seen in right ventricle. The mitral valve is not well visualized. The tricuspid valve is not well visualized. Trace tricuspid regurgitation. Structurally normal aortic valve without significant stenosis or regurgitation. No vegetation seen LV EF:  75    % FINDINGS Left Ventricle Normal left ventricular chamber size. Mild concentric left ventricular hypertrophy. Normal left ventricular systolic function. Left ventricular ejection fraction is visually estimated to be greater than 75%. Right Ventricle The right ventricle was normal in size and function.  Pacer/ICD wire seen in right ventricle. Right Atrium The right atrium is normal in size.  Catheter/pacemaker wire present in the right atrial cavity. Left Atrium The left atrium is normal in size. Mitral Valve The mitral valve is not well visualized. Aortic Valve Structurally normal aortic valve without significant stenosis or regurgitation. Tricuspid Valve The tricuspid valve is not well visualized.  Trace tricuspid regurgitation. Right ventricular systolic pressure is estimated to be 25 mmHg. Pulmonic Valve Structurally normal pulmonic valve without significant stenosis or regurgitation. Pericardium Normal pericardium without effusion.  Ascites present. Aorta The aortic root is normal. Soham Gottlieb M.D. (Electronically Signed) Final Date:     30 December 2017                 17:19      Micro:  Results     Procedure Component Value Units Date/Time    BLOOD CULTURE [[id number]] Collected:  01/06/18 1700    Order Status:  Completed Specimen:  Blood from Peripheral Updated:  01/07/18 0857     Significant Indicator NEG     Source BLD     Site PERIPHERAL     Blood Culture --     No Growth    Note: Blood cultures are incubated for 5 days and  are monitored continuously.Positive blood cultures  are called to the RN and reported as soon as  they are identified.      Narrative:       Droplet,[id number] SUSANNA BARAHONA  Per Hospital Policy: Only change Specimen Src:  "to \"Line\" if  specified by physician order.    BLOOD CULTURE [[id number]] Collected:  01/06/18 1700    Order Status:  Completed Specimen:  Blood from Peripheral Updated:  01/07/18 0857     Significant Indicator NEG     Source BLD     Site PERIPHERAL     Blood Culture --     No Growth    Note: Blood cultures are incubated for 5 days and  are monitored continuously.Positive blood cultures  are called to the RN and reported as soon as  they are identified.      Narrative:       Droplet,[id number] SUSANNA BARAHONA  Per Hospital Policy: Only change Specimen Src: to \"Line\" if  specified by physician order.    FLUID CULTURE W/GRAM STAIN [[id number]] Collected:  01/01/18 1415    Order Status:  Completed Specimen:  Body Fluid Updated:  01/04/18 0836     Gram Stain Result --     Rare WBCs.  No organisms seen.       Significant Indicator NEG     Source BF     Site Peritoneal Fluid     Culture Result Bdf No growth at 72 hours.    Narrative:       Peritoneal Fluid    BLOOD CULTURE [[id number]] Collected:  12/29/17 0926    Order Status:  Completed Specimen:  Blood from Peripheral Updated:  01/03/18 1100     Significant Indicator NEG     Source BLD     Site PERIPHERAL     Blood Culture No growth after 5 days of incubation.    Narrative:       Per Hospital Policy: Only change Specimen Src: to \"Line\" if  specified by physician order.    BLOOD CULTURE [[id number]] Collected:  01/02/18 1135    Order Status:  Completed Specimen:  Blood from Peripheral Updated:  01/03/18 0834     Significant Indicator NEG     Source BLD     Site PERIPHERAL     Blood Culture --     No Growth    Note: Blood cultures are incubated for 5 days and  are monitored continuously.Positive blood cultures  are called to the RN and reported as soon as  they are identified.      Narrative:       Droplet,[id number] BARBRA MINER  Per Hospital Policy: Only change Specimen Src: to \"Line\" if  specified by physician order.    BLOOD CULTURE [[id number]] Collected:  " "01/02/18 1135    Order Status:  Completed Specimen:  Blood from Peripheral Updated:  01/03/18 0834     Significant Indicator NEG     Source BLD     Site PERIPHERAL     Blood Culture --     No Growth    Note: Blood cultures are incubated for 5 days and  are monitored continuously.Positive blood cultures  are called to the RN and reported as soon as  they are identified.      Narrative:       Droplet,[id number] BARBRA MINER  Per Hospital Policy: Only change Specimen Src: to \"Line\" if  specified by physician order.    GRAM STAIN [[id number]] Collected:  01/01/18 1415    Order Status:  Completed Specimen:  Body Fluid Updated:  01/01/18 2024     Significant Indicator .     Source BF     Site Peritoneal Fluid     Gram Stain Result --     Rare WBCs.  No organisms seen.      Narrative:       Peritoneal Fluid    BLOOD CULTURE [[id number]]  (Abnormal)  (Susceptibility) Collected:  12/29/17 1014    Order Status:  Completed Specimen:  Blood from Peripheral Updated:  01/01/18 1040     Significant Indicator POS (POS)     Source BLD     Site PERIPHERAL     Blood Culture -- (A)     Growth detected by Bactec instrument.  12/30/2017  05:43  Methicillin resistant Staphylococcus aureus (MRSA)  detected by PCR.       Blood Culture Methicillin Resistant Staphylococcus aureus (A)    Narrative:       CALL  [facility]  161 tel. [phone],  CALLED  161 tel. [phone] 12/30/2017, 13:38, RB PERF. RESULTS CALLED TO:[phone]  Per Hospital Policy: Only change Specimen Src: to \"Line\" if  specified by physician order.    Culture & Susceptibility     METHICILLIN RESISTANT STAPHYLOCOCCUS AUREUS     Antibiotic Sensitivity Microscan Unit Status    Ampicillin/sulbactam Resistant 16/8 mcg/mL Final    Clindamycin Resistant >4 mcg/mL Final    Daptomycin Sensitive 1 mcg/mL Final    Erythromycin Resistant >4 mcg/mL Final    Moxifloxacin Resistant >4 mcg/mL Final    Oxacillin Resistant >2 mcg/mL Final    Penicillin Resistant >8 mcg/mL Final    Tetracycline " Sensitive <=4 mcg/mL Final    Trimeth/Sulfa Sensitive <=0.5/9.5 mcg/mL Final    Vancomycin Sensitive 2 mcg/mL Final                       Fluid Culture with Gram Stain [[id number]]     Order Status:  No result Specimen:  Body Fluid from Peritoneal Fluid     URINE CULTURE(NEW) [[id number]] Collected:  12/30/17 1559    Order Status:  Completed Specimen:  Urine Updated:  01/01/18 0826     Significant Indicator NEG     Source UR     Site --     Urine Culture No growth at 48 hours    Narrative:       Indication for culture:->Emergency Room Patient  If not done within the last 24 hours          Assessment:  Active Hospital Problems    Diagnosis   • *Severe sepsis (CMS-HCC) [A41.9, R65.20]   • High anion gap metabolic acidosis [E87.2]   • Acute on chronic renal failure (CMS-HCC) [N17.9, N18.9]   • Protein-calorie malnutrition, severe (CMS-HCC) [E43]   • 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]   • Influenza A [J10.1]   • Insulin dependent diabetes mellitus (CMS-HCC) [E11.9, Z79.4]   • Diarrhea [R19.7]   • Atrial fibrillation (CMS-HCC) [I48.91]   • CAP (community acquired pneumonia) [J18.9]   • Normocytic anemia [D64.9]       Plan:  MRSA sepsis  Afebrile  Blood cultures +12/29/17  Blood cultures 1/2 no growth   TTE negative on 12/30/17  Due to pacemaker and recent AV graft- needs DORETHA if feasible  Continue ceftaroline for now (vanco BRYANT 2)  Anticipate 6 week course from date neg blood cxs  Stop date 2/14/2018    Leukocytosis, worsening  Repeat blood cxs 1/6 neg at 24 hours  DORETHA recommended as above  CXR no sig change 1/7-no effusion  CT head no acute change    Influenza A  Complete 5 days of Tamiflu    Cryptogenic cirrhosis  Leading to ascites  s/p paracentesis     Renal failure, not improving  Recent AV graft  Dose adjust abx as needed  Getting HD    Diabetes mellitus  Keep BS under 150 to help control current infection      RASHAWN SHAH

## 2018-01-07 NOTE — PROGRESS NOTES
[facility] Nephrology Progress Note               Author: Pio Duffymina Date & Time created: 1/7/2018  10:29 AM     Interval History:  77-year-old  male well known to our service who I have followed as an outpatient for years.  He has CKD stage V due to diabetes and hypertension.  We have anticipated eventual need for dialysis and already had placed an AV fistula in his right upper arm.  He routinely follows with this, was most recently seen 4 weeks ago.  At that time, he had creatinine of 4.8, hemoglobin of 10, normal electrolytes.  He was not uremic or fluid overloaded.  Hemodialysis has not yet been initiated.     The patient presented to the emergency room complaining of coughing for 2 or 3weeks.  He has been only productive over the last couple of days.  He became increasingly weak, unable to provide care for himself.  He could not get around.  He was not eating very well.     In the ER, he presented, he was normotensive with a blood pressure of 140/96, pulse 80.  He was mildly hypoxic on room air, was started on a little bit of oxygen.  Laboratory showed white count of 16 with a left shift.  He also has evidence potentially of fluid.  Chest x-ray suggests a possible pneumonia.     Laboratory results showed BUN 91, creatinine 5.3, potassium 5.2, was not fluid  overloaded on exam.  His lactic acid levels were elevated.  He is being admitted to the hospital under the hospitalist care.  He was given some  intravenous fluids.  We have been asked to follow him for CKD stage V + acidosis    Daily Nephrology Summary  12/29/17 - see in ER - IVF recommended.  Advanced CKD 5 - if no response to fluids will need to start dialysis  12/30/17 - placed on bicarb overnight.  SCreat down a bit.  K ok.  Bicarb improving.  Urine output 960cc  12/31/17 - Azotemia stable.  Not really expecting significant improvement.  On lots of O2 w Pnx.    BP low.    01/01/18 - Feels fine. Scr is the same. Non-oliguric. No  SOB.   01/02/18 - s/p  US guided paracentesis of RLQ. 4900 mL was removed and 1000 mL sent to lab. Scr is higher now. Did have some hypotension yesterday.  01/03/18 - Feels weak. No other renal events in the past 24 hrs.  01/04/18 - s/p HD 01/03/18 # 1. Tolerated well but low BP. No UF  01/05/18 - s/p HD yesterday #2 with Net UF removed 1000 ml. Pt is confused but able to follow simple commands  01/06/18 - S/p HD yesterday with net UF of 600 mL only given hypotension. O2 requirements still high.   01/07/18 - S/p PUF yesterday with net UF 2000 ml. No new overnight renal events.     Review of Systems   Constitutional: Positive for malaise/fatigue.   HENT: Negative.    Eyes: Negative.    Respiratory: Positive for cough and sputum production.    Cardiovascular: Negative.    Gastrointestinal: Negative.    Genitourinary: Negative.    Musculoskeletal: Negative.    Skin: Negative.        Physical Exam   Constitutional: No distress.   HENT:   Head: Atraumatic.   Eyes: No scleral icterus.   Neck: No JVD present.   Cardiovascular: Normal rate.  Exam reveals no friction rub.    Pulmonary/Chest: No respiratory distress.   Coarse BS bilateral.   Abdominal: Soft. There is no tenderness.   Musculoskeletal: He exhibits no edema.   Neurological: He is alert.   Skin: Skin is warm and dry.       Labs:        Invalid input(s): [id number]      Recent Labs      01/04/18   1400  01/05/18   0410  01/06/18   0350  01/07/18   0435   SODIUM  137  137  139  140   POTASSIUM  3.6  3.5*  3.7  3.4*   CHLORIDE  103  104  106  106   CO2  22  20  21  19*   BUN  32*  44*  35*  49*   CREATININE  2.61*  3.63*  2.90*  3.44*   MAGNESIUM  2.0   --    --    --    CALCIUM  8.3*  8.0*  8.1*  8.3*     Recent Labs      01/05/18   0410  01/06/18   0350  01/07/18   0435   GLUCOSE  331*  205*  252*     Recent Labs      01/05/18   0410  01/06/18   0350  01/07/18   0435   RBC  2.45*  2.51*  2.46*   HEMOGLOBIN  7.4*  7.6*  7.3*   HEMATOCRIT  22.2*  22.7*  22.7*    PLATELETCT  82*  75*  83*     Recent Labs      18   0410  18   0350  18   0435   WBC  14.3*  20.1*  21.3*           Hemodynamics:  Temp (24hrs), Av.1 °C (97 °F), Min:36.1 °C (97 °F), Max:36.1 °C (97 °F)  Temperature: 36.1 °C (97 °F), Monitored Temp: 36.4 °C (97.5 °F)  Pulse  Av  Min: 69  Max: 130Heart Rate (Monitored): 96  NIBP: (!) 96/47     Respiratory:    Respiration: (!) 23, Pulse Oximetry: 94 %, O2 Daily Delivery Respiratory : Highflow Nasal Cannula     Work Of Breathing / Effort: Mild  RUL Breath Sounds: Coarse Crackles, RML Breath Sounds: Coarse Crackles;Diminished, RLL Breath Sounds: Diminished, [place] Breath Sounds: Coarse Crackles, LLL Breath Sounds: Diminished  Fluids:    Intake/Output Summary (Last 24 hours) at 18 1029  Last data filed at 18 0800   Gross per 24 hour   Intake             1465 ml   Output             3190 ml   Net            -1725 ml     Weight: 68.5 kg (151 lb 0.2 oz) (after deducting 2 kg for waffle mattress)  GI/Nutrition:  No orders of the defined types were placed in this encounter.    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 Left AVF immature for use.  -- monitor I/Os  -- avoid contrast    # Acidosis  -- metabolic  -- corrected with HD    # PNX  -- on O2 + ABX    # Anemia  -- chronic   -- no overt bleeding  --  presumed due to CKD 5  -- give EPOGEN while here  -- check iron levels    # Hypocalcemia  -- secondary HPTH likely  -- add Phoslo 667mg TID w meals    # BP   -- on low side with infection  -- Maintain MAP>65    No plans for HD/PUF today. We re-evaluate for tomorrow.  Suspect respiratory issues more due to PNA than volume.          Quality-Core Measures

## 2018-01-08 NOTE — PROGRESS NOTES
[facility] Hospitalist Progress Note    Date of Service: 2018    Chief Complaint  77 y.o. male admitted 2017 with Pt noted to have influenza a.  Also likely with pneumonia.  Also with diarrhea, c-diff negative.  Initially had low BP. Very acidotic with worsening renal failure and elevated lactic acid initially.  Nephrology consulted as well as ID    Interval Problem Update  Intubated   RIJ   Responses are delayed but appropriate  Paced, sinus  AFebrile  AFib 80-90  On TF's  HD 2500ml   HFNC 60L/100%  Tapped 5 litres   oseltamivir completed  Ceftaroline to run to           Consultants/Specialty  Nephrology  Pulmonology    Disposition  Cont in ICU as respiratory status is tenuous        Review of Systems   Unable to perform ROS: Other   Neurological: Positive for weakness.      Physical Exam  Laboratory/Imaging   Hemodynamics  No data recorded.   Monitored Temp: 35.5 °C (95.9 °F)  Pulse  Av.8  Min: 69  Max: 130 Heart Rate (Monitored): 94  NIBP: (!) 101/37      Respiratory      Respiration: 19, Pulse Oximetry: 93 %, O2 Daily Delivery Respiratory : Highflow Nasal Cannula     Work Of Breathing / Effort: Mild  RUL Breath Sounds: Coarse Crackles, RML Breath Sounds: Coarse Crackles;Diminished, RLL Breath Sounds: Diminished, EMERSON Breath Sounds: Coarse Crackles, LLL Breath Sounds: Diminished    Fluids    Intake/Output Summary (Last 24 hours) at 18 0550  Last data filed at 18 0400   Gross per 24 hour   Intake              805 ml   Output             1010 ml   Net             -205 ml       Nutrition  No orders of the defined types were placed in this encounter.    Physical Exam   Constitutional: He appears well-developed. He appears cachectic. No distress.   HENT:   Head: Normocephalic and atraumatic.   Eyes: Conjunctivae are normal. No scleral icterus.   Neck: No JVD present.   Cardiovascular: Normal rate.  Exam reveals no gallop.    Murmur heard.  Pulmonary/Chest: Effort normal. No stridor.  No respiratory distress. He has no wheezes. He has rales.   Abdominal: Soft. There is no tenderness. There is no rebound and no guarding.   Musculoskeletal: He exhibits no edema.   Neurological: He is alert.   Somnolent, rouses to physical stim.  Oriented x 2   Skin: Skin is warm and dry. No rash noted. He is not diaphoretic.   Nursing note and vitals reviewed.      Recent Labs      01/06/18   0350  01/07/18   0435  01/08/18   0513   WBC  20.1*  21.3*  21.8*   RBC  2.51*  2.46*  2.49*   HEMOGLOBIN  7.6*  7.3*  7.3*   HEMATOCRIT  22.7*  22.7*  23.5*   MCV  90.4  92.3  94.4   MCH  30.3  29.7  29.3   MCHC  33.5*  32.2*  31.1*   RDW  53.8*  56.0*  59.4*   PLATELETCT  75*  83*  81*   MPV  12.7  13.5*  13.4*     Recent Labs      01/06/18   0350  01/07/18   0435   SODIUM  139  140   POTASSIUM  3.7  3.4*   CHLORIDE  106  106   CO2  21  19*   GLUCOSE  205*  252*   BUN  35*  49*   CREATININE  2.90*  3.44*   CALCIUM  8.1*  8.3*                      Assessment/Plan     * Severe sepsis (CMS-HCC)   Assessment & Plan    - This is severe sepsis with the following associated acute organ dysfunction(s): acute kidney failure, acute respiratory failure, critical illness myopathy.   - d/t MRSA bacteremia, pneumonia and influenza  - continue Teflaro total 6 wks from neg cultures  Completed 5 days tamiflu  - BP improved with fluids  ID following  TTE neg for vegetations: plan DORETHA once pt respiratory status has improved        High anion gap metabolic acidosis   Assessment & Plan    - severe, due to sepsis/lactic acidosis and renal failure  - continue oral bicarbonate  -Acidosis resolved, gap still present        Acute on chronic renal failure (CMS-HCC)- (present on admission)   Assessment & Plan    Nephrology following  On HD per Nephrology        Acute respiratory failure with hypoxia (CMS-HCC)   Assessment & Plan    Treating pneumonia  Has completed oseltamivir for Influenza B  Volume is an issue: Nephrology managing volume with HD  Neg  1.8 litres/24hrs  Residual ARDS from infection        Bacteremia due to Staphylococcus aureus   Assessment & Plan    Plan 6 weeks Teflaro        Protein-calorie malnutrition, severe (CMS-HCC)   Assessment & Plan    cont's with poor po intake  Now with cortrak and tolerating well        Atrial fibrillation (CMS-HCC)   Assessment & Plan    - initially rate controlled but then tachycardic  - likely d/t sepsis and dehydration  - improved with IVF, rate now controlled          Diarrhea   Assessment & Plan    - c-diff negative        CAP (community acquired pneumonia)- (present on admission)   Assessment & Plan    MRSA  ID following   Ceftaroline          Insulin dependent diabetes mellitus (CMS-HCC)   Assessment & Plan    - diabetic diet  - continue lantus and ISS, adjust as needed  - glucose is only mildly elevated, no reason to suspect DKA is the cause of his acidosis          Influenza A   Assessment & Plan    Completed oseltamivir        Cryptogenic cirrhosis (CMS-HCC)   Assessment & Plan    - due to alpha-1 antitrypsin  - With ascites, significant, causing abdominal pain  - s/p tap 1/1  -cont to follow          Normocytic anemia- (present on admission)   Assessment & Plan    Stable  Cont to follow daily  - no sign of gross bleeding            Reviewed items::  Radiology images reviewed, EKG reviewed, Labs reviewed and Medications reviewed  Ca catheter::  No Ca  DVT prophylaxis - mechanical:  SCDs  Ulcer Prophylaxis::  Not indicated  Antibiotics:  Treating active infection/contamination beyond 24 hours perioperative coverage

## 2018-01-08 NOTE — PROGRESS NOTES
Infectious Disease Progress Note    Author: Magy Marks M.D. Date & Time of service: 2018  10:58 AM    Chief Complaint:  Follow-up for MRSA sepsis and influenza A    Interval History:  2018-MAXIMUM TEMPERATURE 99 remains on high flow oxygen . WBC 15.5 creatinine 5  1/2 AF WBC 10.7 encephalopathic; agitated in bed  /3 AF WBC 9.5 less agitated-somnolent today   AF WBC not done getting HD-tolerating so far   AF WBC 14.3 getting HD remains obtunded   AF (episodes hypothermia) WBC 20 more awake today-   AF WBC 21.3 more obtunded-pulling and catheters/lines   AF, WBC 21.8, pt obtunded, not responding to verbal stimuli, respiratory status tenuous, DORETHA on hold  Labs Reviewed, Medications Reviewed and Radiology Reviewed.    Review of Systems:  Review of Systems   Unable to perform ROS: Mental status change   pt obtunded and not responding to voice    Hemodynamics:  Temp (24hrs), Av.9 °C (96.6 °F), Min:35.9 °C (96.6 °F), Max:35.9 °C (96.6 °F)  Temperature: 35.9 °C (96.6 °F), Monitored Temp: 35.5 °C (95.9 °F)  Pulse  Av.8  Min: 69  Max: 130Heart Rate (Monitored): 77  NIBP: (!) 102/38       Physical Exam:  Physical Exam   Constitutional:   Thin  Chronically ill   HENT:   Head: Normocephalic and atraumatic.   Mouth/Throat: No oropharyngeal exudate.   HFNC   Eyes: Pupils are equal, round, and reactive to light. No scleral icterus.   Neck: Neck supple.   Right HD cath   Cardiovascular: Normal rate.    Murmur heard.  Irregularly Irregular   Pulmonary/Chest: Effort normal. No respiratory distress. He has no wheezes. He has rales.   Left-sided pacemaker-no erythema   Abdominal: Soft. He exhibits distension. There is no tenderness. There is no rebound.   Musculoskeletal: He exhibits no edema.   RUE AVF   Neurological:   Obtunded   Skin: No rash noted.   Nursing note and vitals reviewed.      Meds:    Current Facility-Administered Medications:   •  potassium bicarbonate  •  insulin regular  **AND** Accu-Chek ACHS **AND** NOTIFY MD and PharmD **AND** glucose 4 g **AND** dextrose 50%  •  insulin NPH  •  oxycodone immediate-release **OR** oxycodone immediate-release  •  miconazole 2%-zinc oxide  •  ceftaroline (TEFLARO) ivpb  •  heparin  •  albumin human 25%  •  haloperidol lactate  •  calcium acetate  •  sodium bicarbonate  •  epoetin dariana  •  rosuvastatin  •  Respiratory Care per Protocol  •  heparin    Labs:  Recent Labs      01/06/18   0350  01/07/18   0435  01/08/18   0513   WBC  20.1*  21.3*  21.8*   RBC  2.51*  2.46*  2.49*   HEMOGLOBIN  7.6*  7.3*  7.3*   HEMATOCRIT  22.7*  22.7*  23.5*   MCV  90.4  92.3  94.4   MCH  30.3  29.7  29.3   RDW  53.8*  56.0*  59.4*   PLATELETCT  75*  83*  81*   MPV  12.7  13.5*  13.4*     Recent Labs      01/06/18   0350  01/07/18   0435  01/08/18   0513   SODIUM  139  140  142   POTASSIUM  3.7  3.4*  3.3*   CHLORIDE  106  106  109   CO2  21  19*  18*   GLUCOSE  205*  252*  226*   BUN  35*  49*  67*     Recent Labs      01/06/18   0350  01/07/18   0435  01/08/18   0513   CREATININE  2.90*  3.44*  4.41*       Imaging:  Dx-chest-limited (1 View)    Result Date: 12/31/2017 12/31/2017 9:40 AM HISTORY/REASON FOR EXAM:  Shortness of Breath. TECHNIQUE/EXAM DESCRIPTION AND NUMBER OF VIEWS: Single AP view of the chest. COMPARISON:  1 view chest 12/30/2017 FINDINGS: There are bilateral upper lobe airspace process most consistent with edema. Cardiac Silhouette: normal in size. There is aortic atherosclerosis.There is a cardiac pacemaker. Pleura: There are no pleural effusion or pneumothoraces. Osseous structures: No significant bony abnormality is present.     Unchanged bilateral upper lobe airspace process that are most consistent with edema    Dx-chest-portable (1 View)    Result Date: 12/30/2017 12/30/2017 2:43 AM HISTORY/REASON FOR EXAM: Shortness of Breath TECHNIQUE/EXAM DESCRIPTION:  Single AP view of the chest. COMPARISON: Yesterday FINDINGS: Position of medical  devices appears stable. The heart is in the upper limits of normal for size. Atherosclerotic calcification of the aorta is noted.  The central  pulmonary vasculature appears prominent and indistinct. The lungs appear well expanded bilaterally.  Diffuse scattered hazy pulmonary parenchymal opacities are seen. No significant pleural effusions are identified. The bony structures appear age-appropriate.     1.  Pulmonary edema and/or infiltrates are identified, which appear increased since the prior exam. 2.  Atherosclerosis    Dx-chest-portable (1 View)    Result Date: 12/29/2017 12/29/2017 10:41 AM HISTORY/REASON FOR EXAM:  Possible sepsis. TECHNIQUE/EXAM DESCRIPTION AND NUMBER OF VIEWS: Single portable view of the chest. COMPARISON: 2/16/2017 FINDINGS: Left-sided pacemaker in place. Diffuse interstitial prominence and patchy multifocal opacities throughout both lungs. No pleural effusion. No pneumothorax. Stable cardiopericardial silhouette.     Diffuse interstitial prominence and patchy multifocal opacities throughout both lungs could be seen with multifocal infection or pulmonary edema.    Us-paracentesis, Abd With Imaging    Result Date: 12/27/2017 12/26/2017 4:54 PM HISTORY/REASON FOR EXAM:  Ascites TECHNIQUE/EXAM DESCRIPTION: Ultrasound-guided paracentesis. COMPARISON:  12/24/17 PROCEDURE:  Informed consent was obtained. A timeout was taken. Ascites was localized with real-time ultrasound. The right lower quadrant of the abdominal wall was prepared and draped in a sterile manner. Following local anesthesia with 1% lidocaine, a 5  Romansh Yueh pigtail catheter was advanced into the peritoneal cavity with trocar technique. Ascites was drained. The patient tolerated the procedure well with no evidence of complication. FINDINGS: Ascites is present. Fluid was not sent to the laboratory. Fluid character: straw colored     1. Ultrasound-guided therapeutic paracentesis of the right lower quadrant of the abdominal wall.  2. 4700 mL of fluid withdrawn.    Echocardiogram Comp W/o Cont    Result Date: 12/30/2017  Transthoracic Echo Report Echocardiography Laboratory CONCLUSIONS Mild concentric left ventricular hypertrophy. Pacer/ICD wire seen in right ventricle. The mitral valve is not well visualized. The tricuspid valve is not well visualized. Trace tricuspid regurgitation. Structurally normal aortic valve without significant stenosis or regurgitation. No vegetation seen LV EF:  75    % FINDINGS Left Ventricle Normal left ventricular chamber size. Mild concentric left ventricular hypertrophy. Normal left ventricular systolic function. Left ventricular ejection fraction is visually estimated to be greater than 75%. Right Ventricle The right ventricle was normal in size and function.  Pacer/ICD wire seen in right ventricle. Right Atrium The right atrium is normal in size.  Catheter/pacemaker wire present in the right atrial cavity. Left Atrium The left atrium is normal in size. Mitral Valve The mitral valve is not well visualized. Aortic Valve Structurally normal aortic valve without significant stenosis or regurgitation. Tricuspid Valve The tricuspid valve is not well visualized.  Trace tricuspid regurgitation. Right ventricular systolic pressure is estimated to be 25 mmHg. Pulmonic Valve Structurally normal pulmonic valve without significant stenosis or regurgitation. Pericardium Normal pericardium without effusion.  Ascites present. Aorta The aortic root is normal. Soham Gottlieb M.D. (Electronically Signed) Final Date:     30 December 2017                 17:19      Micro:  Results     Procedure Component Value Units Date/Time    BLOOD CULTURE [[id number]] Collected:  01/02/18 1135    Order Status:  Completed Specimen:  Blood from Peripheral Updated:  01/07/18 1500     Significant Indicator NEG     Source BLD     Site PERIPHERAL     Blood Culture No growth after 5 days of incubation.    Narrative:       Droplet,[id number]  "BARBRA MINER  Per Hospital Policy: Only change Specimen Src: to \"Line\" if  specified by physician order.    BLOOD CULTURE [[id number]] Collected:  01/02/18 1135    Order Status:  Completed Specimen:  Blood from Peripheral Updated:  01/07/18 1500     Significant Indicator NEG     Source BLD     Site PERIPHERAL     Blood Culture No growth after 5 days of incubation.    Narrative:       Droplet,[id number] BARBRA MINER  Per Hospital Policy: Only change Specimen Src: to \"Line\" if  specified by physician order.    BLOOD CULTURE [[id number]] Collected:  01/06/18 1700    Order Status:  Completed Specimen:  Blood from Peripheral Updated:  01/07/18 0857     Significant Indicator NEG     Source BLD     Site PERIPHERAL     Blood Culture --     No Growth    Note: Blood cultures are incubated for 5 days and  are monitored continuously.Positive blood cultures  are called to the RN and reported as soon as  they are identified.      Narrative:       Droplet,[id number] SUSANNA BARAHONA  Per Hospital Policy: Only change Specimen Src: to \"Line\" if  specified by physician order.    BLOOD CULTURE [[id number]] Collected:  01/06/18 1700    Order Status:  Completed Specimen:  Blood from Peripheral Updated:  01/07/18 0857     Significant Indicator NEG     Source BLD     Site PERIPHERAL     Blood Culture --     No Growth    Note: Blood cultures are incubated for 5 days and  are monitored continuously.Positive blood cultures  are called to the RN and reported as soon as  they are identified.      Narrative:       Droplet,[id number] SUSANNA BARAHONA  Per Hospital Policy: Only change Specimen Src: to \"Line\" if  specified by physician order.    FLUID CULTURE W/GRAM STAIN [[id number]] Collected:  01/01/18 1415    Order Status:  Completed Specimen:  Body Fluid Updated:  01/04/18 0836     Gram Stain Result --     Rare WBCs.  No organisms seen.       Significant Indicator NEG     Source BF     Site Peritoneal Fluid     Culture Result " "Bdf No growth at 72 hours.    Narrative:       Peritoneal Fluid    BLOOD CULTURE [[id number]] Collected:  12/29/17 0926    Order Status:  Completed Specimen:  Blood from Peripheral Updated:  01/03/18 1100     Significant Indicator NEG     Source BLD     Site PERIPHERAL     Blood Culture No growth after 5 days of incubation.    Narrative:       Per Hospital Policy: Only change Specimen Src: to \"Line\" if  specified by physician order.    GRAM STAIN [[id number]] Collected:  01/01/18 1415    Order Status:  Completed Specimen:  Body Fluid Updated:  01/01/18 2024     Significant Indicator .     Source BF     Site Peritoneal Fluid     Gram Stain Result --     Rare WBCs.  No organisms seen.      Narrative:       Peritoneal Fluid          Assessment:  Active Hospital Problems    Diagnosis   • *Severe sepsis (CMS-HCC) [A41.9, R65.20]   • High anion gap metabolic acidosis [E87.2]   • Acute on chronic renal failure (CMS-[facility]) [N17.9, N18.9]   • Protein-calorie malnutrition, severe (CMS-HCC) [E43]   • 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]   • Influenza A [J10.1]   • Insulin dependent diabetes mellitus (CMS-HCC) [E11.9, Z79.4]   • Diarrhea [R19.7]   • Atrial fibrillation (CMS-HCC) [I48.91]   • CAP (community acquired pneumonia) [J18.9]   • Normocytic anemia [D64.9]       Plan:  MRSA sepsis   Afebrile  Blood cultures +12/29/17  Blood cultures 1/2 no growth   TTE negative on 12/30/17  Due to pacemaker and recent AV graft-  Needs DORETHA - on hold due to respiratory status  Continue ceftaroline (vanco BRYANT 2)  Anticipate 6 week course from date neg blood cxs  Stop date 2/14/2018    Respiratory failure  On HFNC  Respiratory status tenuous    Leukocytosis, worsening  Repeat blood cxs 1/6 - NGTD  DORETHA recommended as above  CXR no sig change 1/7-no effusion  CT head no acute change    Influenza A  Complete 5 days of Tamiflu    Cryptogenic cirrhosis  Leading to ascites  s/p paracentesis     Renal failure, not improving  Recent AV graft  Dose " adjust abx as needed  Getting HD    Diabetes mellitus  Keep BS under 150 to help control current infection    RASHAWN SHAH and Dr. Szymanski

## 2018-01-08 NOTE — PROGRESS NOTES
Nephrologist ordered  3 hours hd tx. Initiated at 1204 and terminated at 1240 as ordered. At 1230 Patient desaturation from 85 % to 79%. RT started Ambo bagging the Patient. BP 87/35,P 95. Patient is not responding. notified.T/O, rbo ,stop HD. And will reevaluate tomorrow.  Hd stopped.and blood rinsed back to Patient.   . See flow sheet  for details.  Report given to Cristela SHAH.

## 2018-01-08 NOTE — PROGRESS NOTES
[facility] Nephrology Progress Note               Author: Emigdio Mares M.D. Date & Time created: 1/8/2018  10:55 AM     Interval History:  77-year-old  male well known to our service who I have followed as an outpatient for years.  He has CKD stage V due to diabetes and hypertension.  We have anticipated eventual need for dialysis and already had placed an AV fistula in his right upper arm.  He routinely follows with this, was most recently seen 4 weeks ago.  At that time, he had creatinine of 4.8, hemoglobin of 10, normal electrolytes.  He was not uremic or fluid overloaded.  Hemodialysis has not yet been initiated.     The patient presented to the emergency room complaining of coughing for 2 or 3weeks.  He has been only productive over the last couple of days.  He became increasingly weak, unable to provide care for himself.  He could not get around.  He was not eating very well.     In the ER, he presented, he was normotensive with a blood pressure of 140/96, pulse 80.  He was mildly hypoxic on room air, was started on a little bit of oxygen.  Laboratory showed white count of 16 with a left shift.  He also has evidence potentially of fluid.  Chest x-ray suggests a possible pneumonia.     Laboratory results showed BUN 91, creatinine 5.3, potassium 5.2, was not fluid  overloaded on exam.  His lactic acid levels were elevated.  He is being admitted to the hospital under the hospitalist care.  He was given some  intravenous fluids.  We have been asked to follow him for CKD stage V + acidosis    Daily Nephrology Summary  12/29/17 - see in ER - IVF recommended.  Advanced CKD 5 - if no response to fluids will need to start dialysis  12/30/17 - placed on bicarb overnight.  SCreat down a bit.  K ok.  Bicarb improving.  Urine output 960cc  12/31/17 - Azotemia stable.  Not really expecting significant improvement.  On lots of O2 w Pnx.    BP low.    01/01/18 - Feels fine. Scr is the same. Non-oliguric. No  SOB.   01/02/18 - s/p  US guided paracentesis of RLQ. 4900 mL was removed and 1000 mL sent to lab. Scr is higher now. Did have some hypotension yesterday.  01/03/18 - Feels weak. No other renal events in the past 24 hrs.  01/04/18 - s/p HD 01/03/18 # 1. Tolerated well but low BP. No UF  01/05/18 - s/p HD yesterday #2 with Net UF removed 1000 ml. Pt is confused but able to follow simple commands  01/06/18 - S/p HD yesterday with net UF of 600 mL only given hypotension. O2 requirements still high.   01/07/18 - S/p PUF yesterday with net UF 2000 ml. No new overnight renal events.   01/08/18 - No PUF/HD yesterday.Creatinine up to 4.41.Lethargic.    Review of Systems   Unable to perform ROS: Mental acuity (Mental status)       Physical Exam   Constitutional: No distress.   HENT:   Head: Atraumatic.   Eyes: Pupils are equal, round, and reactive to light. No scleral icterus.   Neck:   RIJ HD CVC   Cardiovascular: Normal rate.  Exam reveals no friction rub.    No murmur heard.  Pulmonary/Chest: No respiratory distress. He has rales.   Coarse BS bilateral.   Abdominal: Soft. He exhibits no distension. There is no tenderness. There is no rebound.   Musculoskeletal: He exhibits no edema.   RUE AVF   Lymphadenopathy:     He has no cervical adenopathy.   Neurological: He is alert.   Lethargic   Skin: Skin is warm and dry.   Nursing note and vitals reviewed.      Labs:        Invalid input(s): [id number]      Recent Labs      01/06/18   0350  01/07/18   0435  01/08/18   0513   SODIUM  139  140  142   POTASSIUM  3.7  3.4*  3.3*   CHLORIDE  106  106  109   CO2  21  19*  18*   BUN  35*  49*  67*   CREATININE  2.90*  3.44*  4.41*   CALCIUM  8.1*  8.3*  8.4*     Recent Labs      01/06/18   0350  01/07/18   0435  01/08/18   0513   PREALBUMIN   --    --   3.0*   GLUCOSE  205*  252*  226*     Recent Labs      01/06/18   0350  01/07/18   0435  01/08/18   0513   RBC  2.51*  2.46*  2.49*   HEMOGLOBIN  7.6*  7.3*  7.3*   HEMATOCRIT   22.7*  22.7*  23.5*   PLATELETCT  75*  83*  81*     Recent Labs      18   0350  18   0435  18   0513   WBC  20.1*  21.3*  21.8*           Hemodynamics:  Temp (24hrs), Av.9 °C (96.6 °F), Min:35.9 °C (96.6 °F), Max:35.9 °C (96.6 °F)  Temperature: 35.9 °C (96.6 °F), Monitored Temp: 35.5 °C (95.9 °F)  Pulse  Av.8  Min: 69  Max: 130Heart Rate (Monitored): 77  NIBP: (!) 102/38     Respiratory:    Respiration: 12, Pulse Oximetry: 93 %, O2 Daily Delivery Respiratory : Highflow Nasal Cannula     Work Of Breathing / Effort: Mild  RUL Breath Sounds: Coarse Crackles, RML Breath Sounds: Coarse Crackles;Diminished, RLL Breath Sounds: Diminished, EMERSON Breath Sounds: Coarse Crackles, LLL Breath Sounds: Diminished  Fluids:    Intake/Output Summary (Last 24 hours) at 18 1055  Last data filed at 18 0800   Gross per 24 hour   Intake              740 ml   Output              589 ml   Net              151 ml     Weight: 68.7 kg (151 lb 7.3 oz)  GI/Nutrition:  No orders of the defined types were placed in this encounter.    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 ? New ESRD.  -- baseline creatinine 4.8.Now acute rise secondary  to hypoperfusion  -- Initiated HD 18 via temporary dialysis catheter. Left AVG immature for use.  -- monitor I/Os  -- avoid contrast    # Severe Sepsis,MRSA    # Influenza A    # Acidosis  -- metabolic  -- corrected with HD    # PNA,community acquired  -- on O2 + ABX    # Anemia  -- chronic    -- no overt bleeding  -- presumed due to CKD  -- give EPOGEN       # CKD-MBD  -- On Phoslo 667mg TID w meals    # BP   -- on low side with infection    # Thrombocytopenia.srtable    # Cryptogenic cirrhosis     # Atrial fibrillation    # S/P PPM    # AVG    PLAN:  Dialysis today and MWF.UF as tolerated  Antibiotics per ID  No dietary protein restrictions  Epogen  Check Iron,TIBC,Feritin  Check PTH and Vitamin D  Discussed with RN    PMH/FH/SH reviewed    Reviewed items::  Labs reviewed, Medications reviewed and Radiology images reviewed

## 2018-01-08 NOTE — PROCEDURES
Procedure Note    Date: 1/8/2018  Time: 1300    Procedure: Intubation    Indication: Acute hypoxic respiratory failure, MRSA sepsis and influenza A  Consent: Emergency/implied    Procedure: A time-out was performed. Airway assessed and patient found to have Mallampati class 1.  Equipment prepared and RT/RN at bedside. Patient pre-oxygenated with 100% FiO2.  After medication delivery, a Glidescope was used to acheive direct visualization and a grade 1 view. A 8.0 ETT was placed with 1 attempt(s) into the trachea directly through the vocal cords. Appropriate placement confirmed by direct visualization, bilateral chest and epigastric auscultation, misting in the ETT, and ETCO2 detector. ETT secured in place at 26 cm at the teeth and patient connected to ventilator. Sedation/analgesia continued as appropriate. Patient tolerated procedure well without any difficulties and remains in care of bedside nurse and respiratory therapy. CXR will be performed to confirm appropriate placement of ETT.    Medications: Etomidate 20mg IV and Rocuronium 60mg IV  Complications: none  CXR: ETT about 4 cm above the radha    Bonnie Denny MD  Pulmonary and Critical Care Medicine

## 2018-01-08 NOTE — PROGRESS NOTES
Pt desaturating at the beginning of dialysis.  Bagging pt while Dr. Denny was paged.  Called son and confirmed wish to intubate communicating that he most likely will not be able to wean off the ventilator.  Son verbalized understanding.

## 2018-01-08 NOTE — PROCEDURES
Procedure Note    Date: 1/8/2018  Time: 1315    Procedure: Central Venous Line placement  Site: left internal jugular vein    Indication: septic shock, need for access  Consent: Informed consent obtained from patient or designated decision maker after explaining the benefits/risks of the procedure including but not limited to bleeding, infection, nerve or other deep structure injury, pneumothorax/hemothorax, arrythmia, or death. Patient or surrogate expressed understanding and agreement and signed consent which can be found in the patient's chart.    Procedure: After obtaining consent, a time-out was performed. Appropriate site confirmed with ultrasound and patient positioned, prepped, and draped in sterile fashion. All those present wearing cap and mask and those physically participating remained adhering to sterile fashion with cap, mask, gloves, and gown. 5 mL of local anesthetic injected (1% lidocaine without epinephrine) achieving appropriate comfort level for patient. Vein localized and accessed using continuous ultrasound guidance and a 7 Fr 18cm triple lumen catheter placed using Seldinger technique. Able to aspirate dark, non-pulsatile blood through each lumen and sterile saline flushed easily before capping. Line secured and dressed in sterile fashion. Patient tolerated procedure well without any difficulties and remains in care of bedside nurse. CXR will be performed to confirm appropriate placement for internal jugular or subclavian CVLs.    EBL: minimal  Complications: none  CXR: left IJ projecting over SVC, no PTX    Bonnie Denny MD  Pulmonary and Critical Care Medicine

## 2018-01-08 NOTE — CARE PLAN
Problem: Communication  Goal: The ability to communicate needs accurately and effectively will improve  Outcome: PROGRESSING SLOWER THAN EXPECTED    Intervention: [facility] patient and significant other/support system to call light to alert staff of needs   01/08/18 0716   OTHER   Oriented to: All of the Following : Location of Bathroom, Visiting Policy, Unit Routine, Call Light and Bedside Controls, Bedside Rail Policy, Smoking Policy, Rights and Responsibilities, Bedside Report, and Patient Education Notebook     Frequent orientation and education provided.      Problem: Safety  Goal: Will remain free from falls  Outcome: PROGRESSING AS EXPECTED  Pt will remain free from falls. Pt has been placed in cardiac chair position, but not ambulated from bed. Pt does become restless when turned to Rt side.

## 2018-01-09 NOTE — PROGRESS NOTES
Pt currently with luis manuel hugger in place, intubated and maxed out on ventilator with O2sat in low 80s since intubation. Propofol at 5mcg and levophed at 10mcg. New orders for partial code: no CPR and no defibrillation. Son and spouse at bedside earlier this afternoon.

## 2018-01-09 NOTE — PROGRESS NOTES
MD and son Lui discussed and decided to go comfort care for his father. Morphine admin and tube pulled, all drips discontinued, son at bedside.

## 2018-01-09 NOTE — CONSULTS
DATE OF SERVICE:  01/08/2018    REQUESTING PHYSICIAN:  Karl Crowley DO    REASON FOR CONSULTATION:  Worsening hypoxic respiratory failure due to   influenza A, requiring urgent intubation and ventilator management.    HISTORY OF PRESENT ILLNESS:  The patient is a 77-year-old male with a past   medical history significant for hypertension, BPH, dyslipidemia, type 2   diabetes, and stage V chronic kidney disease who was admitted on the 12/29th   for worsening generalized weakness and falls at home.  His initial workup did   show that he had worsening renal failure with a significant metabolic acidosis   and a serum bicarbonate of approximately 5 with severe leukocytosis of   16,000.  Chest x-ray showed bilateral multifocal pneumonia and influenza A was   positive.  Eventually, blood cultures did come back showing MRSA as well as   he was started on dialysis for his severe acidemia.  He was admitted to the   ICU due to his severe metabolic acidosis and due to his respiratory failure   from influenza A, was started on oxygen therapy with RT protocols.  Over the   course of the next week, the patient had continued to have worsening oxygen   demands and needs.  He had remained coherent for the last several days.    However, this morning, the patient became more obtunded, dropped his oxygen   saturations while he was started on hemodialysis, required bagging and after   speaking to the patient's son who stated that his father was still a full   code, proceeded with urgent intubation at that time.  We were consulted due to   the worsening respiratory failure.    REVIEW OF SYSTEMS:  Unobtainable, as the patient was obtunded at the time of   the evaluation.    PAST MEDICAL HISTORY:  Includes:  1.  Alpha 1 antitrypsin deficiency.  2.  Ascites.  3.  Stage V chronic kidney disease.  4.  Vitamin D deficiency.  5.  Prostate cancer.  6.  Type 2 diabetes.  7.  Hypertension.  8.  Bilateral cataracts.    PAST SURGICAL  HISTORY:  Includes:  1.  AV fistula creation in 11/2017.  He has also undergone a radical   prostatectomy in 2003, as well as a cataract surgery.  2.  Cholecystectomy.  3.  Open reduction of the left leg fracture.  4.  Tonsillectomy.    SOCIAL HISTORY:  The patient is a former smoker, smoking approximately 2 packs   a day for approximately 40 years, but quit in 1990.  He does not use alcohol,   illegal drugs or marijuana.  He is , his wife was also sick during   this time and has now been transferred to a skilled nursing facility.  He   lives here in [place].    FAMILY HISTORY:  No kidney disease, liver disease or lung disease notable in   the patient's son.    OUTPATIENT MEDICATIONS:  Include amlodipine 10 mg daily, Cardura 2 mg daily,   Lasix 20 mg daily, Glargine insulin 5-7 units in the evening, Cozaar 100 mg   daily, Actos 15 mg daily, Crestor 20 mg daily, sodium bicarbonate tablets 650   mg twice daily, spironolactone 25 mg daily.    ALLERGIES:  LISTED TO VALSARTAN.    His inpatient medications were reviewed extensively.    PHYSICAL EXAMINATION:  Drip Levophed is running at 20 mcg.  VITAL SIGNS:  Blood pressures range from 70s-100s/30s-40s, heart rate in the   80s-90s, respiratory rate of 12-18, T-max of 96.6, oxygenation of 80% on 60   liters high flow with 100% FIO2.  GENERAL:  The patient appears chronically ill, very cachectic in appearance,   gasping for breath.  HEENT:  PERRLA.  EOMI were not assessed.  Conjunctivae are pale.  Sclerae   anicteric.  Oropharynx has good dentition.  Moist mucous membranes, clear   posterior pharynx.  NECK:  Supple.  No adenopathy or thyromegaly appreciated.  CARDIOVASCULAR:  Regular rate and rhythm without murmur, rub, or gallop.  LUNGS:  Diminished throughout.  No wheezes noted.  ABDOMEN:  Distended and firm.  Possible fluid wave appreciated.  No mass   appreciated.  Bowel sounds are present.  No tenderness.  EXTREMITIES:  Moving all extremities x4, 2+ dorsal pedal  pulses, trace to 1+   pedal edema.  No clubbing or cyanosis.  Good cap refill.  NEUROLOGIC:  As noted, the patient is obtunded.  He does respond to painful   stimuli.  He does have a cough and gag reflex as well as a corneal reflex.  He   appears to have symmetrical facial expressions.  No focal deficits at this   time.    LABORATORY DATA:  White count of 21.8, hemoglobin of 7.3, hematocrit of 23.5   and platelets of 83.  Sodium of 142, potassium of 3.3, chloride of 109, serum   bicarbonate of 18, glucose of 226, BUN of 67, creatinine of 4.41, calcium of   8.4.  Chest x-ray shows bilateral infiltrates with an ET tube approximately 4   cm above the radha with a left IJ projection over the SVC.    IMPRESSION:  1.  Acute hypoxic respiratory failure related to influenza A.  2.  Acute influenza A pneumonia.  3.  Acute on chronic kidney injury, now undergoing hemodialysis.  4.  Acute leukocytosis.  5.  Normocytic anemia with no apparent blood loss.  6.  Hypokalemia.  7.  Ongoing anion gap metabolic acidosis.  8.  Severe sepsis with septic shock, requiring vasopressors.  9.  History of hypertension.  10.  History of type 2 diabetes.  11.  History of prostate cancer status post prostatectomy.  12.  History of dyslipidemia.    PLAN:  This patient is critically ill at this time requiring urgent intubation   as well as central line placed for shock.  The patient has been having   continuous problems with oxygenation despite changing I:E times as well as   PEEP settings.  If this continues, may need to change his ventilator settings   to APRV; however, for now, we will continue his current settings with lower   PEEP settings to allow him to exhale fully.  The patient has been started on   vasopressor therapy and dialysis has been stopped due to his severe   hypotension in the meantime.  We will continue antibiotic therapy per   infectious disease recommendations that include ceftaroline.  For now, we will   continue to monitor  all electrolytes as well as his glucose and replete and   manage as needed.    Thank you for allowing the pulmonary critical care team to participate in the   patient's care.  We will continue to follow along.    This case was discussed at length with the ICU nursing staff, respiratory   therapist, pharmacist, patient's sons as well as the hospitalist, Dr. Szymanski.    This patient is critically ill.    Critical care time is approximately 82 minutes in direct critical care as well   as extensive data review.  This does not include any procedures and does not   overlap with other physicians.       ____________________________________     MD LV STAFFORD / DIONICIO    DD:  01/08/2018 14:37:39  DT:  01/08/2018 16:54:10    D#:  [id number]  Job#:  [id number]

## 2018-01-09 NOTE — PROGRESS NOTES
Pt declared legally dead by two nurses at 0200. Son left for home, information about  homes was given to son.

## 2018-01-09 NOTE — CARE PLAN
Problem: Ventilation Defect:  Goal: Ability to achieve and maintain unassisted ventilation or tolerate decreased levels of ventilator support    Intervention: Support and monitor invasive and noninvasive mechanical ventilation  Adult Ventilation Update    Total Vent Days: 1  Vent:  x 18, 100% +3    Patient Lines/Drains/Airways Status    Active Airway     Name: Placement date: Placement time: Site: Days:    Airway Group ET Tube 8.0 01/08/18   1300      1              Events/Summary/Plan: Patient intubated. First day. Doesn't tolerate higher PEEP than 3.

## 2018-01-09 NOTE — PROGRESS NOTES
CRITICAL CARE MEDICINE   BRIEF PROGRESS NOTE    Date of service: 1/8/2018  Time: 1047 pm      I was notified by respiratory therapist as well as bedside RN regarding patient's poor response to ongoing assist control ventilation and his continued declining status. The patient had been maximized on vasopressor support and despite attempting APRV, Flolan, and then eventually pressure control ventilation, the patient continues to deteriorate. Repeat arterial blood gas showed lactate over 8. The patient's son Lui was called into the bedside and has agreed that patient should be moved to comfort care measures only. All family members have already visited and have agreed to proceed with comfort care. After initially attempting above maneuvers, the family has agreed to pursue extubation at this time. I have instructed the nurses to begin morphine infusion in we may need to add benzodiazepines frequently. However I do expect the patient to die fairly soon after extubation.    I spent 50 min in reviewing the patient's condition, physical examination, laboratory and imaging data, prior documentation, in discussion with 50, and in formulating an assessment/plan.    Critical Care time: 50 min. No time overlap, procedures not included in time.  [id number]    Francisco Javier Holley D.O.  Critical Care Medicine

## 2018-01-11 LAB
BACTERIA BLD CULT: NORMAL
BACTERIA BLD CULT: NORMAL
SIGNIFICANT IND 70042: NORMAL
SIGNIFICANT IND 70042: NORMAL
SITE SITE: NORMAL
SITE SITE: NORMAL
SOURCE SOURCE: NORMAL
SOURCE SOURCE: NORMAL

## 2018-01-23 NOTE — DISCHARGE SUMMARY
DISCHARGE/DEATH SUMMARY    Patient was initially admitted on 2017 and patient declared  at   2 a.m. on 2018.    ADMISSION DIAGNOSIS:  Pneumonia with influenza A.    SECONDARY DIAGNOSES:  1.  Hypoxemic respiratory failure.  2.  Hypertension.  3.  Hyperlipidemia.  4.  Type 2 diabetes mellitus.  5.  Stage V acute kidney disease.  6.  Abdominal ascites.  7.  Alpha-1 antitrypsin deficiency.  8.  Prostate cancer.  9.  Shock.    CONSULTANTS:  Included nephrology, palliative care, infectious disease and   pulmonary service.    PROCEDURES:  Included  [facility] with Dr. Volodymyr Epperson.  Also, patient had   intubation on 2018, and central line placement on 2018.  DORETHA   performed was negative for vegetations.    HOSPITAL COURSE:  This is a 77-year-old with multiple medical problems   presented with hypertension, dyslipidemia, stage V chronic kidney disease with   a mature AV fistula with weakness.  This had been ongoing for a number of   days.  He had a nonproductive cough over several days.  He looked very ill   with evidence of lactic acidosis of 4.5 and bicarbonate of only 5.  White   count was over 16,000.  Chest x-ray showed multifocal pneumonia with influenza   A noted.  Patient was seen by nephrology and IV fluids were given.  He was   sent to ICU for higher level of care where he required intubation and   mechanical ventilation.  Patient's acidosis did improve slightly.  He did   appear to have influenza A positivity.  He has a positive troponin level as   well.  He was in atrial fibrillation and had significant diarrhea.  His   diabetes was treated with insulin.  He was started on Tamiflu and   broad-spectrum antibiotics.  Bicarbonate was also given due to his severe   acidosis.  Patient was felt to continue to have followup with nephrology and   he has essentially hemofiltration.  Blood pressure medications were held.  The   patient did have irregularly irregular heart rhythm noted as well.   He had   septic shock developed where the critical care team was consulted.  Influenza   A that became positive on the 29th where Tamiflu was started.  Ongoing   acidosis continued.  He had poor to no urinary output as expected.  Rocephin   and azithromycin were used initially for antibiotics.  Bicarbonate drip was   also started.  Subsequent treatment included ongoing nephrology evaluation.    He continued to look ill and impaired.  There is concern about diarrhea and C.   diff, which was ruled out.  By the 30th, patient continued to have ongoing   worsening low blood pressure with elevated lactic acidosis.  Continued ICU   treatment was noted.  He had continued rhonchi on physical exam.  Infectious   disease is involved on the 31st where patient did have MRSA bacteremia noted   with the influenza A.  Pacemaker in place, concern of it possibly be infected.    Ceftaroline was started.  TTE negative for endocarditis.  High lactic   acidosis still noted with the bacteremia.  By the 1st of 2018, cultures still   have infected AV shunt and graft as well as positive echo as noted above.  DORETHA   was recommended.  By the 2nd, patient continued to have 5 liters tap from his   abdominal area, remained on broad-spectrum antibiotics, received 5 days of   Tamiflu.  No overall improvement noted.  By January 3rd, patient continued to   improve slightly to the point of extubation, he was tolerating diet, was not   well enough to swallow.  Discussion was being held at that point regarding   aggressiveness of care.  Continue bicarbonate with dialysis was continued to   be recommended by nephrology.  By the 3rd, the patient continued to have poor   mental status with murmur.  Paracentesis was not felt to be positive, but   blood cultures were positive on the 29th.  Dr. Epperson performed NTDC on   01/03/2018.  By the 4th, patient continued to have poor urine output..    He was confused.  He continued to have treatment for    community-acquired pneumonia and Ceftaroline.  Blood sugars were managed.    High anion gap acidosis continued.  DORETHA performed was negative for   vegetations.  By January 5th, patient continued to have poor urine output.  He   received 7 days of Ceftaroline.  He remained a full code at that time.  We   did have recommendations for limitation of code.  By the 6th, the patient also   continued to have low blood pressure with acidosis.  He was confused, but   following commands slightly, still full code at that point was suggested.  By   the 7th, patient continued to have ongoing pneumonia, overall poor response to   treatment.  He appeared cachectic with his ongoing murmur.  Atrial   fibrillation remained problem.  By 01/08/2018, critical care team was involved   for consultation for intubation and mechanical ventilation due to influenza A   with pneumonia.  Patient developed severe ARDS and attempt was made at   pressure control as well as APRV, minimal improvement noted.  Despite attempts   at dialysis, he continued to have acidosis.      By 01/08/2018, family had agreed   that there would be limitation in overall care.  He has high doses of   vasopressors at that point and code status had been adjusted, no CPR, no   defibrillation.  By 01/08/2018, I was called to the bedside due to poor   response on assist control, APRV as well as Flolan.  He continued to   deteriorate.  The patient's son, Lui at that point wanted to move the patient   towards comfort care measures.  This was performed.  By 01/09/2018, patient   was on morphine, all tubes were discontinued and patient was legally declared   dead by 2 nurses at 02:00 a.m. on 01/09/2018.    FINAL DIAGNOSES: Death due to pneumonia with influenza A..       ____________________________________     Francisco Javier Holley, DO    MSD / NTS    DD:  01/23/2018 07:07:51  DT:  01/23/2018 08:59:57    D#:  [id number]  Job#:  [id number]

## 2022-06-09 NOTE — PROGRESS NOTES
[facility] Nephrology Progress Note               Author: Nacho Ram Date & Time created: 12/31/2017  6:50 AM     Interval History:  77-year-old  male well known to our service who I have followed as an outpatient for years.  He has CKD stage V due to diabetes and hypertension.  We have anticipated eventual need for dialysis and already had placed an AV fistula in his right upper arm.  He routinely follows with this, was most recently seen 4 weeks ago.  At that time, he had creatinine of 4.8, hemoglobin of 10, normal electrolytes.  He was not uremic or fluid overloaded.  Hemodialysis has not yet been initiated.     The patient presented to the emergency room complaining of coughing for 2 or 3weeks.  He has been only productive over the last couple of days.  He became increasingly weak, unable to provide care for himself.  He could not get around.  He was not eating very well.     In the ER, he presented, he was normotensive with a blood pressure of 140/96, pulse 80.  He was mildly hypoxic on room air, was started on a little bit of oxygen.  Laboratory showed white count of 16 with a left shift.  He also has evidence potentially of fluid.  Chest x-ray suggests a possible pneumonia.     Laboratory results showed BUN 91, creatinine 5.3, potassium 5.2, was not fluid  overloaded on exam.  His lactic acid levels were elevated.  He is being admitted to the hospital under the hospitalist care.  He was given some  intravenous fluids.  We have been asked to follow him for CKD stage V + acidosis    Daily Nephrology Summary  12/29/17 - see in ER - IVF recommended.  Advanced CKD 5 - if no response to fluids will need to start dialysis  12/30/17 - placed on bicarb overnight.  SCreat down a bit.  K ok.  Bicarb improving.  Urine output 960cc  12/31/17 - Azotemia stable.  Not really expecting significant improvement.  On lots of O2 w Pnx.    BP low.         Review of Systems   Constitutional: Positive for  malaise/fatigue.   HENT: Negative.    Eyes: Negative.    Respiratory: Positive for cough and sputum production.    Cardiovascular: Negative.    Gastrointestinal: Negative.    Genitourinary: Negative.    Musculoskeletal: Negative.    Skin: Negative.        Physical Exam   Constitutional: No distress.   HENT:   Head: Atraumatic.   Eyes: No scleral icterus.   Neck: No JVD present.   Cardiovascular: Normal rate.  Exam reveals no friction rub.    Pulmonary/Chest: No respiratory distress.   Abdominal: Soft. There is no tenderness.   Musculoskeletal: He exhibits no edema.   Neurological: He is alert.   Skin: Skin is warm and dry.       Labs:  Recent Labs      12/29/17   1728   ISTATAPH  7.285*   ISTATAPCO2  15.8*   ISTATAPO2  57*   ISTATATCO2  8*   ZYRDDIN2YDK  87*   ISTATARTHCO3  7.5*   ISTATARTBE  -17*   ISTATTEMP  36.2 C   ISTATFIO2  25   ISTATSPEC  Arterial   ISTATAPHTC  7.296*   [id number]  54*     Recent Labs      12/29/17   0926  12/29/17   1203  12/29/17   2200   TROPONINI  0.06*  0.08*  0.05*   BNPBTYPENAT  108*   --    --      Recent Labs      12/30/17   0150   12/30/17   0950  12/30/17   1630  12/31/17   0300   SODIUM  137   < >  137  137  135   POTASSIUM  4.7   < >  4.0  4.2  3.8   CHLORIDE  111   < >  109  106  102   CO2  11*   < >  13*  15*  17*   BUN  89*   < >  91*  92*  92*   CREATININE  4.80*   < >  5.16*  5.02*  4.98*   PHOSPHORUS  6.2*   --   5.6*   --    --    CALCIUM  7.4*   < >  7.0*  7.0*  6.8*    < > = values in this interval not displayed.     Recent Labs      12/29/17   0926   12/30/17   0150   12/30/17   0950  12/30/17   1630  12/31/17   0300   ALTSGPT  11   --   9   --    --    --   11   ASTSGOT  35   --   28   --    --    --   31   ALKPHOSPHAT  98   --   75   --    --    --   79   TBILIRUBIN  0.5   --   0.3   --    --    --   0.3   GLUCOSE  141*   < >  101*   < >  98  94  90    < > = values in this interval not displayed.     Recent Labs      12/29/17   0926  12/30/17   0950  12/31/17   5602   17   0550   RBC  3.24*  2.86*   --   3.01*   HEMOGLOBIN  9.7*  8.6*   --   8.9*   HEMATOCRIT  30.2*  26.1*   --   26.6*   PLATELETCT  163*  106*   --   122*   IRON   --    --   <10*   --    FERRITIN   --    --   226.4   --    TOTIRONBC   --    --   157*   --      Recent Labs      17   0926  17   0150  17   0950  17   0300  17   0550   WBC  16.6*   --   12.5*   --   14.4*   NEUTSPOLYS  63.40   --    --    --    --    LYMPHOCYTES  4.50*   --    --    --    --    MONOCYTES  7.10   --    --    --    --    EOSINOPHILS  0.00   --    --    --    --    BASOPHILS  0.00   --    --    --    --    ASTSGOT  35  28   --   31   --    ALTSGPT  11  9   --   11   --    ALKPHOSPHAT  98  75   --   79   --    TBILIRUBIN  0.5  0.3   --   0.3   --            Hemodynamics:  Temp (24hrs), Av.9 °C (98.5 °F), Min:36.8 °C (98.2 °F), Max:37.2 °C (99 °F)  Temperature: 36.9 °C (98.4 °F), Monitored Temp: 36.9 °C (98.42 °F)  Pulse  Av.5  Min: 74  Max: 130Heart Rate (Monitored): (!) 114  NIBP: (!) 93/43     Respiratory:    Respiration: (!) 21, Pulse Oximetry: 95 %, O2 Daily Delivery Respiratory : Highflow Nasal Cannula     PEP/CPT Method: Positive Airway Pressure Device, Work Of Breathing / Effort: Moderate  RUL Breath Sounds: Rhonchi, RML Breath Sounds: Diminished, RLL Breath Sounds: Diminished, EMERSON Breath Sounds: Rhonchi, LLL Breath Sounds: Diminished  Fluids:    Intake/Output Summary (Last 24 hours) at 17 0650  Last data filed at 17 0200   Gross per 24 hour   Intake             4435 ml   Output             1005 ml   Net             3430 ml        GI/Nutrition:  Orders Placed This Encounter   Procedures   • DIET ORDER     Standing Status:   Standing     Number of Occurrences:   1     Order Specific Question:   Diet:     Answer:   Clear Liquid [10]     Order Specific Question:   Diet:     Answer:   Diabetic [3]     Medical Decision Making, by Problem:  Active Hospital Problems    Diagnosis    • *Severe sepsis (CMS-HCC) [A41.9, R65.20]   • High anion gap metabolic acidosis [E87.2]   • Acute on chronic renal failure (CMS-HCC) [N17.9, N18.9]   • 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]   • Influenza A [J10.1]   • Insulin dependent diabetes mellitus (CMS-HCC) [E11.9, Z79.4]   • Diarrhea [R19.7]   • Atrial fibrillation (CMS-[facility]) [I48.91]   • CAP (community acquired pneumonia) [J18.9]   • Normocytic anemia [D64.9]       IMPRESSION/PLAN    # CKD 5   -- at baseline creatinine as outpatient now (4.8)  -- no uremia at this time  -- continue with bicarb though could be PO now  -- monitor I/Os  -- avoid contrast    # Acidosis  -- metabolic  -- continue bicarb - change it to PO    # PNX  -- on O2 + ABX    # Anemia  -- chronic   -- no overt bleeding  -- presumed due to CKD 5  -- give EPOGEN while here  -- check iron levels    # Hypocalcemia  -- secondary HPTH likely  -- add Phoslo 667mg TID w meals    # BP   -- on low side with infection  -- no indication for fluid removal at this time despite increasing O2 needs            Quality-Core Measures     100

## 2024-06-13 NOTE — PROGRESS NOTES
Patient transported to CT via hospital bed on 15L via [facility].  RN and CNA present for transportation.  Patient tolerated procedure and transport well.     Patient trends BPs at home-- states they are typicall 100-120's/70-80's. Continue current medication regimen.

## (undated) DEVICE — SLEEVE, VASO, THIGH, MED

## (undated) DEVICE — KIT SURGIFLO W/OUT THROMBIN - (6EA/CA)

## (undated) DEVICE — SENSOR SPO2 NEO LNCS ADHESIVE (20/BX) SEE USER NOTES

## (undated) DEVICE — GOWN WARMING STANDARD FLEX - (30/CA)

## (undated) DEVICE — TOWELS CLOTH SURGICAL - (4/PK 20PK/CA)

## (undated) DEVICE — SPONGE GAUZESTER 4 X 4 4PLY - (128PK/CA)

## (undated) DEVICE — GLOVE BIOGEL SZ 6 PF LATEX - (50EA/BX 4BX/CA)

## (undated) DEVICE — Device

## (undated) DEVICE — ELECTRODE DUAL RETURN W/ CORD - (50/PK)

## (undated) DEVICE — SODIUM CHL IRRIGATION 0.9% 1000ML (12EA/CA)

## (undated) DEVICE — GELAQUASONIC 100 ULTRASOUND - 48/BX 20GM STERILE FOIL POUCH

## (undated) DEVICE — SUTURE 3-0 VICRYL PLUS SH - 8X 18 INCH (12/BX)

## (undated) DEVICE — KIT ROOM DECONTAMINATION

## (undated) DEVICE — BAG DECANTER (50EA/CS)

## (undated) DEVICE — SYRINGE SAFETY 10 ML 18 GA X 1 1/2 BLUNT LL (100/BX 4BX/CA)

## (undated) DEVICE — SUTURE 6-0 PROLENE BV-1 D/A 24 (36PK/BX)"

## (undated) DEVICE — MASK, LARYNGEAL AIRWAY #5

## (undated) DEVICE — LACTATED RINGERS INJ 1000 ML - (14EA/CA 60CA/PF)

## (undated) DEVICE — SOD. CHL. INJ. 0.9% 250 ML - (36/CA 50CA/PF)

## (undated) DEVICE — TUBING CLEARLINK DUO-VENT - C-FLO (48EA/CA)

## (undated) DEVICE — DRAPE LARGE 3 QUARTER - (20/CA)

## (undated) DEVICE — MASK ANESTHESIA ADULT  - (100/CA)

## (undated) DEVICE — SYRINGE 20 ML LL (50EA/BX 4BX/CA)

## (undated) DEVICE — NEPTUNE 4 PORT MANIFOLD - (20/PK)

## (undated) DEVICE — SET LEADWIRE 5 LEAD BEDSIDE DISPOSABLE ECG (1SET OF 5/EA)

## (undated) DEVICE — ELECTRODE 850 FOAM ADHESIVE - HYDROGEL RADIOTRNSPRNT (50/PK)

## (undated) DEVICE — SUTURE 5-0 PROLENE C-1 D/A 24 (36PK/BX)"

## (undated) DEVICE — CLOSURE SKIN STRIP 1/2 X 4 IN - (STERI STRIP) (50/BX 4BX/CA)

## (undated) DEVICE — GLOVE BIOGEL SZ 8 SURGICAL PF LTX - (50PR/BX 4BX/CA)

## (undated) DEVICE — PROTECTOR ULNA NERVE - (36PR/CA)

## (undated) DEVICE — STERI STRIP COMPOUND BENZOIN - TINCTURE 0.6ML WITH APPLICATOR (40EA/BX)

## (undated) DEVICE — BANDAGE ELASTIC 4 HONEYCOMB - 4"X5YD LF (20/CA)"

## (undated) DEVICE — GLOVE BIOGEL SZ 7.5 SURGICAL PF LTX - (50PR/BX 4BX/CA)

## (undated) DEVICE — SUTURE 4-0 30CM STRATAFIX SPIRAL PS-2 (12EA/BX)

## (undated) DEVICE — SUTURE GENERAL

## (undated) DEVICE — DRAPE LOWER EXTREMETY - (6/CA)

## (undated) DEVICE — KIT ANESTHESIA W/CIRCUIT & 3/LT BAG W/FILTER (20EA/CA)

## (undated) DEVICE — SUTURE 4-0 SILK 12 X 18 INCH - (36/BX)

## (undated) DEVICE — SET EXTENSION WITH 2 PORTS (48EA/CA) ***PART #2C8610 IS A SUBSTITUTE*****

## (undated) DEVICE — SUTURE CV

## (undated) DEVICE — DRESSING TRANSPARENT FILM TEGADERM 4 X 4.75" (50EA/BX)"

## (undated) DEVICE — CHLORAPREP 26 ML APPLICATOR - ORANGE TINT(25/CA)

## (undated) DEVICE — CANISTER SUCTION 3000ML MECHANICAL FILTER AUTO SHUTOFF MEDI-VAC NONSTERILE LF DISP  (40EA/CA)

## (undated) DEVICE — CLIP SM INTNL HRZN TI ESCP LGT - (24EA/PK 25PK/BX)

## (undated) DEVICE — BLADE SURGICAL #11 - (50/BX)

## (undated) DEVICE — GOWN SURGEONS LARGE - (32/CA)

## (undated) DEVICE — PAD PREP 24 X 48 CUFFED - (100/CA)

## (undated) DEVICE — SUCTION INSTRUMENT YANKAUER BULBOUS TIP W/O VENT (50EA/CA)

## (undated) DEVICE — COVER PROBE STERILE CONE (12EA/CA)

## (undated) DEVICE — PACK MINOR BASIN - (2EA/CA)

## (undated) DEVICE — CLIP MED INTNL HRZN TI ESCP - (25/BX)

## (undated) DEVICE — HEAD HOLDER JUNIOR/ADULT